# Patient Record
Sex: MALE | Race: WHITE | NOT HISPANIC OR LATINO | Employment: OTHER | URBAN - METROPOLITAN AREA
[De-identification: names, ages, dates, MRNs, and addresses within clinical notes are randomized per-mention and may not be internally consistent; named-entity substitution may affect disease eponyms.]

---

## 2017-02-13 ENCOUNTER — AMBULATORY - HEALTHEAST (OUTPATIENT)
Dept: ADMINISTRATIVE | Facility: REHABILITATION | Age: 52
End: 2017-02-13

## 2017-02-13 DIAGNOSIS — M25.511 RIGHT SHOULDER PAIN: ICD-10-CM

## 2017-02-13 DIAGNOSIS — Z98.890 STATUS POST ROTATOR CUFF SURGERY: ICD-10-CM

## 2017-04-07 ENCOUNTER — AMBULATORY - HEALTHEAST (OUTPATIENT)
Dept: ADMINISTRATIVE | Facility: REHABILITATION | Age: 52
End: 2017-04-07

## 2017-04-07 DIAGNOSIS — M25.511 PAIN IN RIGHT SHOULDER: ICD-10-CM

## 2018-02-15 ENCOUNTER — APPOINTMENT (OUTPATIENT)
Dept: MRI IMAGING | Facility: CLINIC | Age: 53
End: 2018-02-15
Attending: EMERGENCY MEDICINE
Payer: COMMERCIAL

## 2018-02-15 ENCOUNTER — APPOINTMENT (OUTPATIENT)
Dept: CT IMAGING | Facility: CLINIC | Age: 53
End: 2018-02-15
Attending: EMERGENCY MEDICINE
Payer: COMMERCIAL

## 2018-02-15 ENCOUNTER — HOSPITAL ENCOUNTER (EMERGENCY)
Facility: CLINIC | Age: 53
Discharge: HOME | End: 2018-02-15
Attending: EMERGENCY MEDICINE | Admitting: EMERGENCY MEDICINE
Payer: COMMERCIAL

## 2018-02-15 VITALS
WEIGHT: 240 LBS | RESPIRATION RATE: 13 BRPM | TEMPERATURE: 97.9 F | HEIGHT: 67 IN | DIASTOLIC BLOOD PRESSURE: 98 MMHG | OXYGEN SATURATION: 95 % | SYSTOLIC BLOOD PRESSURE: 161 MMHG | BODY MASS INDEX: 37.67 KG/M2

## 2018-02-15 DIAGNOSIS — R51.9 NONINTRACTABLE HEADACHE, UNSPECIFIED CHRONICITY PATTERN, UNSPECIFIED HEADACHE TYPE: ICD-10-CM

## 2018-02-15 DIAGNOSIS — K04.7 DENTAL INFECTION: ICD-10-CM

## 2018-02-15 LAB
ALBUMIN SERPL-MCNC: 3.4 G/DL (ref 3.4–5)
ALP SERPL-CCNC: 119 U/L (ref 40–150)
ALT SERPL W P-5'-P-CCNC: 24 U/L (ref 0–70)
ANION GAP SERPL CALCULATED.3IONS-SCNC: 9 MMOL/L (ref 3–14)
APTT PPP: 26 SEC (ref 22–37)
AST SERPL W P-5'-P-CCNC: 13 U/L (ref 0–45)
BASOPHILS # BLD AUTO: 0 10E9/L (ref 0–0.2)
BASOPHILS NFR BLD AUTO: 0.3 %
BILIRUB SERPL-MCNC: <0.1 MG/DL (ref 0.2–1.3)
BUN SERPL-MCNC: 13 MG/DL (ref 7–30)
CALCIUM SERPL-MCNC: 8.8 MG/DL (ref 8.5–10.1)
CHLORIDE SERPL-SCNC: 107 MMOL/L (ref 94–109)
CO2 SERPL-SCNC: 24 MMOL/L (ref 20–32)
CREAT SERPL-MCNC: 0.66 MG/DL (ref 0.66–1.25)
DIFFERENTIAL METHOD BLD: ABNORMAL
EOSINOPHIL # BLD AUTO: 0.3 10E9/L (ref 0–0.7)
EOSINOPHIL NFR BLD AUTO: 3.5 %
ERYTHROCYTE [DISTWIDTH] IN BLOOD BY AUTOMATED COUNT: 13.4 % (ref 10–15)
ERYTHROCYTE [SEDIMENTATION RATE] IN BLOOD BY WESTERGREN METHOD: 19 MM/H (ref 0–20)
GFR SERPL CREATININE-BSD FRML MDRD: >90 ML/MIN/1.7M2
GLUCOSE SERPL-MCNC: 163 MG/DL (ref 70–99)
HCT VFR BLD AUTO: 41.6 % (ref 40–53)
HGB BLD-MCNC: 12.9 G/DL (ref 13.3–17.7)
IMM GRANULOCYTES # BLD: 0.1 10E9/L (ref 0–0.4)
IMM GRANULOCYTES NFR BLD: 1.3 %
INR PPP: 0.91 (ref 0.86–1.14)
LYMPHOCYTES # BLD AUTO: 1.3 10E9/L (ref 0.8–5.3)
LYMPHOCYTES NFR BLD AUTO: 14.8 %
MCH RBC QN AUTO: 29.5 PG (ref 26.5–33)
MCHC RBC AUTO-ENTMCNC: 31 G/DL (ref 31.5–36.5)
MCV RBC AUTO: 95 FL (ref 78–100)
MONOCYTES # BLD AUTO: 0.7 10E9/L (ref 0–1.3)
MONOCYTES NFR BLD AUTO: 8.2 %
NEUTROPHILS # BLD AUTO: 6.3 10E9/L (ref 1.6–8.3)
NEUTROPHILS NFR BLD AUTO: 71.9 %
PLATELET # BLD AUTO: 321 10E9/L (ref 150–450)
POTASSIUM SERPL-SCNC: 4.2 MMOL/L (ref 3.4–5.3)
PROT SERPL-MCNC: 7.6 G/DL (ref 6.8–8.8)
RBC # BLD AUTO: 4.38 10E12/L (ref 4.4–5.9)
SODIUM SERPL-SCNC: 140 MMOL/L (ref 133–144)
WBC # BLD AUTO: 8.8 10E9/L (ref 4–11)

## 2018-02-15 PROCEDURE — 85652 RBC SED RATE AUTOMATED: CPT | Performed by: EMERGENCY MEDICINE

## 2018-02-15 PROCEDURE — 80053 COMPREHEN METABOLIC PANEL: CPT | Performed by: EMERGENCY MEDICINE

## 2018-02-15 PROCEDURE — 70496 CT ANGIOGRAPHY HEAD: CPT

## 2018-02-15 PROCEDURE — 99285 EMERGENCY DEPT VISIT HI MDM: CPT | Mod: 25

## 2018-02-15 PROCEDURE — 70450 CT HEAD/BRAIN W/O DYE: CPT | Mod: XS

## 2018-02-15 PROCEDURE — 85730 THROMBOPLASTIN TIME PARTIAL: CPT | Performed by: EMERGENCY MEDICINE

## 2018-02-15 PROCEDURE — 25000128 H RX IP 250 OP 636: Performed by: EMERGENCY MEDICINE

## 2018-02-15 PROCEDURE — 85610 PROTHROMBIN TIME: CPT | Performed by: EMERGENCY MEDICINE

## 2018-02-15 PROCEDURE — 99285 EMERGENCY DEPT VISIT HI MDM: CPT | Mod: Z6 | Performed by: EMERGENCY MEDICINE

## 2018-02-15 PROCEDURE — 25000132 ZZH RX MED GY IP 250 OP 250 PS 637: Performed by: EMERGENCY MEDICINE

## 2018-02-15 PROCEDURE — 96374 THER/PROPH/DIAG INJ IV PUSH: CPT

## 2018-02-15 PROCEDURE — 25000125 ZZHC RX 250: Performed by: EMERGENCY MEDICINE

## 2018-02-15 PROCEDURE — 96375 TX/PRO/DX INJ NEW DRUG ADDON: CPT

## 2018-02-15 PROCEDURE — 96361 HYDRATE IV INFUSION ADD-ON: CPT

## 2018-02-15 PROCEDURE — A9585 GADOBUTROL INJECTION: HCPCS | Performed by: EMERGENCY MEDICINE

## 2018-02-15 PROCEDURE — 85025 COMPLETE CBC W/AUTO DIFF WBC: CPT | Performed by: EMERGENCY MEDICINE

## 2018-02-15 PROCEDURE — 70553 MRI BRAIN STEM W/O & W/DYE: CPT

## 2018-02-15 RX ORDER — METOCLOPRAMIDE HYDROCHLORIDE 5 MG/ML
10 INJECTION INTRAMUSCULAR; INTRAVENOUS ONCE
Status: COMPLETED | OUTPATIENT
Start: 2018-02-15 | End: 2018-02-15

## 2018-02-15 RX ORDER — DIPHENHYDRAMINE HYDROCHLORIDE 50 MG/ML
25 INJECTION INTRAMUSCULAR; INTRAVENOUS ONCE
Status: COMPLETED | OUTPATIENT
Start: 2018-02-15 | End: 2018-02-15

## 2018-02-15 RX ORDER — GADOBUTROL 604.72 MG/ML
11 INJECTION INTRAVENOUS ONCE
Status: COMPLETED | OUTPATIENT
Start: 2018-02-15 | End: 2018-02-15

## 2018-02-15 RX ORDER — CLONIDINE HYDROCHLORIDE 0.1 MG/1
0.1 TABLET ORAL 2 TIMES DAILY
Qty: 60 TABLET | Refills: 0 | Status: SHIPPED | OUTPATIENT
Start: 2018-02-15 | End: 2020-12-30

## 2018-02-15 RX ORDER — LORAZEPAM 2 MG/ML
1 INJECTION INTRAMUSCULAR ONCE
Status: COMPLETED | OUTPATIENT
Start: 2018-02-15 | End: 2018-02-15

## 2018-02-15 RX ORDER — ONDANSETRON 4 MG/1
4 TABLET, ORALLY DISINTEGRATING ORAL EVERY 8 HOURS PRN
Qty: 12 TABLET | Refills: 1 | Status: SHIPPED | OUTPATIENT
Start: 2018-02-15 | End: 2018-02-27

## 2018-02-15 RX ORDER — OXYCODONE HYDROCHLORIDE 5 MG/1
15 TABLET ORAL ONCE
Status: COMPLETED | OUTPATIENT
Start: 2018-02-15 | End: 2018-02-15

## 2018-02-15 RX ORDER — CLONIDINE HYDROCHLORIDE 0.2 MG/1
0.2 TABLET ORAL ONCE
Status: DISCONTINUED | OUTPATIENT
Start: 2018-02-15 | End: 2018-02-15

## 2018-02-15 RX ORDER — KETOROLAC TROMETHAMINE 30 MG/ML
30 INJECTION, SOLUTION INTRAMUSCULAR; INTRAVENOUS ONCE
Status: COMPLETED | OUTPATIENT
Start: 2018-02-15 | End: 2018-02-15

## 2018-02-15 RX ORDER — SUMATRIPTAN 6 MG/.5ML
6 INJECTION, SOLUTION SUBCUTANEOUS ONCE
Status: COMPLETED | OUTPATIENT
Start: 2018-02-15 | End: 2018-02-15

## 2018-02-15 RX ORDER — IOPAMIDOL 755 MG/ML
70 INJECTION, SOLUTION INTRAVASCULAR ONCE
Status: COMPLETED | OUTPATIENT
Start: 2018-02-15 | End: 2018-02-15

## 2018-02-15 RX ORDER — SUMATRIPTAN 50 MG/1
50 TABLET, FILM COATED ORAL
Qty: 9 TABLET | Refills: 1 | Status: SHIPPED | OUTPATIENT
Start: 2018-02-15 | End: 2020-12-30

## 2018-02-15 RX ADMIN — SODIUM CHLORIDE 100 ML: 9 INJECTION, SOLUTION INTRAVENOUS at 14:22

## 2018-02-15 RX ADMIN — GADOBUTROL 11 ML: 604.72 INJECTION INTRAVENOUS at 17:47

## 2018-02-15 RX ADMIN — SODIUM CHLORIDE 1000 ML: 9 INJECTION, SOLUTION INTRAVENOUS at 12:36

## 2018-02-15 RX ADMIN — IOPAMIDOL 70 ML: 755 INJECTION, SOLUTION INTRAVENOUS at 14:22

## 2018-02-15 RX ADMIN — KETOROLAC TROMETHAMINE 30 MG: 30 INJECTION, SOLUTION INTRAMUSCULAR at 16:12

## 2018-02-15 RX ADMIN — OXYCODONE HYDROCHLORIDE 15 MG: 5 TABLET ORAL at 15:22

## 2018-02-15 RX ADMIN — LORAZEPAM 1 MG: 2 INJECTION INTRAMUSCULAR; INTRAVENOUS at 12:41

## 2018-02-15 RX ADMIN — DIPHENHYDRAMINE HYDROCHLORIDE 25 MG: 50 INJECTION, SOLUTION INTRAMUSCULAR; INTRAVENOUS at 12:37

## 2018-02-15 RX ADMIN — SUMATRIPTAN SUCCINATE 6 MG: 6 INJECTION SUBCUTANEOUS at 15:24

## 2018-02-15 RX ADMIN — METOCLOPRAMIDE 10 MG: 5 INJECTION, SOLUTION INTRAMUSCULAR; INTRAVENOUS at 12:40

## 2018-02-15 NOTE — PROGRESS NOTES
This writer clarified with Dr. Mendez that he wants the patient to have toradol with a toradol allergy. Pt wants to take the toradol.

## 2018-02-15 NOTE — ED AVS SNAPSHOT
[facility] Emergency Department    [address] [facility] [address]    Phone:  [phone]    Fax:  [phone]                                       Obed Nickerson   MRN: [id number]    Department:  [facility] Emergency Department   Date of Visit:  2/15/2018           After Visit Summary Signature Page     I have received my discharge instructions, and my questions have been answered. I have discussed any challenges I see with this plan with the nurse or doctor.    ..........................................................................................................................................  Patient/Patient Representative Signature      ..........................................................................................................................................  Patient Representative Print Name and Relationship to Patient    ..................................................               ................................................  Date                                            Time    ..........................................................................................................................................  Reviewed by Signature/Title    ...................................................              ..............................................  Date                                                            Time

## 2018-02-15 NOTE — ED AVS SNAPSHOT
[facility] Emergency Department    [address] [facility] [address]    Phone:  [phone]    Fax:  [phone]                                       Obed Nickerson   MRN: [id number]    Department:  [facility] Emergency Department   Date of Visit:  2/15/2018           Patient Information     Date Of Birth          1965        Your diagnoses for this visit were:     Nonintractable headache, unspecified chronicity pattern, unspecified headache type     Dental infection        You were seen by Elvin Mendez MD.      Follow-up Information     Schedule an appointment as soon as possible for a visit with [facility].    Why:  Follow-up in primary care clinic as soon as possible.    Contact information:    [address] [place] [address]          Schedule an appointment as soon as possible for a visit with [facility].    Specialty:  Neurology    Why:  For follow-up of headaches    Contact information:    [address] [facility] [address] [phone]    Additional information:    The medical center is located at   [address] [place] [address] ([address] in [place], [place] miles north   of [place]).        Schedule an appointment as soon as possible for a visit with Dentist.        Schedule an appointment as soon as possible for a visit with Neurology Clinic at [facility].    Contact information:    [phone]        Schedule an appointment as soon as possible for a visit with Oral Surgery Clinic at [facility].    Contact information:    [phone]        Schedule an appointment as soon as possible for a visit with [facility].    Why:  Also follow-up in your primary care clinic    Contact information:    [address] [place] [address]          Follow up with [facility] Emergency Department.    Specialty:  EMERGENCY MEDICINE    Why:   If symptoms worsen, or for new problems or concerns.    Contact information:    [address] [facility] [address] [phone]    Additional information:    The medical center is located at   [address] [facility] (between [address] in [place], four miles north   of [place]).      Discharge References/Attachments     HEADACHE, UNSPECIFIED (ENGLISH)    MIGRAINE AND TENSION HEADACHES, WHAT ARE? (ENGLISH)    HEADACHES, SELF-CARE FOR (ENGLISH)    HEADACHES, PREVENTING TENSION (ENGLISH)    DENTAL ABSCESS (ENGLISH)      24 Hour Appointment Hotline       To make an appointment at any [facility] clinic, call [phone] ([phone]). If you don't have a family doctor or clinic, we will help you find one. [facility] clinics are conveniently located to serve the needs of you and your family.             Review of your medicines      START taking        Dose / Directions Last dose taken    amoxicillin-clavulanate 875-125 MG per tablet   Commonly known as:  AUGMENTIN   Dose:  1 tablet   Quantity:  20 tablet        Take 1 tablet by mouth 2 times daily for 10 days   Refills:  0        cloNIDine 0.1 MG tablet   Commonly known as:  CATAPRES   Dose:  0.1 mg   Quantity:  60 tablet        Take 1 tablet (0.1 mg) by mouth 2 times daily   Refills:  0        ondansetron 4 MG ODT tab   Commonly known as:  ZOFRAN ODT   Dose:  4 mg   Quantity:  12 tablet        Take 1 tablet (4 mg) by mouth every 8 hours as needed for nausea   Refills:  1        SUMAtriptan 50 MG tablet   Commonly known as:  IMITREX   Dose:  50 mg   Quantity:  9 tablet        Take 1 tablet (50 mg) by mouth at onset of headache for migraine May repeat in 2 hours. Max 4 tablets/24 hours.   Refills:  1          Our records show that you are taking the medicines listed below. If these are incorrect, please call your family doctor or clinic.        Dose / Directions Last dose taken    ALPRAZOLAM PO   Dose:  0.5 mg        Take 0.5 mg by mouth 2  times daily as needed for anxiety   Refills:  0        BENADRYL PO   Dose:  25 mg        Take 25 mg by mouth every 8 hours as needed   Refills:  0        DULOXETINE HCL PO   Dose:  120 mg        Take 120 mg by mouth daily   Refills:  0        IBUPROFEN PO   Dose:  800 mg        Take 800 mg by mouth every 8 hours as needed for moderate pain   Refills:  0        insulin glargine 100 UNIT/ML injection   Commonly known as:  LANTUS   Dose:  20 Units        Inject 20 Units Subcutaneous At Bedtime   Refills:  0        lisinopril-hydrochlorothiazide 20-12.5 MG per tablet   Commonly known as:  PRINZIDE/ZESTORETIC   Dose:  2 tablet   Quantity:  180 tablet        Take 2 tablets by mouth daily (Needs follow-up appointment for this medication)   Refills:  0        METFORMIN HCL PO   Dose:  1000 mg        Take 1,000 mg by mouth 2 times daily (with meals)   Refills:  0        MORPHINE SULFATE ER PO   Dose:  30 mg        Take 30 mg by mouth 2 times daily   Refills:  0        OXYCODONE HCL PO   Dose:  15 mg        Take 15 mg by mouth every 8 hours   Refills:  0        TIZANIDINE HCL PO   Dose:  4 mg        Take 4 mg by mouth At Bedtime   Refills:  0                Prescriptions were sent or printed at these locations (4 Prescriptions)                   [id number] - [place], [place] - [address]   [address] [facility] [address]    Telephone:  [phone]   Fax:  [phone]   Hours:                  E-Prescribed (4 of 4)         amoxicillin-clavulanate (AUGMENTIN) 875-125 MG per tablet               SUMAtriptan (IMITREX) 50 MG tablet               ondansetron (ZOFRAN ODT) 4 MG ODT tab               cloNIDine (CATAPRES) 0.1 MG tablet                Procedures and tests performed during your visit     CBC with platelets, differential    CT Head Neck Angio w/o & w Contrast    CT Head w/o Contrast    Comprehensive metabolic panel    Erythrocyte sedimentation rate auto    INR    MR Brain w/o & w  Contrast    PTT      Orders Needing Specimen Collection     None      Pending Results     Date and Time Order Name Status Description    2/15/2018 1621 MR Brain w/o & w Contrast Preliminary             Pending Culture Results     No orders found from 2/13/2018 to 2/16/2018.            Pending Results Instructions     If you had any lab results that were not finalized at the time of your Discharge, you can call the ED Lab Result RN at [phone]. You will be contacted by this team for any positive Lab results or changes in treatment. The nurses are available 7 days a week from 10A to 6:30P.  You can leave a message 24 hours per day and they will return your call.        Test Results From Your Hospital Stay        2/15/2018  1:02 PM      Component Results     Component Value Ref Range & Units Status    WBC 8.8 4.0 - 11.0 10e9/L Final    RBC Count 4.38 (L) 4.4 - 5.9 10e12/L Final    Hemoglobin 12.9 (L) 13.3 - 17.7 g/dL Final    Hematocrit 41.6 40.0 - 53.0 % Final    MCV 95 78 - 100 fl Final    MCH 29.5 26.5 - 33.0 pg Final    MCHC 31.0 (L) 31.5 - 36.5 g/dL Final    RDW 13.4 10.0 - 15.0 % Final    Platelet Count 321 150 - 450 10e9/L Final    Diff Method Automated Method  Final    % Neutrophils 71.9 % Final    % Lymphocytes 14.8 % Final    % Monocytes 8.2 % Final    % Eosinophils 3.5 % Final    % Basophils 0.3 % Final    % Immature Granulocytes 1.3 % Final    Absolute Neutrophil 6.3 1.6 - 8.3 10e9/L Final    Absolute Lymphocytes 1.3 0.8 - 5.3 10e9/L Final    Absolute Monocytes 0.7 0.0 - 1.3 10e9/L Final    Absolute Eosinophils 0.3 0.0 - 0.7 10e9/L Final    Absolute Basophils 0.0 0.0 - 0.2 10e9/L Final    Abs Immature Granulocytes 0.1 0 - 0.4 10e9/L Final         2/15/2018  1:19 PM      Component Results     Component Value Ref Range & Units Status    Sodium 140 133 - 144 mmol/L Final    Potassium 4.2 3.4 - 5.3 mmol/L Final    Chloride 107 94 - 109 mmol/L Final    Carbon Dioxide 24 20 - 32 mmol/L Final    Anion Gap 9 3  - 14 mmol/L Final    Glucose 163 (H) 70 - 99 mg/dL Final    Urea Nitrogen 13 7 - 30 mg/dL Final    Creatinine 0.66 0.66 - 1.25 mg/dL Final    GFR Estimate >90 >60 mL/min/1.7m2 Final    Non  GFR Calc    GFR Estimate If Black >90 >60 mL/min/1.7m2 Final    African American GFR Calc    Calcium 8.8 8.5 - 10.1 mg/dL Final    Bilirubin Total <0.1 (L) 0.2 - 1.3 mg/dL Final    Albumin 3.4 3.4 - 5.0 g/dL Final    Protein Total 7.6 6.8 - 8.8 g/dL Final    Alkaline Phosphatase 119 40 - 150 U/L Final    ALT 24 0 - 70 U/L Final    AST 13 0 - 45 U/L Final         2/15/2018  1:29 PM      Component Results     Component Value Ref Range & Units Status    PTT 26 22 - 37 sec Final         2/15/2018  1:29 PM      Component Results     Component Value Ref Range & Units Status    INR 0.91 0.86 - 1.14 Final         2/15/2018  3:21 PM      Narrative     CT SCAN OF THE HEAD WITHOUT CONTRAST   2/15/2018 2:30 PM     HISTORY: Two weeks of intermittent poorly localized headache.     TECHNIQUE: Axial images of the head and coronal reformations without  IV contrast material. Radiation dose for this scan was reduced using  automated exposure control, adjustment of the mA and/or kV according  to patient size, or iterative reconstruction technique.    COMPARISON: None.    FINDINGS: There is no evidence of intracranial hemorrhage, mass, or  anomaly. The ventricles are normal in size, shape and configuration.  The brain parenchyma and subarachnoid spaces are normal.     Mild mucosal thickening within the visualized maxillary sinus. The  bony calvarium and bones of the skull base appear intact.         Impression     IMPRESSION: No evidence of acute intracranial hemorrhage, mass, or  herniation.      MARYURI ROSSI MD         2/15/2018  3:39 PM      Narrative     CT ANGIOGRAM OF THE HEAD AND NECK WITHOUT AND WITH CONTRAST  2/15/2018  2:41 PM     HISTORY: Evaluate for dissection/thromboembolism.     TECHNIQUE: CT angiography with an  injection of 70 mL Isovue 370 IV  with scans through the head and neck.  Images were transferred to a  separate 3-D workstation where multiplanar reformations and 3-D images  were created.  Estimates of carotid stenoses are made relative to the  distal internal carotid artery diameters except as noted. Radiation  dose for this scan was reduced using automated exposure control,  adjustment of the mA and/or kV according to patient size, or iterative  reconstruction technique.    COMPARISON: None.     CT HEAD FINDINGS: No contrast enhancing lesions. Cerebral blood flow  is grossly normal.     CT ANGIOGRAM HEAD FINDINGS: The major intracranial arteries including  the proximal branches of the anterior cerebral, middle cerebral, and  posterior cerebral arteries appear patent without vascular cutoff. No  aneurysm identified. There are multifocal intracranial stenosis of  both the anterior and posterior circulation, most pronounced including  focal stenosis of the mid P2 segments bilaterally. There is  atherosclerotic calcification of the cavernous internal carotid  arteries without significant stenosis. Venous circulation is  unremarkable.     CT ANGIOGRAM NECK FINDINGS:   Normal origin of the great vessels from the aortic arch.     Right carotid artery: The right common and internal carotid arteries  are patent. No significant stenosis.     Left carotid artery: The left common and internal carotid arteries are  patent. No significant stenosis.     Vertebral arteries: Vertebral arteries are patent without evidence of  dissection. No significant stenosis.     Other findings: Degenerative changes of the spine. Postoperative  changes of anterior fusion at C4-C5. Mild mucosal thickening along the  inferior left maxillary sinus. There is large periapical lucency  surrounding the left maxillary molar with erosion into the maxillary  sinus, this could represent odontogenic sinusitis.         Impression     IMPRESSION:   1.  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 This is nonspecific, but can be  seen in intracranial vasculitis. Intracranial atherosclerotic disease  would also be in the differential although it is considered less  likely as there is no significant atherosclerotic disease within the  neck.  2. Patent arteries in the neck without evidence of dissection. No  significant stenosis or atherosclerotic disease in the neck.  3. Mucosal thickening in the left maxillary sinus possibly  representing odontogenic sinusitis from the left maxillary molar.    Results discussed with Elvin Mendez at 3:30 PM on 2/15/2018.    MARYURI ROSSI MD         2/15/2018  4:31 PM      Component Results     Component Value Ref Range & Units Status    Sed Rate 19 0 - 20 mm/h Final         2/15/2018  6:05 PM      Narrative     MRI BRAIN WITHOUT AND WITH CONTRAST  2/15/2018  5:47 PM    HISTORY: Headache for 2 weeks. CT angiogram shows multiple  intracranial arterial stenoses in the anterior and posterior  circulation raising the possibility of vasculitis.     TECHNIQUE: Multiplanar, multisequence MRI of the brain without and  with 11mL Gadavist.    COMPARISON: None.    FINDINGS: The brain parenchyma, ventricles and subarachnoid spaces  appear normal. There is no evidence of hemorrhage, mass, acute  infarct, or anomaly. There are no gadolinium enhancing lesions.    Moderate mucosal thickening inferiorly in the left maxillary sinus.  The arteries at the base of the brain and the dural venous sinuses  appear patent.         Impression     IMPRESSION: Normal MRI of the brain. No evidence of infarct or white  matter disease.                Thank you for choosing [facility]       Thank you for choosing [facility] for your care. Our goal is always to provide you with excellent care. Hearing back from our patients is one way we can continue to improve our services. Please take a few minutes to complete  the written survey that you may receive in the mail after you visit with us. Thank you!        [facility] Information     [facility] gives you secure access to your electronic health record. If you see a primary care provider, you can also send messages to your care team and make appointments. If you have questions, please call your primary care clinic.  If you do not have a primary care provider, please call [phone] and they will assist you.        Care EveryWhere ID     This is your Care EveryWhere ID. This could be used by other organizations to access your [facility] medical records  [id number]        Equal Access to Services     [facility] : Logan Lao, albertina suarez, sola alberto. [facility] [phone].    ATENCIÓN: Si habla español, tiene a perdue disposición servicios gratuitos de asistencia lingüística. Llame al [phone].    We comply with applicable federal civil rights laws and [place] laws. We do not discriminate on the basis of race, color, national origin, age, disability, sex, sexual orientation, or gender identity.            After Visit Summary       This is your record. Keep this with you and show to your community pharmacist(s) and doctor(s) at your next visit.

## 2018-02-15 NOTE — ED PROVIDER NOTES
History     Chief Complaint   Patient presents with     Headache     HPI  Obed Nickerson is a 52 year old male with history of chronic pain syndrome and migraine 2 weeks of intermittent bilateral severe headaches. Sudden onset of headache approximately 2 weeks ago with benign straining with intermittent headaches since that time, precipitated by straining and without clear alleviating factors other than improvement from oxycodone 15 mg which he takes for chronic pain (in addition to morphine ER 30 mg). He also tried Imitrex from an old prescription (over one year old) and Zofran from his wife's prescription without headache relief.  Headaches are dull, aching, radiating from the frontal temporal areas posteriorly to the back of the head on each side. He has nausea and photophobia. No neck stiffness, rash, fever or chills.  No visual or acute neurologic deficit or abnormality. He has periods of asymptomatic, headache free, between the headaches. He has a distant history of migraine headaches and he does not recall what his migraines were like.  States he only gets these approximately once every 10 years.  No acute head injury.  Recent URI symptoms with gradual improvement since onset approximately 10 days ago. He is productive cough, resolved sore throat, nasal congestion and nasal drainage.  Also wonders if left lower molar tooth decay and recurring tooth infection could have anything to do with this headache.  No facial redness or swelling. He has not seen a dentist in the couple of months he has had the tooth symptoms intermittently.  No other acute complaints or concerns.    Problem List:    Patient Active Problem List    Diagnosis Date Noted     Encounter for long-term opiate analgesic use 03/04/2015     Priority: Medium     Colon cancer screening 01/16/2014     Priority: Medium     No known fam hx of colon cancer.         Morbid obesity (H) 01/16/2014     Priority: Medium     Pt reports being overweight in  his adult life.  He has sustained several injuries, especially a neck injury in .  Never tried any nutrition, medication, other weight management therapy.         Hyperlipidemia with target LDL less than 100 2014     Priority: Medium     Pt has not had prior Lipid testing, 2014 returned with .  Both parents with strokes and MI.  Father  from MI at 70.  Mother  from Pneuomnia, had heart failure as well.  Pt morbidly obese.    Diagnosis updated by automated process. Provider to review and confirm.       Screening for prostate cancer 2014     Priority: Medium     Pt's father had Prostate CA at 67.  Father smoked, drank.         Tinnitus of both ears 2014     Priority: Medium     Pt reports life-long ringing in both ears.  Was raised around farm equipment and airplanes.  Feels these are major contributors.  Has had audiology recently, per pt has mild hearing loss left worse than right and positive for tinnitus.  However they said there was no treatment available for tinnitus.         Hypertension goal BP (blood pressure) < 130/80 2014     Priority: Medium     Pt with elevated readings for past 5 years, generally in 140s and 90s, but today is 170s/110s (and at this level for past 3 years).  Has never been on BP med previously.  Currently obese, and has chronic pain, and also endorses sleep issues (secondary to pain) with frequent waking, but feels rested.  Feels that he very likely has ANAHI,  (and several family members have ANAHI- treated with CPAP), but is adamant that he could not tolerate a CPAP and is not interested in testing (more interested in this when discussing testing when we discussed Dental appliance as an alternative).      Interested in some blood pressure medications.         Chronic pain syndrome 2008     Priority: Medium     Previously followed by Dr. Moran at [facility], then left in , then started seeing Dr. Lo at [facility]  clinic in [place].  Plans to continue with this provider.  Here to establish care/PCP, does not want/need me to manage his chronic narcotics or pain.      Had neck injury in , and is s/p Cervical Fusion x 2 (2-6 presently).  Currently on Vicodin 5mg/325 QID.  Feels this helps the pain, though does not completely control pain.  He has been MS Contin, and OxyContin (briefly).  Had been on Neurontin (x 1 week- excessive SE), Amitriptyline, Tegretol (mental slow).  Has not tried Effexor or Cymbalta.   Is s/p SCS for lumbar radiculitis- which did not work and had it removed.  Has tried PT and numerous spine injection    Plans for some Carpal Tunnel surgery (pending EMG soon).  Has been told that he could have spine surgery, but is holding off surgery for as long as he can.         Cervicalgia 2008     Priority: Medium     2014  S/p cervical spine fusion x 2, (per pt C2-6).            Past Medical History:    Past Medical History:   Diagnosis Date     Chronic pain syndrome 2008     Tinnitus of both ears 2014       Past Surgical History:    Past Surgical History:   Procedure Laterality Date     ?? previous implanted morphine pump??  during     eventually explanted due to infection     APPENDECTOMY OPEN       FUSION CERVICAL ANTERIOR THREE+ LEVELS      C2-6     right lower leg limb reattachment       skin grafts      many       Family History:    Family History   Problem Relation Age of Onset     C.A.D. Mother 60     C.A.D. Father 72     CEREBROVASCULAR DISEASE Father 60      age 70; ETOH, smoker     Breast Cancer No family hx of      Cancer - colorectal No family hx of      Prostate Cancer Father      at 67       Social History:  Marital Status:   [2]  Social History   Substance Use Topics     Smoking status: Never Smoker     Smokeless tobacco: Not on file     Alcohol use No        Medications:      IBUPROFEN PO   DiphenhydrAMINE HCl (BENADRYL PO)   MORPHINE SULFATE ER PO   OXYCODONE  "HCL PO   TIZANIDINE HCL PO   insulin glargine (LANTUS) 100 UNIT/ML injection   DULOXETINE HCL PO   METFORMIN HCL PO   ALPRAZOLAM PO   amoxicillin-clavulanate (AUGMENTIN) 875-125 MG per tablet   SUMAtriptan (IMITREX) 50 MG tablet   ondansetron (ZOFRAN ODT) 4 MG ODT tab   cloNIDine (CATAPRES) 0.1 MG tablet   lisinopril-hydrochlorothiazide (PRINZIDE,ZESTORETIC) 20-12.5 MG per tablet   [DISCONTINUED] lisinopril (PRINIVIL,ZESTRIL) 10 MG tablet   [DISCONTINUED] simvastatin (ZOCOR) 40 MG tablet       Review of Systems  As mentioned above in the history present illness.  All other systems were reviewed and are negative.    Physical Exam   BP: (!) 199/114  Heart Rate: 105  Temp: 97.9  F (36.6  C)  Resp: 16  Height: 170.2 cm (5' 7\")  Weight: 108.9 kg (240 lb)  SpO2: 98 %      Physical Exam   Constitutional: He is oriented to person, place, and time. He appears well-developed and well-nourished. He appears distressed ( Anxious appearing but does not appear to be in any discomfort).   HENT:   Head: Normocephalic and atraumatic.   Right Ear: External ear normal.   Left Ear: External ear normal.   Mouth/Throat: Uvula is midline, oropharynx is clear and moist and mucous membranes are normal. No oral lesions. No trismus in the jaw. Dental caries present. No dental abscesses or uvula swelling. No oropharyngeal exudate, posterior oropharyngeal edema, posterior oropharyngeal erythema or tonsillar abscesses.   Eyes: Conjunctivae and EOM are normal.   Neck: Normal range of motion. Neck supple. Normal carotid pulses and no JVD present. Carotid bruit is not present. No tracheal deviation present. No thyromegaly present.   Cardiovascular: Normal rate, regular rhythm, normal heart sounds and intact distal pulses.  Exam reveals no gallop and no friction rub.    No murmur heard.  Pulmonary/Chest: Effort normal and breath sounds normal. No stridor. No respiratory distress. He has no wheezes. He has no rales.   Abdominal: Soft. He exhibits no " distension. There is no tenderness.   Musculoskeletal: Normal range of motion. He exhibits no edema or tenderness.   Lymphadenopathy:     He has cervical adenopathy ( Small tender left anterior cervical lymph node).   Neurological: He is alert and oriented to person, place, and time. He has normal strength. No cranial nerve deficit ( 2-12 intact) or sensory deficit. He exhibits normal muscle tone. Coordination and gait normal.   Skin: Skin is warm and dry. No rash noted. He is not diaphoretic. No erythema. No pallor.   Psychiatric: His behavior is normal.   Anxious appearing.   Nursing note and vitals reviewed.      ED Course     ED Course     Procedures          Results for orders placed or performed during the hospital encounter of 02/15/18   CT Head w/o Contrast    Narrative    CT SCAN OF THE HEAD WITHOUT CONTRAST   2/15/2018 2:30 PM     HISTORY: Two weeks of intermittent poorly localized headache.     TECHNIQUE: Axial images of the head and coronal reformations without  IV contrast material. Radiation dose for this scan was reduced using  automated exposure control, adjustment of the mA and/or kV according  to patient size, or iterative reconstruction technique.    COMPARISON: None.    FINDINGS: There is no evidence of intracranial hemorrhage, mass, or  anomaly. The ventricles are normal in size, shape and configuration.  The brain parenchyma and subarachnoid spaces are normal.     Mild mucosal thickening within the visualized maxillary sinus. The  bony calvarium and bones of the skull base appear intact.       Impression    IMPRESSION: No evidence of acute intracranial hemorrhage, mass, or  herniation.      MARYURI ROSSI MD   CT Head Neck Angio w/o & w Contrast    Narrative    CT ANGIOGRAM OF THE HEAD AND NECK WITHOUT AND WITH CONTRAST  2/15/2018  2:41 PM     HISTORY: Evaluate for dissection/thromboembolism.     TECHNIQUE: CT angiography with an injection of 70 mL Isovue 370 IV  with scans through the head and  neck.  Images were transferred to a  separate 3-D workstation where multiplanar reformations and 3-D images  were created.  Estimates of carotid stenoses are made relative to the  distal internal carotid artery diameters except as noted. Radiation  dose for this scan was reduced using automated exposure control,  adjustment of the mA and/or kV according to patient size, or iterative  reconstruction technique.    COMPARISON: None.     CT HEAD FINDINGS: No contrast enhancing lesions. Cerebral blood flow  is grossly normal.     CT ANGIOGRAM HEAD FINDINGS: The major intracranial arteries including  the proximal branches of the anterior cerebral, middle cerebral, and  posterior cerebral arteries appear patent without vascular cutoff. No  aneurysm identified. There are multifocal intracranial stenosis of  both the anterior and posterior circulation, most pronounced including  focal stenosis of the mid P2 segments bilaterally. There is  atherosclerotic calcification of the cavernous internal carotid  arteries without significant stenosis. Venous circulation is  unremarkable.     CT ANGIOGRAM NECK FINDINGS:   Normal origin of the great vessels from the aortic arch.     Right carotid artery: The right common and internal carotid arteries  are patent. No significant stenosis.     Left carotid artery: The left common and internal carotid arteries are  patent. No significant stenosis.     Vertebral arteries: Vertebral arteries are patent without evidence of  dissection. No significant stenosis.     Other findings: Degenerative changes of the spine. Postoperative  changes of anterior fusion at C4-C5. Mild mucosal thickening along the  inferior left maxillary sinus. There is large periapical lucency  surrounding the left maxillary molar with erosion into the maxillary  sinus, this could represent odontogenic sinusitis.       Impression    IMPRESSION:   1. Multifocal stenoses of the intracranial arteries involving both  the  anterior and posterior circulation. This is nonspecific, but can be  seen in intracranial vasculitis. Intracranial atherosclerotic disease  would also be in the differential although it is considered less  likely as there is no significant atherosclerotic disease within the  neck.  2. Patent arteries in the neck without evidence of dissection. No  significant stenosis or atherosclerotic disease in the neck.  3. Mucosal thickening in the left maxillary sinus possibly  representing odontogenic sinusitis from the left maxillary molar.    Results discussed with Elvin Mendez at 3:30 PM on 2/15/2018.    MARYURI ROSSI MD   MR Brain w/o & w Contrast    Narrative    MRI BRAIN WITHOUT AND WITH CONTRAST  2/15/2018  5:47 PM    HISTORY: Headache for 2 weeks. CT angiogram shows multiple  intracranial arterial stenoses in the anterior and posterior  circulation raising the possibility of vasculitis.     TECHNIQUE: Multiplanar, multisequence MRI of the brain without and  with 11mL Gadavist.    COMPARISON: None.    FINDINGS: The brain parenchyma, ventricles and subarachnoid spaces  appear normal. There is no evidence of hemorrhage, mass, acute  infarct, or anomaly. There are no gadolinium enhancing lesions.    Moderate mucosal thickening inferiorly in the left maxillary sinus.  The arteries at the base of the brain and the dural venous sinuses  appear patent.       Impression    IMPRESSION: Normal MRI of the brain. No evidence of infarct or white  matter disease.    CARLOS A MICHELE MD   CBC with platelets, differential   Result Value Ref Range    WBC 8.8 4.0 - 11.0 10e9/L    RBC Count 4.38 (L) 4.4 - 5.9 10e12/L    Hemoglobin 12.9 (L) 13.3 - 17.7 g/dL    Hematocrit 41.6 40.0 - 53.0 %    MCV 95 78 - 100 fl    MCH 29.5 26.5 - 33.0 pg    MCHC 31.0 (L) 31.5 - 36.5 g/dL    RDW 13.4 10.0 - 15.0 %    Platelet Count 321 150 - 450 10e9/L    Diff Method Automated Method     % Neutrophils 71.9 %    % Lymphocytes 14.8 %    % Monocytes 8.2 %     % Eosinophils 3.5 %    % Basophils 0.3 %    % Immature Granulocytes 1.3 %    Absolute Neutrophil 6.3 1.6 - 8.3 10e9/L    Absolute Lymphocytes 1.3 0.8 - 5.3 10e9/L    Absolute Monocytes 0.7 0.0 - 1.3 10e9/L    Absolute Eosinophils 0.3 0.0 - 0.7 10e9/L    Absolute Basophils 0.0 0.0 - 0.2 10e9/L    Abs Immature Granulocytes 0.1 0 - 0.4 10e9/L   Comprehensive metabolic panel   Result Value Ref Range    Sodium 140 133 - 144 mmol/L    Potassium 4.2 3.4 - 5.3 mmol/L    Chloride 107 94 - 109 mmol/L    Carbon Dioxide 24 20 - 32 mmol/L    Anion Gap 9 3 - 14 mmol/L    Glucose 163 (H) 70 - 99 mg/dL    Urea Nitrogen 13 7 - 30 mg/dL    Creatinine 0.66 0.66 - 1.25 mg/dL    GFR Estimate >90 >60 mL/min/1.7m2    GFR Estimate If Black >90 >60 mL/min/1.7m2    Calcium 8.8 8.5 - 10.1 mg/dL    Bilirubin Total <0.1 (L) 0.2 - 1.3 mg/dL    Albumin 3.4 3.4 - 5.0 g/dL    Protein Total 7.6 6.8 - 8.8 g/dL    Alkaline Phosphatase 119 40 - 150 U/L    ALT 24 0 - 70 U/L    AST 13 0 - 45 U/L   PTT   Result Value Ref Range    PTT 26 22 - 37 sec   INR   Result Value Ref Range    INR 0.91 0.86 - 1.14   Erythrocyte sedimentation rate auto   Result Value Ref Range    Sed Rate 19 0 - 20 mm/h         Medications   metoclopramide (REGLAN) injection 10 mg (10 mg Intravenous Given 2/15/18 1240)   diphenhydrAMINE (BENADRYL) injection 25 mg (25 mg Intravenous Given 2/15/18 1237)   LORazepam (ATIVAN) injection 1 mg (1 mg Intravenous Given 2/15/18 1241)   0.9% sodium chloride BOLUS (0 mLs Intravenous Stopped 2/15/18 1412)   iopamidol (ISOVUE-370) solution 70 mL (70 mLs Intravenous Given 2/15/18 1422)   sodium chloride 0.9 % bag 500mL for CT scan flush use (100 mLs Intravenous Given 2/15/18 1422)   ketorolac (TORADOL) injection 30 mg (30 mg Intravenous Given 2/15/18 1612)   oxyCODONE IR (ROXICODONE) tablet 15 mg (15 mg Oral Given 2/15/18 1522)   SUMAtriptan (IMITREX) injection 6 mg (6 mg Subcutaneous Given 2/15/18 1524)   gadobutrol (GADAVIST) injection 11 mL  (11 mLs Intravenous Given 2/15/18 1747)   sodium chloride (PF) 0.9% PF flush 10 mL (10 mLs Intracatheter Given 2/15/18 1747)       12:50 PM - Patient reports improvement in headache after migraine management.  He appears in no acute distress and is pleasant and cooperative.  Head CT without contrast was unremarkable.  Awaiting results of the the rest of his imaging evaluation .  He would like to try Imitrex and Toradol Toradol now. Toradol was felt to be acceptable to be given now that CT the head without contrast has been reported to be negative.     3:31 PM - Reviewed case with the Radiologist who interpreted his studies.     4:14 PM - Reviewed case and consulted Neurology, Dr. Cruz.  Recommended MRI study to differentiate atherosclerotic vascular disease versus vasculitis to explain his findings on CT study.  Doubt this is a cause of his headaches.  She recommended discharge on a baby aspirin daily if MRI negative.    MRI reports that MRI is booked until 9 PM and they are unable to perform his MRI expeditiously.    5:17 PM - Able to get patient into MRI now.     5:49 PM - Patient has returned from MRI.  He remains comfortable and significantly improved.  Essentially headache free.  He reports that he had great improvement with Toradol and he had no adverse reaction to Toradol.  He states that prior allergic reaction to Toradol is unclear for him and he would like this removed from his allergy list.  Allergy list states Toradol causes itching.    Assessments & Plan (with Medical Decision Making)   52-year-old male with chronic pain syndrome and history of migraine headaches, on high doses of OxyContin and Morphine extended release, with 2 weeks of intermittent bilateral headaches which appear to be precipitated by mild exertion, but not every exertional activity.  His last migraine headache was so long ago he does not recall if these are similar to prior migraines.  He is neurologically intact and without  meningeal signs.  He underwent CT/CTA head and neck studies which were remarkable for vascular stenoses in the anterior and posterior cerebral circulation concerning for atherosclerotic vascular disease versus vasculitis.  Neurologist was consulted and recommended MRI to differentiate and rule out vasculitis.  MRI study was normal.  Doubt vasculitis.  In addition sed rate was normal.  Afebrile with normal WBC. Hx, signs and sx and exam are most consistent with migraine HA. Doubt SAH/ICH, CVA, meningitis or encephalitis. Pt is improved significantly after migraine meds and pt is comfortable with d/c home. He was informed of the incidental finding of maxillary molar dental abscess eroding into the maxillary sinus with need for follow-up with oral surgery.  He reports left upper and left lower dental discomfort over the past several months, but has not had any facial redness or swelling or other concerning symptoms of this. He was given referral information for oral surgery clinic and expressed understanding of the need for follow-up of this finding as soon as possible.  He was prescribed Augmentin. He was provided a copy of his CT on disc to facilitate his outpatient follow-up of this and oral surgery clinic as soon as possible.  Neurologist recommended follow-up in the stroke neural clinic. Patient was provided instructions for supportive care and will return as needed for worsened condition or worsening symptoms, or new problems or concerns.  I prescribed Imitrex to try for headaches and Zofran to use if needed for nausea.  He also requested Clonidine which he was previously prescribed by his pain clinic physician.  This should also help his blood pressure which was elevated but significantly improved after headache pain management.    I have reviewed the nursing notes.    I have reviewed the findings, diagnosis, plan and need for follow up with the patient.      Discharge Medication List as of 2/15/2018  6:23 PM       START taking these medications    Details   amoxicillin-clavulanate (AUGMENTIN) 875-125 MG per tablet Take 1 tablet by mouth 2 times daily for 10 days, Disp-20 tablet, R-0, E-Prescribe      SUMAtriptan (IMITREX) 50 MG tablet Take 1 tablet (50 mg) by mouth at onset of headache for migraine May repeat in 2 hours. Max 4 tablets/24 hours., Disp-9 tablet, R-1, E-Prescribe      ondansetron (ZOFRAN ODT) 4 MG ODT tab Take 1 tablet (4 mg) by mouth every 8 hours as needed for nausea, Disp-12 tablet, R-1, E-Prescribe      cloNIDine (CATAPRES) 0.1 MG tablet Take 1 tablet (0.1 mg) by mouth 2 times daily, Disp-60 tablet, R-0, E-Prescribe             Final diagnoses:   Nonintractable headache, unspecified chronicity pattern, unspecified headache type   Dental infection       2/15/2018   [facility] EMERGENCY DEPARTMENT     Elvin Mendez MD  02/16/18 [id number]

## 2018-02-15 NOTE — ED NOTES
Headache on both sides of head off and on x 2 weeks - hx of infrequent migraine ha - 10 days of cough with wheeze at times-took oxycodone 15 mp po and morphine er 30 mg at 0700 for chronic neck pain

## 2018-02-16 ENCOUNTER — ANESTHESIA (OUTPATIENT)
Dept: EMERGENCY MEDICINE | Facility: CLINIC | Age: 53
End: 2018-02-16
Payer: COMMERCIAL

## 2018-02-16 ENCOUNTER — HOSPITAL ENCOUNTER (OUTPATIENT)
Facility: CLINIC | Age: 53
Setting detail: OBSERVATION
Discharge: HOME | End: 2018-02-17
Attending: EMERGENCY MEDICINE
Payer: COMMERCIAL

## 2018-02-16 ENCOUNTER — APPOINTMENT (OUTPATIENT)
Dept: GENERAL RADIOLOGY | Facility: CLINIC | Age: 53
End: 2018-02-16
Attending: EMERGENCY MEDICINE
Payer: COMMERCIAL

## 2018-02-16 ENCOUNTER — ANESTHESIA EVENT (OUTPATIENT)
Dept: EMERGENCY MEDICINE | Facility: CLINIC | Age: 53
End: 2018-02-16
Payer: COMMERCIAL

## 2018-02-16 ENCOUNTER — TELEPHONE (OUTPATIENT)
Dept: NEUROLOGY | Facility: CLINIC | Age: 53
End: 2018-02-16

## 2018-02-16 DIAGNOSIS — I10 ESSENTIAL HYPERTENSION: ICD-10-CM

## 2018-02-16 DIAGNOSIS — R51.9 ACUTE INTRACTABLE HEADACHE, UNSPECIFIED HEADACHE TYPE: ICD-10-CM

## 2018-02-16 LAB
ANION GAP SERPL CALCULATED.3IONS-SCNC: 8 MMOL/L (ref 3–14)
APPEARANCE CSF: CLEAR
BASOPHILS # BLD AUTO: 0 10E9/L (ref 0–0.2)
BASOPHILS NFR BLD AUTO: 0.6 %
BUN SERPL-MCNC: 13 MG/DL (ref 7–30)
CALCIUM SERPL-MCNC: 9.1 MG/DL (ref 8.5–10.1)
CHLORIDE SERPL-SCNC: 103 MMOL/L (ref 94–109)
CO2 SERPL-SCNC: 27 MMOL/L (ref 20–32)
COLOR CSF: COLORLESS
CREAT SERPL-MCNC: 0.68 MG/DL (ref 0.66–1.25)
CRP SERPL-MCNC: 9.1 MG/L (ref 0–8)
DIFFERENTIAL METHOD BLD: NORMAL
EOSINOPHIL # BLD AUTO: 0.3 10E9/L (ref 0–0.7)
EOSINOPHIL NFR BLD AUTO: 4.1 %
ERYTHROCYTE [DISTWIDTH] IN BLOOD BY AUTOMATED COUNT: 13.2 % (ref 10–15)
GFR SERPL CREATININE-BSD FRML MDRD: >90 ML/MIN/1.7M2
GLUCOSE CSF-MCNC: 85 MG/DL (ref 40–70)
GLUCOSE SERPL-MCNC: 126 MG/DL (ref 70–99)
HCT VFR BLD AUTO: 41.8 % (ref 40–53)
HGB BLD-MCNC: 13.9 G/DL (ref 13.3–17.7)
IMM GRANULOCYTES # BLD: 0.1 10E9/L (ref 0–0.4)
IMM GRANULOCYTES NFR BLD: 1.1 %
LYMPHOCYTES # BLD AUTO: 2 10E9/L (ref 0.8–5.3)
LYMPHOCYTES NFR BLD AUTO: 29.8 %
MCH RBC QN AUTO: 29.4 PG (ref 26.5–33)
MCHC RBC AUTO-ENTMCNC: 33.3 G/DL (ref 31.5–36.5)
MCV RBC AUTO: 88 FL (ref 78–100)
MONOCYTES # BLD AUTO: 0.7 10E9/L (ref 0–1.3)
MONOCYTES NFR BLD AUTO: 10.7 %
NEUTROPHILS # BLD AUTO: 3.5 10E9/L (ref 1.6–8.3)
NEUTROPHILS NFR BLD AUTO: 53.7 %
PLATELET # BLD AUTO: 353 10E9/L (ref 150–450)
POTASSIUM SERPL-SCNC: 3.8 MMOL/L (ref 3.4–5.3)
PROT CSF-MCNC: 54 MG/DL (ref 15–60)
RBC # BLD AUTO: 4.73 10E12/L (ref 4.4–5.9)
RBC # CSF MANUAL: 2 /UL (ref 0–2)
SODIUM SERPL-SCNC: 138 MMOL/L (ref 133–144)
SPECIMEN VOL CSF: 3 ML
TUBE # CSF: 4 #
WBC # BLD AUTO: 6.5 10E9/L (ref 4–11)
WBC # CSF MANUAL: 2 /UL (ref 0–5)

## 2018-02-16 PROCEDURE — 96375 TX/PRO/DX INJ NEW DRUG ADDON: CPT | Performed by: EMERGENCY MEDICINE

## 2018-02-16 PROCEDURE — G0378 HOSPITAL OBSERVATION PER HR: HCPCS

## 2018-02-16 PROCEDURE — 96374 THER/PROPH/DIAG INJ IV PUSH: CPT | Performed by: EMERGENCY MEDICINE

## 2018-02-16 PROCEDURE — 84157 ASSAY OF PROTEIN OTHER: CPT | Performed by: EMERGENCY MEDICINE

## 2018-02-16 PROCEDURE — 99285 EMERGENCY DEPT VISIT HI MDM: CPT | Mod: 25 | Performed by: EMERGENCY MEDICINE

## 2018-02-16 PROCEDURE — 87070 CULTURE OTHR SPECIMN AEROBIC: CPT | Performed by: EMERGENCY MEDICINE

## 2018-02-16 PROCEDURE — 62270 DX LMBR SPI PNXR: CPT | Performed by: EMERGENCY MEDICINE

## 2018-02-16 PROCEDURE — 85652 RBC SED RATE AUTOMATED: CPT | Performed by: EMERGENCY MEDICINE

## 2018-02-16 PROCEDURE — 80048 BASIC METABOLIC PNL TOTAL CA: CPT | Performed by: EMERGENCY MEDICINE

## 2018-02-16 PROCEDURE — 82945 GLUCOSE OTHER FLUID: CPT | Performed by: EMERGENCY MEDICINE

## 2018-02-16 PROCEDURE — 71046 X-RAY EXAM CHEST 2 VIEWS: CPT

## 2018-02-16 PROCEDURE — 96361 HYDRATE IV INFUSION ADD-ON: CPT | Performed by: EMERGENCY MEDICINE

## 2018-02-16 PROCEDURE — 25000132 ZZH RX MED GY IP 250 OP 250 PS 637: Performed by: EMERGENCY MEDICINE

## 2018-02-16 PROCEDURE — 25000128 H RX IP 250 OP 636: Performed by: EMERGENCY MEDICINE

## 2018-02-16 PROCEDURE — 96376 TX/PRO/DX INJ SAME DRUG ADON: CPT | Performed by: EMERGENCY MEDICINE

## 2018-02-16 PROCEDURE — 89050 BODY FLUID CELL COUNT: CPT | Performed by: EMERGENCY MEDICINE

## 2018-02-16 PROCEDURE — 37000011 ZZH ANESTHESIA WARD SERVICE: Performed by: NURSE ANESTHETIST, CERTIFIED REGISTERED

## 2018-02-16 PROCEDURE — 87205 SMEAR GRAM STAIN: CPT | Performed by: EMERGENCY MEDICINE

## 2018-02-16 PROCEDURE — 25000125 ZZHC RX 250: Performed by: NURSE ANESTHETIST, CERTIFIED REGISTERED

## 2018-02-16 PROCEDURE — 86140 C-REACTIVE PROTEIN: CPT | Performed by: EMERGENCY MEDICINE

## 2018-02-16 PROCEDURE — 83036 HEMOGLOBIN GLYCOSYLATED A1C: CPT | Performed by: FAMILY MEDICINE

## 2018-02-16 PROCEDURE — 40000671 ZZH STATISTIC ANESTHESIA CASE

## 2018-02-16 PROCEDURE — 85025 COMPLETE CBC W/AUTO DIFF WBC: CPT | Performed by: EMERGENCY MEDICINE

## 2018-02-16 RX ORDER — LORAZEPAM 2 MG/ML
1 INJECTION INTRAMUSCULAR ONCE
Status: COMPLETED | OUTPATIENT
Start: 2018-02-16 | End: 2018-02-16

## 2018-02-16 RX ORDER — METOCLOPRAMIDE HYDROCHLORIDE 5 MG/ML
10 INJECTION INTRAMUSCULAR; INTRAVENOUS ONCE
Status: COMPLETED | OUTPATIENT
Start: 2018-02-16 | End: 2018-02-16

## 2018-02-16 RX ORDER — HYDROMORPHONE HYDROCHLORIDE 1 MG/ML
0.5 INJECTION, SOLUTION INTRAMUSCULAR; INTRAVENOUS; SUBCUTANEOUS
Status: DISCONTINUED | OUTPATIENT
Start: 2018-02-16 | End: 2018-02-17

## 2018-02-16 RX ORDER — LIDOCAINE HYDROCHLORIDE 10 MG/ML
INJECTION, SOLUTION INFILTRATION; PERINEURAL PRN
Status: DISCONTINUED | OUTPATIENT
Start: 2018-02-16 | End: 2018-02-16

## 2018-02-16 RX ORDER — KETOROLAC TROMETHAMINE 30 MG/ML
30 INJECTION, SOLUTION INTRAMUSCULAR; INTRAVENOUS ONCE
Status: COMPLETED | OUTPATIENT
Start: 2018-02-16 | End: 2018-02-16

## 2018-02-16 RX ORDER — LIDOCAINE HYDROCHLORIDE 10 MG/ML
INJECTION, SOLUTION EPIDURAL; INFILTRATION; INTRACAUDAL; PERINEURAL
Status: DISCONTINUED
Start: 2018-02-16 | End: 2018-02-16 | Stop reason: HOSPADM

## 2018-02-16 RX ORDER — LISINOPRIL 10 MG/1
40 TABLET ORAL ONCE
Status: COMPLETED | OUTPATIENT
Start: 2018-02-16 | End: 2018-02-16

## 2018-02-16 RX ORDER — HYDROCHLOROTHIAZIDE 25 MG/1
25 TABLET ORAL ONCE
Status: COMPLETED | OUTPATIENT
Start: 2018-02-16 | End: 2018-02-16

## 2018-02-16 RX ORDER — DIPHENHYDRAMINE HYDROCHLORIDE 50 MG/ML
25 INJECTION INTRAMUSCULAR; INTRAVENOUS ONCE
Status: COMPLETED | OUTPATIENT
Start: 2018-02-16 | End: 2018-02-16

## 2018-02-16 RX ORDER — SODIUM CHLORIDE 9 MG/ML
1000 INJECTION, SOLUTION INTRAVENOUS CONTINUOUS
Status: DISCONTINUED | OUTPATIENT
Start: 2018-02-16 | End: 2018-02-17

## 2018-02-16 RX ORDER — LISINOPRIL AND HYDROCHLOROTHIAZIDE 12.5; 2 MG/1; MG/1
2 TABLET ORAL DAILY
Status: DISCONTINUED | OUTPATIENT
Start: 2018-02-16 | End: 2018-02-16 | Stop reason: CLARIF

## 2018-02-16 RX ADMIN — KETOROLAC TROMETHAMINE 30 MG: 30 INJECTION, SOLUTION INTRAMUSCULAR at 22:05

## 2018-02-16 RX ADMIN — LIDOCAINE HYDROCHLORIDE 120 MG: 10 INJECTION, SOLUTION INFILTRATION; PERINEURAL at 20:45

## 2018-02-16 RX ADMIN — DIPHENHYDRAMINE HYDROCHLORIDE 25 MG: 50 INJECTION, SOLUTION INTRAMUSCULAR; INTRAVENOUS at 18:05

## 2018-02-16 RX ADMIN — LORAZEPAM 1 MG: 2 INJECTION INTRAMUSCULAR; INTRAVENOUS at 18:05

## 2018-02-16 RX ADMIN — METOCLOPRAMIDE 10 MG: 5 INJECTION, SOLUTION INTRAMUSCULAR; INTRAVENOUS at 18:05

## 2018-02-16 RX ADMIN — HYDROCHLOROTHIAZIDE 25 MG: 25 TABLET ORAL at 22:54

## 2018-02-16 RX ADMIN — LISINOPRIL 40 MG: 10 TABLET ORAL at 22:55

## 2018-02-16 RX ADMIN — SODIUM CHLORIDE 1000 ML: 9 INJECTION, SOLUTION INTRAVENOUS at 19:45

## 2018-02-16 RX ADMIN — SODIUM CHLORIDE 1000 ML: 9 INJECTION, SOLUTION INTRAVENOUS at 18:00

## 2018-02-16 RX ADMIN — Medication 0.5 MG: at 19:45

## 2018-02-16 RX ADMIN — Medication 0.5 MG: at 21:03

## 2018-02-16 ASSESSMENT — ENCOUNTER SYMPTOMS
CONFUSION: 0
FEVER: 0
ABDOMINAL PAIN: 0
COLOR CHANGE: 0
SHORTNESS OF BREATH: 0
PHOTOPHOBIA: 1
RHINORRHEA: 1
DIARRHEA: 0
PALPITATIONS: 0
NECK PAIN: 1
NECK STIFFNESS: 0
HEADACHES: 1
CONSTIPATION: 0
NAUSEA: 1
WEAKNESS: 0
CHILLS: 0
VOMITING: 0
COUGH: 0

## 2018-02-16 NOTE — TELEPHONE ENCOUNTER
With the vessel imaging being concerning for vasculitis, he should definitely be seen in Stroke clinic ([facility]).    CY

## 2018-02-16 NOTE — IP AVS SNAPSHOT
MRN:[id number]                      After Visit Summary   2/16/2018    Obed Nickerson    MRN: [id number]           Thank you!     Thank you for choosing [facility] for your care. Our goal is always to provide you with excellent care. Hearing back from our patients is one way we can continue to improve our services. Please take a few minutes to complete the written survey that you may receive in the mail after you visit with us. Thank you!        Patient Information     Date Of Birth          1965        About your hospital stay     You were admitted on:  February 16, 2018 You last received care in the:  [facility]    You were discharged on:  February 17, 2018       Who to Call     For medical emergencies, please call 911.  For non-urgent questions about your medical care, please call your primary care provider or clinic, None          Attending Provider     Provider Specialty    Amanuel Chapman MD Emergency Medicine    Robles, Huseyin Faulkner MD Pulmonary    Lakhwinder Francis MD [facility]       Primary Care Provider Fax #    [facility] [phone]      Your next 10 appointments already scheduled     Mar 21, 2018  1:30 PM CDT   New Visit with Kady Glynn MD   [facility] ([facility]    [address] [address]   [phone]              Pending Results     Date and Time Order Name Status Description    2/16/2018 1746 CSF Culture Aerobic Bacterial Preliminary             Statement of Approval     Ordered          02/17/18 1506  I have reviewed and agree with all the recommendations and orders detailed in this document.  EFFECTIVE NOW     Approved and electronically signed by:  Lakhwinder Francis MD             Admission Information     Date & Time Provider Department Dept. Phone    2/16/2018 Lakhwinder Francis MD [facility] [phone]      Your Vitals Were     Blood  "Pressure Pulse Temperature Respirations Height Weight    163/92 110 97.7  F (36.5  C) (Oral) 18 1.702 m (5' 7\") 111.5 kg (245 lb 13 oz)    Pulse Oximetry BMI (Body Mass Index)                97% 38.5 kg/m2          Health: [facility] Information     [employer] gives you secure access to your electronic health record. If you see a primary care provider, you can also send messages to your care team and make appointments. If you have questions, please call your primary care clinic.  If you do not have a primary care provider, please call [phone] and they will assist you.        Care EveryWhere ID     This is your Care EveryWhere ID. This could be used by other organizations to access your [facility] medical records  [id number]        Equal Access to Services     MG HERNANDEZ : Logan Lao, albertina suarez, cyndi schmidt, sola zarate. So [facility] [phone].    ATENCIÓN: Si habla español, tiene a perdue disposición servicios gratuitos de asistencia lingüística. Llame al [phone].    We comply with applicable federal civil rights laws and [place] laws. We do not discriminate on the basis of race, color, national origin, age, disability, sex, sexual orientation, or gender identity.               Review of your medicines      START taking        Dose / Directions    ketorolac 10 MG tablet   Commonly known as:  TORADOL   Used for:  Acute intractable headache, unspecified headache type        Dose:  10 mg   Take 1 tablet (10 mg) by mouth every 6 hours as needed for moderate pain   Quantity:  14 tablet   Refills:  0       predniSONE 20 MG tablet   Commonly known as:  DELTASONE   Used for:  Acute intractable headache, unspecified headache type        Dose:  40 mg   Start taking on:  2/18/2018   Take 2 tablets (40 mg) by mouth daily for 4 days   Quantity:  8 tablet   Refills:  0         CONTINUE these medicines which may have CHANGED, or have new prescriptions. If we are " uncertain of the size of tablets/capsules you have at home, strength may be listed as something that might have changed.        Dose / Directions    lisinopril-hydrochlorothiazide 20-12.5 MG per tablet   Commonly known as:  PRINZIDE/ZESTORETIC   This may have changed:    - how much to take  - additional instructions   Used for:  Hypertension goal BP (blood pressure) < 130/80        Dose:  2 tablet   Take 2 tablets by mouth daily (Needs follow-up appointment for this medication)   Quantity:  180 tablet   Refills:  0       * OXYCODONE HCL PO   This may have changed:  Another medication with the same name was added. Make sure you understand how and when to take each.        Dose:  15 mg   Take 15 mg by mouth every 8 hours   Refills:  0       * oxyCODONE IR 10 MG tablet   Commonly known as:  ROXICODONE   This may have changed:  You were already taking a medication with the same name, and this prescription was added. Make sure you understand how and when to take each.   Used for:  Acute intractable headache, unspecified headache type        Dose:  10 mg   Take 1 tablet (10 mg) by mouth every 4 hours as needed for moderate to severe pain   Quantity:  30 tablet   Refills:  0       * Notice:  This list has 2 medication(s) that are the same as other medications prescribed for you. Read the directions carefully, and ask your doctor or other care provider to review them with you.      CONTINUE these medicines which have NOT CHANGED        Dose / Directions    ALPRAZOLAM PO        Dose:  0.5 mg   Take 0.5 mg by mouth 2 times daily as needed for anxiety   Refills:  0       amoxicillin-clavulanate 875-125 MG per tablet   Commonly known as:  AUGMENTIN        Dose:  1 tablet   Take 1 tablet by mouth 2 times daily for 10 days   Quantity:  20 tablet   Refills:  0       ASPIRIN PO        Dose:  81 mg   Take 81 mg by mouth daily   Refills:  0       BENADRYL PO        Dose:  25 mg   Take 25 mg by mouth every 8 hours as needed   Refills:   0       cloNIDine 0.1 MG tablet   Commonly known as:  CATAPRES        Dose:  0.1 mg   Take 1 tablet (0.1 mg) by mouth 2 times daily   Quantity:  60 tablet   Refills:  0       DULOXETINE HCL PO        Dose:  120 mg   Take 120 mg by mouth daily   Refills:  0       insulin glargine 100 UNIT/ML injection   Commonly known as:  LANTUS        Dose:  20 Units   Inject 20 Units Subcutaneous At Bedtime   Refills:  0       METFORMIN HCL PO        Dose:  1000 mg   Take 1,000 mg by mouth 2 times daily (with meals)   Refills:  0       MORPHINE SULFATE ER PO        Dose:  30 mg   Take 30 mg by mouth 2 times daily   Refills:  0       ondansetron 4 MG ODT tab   Commonly known as:  ZOFRAN ODT        Dose:  4 mg   Take 1 tablet (4 mg) by mouth every 8 hours as needed for nausea   Quantity:  12 tablet   Refills:  1       SUMAtriptan 50 MG tablet   Commonly known as:  IMITREX        Dose:  50 mg   Take 1 tablet (50 mg) by mouth at onset of headache for migraine May repeat in 2 hours. Max 4 tablets/24 hours.   Quantity:  9 tablet   Refills:  1       TIZANIDINE HCL PO        Dose:  4 mg   Take 4 mg by mouth At Bedtime   Refills:  0         STOP taking     IBUPROFEN PO                Where to get your medicines      These medications were sent to [facility] [address]  [address] [address]     Phone:  [phone]     ketorolac 10 MG tablet    predniSONE 20 MG tablet         Some of these will need a paper prescription and others can be bought over the counter. Ask your nurse if you have questions.     Bring a paper prescription for each of these medications     oxyCODONE IR 10 MG tablet                Protect others around you: Learn how to safely use, store and throw away your medicines at www.disposemymeds.org.        Information about OPIOIDS     PRESCRIPTION OPIOIDS: WHAT YOU NEED TO KNOW    Prescription opioids can be used to help relieve moderate to severe pain and are often  prescribed following a surgery or injury, or for certain health conditions. These medications can be an important part of treatment but also come with serious risks. It is important to work with your health care provider to make sure you are getting the safest, most effective care.    WHAT ARE THE RISKS AND SIDE EFFECTS OF OPIOID USE?  Prescription opioids carry serious risks of addiction and overdose, especially with prolonged use. An opioid overdose, often marked by slowed breathing can cause sudden death. The use of prescription opioids can have a number of side effects as well, even when taken as directed:      Tolerance - meaning you might need to take more of a medication for the same pain relief    Physical dependence - meaning you have symptoms of withdrawal when a medication is stopped    Increased sensitivity to pain    Constipation    Nausea, vomiting, and dry mouth    Sleepiness and dizziness    Confusion    Depression    Low levels of testosterone that can result in lower sex drive, energy, and strength    Itching and sweating    RISKS ARE GREATER WITH:    History of drug misuse, substance use disorder, or overdose    Mental health conditions (such as depression or anxiety)    Sleep apnea    Older age (65 years or older)    Pregnancy    Avoid alcohol while taking prescription opioids.   Also, unless specifically advised by your health care provider, medications to avoid include:    Benzodiazepines (such as Xanax or Valium)    Muscle relaxants (such as Soma or Flexeril)    Hypnotics (such as Ambien or Lunesta)    Other prescription opioids    KNOW YOUR OPTIONS:  Talk to your health care provider about ways to manage your pain that do not involve prescription opioids. Some of these options may actually work better and have fewer risks and side effects:    Pain relievers such as acetaminophen, ibuprofen, and naproxen    Some medications that are also used for depression or seizures    Physical therapy and  exercise    Cognitive behavioral therapy, a psychological, goal-directed approach, in which patients learn how to modify physical, behavioral, and emotional triggers of pain and stress    IF YOU ARE PRESCRIBED OPIOIDS FOR PAIN:    Never take opioids in greater amounts or more often than prescribed    Follow up with your primary health care provider and work together to create a plan on how to manage your pain.    Talk about ways to help manage your pain that do not involve prescription opioids    Talk about all concerns and side effects    Help prevent misuse and abuse    Never sell or share prescription opioids    Never use another person's prescription opioids    Store prescription opioids in a secure place and out of reach of others (this may include visitors, children, friends, and family)    Visit www.cdc.gov/drugoverdose to learn about risks of opioid abuse and overdose    If you believe you may be struggling with addiction, tell your health care provider and ask for guidance or call [facility]'s National Helpline at 0-657-915-HELP    LEARN MORE / www.cdc.gov/drugoverdose/prescribing/guideline.html    Safely dispose of unused prescription opioids: Find your local drug take-back programs and more information about the importance of safe disposal at www.doseofreality.mn.gov             Medication List: This is a list of all your medications and when to take them. Check marks below indicate your daily home schedule. Keep this list as a reference.      Medications           Morning Afternoon Evening Bedtime As Needed    ALPRAZOLAM PO   Take 0.5 mg by mouth 2 times daily as needed for anxiety                                amoxicillin-clavulanate 875-125 MG per tablet   Commonly known as:  AUGMENTIN   Take 1 tablet by mouth 2 times daily for 10 days   Last time this was given:  1 tablet on 2/17/2018  9:48 AM                                ASPIRIN PO   Take 81 mg by mouth daily   Last time this was given:  81 mg on  2/17/2018  9:49 AM                                BENADRYL PO   Take 25 mg by mouth every 8 hours as needed                                cloNIDine 0.1 MG tablet   Commonly known as:  CATAPRES   Take 1 tablet (0.1 mg) by mouth 2 times daily   Last time this was given:  0.1 mg on 2/17/2018  9:49 AM                                DULOXETINE HCL PO   Take 120 mg by mouth daily   Last time this was given:  120 mg on 2/17/2018  9:48 AM                                insulin glargine 100 UNIT/ML injection   Commonly known as:  LANTUS   Inject 20 Units Subcutaneous At Bedtime   Last time this was given:  20 Units on 2/17/2018  1:28 AM                                ketorolac 10 MG tablet   Commonly known as:  TORADOL   Take 1 tablet (10 mg) by mouth every 6 hours as needed for moderate pain   Last time this was given:  10 mg on 2/17/2018 11:03 AM                                lisinopril-hydrochlorothiazide 20-12.5 MG per tablet   Commonly known as:  PRINZIDE/ZESTORETIC   Take 2 tablets by mouth daily (Needs follow-up appointment for this medication)                                METFORMIN HCL PO   Take 1,000 mg by mouth 2 times daily (with meals)   Last time this was given:  1,000 mg on 2/17/2018  9:48 AM                                MORPHINE SULFATE ER PO   Take 30 mg by mouth 2 times daily   Last time this was given:  30 mg on 2/17/2018 11:03 AM                                ondansetron 4 MG ODT tab   Commonly known as:  ZOFRAN ODT   Take 1 tablet (4 mg) by mouth every 8 hours as needed for nausea                                * OXYCODONE HCL PO   Take 15 mg by mouth every 8 hours   Last time this was given:  5 mg on 2/17/2018 12:40 PM                                * oxyCODONE IR 10 MG tablet   Commonly known as:  ROXICODONE   Take 1 tablet (10 mg) by mouth every 4 hours as needed for moderate to severe pain   Last time this was given:  5 mg on 2/17/2018 12:40 PM                                predniSONE 20 MG  tablet   Commonly known as:  DELTASONE   Take 2 tablets (40 mg) by mouth daily for 4 days   Start taking on:  2/18/2018   Last time this was given:  40 mg on 2/17/2018  8:25 AM                                SUMAtriptan 50 MG tablet   Commonly known as:  IMITREX   Take 1 tablet (50 mg) by mouth at onset of headache for migraine May repeat in 2 hours. Max 4 tablets/24 hours.                                TIZANIDINE HCL PO   Take 4 mg by mouth At Bedtime                                * Notice:  This list has 2 medication(s) that are the same as other medications prescribed for you. Read the directions carefully, and ask your doctor or other care provider to review them with you.

## 2018-02-16 NOTE — TELEPHONE ENCOUNTER
Reason for Call:  Other appointment    Detailed comments: pt wife calling stating pt was in the ER for sever migraines x 2 weeks, had CT and MR done.  would like to get in to be seen sooner the March     Phone Number Patient can be reached at: Other phone number:  [phone] - wife Mirna*    Best Time: any     Can we leave a detailed message on this number? YES    Call taken on 2/16/2018 at 9:21 AM by Dee Fowler

## 2018-02-16 NOTE — TELEPHONE ENCOUNTER
"Spoke with patient.  Discussed that ER appears to have spoken to [facility] Neurologist, however they believe [facility] is booked out [facility] than Dr Glynn.    State they were verbally told by ER doc,  to get Neurology appt here [facility]  and also at the [facility] \"[facility]\".    Discussed that often times insurance may have difficulty reimburse for two Neurologists.  Often recommended to determine one or the other.  Suggested they take the Abx;  as well as Make the ASAP appt with PCP as stated in AVS.  RN ensured pt on the wait list and forward info to Dr Glynn to review the case info.        (The rad report did state:   IMPRESSION:   1. 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 This is nonspecific, but can be  seen in intracranial vasculitis. Intracranial atherosclerotic disease  would also be in the differential although it is considered less  likely as there is no significant atherosclerotic disease within the  neck.  2. Patent arteries in the neck without evidence of dissection. No  significant stenosis or atherosclerotic disease in the neck.  3. Mucosal thickening in the left maxillary sinus possibly  representing odontogenic sinusitis from the left maxillary molar.)    Sury Mathews RN  [facility]  "

## 2018-02-16 NOTE — IP AVS SNAPSHOT
[facility]    [address] [address]    Phone:  [phone]    Fax:  [phone]                                       After Visit Summary   2/16/2018    Obed Nickerson    MRN: [id number]           After Visit Summary Signature Page     I have received my discharge instructions, and my questions have been answered. I have discussed any challenges I see with this plan with the nurse or doctor.    ..........................................................................................................................................  Patient/Patient Representative Signature      ..........................................................................................................................................  Patient Representative Print Name and Relationship to Patient    ..................................................               ................................................  Date                                            Time    ..........................................................................................................................................  Reviewed by Signature/Title    ...................................................              ..............................................  Date                                                            Time

## 2018-02-16 NOTE — ED PROVIDER NOTES
History     Chief Complaint   Patient presents with     Headache     for 2 weeks, seen here yesterday for same     HPI  Obed Nickerson is a 52 year old male with history of essential hypertension, hyperlipidemia, chronic pain, migraines and morbid obesity who presents to the ED with wife by private car for evaluation of headache x 2 weeks. Pt was seen here in [facility] ED yesterday for his headache with labs, CT and MRI imaging with signs of atherosclerotic vascular disease, no signs of acute abnormalities. Pt discharged home yesterday and prescribed Clonidine, Zofran, Augmentin for dental pain and Imitrex. Pt reports he is currently taking these medications as prescribed but discontinued Lisinopril-HCTZ today as he thought there was an interaction with Zestoretic and Clonidine. Since seen yesterday pt reports increased bilateral temporal headache radiating down his neck. Associated nausea and photophobia. Pt additionally reports cough and congestion x 10 days. Dental pain a 1 month. Denies neck stiffness, changes in speech, weakness, fevers, chest pain, shortness of breath, changes in bowels or skin changes. Wife denies changes in mental status. Last migraine 10 years ago. History of spinal stimulator and osteomyelitis of the spine. History of concussions as a child. Excedrin, APAP, Ibuprofen and Magnesium additionally tried without alleviation of symptoms. Pt is on daily Oxycodone, OxyContin and morphine for chronic pain. No drug or ETOH use. Pt does not smoke. Current pain reported as 10/10.       Problem List:    Patient Active Problem List    Diagnosis Date Noted     Encounter for long-term opiate analgesic use 03/04/2015     Priority: Medium     Colon cancer screening 01/16/2014     Priority: Medium     No known fam hx of colon cancer.         Morbid obesity (H) 01/16/2014     Priority: Medium     Pt reports being overweight in his adult life.  He has sustained several injuries, especially a neck  injury in .  Never tried any nutrition, medication, other weight management therapy.         Hyperlipidemia with target LDL less than 100 2014     Priority: Medium     Pt has not had prior Lipid testing, 2014 returned with .  Both parents with strokes and MI.  Father  from MI at 70.  Mother  from Pneuomnia, had heart failure as well.  Pt morbidly obese.    Diagnosis updated by automated process. Provider to review and confirm.       Screening for prostate cancer 2014     Priority: Medium     Pt's father had Prostate CA at 67.  Father smoked, drank.         Tinnitus of both ears 2014     Priority: Medium     Pt reports life-long ringing in both ears.  Was raised around farm equipment and airplanes.  Feels these are major contributors.  Has had audiology recently, per pt has mild hearing loss left worse than right and positive for tinnitus.  However they said there was no treatment available for tinnitus.         Hypertension goal BP (blood pressure) < 130/80 2014     Priority: Medium     Pt with elevated readings for past 5 years, generally in 140s and 90s, but today is 170s/110s (and at this level for past 3 years).  Has never been on BP med previously.  Currently obese, and has chronic pain, and also endorses sleep issues (secondary to pain) with frequent waking, but feels rested.  Feels that he very likely has ANAHI,  (and several family members have ANAHI- treated with CPAP), but is adamant that he could not tolerate a CPAP and is not interested in testing (more interested in this when discussing testing when we discussed Dental appliance as an alternative).      Interested in some blood pressure medications.         Chronic pain syndrome 2008     Priority: Medium     Previously followed by Dr. Moran at [facility], then left in , then started seeing Dr. Lo at [facility] in [place].  Plans to continue with this provider.  Here to  establish care/PCP, does not want/need me to manage his chronic narcotics or pain.      Had neck injury in , and is s/p Cervical Fusion x 2 (2-6 presently).  Currently on Vicodin 5mg/325 QID.  Feels this helps the pain, though does not completely control pain.  He has been MS Contin, and OxyContin (briefly).  Had been on Neurontin (x 1 week- excessive SE), Amitriptyline, Tegretol (mental slow).  Has not tried Effexor or Cymbalta.   Is s/p SCS for lumbar radiculitis- which did not work and had it removed.  Has tried PT and numerous spine injection    Plans for some Carpal Tunnel surgery (pending EMG soon).  Has been told that he could have spine surgery, but is holding off surgery for as long as he can.         Cervicalgia 2008     Priority: Medium     2014  S/p cervical spine fusion x 2, (per pt C2-6).            Past Medical History:    Past Medical History:   Diagnosis Date     Chronic pain syndrome 2008     Tinnitus of both ears 2014       Past Surgical History:    Past Surgical History:   Procedure Laterality Date     ?? previous implanted morphine pump??  during     eventually explanted due to infection     APPENDECTOMY OPEN       FUSION CERVICAL ANTERIOR THREE+ LEVELS      C2-6     right lower leg limb reattachment       skin grafts      many       Family History:    Family History   Problem Relation Age of Onset     C.A.D. Mother 60     C.A.D. Father 72     CEREBROVASCULAR DISEASE Father 60      age 70; ETOH, smoker     Breast Cancer No family hx of      Cancer - colorectal No family hx of      Prostate Cancer Father      at 67       Social History:  Marital Status:   [2]  Social History   Substance Use Topics     Smoking status: Never Smoker     Smokeless tobacco: Not on file     Alcohol use No        Medications:      IBUPROFEN PO   DiphenhydrAMINE HCl (BENADRYL PO)   MORPHINE SULFATE ER PO   OXYCODONE HCL PO   TIZANIDINE HCL PO   insulin glargine (LANTUS) 100  UNIT/ML injection   DULOXETINE HCL PO   METFORMIN HCL PO   ALPRAZOLAM PO   amoxicillin-clavulanate (AUGMENTIN) 875-125 MG per tablet   SUMAtriptan (IMITREX) 50 MG tablet   ondansetron (ZOFRAN ODT) 4 MG ODT tab   cloNIDine (CATAPRES) 0.1 MG tablet   lisinopril-hydrochlorothiazide (PRINZIDE,ZESTORETIC) 20-12.5 MG per tablet   [DISCONTINUED] lisinopril (PRINIVIL,ZESTRIL) 10 MG tablet   [DISCONTINUED] simvastatin (ZOCOR) 40 MG tablet         Review of Systems   Constitutional: Negative for chills and fever.   HENT: Positive for congestion, postnasal drip and rhinorrhea.    Eyes: Positive for photophobia.   Respiratory: Negative for cough and shortness of breath.    Cardiovascular: Negative for chest pain, palpitations and leg swelling.   Gastrointestinal: Positive for nausea. Negative for abdominal pain, constipation, diarrhea and vomiting.   Genitourinary: Negative.    Musculoskeletal: Positive for neck pain. Negative for neck stiffness.   Skin: Negative for color change and rash.   Neurological: Positive for headaches. Negative for weakness.   Psychiatric/Behavioral: Negative for confusion.       Physical Exam   BP: (!) 208/138  Heart Rate: 81  Temp: 97.7  F (36.5  C)  Resp: 10  SpO2: 99 %      Physical Exam   Constitutional: He is oriented to person, place, and time. He appears well-developed and well-nourished. No distress.   HENT:   Head: Normocephalic and atraumatic.   Mouth/Throat: Oropharynx is clear and moist and mucous membranes are normal.   Eyes: Conjunctivae and EOM are normal. Pupils are equal, round, and reactive to light. Right eye exhibits no discharge. Left eye exhibits no discharge. No scleral icterus.   Neck: Normal range of motion. Neck supple.   Cardiovascular: Normal rate, regular rhythm, normal heart sounds and intact distal pulses.  Exam reveals no gallop and no friction rub.    No murmur heard.  Pulmonary/Chest: Effort normal and breath sounds normal. No respiratory distress. He has no wheezes.  He has no rales. He exhibits no tenderness.   Abdominal: Soft. Bowel sounds are normal. There is no tenderness.   Neurological: He is alert and oriented to person, place, and time. No cranial nerve deficit or sensory deficit.   Skin: Skin is warm. No rash noted. He is not diaphoretic.   Psychiatric: He has a normal mood and affect. His behavior is normal.     BP (!) 162/100  Temp 97.7  F (36.5  C) (Oral)  Resp 10  SpO2 99%      Nontoxic-appearing no respiratory distress alert and oriented x3.    Head atraumatic normocephalic    Cranial nerves; vision baseline fields intact, PERRL, EOMI, facial sensation intact to light touch, facial muscle tone intact and symmetrical, hearing grossly intact,swallowing without difficulty, voice baseline and normal, SCM  strength intact, tongue protrudes midline.  Palatal elevation symmetric    TM's unremarkable, EACs clear, oropharynx moist without lesions or erythema.  Dentition in poor repair, left upper bicuspid broken off at the base, no associated gingival swelling or fluctuance, no facial swelling or erythema    Neck supple full active painless range of motion no posterior midline tenderness.    No cervical adenopathy    Spine nontender to palpation     Lungs clear to auscultation no rales rhonchi or wheezes    Heart regular no murmur S3 or rub    Abdomen soft nontender bowel sounds positive no masses or HSM    Strength and sensation intact throughout the extremities, skin clear from rash or lesion.      ED Course     ED Course     Procedures              Critical Care time:  None    Results for orders placed or performed during the hospital encounter of 02/16/18   Chest XR,  PA & LAT    Narrative    CHEST TWO VIEWS   2/16/2018 8:29 PM     HISTORY: Cough.    COMPARISON: 5/30/2014.    FINDINGS: Heart and lungs negative. No interval change. Fusion  hardware lower cervical spine.      Impression    IMPRESSION: Negative.    JOHN MCNAMARA MD   Basic metabolic panel   Result  Value Ref Range    Sodium 138 133 - 144 mmol/L    Potassium 3.8 3.4 - 5.3 mmol/L    Chloride 103 94 - 109 mmol/L    Carbon Dioxide 27 20 - 32 mmol/L    Anion Gap 8 3 - 14 mmol/L    Glucose 126 (H) 70 - 99 mg/dL    Urea Nitrogen 13 7 - 30 mg/dL    Creatinine 0.68 0.66 - 1.25 mg/dL    GFR Estimate >90 >60 mL/min/1.7m2    GFR Estimate If Black >90 >60 mL/min/1.7m2    Calcium 9.1 8.5 - 10.1 mg/dL   CBC with platelets differential   Result Value Ref Range    WBC 6.5 4.0 - 11.0 10e9/L    RBC Count 4.73 4.4 - 5.9 10e12/L    Hemoglobin 13.9 13.3 - 17.7 g/dL    Hematocrit 41.8 40.0 - 53.0 %    MCV 88 78 - 100 fl    MCH 29.4 26.5 - 33.0 pg    MCHC 33.3 31.5 - 36.5 g/dL    RDW 13.2 10.0 - 15.0 %    Platelet Count 353 150 - 450 10e9/L    Diff Method Automated Method     % Neutrophils 53.7 %    % Lymphocytes 29.8 %    % Monocytes 10.7 %    % Eosinophils 4.1 %    % Basophils 0.6 %    % Immature Granulocytes 1.1 %    Absolute Neutrophil 3.5 1.6 - 8.3 10e9/L    Absolute Lymphocytes 2.0 0.8 - 5.3 10e9/L    Absolute Monocytes 0.7 0.0 - 1.3 10e9/L    Absolute Eosinophils 0.3 0.0 - 0.7 10e9/L    Absolute Basophils 0.0 0.0 - 0.2 10e9/L    Abs Immature Granulocytes 0.1 0 - 0.4 10e9/L   CRP inflammation   Result Value Ref Range    CRP Inflammation 9.1 (H) 0.0 - 8.0 mg/L   Glucose CSF: Tube 2   Result Value Ref Range    Glucose CSF 85 (H) 40 - 70 mg/dL   Protein total CSF: Tube 2   Result Value Ref Range    Protein Total CSF 54 15 - 60 mg/dL   Gram stain   Result Value Ref Range    Specimen Description Cerebrospinal fluid     Gram Stain No WBC's seen     Gram Stain No organisms seen     Gram Stain       Gram stain result is preliminary and awaits review of Microbiology Staff.  CALLED TO HEIDY RN ED       Medications   0.9% sodium chloride BOLUS (0 mLs Intravenous Stopped 2/16/18 1944)     Followed by   sodium chloride 0.9% infusion (1,000 mLs Intravenous New Bag 2/16/18 1945)   lidocaine (PF) (XYLOCAINE) 1 % injection (not administered)    HYDROmorphone (PF) (DILAUDID) injection 0.5 mg (0.5 mg Intravenous Given 2/16/18 2103)   diphenhydrAMINE (BENADRYL) injection 25 mg (25 mg Intravenous Given 2/16/18 1805)   metoclopramide (REGLAN) injection 10 mg (10 mg Intravenous Given 2/16/18 1805)   LORazepam (ATIVAN) injection 1 mg (1 mg Intravenous Given 2/16/18 1805)   ketorolac (TORADOL) injection 30 mg (30 mg Intravenous Given 2/16/18 2205)   lisinopril (PRINIVIL/ZESTRIL) tablet 40 mg (40 mg Oral Given 2/16/18 2255)     And   hydrochlorothiazide (HYDRODIURIL) tablet 25 mg (25 mg Oral Given 2/16/18 2254)     Procedure: Lumbar puncture  Indication headache rule out subarachnoid hemorrhage, infection  Risk benefits reviewed patient consented in written form  Patient placed in sitting position, L3-4 interspinous space identified using posterior superior iliac crest, 6 cm longitudinal scar present  Prepped and draped in usual fashion, attempted lumbar puncture at 3 levels and was unsuccessful, patient tolerated procedure without acute complication  CRNA ultimately obtained CSF.    Multiple different medications given as above for headache, ultimately responded best to Toradol with significant relief of headache.  Blood pressures slightly elevated 160/100 last.  I interviewed and examined the patient, supervised workup, discussed differential diagnoses, reviewed results of studies, agree with assessment, plan and contents of note which is completely reviewed, edited and contains my further additions, assessment and plan.  Dr. Amanuel Chapman    Assessments & Plan (with Medical Decision Making)  52-year-old male with a history of chronic pain, migraine, presents with severe headache, ×2 weeks, intractable, responded to above medications, hypertensive, did not take Zestoretic today.  No evidence for CNS bleed, infarct, mass, infection, vasculitis.  Patient will be admitted observation for ongoing blood pressure monitoring and pain control.  Reviewed with Dr. Robles  who accepts patient for admission.  Working diagnosis, plan and results of studies reviewed with patient and spouse expressed understanding and agreement.  Etiology of headache remains undetermined, may be secondary to labile hypertension, may be secondary to intractable migraine.     I have reviewed the nursing notes.    I have reviewed the findings, diagnosis, plan and need for follow up with the patient.       New Prescriptions    No medications on file       Final diagnoses:   Acute intractable headache, unspecified headache type   Essential hypertension       2/16/2018   [facility] EMERGENCY DEPARTMENT     Amanuel Chapman MD  02/16/18 [id number]       Amanuel Chapman MD  02/16/18 2223

## 2018-02-17 VITALS
RESPIRATION RATE: 18 BRPM | WEIGHT: 245.81 LBS | OXYGEN SATURATION: 97 % | HEART RATE: 110 BPM | HEIGHT: 67 IN | TEMPERATURE: 97.7 F | SYSTOLIC BLOOD PRESSURE: 163 MMHG | DIASTOLIC BLOOD PRESSURE: 92 MMHG | BODY MASS INDEX: 38.58 KG/M2

## 2018-02-17 PROBLEM — R51.9 HEADACHE: Status: ACTIVE | Noted: 2018-02-17

## 2018-02-17 LAB
ERYTHROCYTE [SEDIMENTATION RATE] IN BLOOD BY WESTERGREN METHOD: 18 MM/H (ref 0–20)
GLUCOSE BLDC GLUCOMTR-MCNC: 110 MG/DL (ref 70–99)
GLUCOSE BLDC GLUCOMTR-MCNC: 150 MG/DL (ref 70–99)
GLUCOSE BLDC GLUCOMTR-MCNC: 223 MG/DL (ref 70–99)
GRAM STN SPEC: NORMAL
HBA1C MFR BLD: 7.5 % (ref 4.3–6)
SPECIMEN SOURCE: NORMAL

## 2018-02-17 PROCEDURE — 25000132 ZZH RX MED GY IP 250 OP 250 PS 637: Performed by: FAMILY MEDICINE

## 2018-02-17 PROCEDURE — 25000128 H RX IP 250 OP 636

## 2018-02-17 PROCEDURE — 25000125 ZZHC RX 250: Performed by: FAMILY MEDICINE

## 2018-02-17 PROCEDURE — 96372 THER/PROPH/DIAG INJ SC/IM: CPT

## 2018-02-17 PROCEDURE — 25000132 ZZH RX MED GY IP 250 OP 250 PS 637

## 2018-02-17 PROCEDURE — 25000128 H RX IP 250 OP 636: Performed by: EMERGENCY MEDICINE

## 2018-02-17 PROCEDURE — 25000131 ZZH RX MED GY IP 250 OP 636 PS 637

## 2018-02-17 PROCEDURE — G0378 HOSPITAL OBSERVATION PER HR: HCPCS

## 2018-02-17 PROCEDURE — 00000146 ZZHCL STATISTIC GLUCOSE BY METER IP

## 2018-02-17 PROCEDURE — 99236 HOSP IP/OBS SAME DATE HI 85: CPT | Performed by: FAMILY MEDICINE

## 2018-02-17 PROCEDURE — 25000131 ZZH RX MED GY IP 250 OP 636 PS 637: Performed by: FAMILY MEDICINE

## 2018-02-17 PROCEDURE — 96376 TX/PRO/DX INJ SAME DRUG ADON: CPT

## 2018-02-17 RX ORDER — SUMATRIPTAN 50 MG/1
50 TABLET, FILM COATED ORAL
Status: DISCONTINUED | OUTPATIENT
Start: 2018-02-17 | End: 2018-02-17 | Stop reason: HOSPADM

## 2018-02-17 RX ORDER — OXYCODONE HYDROCHLORIDE 15 MG/1
15 TABLET ORAL EVERY 8 HOURS SCHEDULED
Status: DISCONTINUED | OUTPATIENT
Start: 2018-02-17 | End: 2018-02-17

## 2018-02-17 RX ORDER — MORPHINE SULFATE 30 MG/1
30 TABLET, FILM COATED, EXTENDED RELEASE ORAL EVERY 12 HOURS SCHEDULED
Status: DISCONTINUED | OUTPATIENT
Start: 2018-02-17 | End: 2018-02-17 | Stop reason: HOSPADM

## 2018-02-17 RX ORDER — PREDNISONE 20 MG/1
40 TABLET ORAL DAILY
Qty: 8 TABLET | Refills: 0 | Status: SHIPPED | OUTPATIENT
Start: 2018-02-18 | End: 2018-02-22

## 2018-02-17 RX ORDER — DEXTROSE MONOHYDRATE 25 G/50ML
25-50 INJECTION, SOLUTION INTRAVENOUS
Status: DISCONTINUED | OUTPATIENT
Start: 2018-02-17 | End: 2018-02-17 | Stop reason: HOSPADM

## 2018-02-17 RX ORDER — NICOTINE POLACRILEX 4 MG
15-30 LOZENGE BUCCAL
Status: DISCONTINUED | OUTPATIENT
Start: 2018-02-17 | End: 2018-02-17

## 2018-02-17 RX ORDER — NALOXONE HYDROCHLORIDE 0.4 MG/ML
.1-.4 INJECTION, SOLUTION INTRAMUSCULAR; INTRAVENOUS; SUBCUTANEOUS
Status: DISCONTINUED | OUTPATIENT
Start: 2018-02-17 | End: 2018-02-17 | Stop reason: HOSPADM

## 2018-02-17 RX ORDER — ONDANSETRON 4 MG/1
4 TABLET, ORALLY DISINTEGRATING ORAL EVERY 8 HOURS PRN
Status: DISCONTINUED | OUTPATIENT
Start: 2018-02-17 | End: 2018-02-17 | Stop reason: HOSPADM

## 2018-02-17 RX ORDER — KETOROLAC TROMETHAMINE 10 MG/1
10 TABLET, FILM COATED ORAL EVERY 6 HOURS PRN
Status: DISCONTINUED | OUTPATIENT
Start: 2018-02-17 | End: 2018-02-17 | Stop reason: HOSPADM

## 2018-02-17 RX ORDER — OXYCODONE HYDROCHLORIDE 15 MG/1
15 TABLET ORAL EVERY 8 HOURS SCHEDULED
Status: DISCONTINUED | OUTPATIENT
Start: 2018-02-17 | End: 2018-02-17 | Stop reason: HOSPADM

## 2018-02-17 RX ORDER — LISINOPRIL 40 MG/1
40 TABLET ORAL DAILY
Status: DISCONTINUED | OUTPATIENT
Start: 2018-02-17 | End: 2018-02-17 | Stop reason: HOSPADM

## 2018-02-17 RX ORDER — PREDNISONE 20 MG/1
40 TABLET ORAL DAILY
Status: DISCONTINUED | OUTPATIENT
Start: 2018-02-17 | End: 2018-02-17 | Stop reason: HOSPADM

## 2018-02-17 RX ORDER — HYDROCHLOROTHIAZIDE 25 MG/1
25 TABLET ORAL DAILY
Status: DISCONTINUED | OUTPATIENT
Start: 2018-02-17 | End: 2018-02-17 | Stop reason: HOSPADM

## 2018-02-17 RX ORDER — LISINOPRIL AND HYDROCHLOROTHIAZIDE 12.5; 2 MG/1; MG/1
2 TABLET ORAL DAILY
Status: DISCONTINUED | OUTPATIENT
Start: 2018-02-17 | End: 2018-02-17

## 2018-02-17 RX ORDER — MORPHINE SULFATE 30 MG/1
30 TABLET, FILM COATED, EXTENDED RELEASE ORAL EVERY 12 HOURS SCHEDULED
Status: DISCONTINUED | OUTPATIENT
Start: 2018-02-17 | End: 2018-02-17

## 2018-02-17 RX ORDER — CLONIDINE HYDROCHLORIDE 0.1 MG/1
0.1 TABLET ORAL 2 TIMES DAILY
Status: DISCONTINUED | OUTPATIENT
Start: 2018-02-17 | End: 2018-02-17 | Stop reason: HOSPADM

## 2018-02-17 RX ORDER — DEXTROSE MONOHYDRATE 25 G/50ML
25-50 INJECTION, SOLUTION INTRAVENOUS
Status: DISCONTINUED | OUTPATIENT
Start: 2018-02-17 | End: 2018-02-17

## 2018-02-17 RX ORDER — OXYCODONE HYDROCHLORIDE 5 MG/1
5 TABLET ORAL ONCE
Status: COMPLETED | OUTPATIENT
Start: 2018-02-17 | End: 2018-02-17

## 2018-02-17 RX ORDER — OXYCODONE HYDROCHLORIDE 5 MG/1
10-15 TABLET ORAL EVERY 4 HOURS PRN
Status: DISCONTINUED | OUTPATIENT
Start: 2018-02-17 | End: 2018-02-17 | Stop reason: HOSPADM

## 2018-02-17 RX ORDER — ASPIRIN 81 MG/1
81 TABLET, CHEWABLE ORAL DAILY
Status: DISCONTINUED | OUTPATIENT
Start: 2018-02-17 | End: 2018-02-17 | Stop reason: HOSPADM

## 2018-02-17 RX ORDER — IBUPROFEN 600 MG/1
600 TABLET, FILM COATED ORAL EVERY 6 HOURS PRN
Status: DISCONTINUED | OUTPATIENT
Start: 2018-02-17 | End: 2018-02-17 | Stop reason: ALTCHOICE

## 2018-02-17 RX ORDER — KETOROLAC TROMETHAMINE 30 MG/ML
30 INJECTION, SOLUTION INTRAMUSCULAR; INTRAVENOUS EVERY 6 HOURS PRN
Status: DISCONTINUED | OUTPATIENT
Start: 2018-02-17 | End: 2018-02-17

## 2018-02-17 RX ORDER — DULOXETIN HYDROCHLORIDE 30 MG/1
120 CAPSULE, DELAYED RELEASE ORAL DAILY
Status: DISCONTINUED | OUTPATIENT
Start: 2018-02-17 | End: 2018-02-17 | Stop reason: HOSPADM

## 2018-02-17 RX ORDER — KETOROLAC TROMETHAMINE 10 MG/1
10 TABLET, FILM COATED ORAL EVERY 4 HOURS PRN
Status: DISCONTINUED | OUTPATIENT
Start: 2018-02-17 | End: 2018-02-17

## 2018-02-17 RX ORDER — KETOROLAC TROMETHAMINE 10 MG/1
10 TABLET, FILM COATED ORAL EVERY 6 HOURS PRN
Qty: 14 TABLET | Refills: 0 | Status: SHIPPED | OUTPATIENT
Start: 2018-02-17 | End: 2020-12-30

## 2018-02-17 RX ORDER — NICOTINE POLACRILEX 4 MG
15-30 LOZENGE BUCCAL
Status: DISCONTINUED | OUTPATIENT
Start: 2018-02-17 | End: 2018-02-17 | Stop reason: HOSPADM

## 2018-02-17 RX ORDER — OXYCODONE HYDROCHLORIDE 10 MG/1
10 TABLET ORAL EVERY 4 HOURS PRN
Qty: 30 TABLET | Refills: 0 | Status: SHIPPED | OUTPATIENT
Start: 2018-02-17 | End: 2020-12-29

## 2018-02-17 RX ORDER — ALPRAZOLAM 0.5 MG
0.5 TABLET ORAL 2 TIMES DAILY PRN
Status: DISCONTINUED | OUTPATIENT
Start: 2018-02-17 | End: 2018-02-17 | Stop reason: HOSPADM

## 2018-02-17 RX ORDER — DIPHENHYDRAMINE HCL 25 MG
25 CAPSULE ORAL EVERY 8 HOURS PRN
Status: DISCONTINUED | OUTPATIENT
Start: 2018-02-17 | End: 2018-02-17 | Stop reason: HOSPADM

## 2018-02-17 RX ORDER — TIZANIDINE 2 MG/1
2-4 TABLET ORAL EVERY 6 HOURS PRN
Status: DISCONTINUED | OUTPATIENT
Start: 2018-02-17 | End: 2018-02-17 | Stop reason: HOSPADM

## 2018-02-17 RX ADMIN — CLONIDINE HYDROCHLORIDE 0.1 MG: 0.1 TABLET ORAL at 09:49

## 2018-02-17 RX ADMIN — MORPHINE SULFATE 30 MG: 30 TABLET, EXTENDED RELEASE ORAL at 11:03

## 2018-02-17 RX ADMIN — SODIUM CHLORIDE 1000 ML: 9 INJECTION, SOLUTION INTRAVENOUS at 02:51

## 2018-02-17 RX ADMIN — OXYCODONE HYDROCHLORIDE 5 MG: 5 TABLET ORAL at 12:40

## 2018-02-17 RX ADMIN — DULOXETINE HYDROCHLORIDE 120 MG: 30 CAPSULE, DELAYED RELEASE PELLETS ORAL at 09:48

## 2018-02-17 RX ADMIN — PREDNISONE 40 MG: 20 TABLET ORAL at 08:25

## 2018-02-17 RX ADMIN — KETOROLAC TROMETHAMINE 30 MG: 30 INJECTION, SOLUTION INTRAMUSCULAR at 03:56

## 2018-02-17 RX ADMIN — INSULIN GLARGINE 20 UNITS: 100 INJECTION, SOLUTION SUBCUTANEOUS at 01:28

## 2018-02-17 RX ADMIN — MORPHINE SULFATE 30 MG: 30 TABLET, EXTENDED RELEASE ORAL at 01:22

## 2018-02-17 RX ADMIN — OXYCODONE HYDROCHLORIDE 15 MG: 15 TABLET ORAL at 08:25

## 2018-02-17 RX ADMIN — OXYCODONE HYDROCHLORIDE 10 MG: 5 TABLET ORAL at 09:55

## 2018-02-17 RX ADMIN — INSULIN ASPART 2 UNITS: 100 INJECTION, SOLUTION INTRAVENOUS; SUBCUTANEOUS at 12:36

## 2018-02-17 RX ADMIN — HYDROCHLOROTHIAZIDE 25 MG: 25 TABLET ORAL at 09:49

## 2018-02-17 RX ADMIN — ASPIRIN 81 MG 81 MG: 81 TABLET ORAL at 09:49

## 2018-02-17 RX ADMIN — AMOXICILLIN AND CLAVULANATE POTASSIUM 1 TABLET: 875; 125 TABLET, FILM COATED ORAL at 09:48

## 2018-02-17 RX ADMIN — KETOROLAC TROMETHAMINE 10 MG: 10 TABLET, FILM COATED ORAL at 11:03

## 2018-02-17 RX ADMIN — OXYCODONE HYDROCHLORIDE 15 MG: 15 TABLET ORAL at 01:22

## 2018-02-17 RX ADMIN — LISINOPRIL 40 MG: 40 TABLET ORAL at 09:48

## 2018-02-17 RX ADMIN — METFORMIN HYDROCHLORIDE 1000 MG: 500 TABLET ORAL at 09:48

## 2018-02-17 ASSESSMENT — VISUAL ACUITY
OU: NORMAL ACUITY
OU: NORMAL ACUITY

## 2018-02-17 NOTE — H&P
[facility] History and Physical    Obed Nickerson MRN# [id number]   Age: 52 year old YOB: 1965     Date of Admission:  2/16/2018      Primary care provider: [facility]          Assessment and Plan:   Intractable headache  2/17/2018 -- unclear etiology - I suspect tension headache vs intractable migraine, but with description of incapacitating bursts over past week cluster headaches area at least a possibility and we'll try 15 minutes of high flow oxygen to see if this helps.  See discussion of vasculitis as below - awaiting call-back from neurology regarding this.  Otherwise, if looks like migraine/tension as above, toradol has been most helpful and we'll try switching to oral toradol 10 mg 4 times daily for 3 more days, then return to ibuprofen along with a prednisone burst 40 mg x 5 days and can use some extra doses of his oxycodone 15 mg over the next 5 days as well, plan for resumption of home dosing only of oxycodone after 5 days.   Can try some of his zanaflex during the day as well.  Instructed not to drive with these increases in oxycodone and Zanaflex if needed.  Continue home imitrex as needed.    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  This was discussed with general neurology by ER on 2/15 who thought it was likely atherosclerotic if her MRI was unremarkable - today I discussed it with stroke neurology from [facility] who reviewed his imaging and agreed that this appears to be intracranial athrosclerotic disease and not vasculitis especially with no new or progressive neurologic symptoms, no evidence of stroke or other changes on MRI, normal ESR and just faintly elevated CRP and normal LP.  They recommended treatment as above, with follow-up MRA shortly  prior to follow-up with stroke neuro in 2-3 months - patient and primary care provider can coordinate this as we cannot schedule with neurology on the weekend.      Maxillary molar dental abscess eroding into maxillary sinus  2/17/2018 -- subacute problem, may be compounding headaches as above.  On augmentin.  Needs outpatient follow-up with oral surgery/dentistry, patient aware and planning to schedule on Monday.       Chronic pain  2/17/2018 -- has chronic neck pain, previously seen by [place] [facility] 2 years ago, now managed by [facility].  On oxycodone 15 mg 3 times daily and  MS contin 15 mg twice daily.  Also takes tizanadine daily at bedtime.  Chronic pain is at baseline and adequately controlled with this.  Continue home medications with additions as above.      Type 2 diabetes   2/17/2018 -- A1c unknown.  Continue home Lantus 20u daily at bedtime.  Continue home metformin.  Medium dose sliding scale insulin while in hospital.       Essential hypertension   2/17/2018 -- blood pressure running high - of note patient stopped taking his lisinopril/hctz when he started the clonidine after ER visit 2 days ago, and had only been taking 1 tab daily prior to that - resume baseline dose 40/25 lisinopril/hctz, continue clonidine   - follow.      Morbid obesity    Prophylaxis  Low risk ambulate    Lines  PIV    Disposition    If headache is controlled with this oral regimen can discharge home today - if not having failed multiple ER visits and needing ongoing inpatient management of his medications would consider him to have failed observation and would transition to inpatient status.               Chief Complaint:   headache     History is obtained from the patient          History of Present Illness:   This patient is a 52 year old  male with a significant past medical history of chronic pain, hypertension, diabetes, and obesity who presents with headache.  Patient has had about 2 weeks of intractable  "headache. Thinks it initially started when trying to have a bowel movement, then resolved.  Then returned the next day and has not gone away since.  However, he has had about 5 episodes where it's become markedly worse over the past 2 weeks where he feels \"completely incapacitated\" by the headache for up to an hour or so, then it returns to it's baseline level.  Pain currently an 8/10, has been a 10 when it flares.  Says this is baseline and tolerable currently after the IV toradol early this AM.  Has tried multiple medications, but the toradol is what works the best for him.  Pain starts in frontal area bilaterally and radiates around the top of his head down to his neck. Not more on 1 side than the other.  Says it's about the same pain throughout this area.  Constant.  No vision changes, floaters or bluriness, but does have some mild photophobia and says some smells do seem to bother him more than usual.  No new numbness, weakness, speech changes, swallowing trouble or other neurologic symptoms.    Denies recent medication changes other than starting the clonidine and stopping the lisinopril/hctz after his ED visit 2 days ago.  Says the oxycodone and MS contin help some, but more for the chronic neck pain than the headache.    No fever or chills.  No cough, throat pain, urinary symptoms or other signs of infection.  Taking orals normally.    Reports never smoking, very rare alcohol - still on same bottle that he got for his wedding 8 years ago, none recently.  Denies any illicit drug use.              Past Medical History:   I have reviewed this patient's past medical history.  Patient Active Problem List    Diagnosis Date Noted     Headache 02/17/2018     Priority: Medium     Encounter for long-term opiate analgesic use 03/04/2015     Priority: Medium     Colon cancer screening 01/16/2014     Priority: Medium     No known fam hx of colon cancer.         Morbid obesity (H) 01/16/2014     Priority: Medium     Pt " reports being overweight in his adult life.  He has sustained several injuries, especially a neck injury in .  Never tried any nutrition, medication, other weight management therapy.         Hyperlipidemia with target LDL less than 100 2014     Priority: Medium     Pt has not had prior Lipid testing, 2014 returned with .  Both parents with strokes and MI.  Father  from MI at 70.  Mother  from Pneuomnia, had heart failure as well.  Pt morbidly obese.    Diagnosis updated by automated process. Provider to review and confirm.       Screening for prostate cancer 2014     Priority: Medium     Pt's father had Prostate CA at 67.  Father smoked, drank.         Tinnitus of both ears 2014     Priority: Medium     Pt reports life-long ringing in both ears.  Was raised around farm equipment and airplanes.  Feels these are major contributors.  Has had audiology recently, per pt has mild hearing loss left worse than right and positive for tinnitus.  However they said there was no treatment available for tinnitus.         Hypertension goal BP (blood pressure) < 130/80 2014     Priority: Medium     Pt with elevated readings for past 5 years, generally in 140s and 90s, but today is 170s/110s (and at this level for past 3 years).  Has never been on BP med previously.  Currently obese, and has chronic pain, and also endorses sleep issues (secondary to pain) with frequent waking, but feels rested.  Feels that he very likely has ANAHI,  (and several family members have ANAHI- treated with CPAP), but is adamant that he could not tolerate a CPAP and is not interested in testing (more interested in this when discussing testing when we discussed Dental appliance as an alternative).      Interested in some blood pressure medications.         Chronic pain syndrome 2008     Priority: Medium     Previously followed by Dr. Moran at [facility], then left in , then started seeing   Wally at [facility] in [place].  Plans to continue with this provider.  Here to establish care/PCP, does not want/need me to manage his chronic narcotics or pain.      Had neck injury in , and is s/p Cervical Fusion x 2 (2-6 presently).  Currently on Vicodin 5mg/325 QID.  Feels this helps the pain, though does not completely control pain.  He has been MS Contin, and OxyContin (briefly).  Had been on Neurontin (x 1 week- excessive SE), Amitriptyline, Tegretol (mental slow).  Has not tried Effexor or Cymbalta.   Is s/p SCS for lumbar radiculitis- which did not work and had it removed.  Has tried PT and numerous spine injection    Plans for some Carpal Tunnel surgery (pending EMG soon).  Has been told that he could have spine surgery, but is holding off surgery for as long as he can.         Cervicalgia 2008     Priority: Medium     2014  S/p cervical spine fusion x 2, (per pt C2-6).                  Past Surgical History:   I have reviewed this patient's past surgical history   Past Surgical History:   Procedure Laterality Date     ?? previous implanted morphine pump??  during     eventually explanted due to infection     APPENDECTOMY OPEN       FUSION CERVICAL ANTERIOR THREE+ LEVELS      C2-6     right lower leg limb reattachment       skin grafts      many             Social History:   I have reviewed this patient's social history   Social History   Substance Use Topics     Smoking status: Never Smoker     Smokeless tobacco: Not on file     Alcohol use No             Family History:   I have reviewed this patient's family history  Family History   Problem Relation Age of Onset     C.A.D. Mother 60     C.A.D. Father 72     CEREBROVASCULAR DISEASE Father 60      age 70; ETOH, smoker     Breast Cancer No family hx of      Cancer - colorectal No family hx of      Prostate Cancer Father      at 67             Immunizations:   Immunizations are current          Allergies:     Allergies    Allergen Reactions     Compazine [Prochlorperazine] Nausea     And jittery     Demerol Nausea     Gabapentin      Ultram [Tramadol] Itching             Medications:     Prescriptions Prior to Admission   Medication Sig Dispense Refill Last Dose     IBUPROFEN PO Take 800 mg by mouth every 8 hours as needed for moderate pain   2/16/2018 at Unknown time     DiphenhydrAMINE HCl (BENADRYL PO) Take 25 mg by mouth every 8 hours as needed   2/16/2018 at Unknown time     MORPHINE SULFATE ER PO Take 30 mg by mouth 2 times daily   2/16/2018 at Unknown time     OXYCODONE HCL PO Take 15 mg by mouth every 8 hours   2/16/2018 at 1100     TIZANIDINE HCL PO Take 4 mg by mouth At Bedtime   2/15/2018 at Unknown time     insulin glargine (LANTUS) 100 UNIT/ML injection Inject 20 Units Subcutaneous At Bedtime   2/15/2018 at Unknown time     DULOXETINE HCL PO Take 120 mg by mouth daily   2/16/2018 at Unknown time     METFORMIN HCL PO Take 1,000 mg by mouth 2 times daily (with meals)   2/16/2018 at Unknown time     ALPRAZOLAM PO Take 0.5 mg by mouth 2 times daily as needed for anxiety   2/16/2018 at Unknown time     amoxicillin-clavulanate (AUGMENTIN) 875-125 MG per tablet Take 1 tablet by mouth 2 times daily for 10 days 20 tablet 0 2/16/2018 at Unknown time     SUMAtriptan (IMITREX) 50 MG tablet Take 1 tablet (50 mg) by mouth at onset of headache for migraine May repeat in 2 hours. Max 4 tablets/24 hours. 9 tablet 1 2/16/2018 at Unknown time     ondansetron (ZOFRAN ODT) 4 MG ODT tab Take 1 tablet (4 mg) by mouth every 8 hours as needed for nausea 12 tablet 1 2/16/2018 at Unknown time     cloNIDine (CATAPRES) 0.1 MG tablet Take 1 tablet (0.1 mg) by mouth 2 times daily 60 tablet 0 2/16/2018 at Unknown time     lisinopril-hydrochlorothiazide (PRINZIDE,ZESTORETIC) 20-12.5 MG per tablet Take 2 tablets by mouth daily (Needs follow-up appointment for this medication) (Patient taking differently: Take 1 tablet by mouth daily (Needs follow-up  "appointment for this medication)) 180 tablet 0 2018 at Unknown time             Review of Systems:    ROS: 10 point ROS neg other than the symptoms noted above in the HPI.             Physical Exam:   Blood pressure (!) 162/98, pulse 97, temperature 98  F (36.7  C), temperature source Oral, resp. rate 18, height 1.702 m (5' 7\"), weight 111.5 kg (245 lb 13 oz), SpO2 96 %.  Temperatures:  Current - Temp: 98  F (36.7  C); Max - Temp  Av.8  F (36.6  C)  Min: 97.7  F (36.5  C)  Max: 98  F (36.7  C)  Respiration range: Resp  Avg: 15.3  Min: 10  Max: 18  Pulse range: Pulse  Av  Min: 97  Max: 97  Blood pressure range: Systolic (24hrs), Av , Min:154 , Max:208   ; Diastolic (24hrs), Av, Min:92, Max:138    Pulse oximetry range: SpO2  Av.4 %  Min: 95 %  Max: 99 %  No intake or output data in the 24 hours ending 18 0751  EXAM:   GENERAL APPEARANCE ADULT: Alert, no acute distress, oriented x 3  EYES: PERRL, EOM normal, conjunctiva and lids normal, EOM normal  HENT: oral mucous membranes moist, head atraumatic and normocephalic  NECK: No adenopathy,masses or thyromegaly, supple  CV: regular rate and rhythm no murmur  Pulm: clear to auscultation bilaterally  Abdomen: soft, non-tender, no masses, no masses, normal bowel sounds.  MS: extremities normal, no peripheral edema  SKIN: no suspicious lesions or rashes  NEURO: Alert, oriented, speech and mentation normal, Cranial nerves 2-12 are normal., Strength normal and symmetrical in upper and lower extremities.  Sensation to light touch intact throughout upper and lower extremities bilaterally.   Reflexes 2+ and symmetrical at biceps, triceps, knees and ankles. Finger to nose and heel to shin testing is normal.  PSYCH: mentation appears normal, affect and mood normal           Data:     Results for orders placed or performed during the hospital encounter of 18 (from the past 24 hour(s))   Basic metabolic panel   Result Value Ref Range    Sodium " 138 133 - 144 mmol/L    Potassium 3.8 3.4 - 5.3 mmol/L    Chloride 103 94 - 109 mmol/L    Carbon Dioxide 27 20 - 32 mmol/L    Anion Gap 8 3 - 14 mmol/L    Glucose 126 (H) 70 - 99 mg/dL    Urea Nitrogen 13 7 - 30 mg/dL    Creatinine 0.68 0.66 - 1.25 mg/dL    GFR Estimate >90 >60 mL/min/1.7m2    GFR Estimate If Black >90 >60 mL/min/1.7m2    Calcium 9.1 8.5 - 10.1 mg/dL   CBC with platelets differential   Result Value Ref Range    WBC 6.5 4.0 - 11.0 10e9/L    RBC Count 4.73 4.4 - 5.9 10e12/L    Hemoglobin 13.9 13.3 - 17.7 g/dL    Hematocrit 41.8 40.0 - 53.0 %    MCV 88 78 - 100 fl    MCH 29.4 26.5 - 33.0 pg    MCHC 33.3 31.5 - 36.5 g/dL    RDW 13.2 10.0 - 15.0 %    Platelet Count 353 150 - 450 10e9/L    Diff Method Automated Method     % Neutrophils 53.7 %    % Lymphocytes 29.8 %    % Monocytes 10.7 %    % Eosinophils 4.1 %    % Basophils 0.6 %    % Immature Granulocytes 1.1 %    Absolute Neutrophil 3.5 1.6 - 8.3 10e9/L    Absolute Lymphocytes 2.0 0.8 - 5.3 10e9/L    Absolute Monocytes 0.7 0.0 - 1.3 10e9/L    Absolute Eosinophils 0.3 0.0 - 0.7 10e9/L    Absolute Basophils 0.0 0.0 - 0.2 10e9/L    Abs Immature Granulocytes 0.1 0 - 0.4 10e9/L   CRP inflammation   Result Value Ref Range    CRP Inflammation 9.1 (H) 0.0 - 8.0 mg/L   Erythrocyte sedimentation rate auto   Result Value Ref Range    Sed Rate 18 0 - 20 mm/h   Chest XR,  PA & LAT    Narrative    CHEST TWO VIEWS   2/16/2018 8:29 PM     HISTORY: Cough.    COMPARISON: 5/30/2014.    FINDINGS: Heart and lungs negative. No interval change. Fusion  hardware lower cervical spine.      Impression    IMPRESSION: Negative.    JOHN MCNAMARA MD   Gram stain   Result Value Ref Range    Specimen Description Cerebrospinal fluid     Gram Stain No organisms seen     Gram Stain No WBC's seen     Gram Stain CALLED TO [facility]     Gram Stain       Gram stain review consistent with reported results.  Gram stain slide reviewed at the [facility]     Glucose CSF: Tube 2   Result Value Ref Range    Glucose CSF 85 (H) 40 - 70 mg/dL   Protein total CSF: Tube 2   Result Value Ref Range    Protein Total CSF 54 15 - 60 mg/dL   Cell count with differential CSF: Tube 4   Result Value Ref Range    WBC CSF 2 0 - 5 /uL    RBC CSF 2 0 - 2 /uL    Tube Number 4 #    Volume 3 mL    Color CSF Colorless CLRL^Colorless    Appearance CSF Clear CLER^Clear   Glucose by meter   Result Value Ref Range    Glucose 150 (H) 70 - 99 mg/dL   Glucose by meter   Result Value Ref Range    Glucose 110 (H) 70 - 99 mg/dL       All imaging studies reviewed by me.    Attestation:  I have reviewed today's vital signs, notes, medications, labs and imaging.  Amount of time performed on this history and physical: 70 minutes.        Lakhwinder Francis MD

## 2018-02-17 NOTE — PLAN OF CARE
Problem: Pain, Acute (Adult)  Goal: Identify Related Risk Factors and Signs and Symptoms  Related risk factors and signs and symptoms are identified upon initiation of Human Response Clinical Practice Guideline (CPG).   Outcome: No Change  Patient up with stand by assist.   Headache pain slightly improved with IV Toradol and home pain medications. Sensitive to light, washcloth covering eyes.   Patient left arm and right leg weaker then opposite side which is baseline for him.   Small scab on bottom of right foot.

## 2018-02-17 NOTE — ANESTHESIA PROCEDURE NOTES
Peripheral nerve/Neuraxial procedure note : lumbar puncture  Pre-Procedure  Performed by  RAMYA SHEEHAN   Location: ED      Pre-Anesthestic Checklist: patient identified, risks and benefits discussed, informed consent, monitors and equipment checked, pre-op evaluation and at physician/surgeon's request    Timeout  Correct Patient: Yes   Correct Procedure: Yes   Correct Site: Yes   Correct Laterality: N/A   Correct Position: Yes   Site Marked: N/A   .   Procedure Documentation  ASA 3  .    Procedure:    Lumbar puncture.  Insertion Site:L2-3  (midline approach)      Patient Prep;povidone-iodine 7.5% surgical scrub.  .  Needle: Megha tip Spinal Needle (gauge): 22  Spinal/LP Needle Length (inches): 4 # of attempts: 1 and  # of redirects:  1 No introducer used .       Assessment/Narrative  Paresthesias: No.  .  .  20 mL of clear CSF fluid removed while lateral   .

## 2018-02-17 NOTE — DISCHARGE SUMMARY
[facility] Discharge Summary    Obed Nickerson MRN# [id number]   Age: 52 year old YOB: 1965     Date of Admission:  2/16/2018  Date of Discharge::  2/17/2018  Admitting Physician:  Huseyin Robles MD  Discharge Physician:  Lakhwinder Francis MD, MD             Admission Diagnoses:   Essential hypertension [I10]  Acute intractable headache, unspecified headache type [R51]          Principle Discharge Diagnosis:     Intractable headache    See hospital course for further active diagnoses addressed during this admission.            Procedures:   No procedures performed during this admission          Medications Prior to Admission:     Prescriptions Prior to Admission   Medication Sig Dispense Refill Last Dose     ASPIRIN PO Take 81 mg by mouth daily        DiphenhydrAMINE HCl (BENADRYL PO) Take 25 mg by mouth every 8 hours as needed   2/16/2018 at Unknown time     MORPHINE SULFATE ER PO Take 30 mg by mouth 2 times daily   2/16/2018 at Unknown time     OXYCODONE HCL PO Take 15 mg by mouth every 8 hours   2/16/2018 at 1100     TIZANIDINE HCL PO Take 4 mg by mouth At Bedtime   2/15/2018 at Unknown time     insulin glargine (LANTUS) 100 UNIT/ML injection Inject 20 Units Subcutaneous At Bedtime   2/15/2018 at Unknown time     DULOXETINE HCL PO Take 120 mg by mouth daily   2/16/2018 at Unknown time     METFORMIN HCL PO Take 1,000 mg by mouth 2 times daily (with meals)   2/16/2018 at Unknown time     ALPRAZOLAM PO Take 0.5 mg by mouth 2 times daily as needed for anxiety   2/16/2018 at Unknown time     amoxicillin-clavulanate (AUGMENTIN) 875-125 MG per tablet Take 1 tablet by mouth 2 times daily for 10 days 20 tablet 0 2/16/2018 at Unknown time     SUMAtriptan (IMITREX) 50 MG tablet Take 1 tablet (50 mg) by mouth at onset of headache for migraine May repeat in 2 hours. Max 4 tablets/24 hours. 9 tablet 1 2/16/2018 at Unknown time     ondansetron (ZOFRAN ODT) 4 MG ODT tab Take 1 tablet (4 mg) by mouth  every 8 hours as needed for nausea 12 tablet 1 2/16/2018 at Unknown time     cloNIDine (CATAPRES) 0.1 MG tablet Take 1 tablet (0.1 mg) by mouth 2 times daily 60 tablet 0 2/16/2018 at Unknown time     lisinopril-hydrochlorothiazide (PRINZIDE,ZESTORETIC) 20-12.5 MG per tablet Take 2 tablets by mouth daily (Needs follow-up appointment for this medication) (Patient taking differently: Take 1 tablet by mouth daily (Needs follow-up appointment for this medication)) 180 tablet 0 2/16/2018 at Unknown time     [DISCONTINUED] IBUPROFEN PO Take 800 mg by mouth every 8 hours as needed for moderate pain   2/16/2018 at Unknown time             Discharge Medications:     Current Discharge Medication List      START taking these medications    Details   !! oxyCODONE IR (ROXICODONE) 10 MG tablet Take 1 tablet (10 mg) by mouth every 4 hours as needed for moderate to severe pain  Qty: 30 tablet, Refills: 0    Associated Diagnoses: Acute intractable headache, unspecified headache type      ketorolac (TORADOL) 10 MG tablet Take 1 tablet (10 mg) by mouth every 6 hours as needed for moderate pain  Qty: 14 tablet, Refills: 0    Associated Diagnoses: Acute intractable headache, unspecified headache type      predniSONE (DELTASONE) 20 MG tablet Take 2 tablets (40 mg) by mouth daily for 4 days  Qty: 8 tablet, Refills: 0    Associated Diagnoses: Acute intractable headache, unspecified headache type       !! - Potential duplicate medications found. Please discuss with provider.      CONTINUE these medications which have NOT CHANGED    Details   ASPIRIN PO Take 81 mg by mouth daily      DiphenhydrAMINE HCl (BENADRYL PO) Take 25 mg by mouth every 8 hours as needed      MORPHINE SULFATE ER PO Take 30 mg by mouth 2 times daily      !! OXYCODONE HCL PO Take 15 mg by mouth every 8 hours      TIZANIDINE HCL PO Take 4 mg by mouth At Bedtime      insulin glargine (LANTUS) 100 UNIT/ML injection Inject 20 Units Subcutaneous At Bedtime      DULOXETINE HCL  "PO Take 120 mg by mouth daily      METFORMIN HCL PO Take 1,000 mg by mouth 2 times daily (with meals)      ALPRAZOLAM PO Take 0.5 mg by mouth 2 times daily as needed for anxiety      amoxicillin-clavulanate (AUGMENTIN) 875-125 MG per tablet Take 1 tablet by mouth 2 times daily for 10 days  Qty: 20 tablet, Refills: 0      SUMAtriptan (IMITREX) 50 MG tablet Take 1 tablet (50 mg) by mouth at onset of headache for migraine May repeat in 2 hours. Max 4 tablets/24 hours.  Qty: 9 tablet, Refills: 1      ondansetron (ZOFRAN ODT) 4 MG ODT tab Take 1 tablet (4 mg) by mouth every 8 hours as needed for nausea  Qty: 12 tablet, Refills: 1      cloNIDine (CATAPRES) 0.1 MG tablet Take 1 tablet (0.1 mg) by mouth 2 times daily  Qty: 60 tablet, Refills: 0      lisinopril-hydrochlorothiazide (PRINZIDE,ZESTORETIC) 20-12.5 MG per tablet Take 2 tablets by mouth daily (Needs follow-up appointment for this medication)  Qty: 180 tablet, Refills: 0    Associated Diagnoses: Hypertension goal BP (blood pressure) < 130/80       !! - Potential duplicate medications found. Please discuss with provider.      STOP taking these medications       IBUPROFEN PO Comments:   Reason for Stopping:                     Consultations:   Phone consultation with stroke neurology           Brief History of Illness:     From Admission H+P:   This patient is a 52 year old  male with a significant past medical history of chronic pain, hypertension, diabetes, and obesity who presents with headache.  Patient has had about 2 weeks of intractable headache. Thinks it initially started when trying to have a bowel movement, then resolved.  Then returned the next day and has not gone away since.  However, he has had about 5 episodes where it's become markedly worse over the past 2 weeks where he feels \"completely incapacitated\" by the headache for up to an hour or so, then it returns to it's baseline level.  Pain currently an 8/10, has been a 10 when it flares.  " Says this is baseline and tolerable currently after the IV toradol early this AM.  Has tried multiple medications, but the toradol is what works the best for him.  Pain starts in frontal area bilaterally and radiates around the top of his head down to his neck. Not more on 1 side than the other.  Says it's about the same pain throughout this area.  Constant.  No vision changes, floaters or bluriness, but does have some mild photophobia and says some smells do seem to bother him more than usual.  No new numbness, weakness, speech changes, swallowing trouble or other neurologic symptoms.    Denies recent medication changes other than starting the clonidine and stopping the lisinopril/hctz after his ED visit 2 days ago.  Says the oxycodone and MS contin help some, but more for the chronic neck pain than the headache.    No fever or chills.  No cough, throat pain, urinary symptoms or other signs of infection.  Taking orals normally.     Reports never smoking, very rare alcohol - still on same bottle that he got for his wedding 8 years ago, none recently.  Denies any illicit drug use.                 TODAY:     See progress note from today - headache improved but not resolved, current oral regimen at least controlling symptoms, patient thinks he'd be ok at home with current regimen for now.  Planned follow-up with primary care provider this week.               Hospital Course:     Intractable headache  2/17/2018 -- unclear etiology - I suspect tension headache vs intractable migraine, but with description of incapacitating bursts over past week cluster headaches area at least a possibility and we'll try 15 minutes of high flow oxygen to see if this helps.  See discussion of vasculitis as below - awaiting call-back from neurology regarding this.  Otherwise, if looks like migraine/tension as above, toradol has been most helpful and we'll try switching to oral toradol 10 mg 4 times daily for 3 more days, then return to  ibuprofen along with a prednisone burst 40 mg x 5 days and can use some extra doses of his oxycodone 15 mg over the next 5 days as well, plan for resumption of home dosing only of oxycodone after 5 days.   Can try some of his zanaflex during the day as well.  Instructed not to drive with these increases in oxycodone and Zanaflex if needed.  Continue home imitrex as needed.  UPDATE: doing well with oral toradol, prednisone and some extra oxycodone planned to be used for 1 week or less.  No other concerns.       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  This was discussed with general neurology by ER on 2/15 who thought it was likely atherosclerotic if her MRI was unremarkable - today I discussed it with stroke neurology from [facility] who reviewed his imaging and agreed that this appears to be intracranial athrosclerotic disease and not vasculitis especially with no new or progressive neurologic symptoms, no evidence of stroke or other changes on MRI, normal ESR and just faintly elevated CRP and normal LP.  They recommended treatment as above, with follow-up MRA shortly prior to follow-up with stroke neuro in 2-3 months - patient and primary care provider can coordinate this as we cannot schedule with neurology on the weekend.       Maxillary molar dental abscess eroding into maxillary sinus  2/17/2018 -- subacute problem, may be compounding headaches as above.  On augmentin.  Needs outpatient follow-up with oral surgery/dentistry, patient aware and planning to schedule on Monday.        Chronic pain  2/17/2018 -- has chronic neck pain, previously seen by [facility] 2 years ago, now managed by [facility].  On oxycodone 15 mg 3 times daily and  MS contin 15 mg twice daily.  Also takes tizanadine daily at bedtime.   Chronic pain is at baseline and adequately controlled with this.  Continue home medications with additions as above.       Type 2 diabetes   2/17/2018 -- A1c unknown.  Continue home Lantus 20u daily at bedtime.  Continue home metformin.  Medium dose sliding scale insulin while in hospital.        Essential hypertension   2/17/2018 -- blood pressure running high - of note patient stopped taking his lisinopril/hctz when he started the clonidine after ER visit 2 days ago, and had only been taking 1 tab daily prior to that - resume baseline dose 40/25 lisinopril/hctz, continue clonidine   - follow.       Morbid obesity     Prophylaxis  Low risk ambulate             Discharge Instructions and Follow-Up:   Discharge diet: Regular   Discharge activity: Activity as tolerated   Discharge follow-up: Follow up with primary care provider within 7 days           Discharge Disposition:   Discharged to home      Attestation:  I have reviewed today's vital signs, notes, medications, labs and imaging.  Amount of time performed on this discharge summary: 30 minutes.    Lakhwinder Francis MD, MD

## 2018-02-17 NOTE — PROGRESS NOTES
Secondary skin audit complete and agree with primary nurses assessment of skin audit and milad scale.

## 2018-02-17 NOTE — ANESTHESIA POSTPROCEDURE EVALUATION
Patient: Obed Nickerson    * No procedures listed *    Diagnosis:* No pre-op diagnosis entered *  Diagnosis Additional Information: No value filed.    Anesthesia Type:  Spinal    Note:  Anesthesia Post Evaluation    Patient location during evaluation: Bedside  Patient participation: Able to fully participate in evaluation  Level of consciousness: awake and alert  Pain management: adequate  Airway patency: patent  Cardiovascular status: acceptable  Respiratory status: acceptable  Hydration status: acceptable  PONV: none     Anesthetic complications: None          Last vitals:  Vitals:    02/16/18 1945 02/16/18 2000 02/16/18 2015   BP: (!) 160/99 (!) 167/125 (!) 165/98   Resp:      Temp:      SpO2: 98% 97% 97%         Electronically Signed By: CATHIE Reina CRNA  February 16, 2018  9:06 PM

## 2018-02-17 NOTE — PROGRESS NOTES
Patient has continued to complain of intermittent headache pain despite interventions. Patient does feel he can manage at home. Dr. Francis in to see him and planning to discharge.

## 2018-02-17 NOTE — PROGRESS NOTES
[facility] [facility] DISCHARGE NOTE    Patient discharged to home at 3:48 PM via wheel chair. Accompanied by staff. Discharge instructions reviewed with patient, opportunity offered to ask questions. Prescriptions sent to patients preferred pharmacy. All belongings sent with patient.    Beulah Cavazos

## 2018-02-17 NOTE — ED NOTES
"Patient has  [place] to Observation  order. Patient has been given the Observation brochure -  What does Observation mean to me.\"  Patient has been given the opportunity to ask questions about observation status and their plan of care.      Ramona Souza  "

## 2018-02-17 NOTE — ANESTHESIA PREPROCEDURE EVALUATION
Anesthesia Evaluation     .             ROS/MED HX    ENT/Pulmonary:       Neurologic:       Cardiovascular:     (+) Dyslipidemia, hypertension----. : . . . :. .       METS/Exercise Tolerance:     Hematologic:         Musculoskeletal:         GI/Hepatic:         Renal/Genitourinary:         Endo:     (+) Obesity, .      Psychiatric:         Infectious Disease:         Malignancy:         Other:    (+) H/O Chronic Pain,                   Physical Exam  Normal systems: cardiovascular, pulmonary and dental    Airway   Mallampati: I  TM distance: >3 FB  Neck ROM: full    Dental     Cardiovascular   Rhythm and rate: regular and normal      Pulmonary    breath sounds clear to auscultation                    Anesthesia Plan  Procedure only, no anesthetic delivered    History & Physical Review  History and physical reviewed and following examination; no interval change.    ASA Status:  3 emergent.    NPO Status:  Unknown    Plan for Spinal     LP.      Postoperative Care      Consents  Anesthetic plan, risks, benefits and alternatives discussed with:  Patient..                          .

## 2018-02-17 NOTE — PROGRESS NOTES
"[place] [facility] ADMISSION NOTE    Patient admitted to room 2309 at approximately 0000 via cart from emergency room. Patient was accompanied by transport tech and spouse .     Verbal SBAR report received from Annie prior to patient arrival.     Patient ambulated to bed with stand-by assist. Patient alert and oriented X 3. Pain is not well controlled.  Medication(s) being used: narcotic analgesics including dilaudid. 0-10 Pain Scale: 6. Admission vital signs: Blood pressure (!) 178/99, temperature 97.8  F (36.6  C), temperature source Oral, resp. rate 18, height 1.702 m (5' 7\"), weight 111.5 kg (245 lb 13 oz), SpO2 98 %. Patient was oriented to plan of care, call light, bed controls, tv, telephone, bathroom and visiting hours.     The following safety risks were identified during admission: none. Yellow risk band applied: NO.     Patient left upper extremity and right lower extremity are weaker then the other side, this is baseline per patient.    Patient has small scab on bottom of right foot.     Hope Cloindres"

## 2018-02-19 NOTE — TELEPHONE ENCOUNTER
Left message on answering machine for patient to call back.  Was seen in ED again and they also recommended Stroke clinic follow up and a shot term follow with PCP in 7 days.    Will reassure that if they determine transfer to general neurology care (if and when appropriate) any remaining non complex migraines,  we will be glad to see him.    Sury Mathews RN  [facility]

## 2018-02-22 LAB
BACTERIA SPEC CULT: NO GROWTH
SPECIMEN SOURCE: NORMAL

## 2018-03-21 ENCOUNTER — OFFICE VISIT (OUTPATIENT)
Dept: NEUROLOGY | Facility: CLINIC | Age: 53
End: 2018-03-21
Payer: COMMERCIAL

## 2018-03-21 VITALS
SYSTOLIC BLOOD PRESSURE: 137 MMHG | DIASTOLIC BLOOD PRESSURE: 89 MMHG | WEIGHT: 237.2 LBS | BODY MASS INDEX: 37.15 KG/M2 | HEART RATE: 107 BPM | RESPIRATION RATE: 12 BRPM | TEMPERATURE: 98 F

## 2018-03-21 DIAGNOSIS — R93.0 ABNORMAL HEAD CT: ICD-10-CM

## 2018-03-21 DIAGNOSIS — G44.84 EXERTIONAL HEADACHE: Primary | ICD-10-CM

## 2018-03-21 PROCEDURE — 99204 OFFICE O/P NEW MOD 45 MIN: CPT | Performed by: PSYCHIATRY & NEUROLOGY

## 2018-03-21 PROCEDURE — 99358 PROLONG SERVICE W/O CONTACT: CPT | Performed by: PSYCHIATRY & NEUROLOGY

## 2018-03-21 RX ORDER — INDOMETHACIN 25 MG/1
25 CAPSULE ORAL 3 TIMES DAILY PRN
Qty: 30 CAPSULE | Refills: 0 | Status: SHIPPED | OUTPATIENT
Start: 2018-03-21 | End: 2018-05-14

## 2018-03-21 NOTE — MR AVS SNAPSHOT
After Visit Summary   3/21/2018    Obed Nickerson    MRN: [id number]           Patient Information     Date Of Birth          1965        Visit Information        Provider Department      3/21/2018 1:30 PM Kady Glynn MD [facility]        Today's Diagnoses     Exertional headache    -  1    Abnormal head CT          Care Instructions    Plan:  Reimaging of the head vessels. We will notify you of the results.   In reviewing your chart: It looks like they wanted to see you in Stroke Neurology at the [place]. I do recommend you follow-up with them too (after the imaging is done).   Try indomethacin for the headaches, if they return. Take at time of onset. I caution you against using this at the same time as the duloxetine.   Return to clinic as needed.               Follow-ups after your visit        Future tests that were ordered for you today     Open Future Orders        Priority Expected Expires Ordered    MRA Angiogram Head w/o Contrast Routine  3/22/2019 3/21/2018    MRA Brain Venogram w&wo Contrast Routine  3/21/2019 3/21/2018            Who to contact     If you have questions or need follow up information about today's clinic visit or your schedule please contact [facility] directly at [phone].  Normal or non-critical lab and imaging results will be communicated to you by MyChart, letter or phone within 4 business days after the clinic has received the results. If you do not hear from us within 7 days, please contact the clinic through MyChart or phone. If you have a critical or abnormal lab result, we will notify you by phone as soon as possible.  Submit refill requests through [employer] or call your pharmacy and they will forward the refill request to us. Please allow 3 business days for your refill to be completed.          Additional Information About Your Visit        [employer] Information     [employer] gives you secure access to your electronic  health record. If you see a primary care provider, you can also send messages to your care team and make appointments. If you have questions, please call your primary care clinic.  If you do not have a primary care provider, please call [phone] and they will assist you.        Care EveryWhere ID     This is your Care EveryWhere ID. This could be used by other organizations to access your [facility] medical records  [id number]        Your Vitals Were     Pulse Temperature Respirations BMI (Body Mass Index)          107 98  F (36.7  C) (Oral) 12 37.15 kg/m2         Blood Pressure from Last 3 Encounters:   03/21/18 137/89   02/17/18 (!) 163/92   02/15/18 (!) 161/98    Weight from Last 3 Encounters:   03/21/18 107.6 kg (237 lb 3.2 oz)   02/17/18 111.5 kg (245 lb 13 oz)   02/15/18 108.9 kg (240 lb)                 Today's Medication Changes          These changes are accurate as of 3/21/18  2:28 PM.  If you have any questions, ask your nurse or doctor.               Start taking these medicines.        Dose/Directions    indomethacin 25 MG capsule   Commonly known as:  INDOCIN   Used for:  Exertional headache   Started by:  Kady Glynn MD        Dose:  25 mg   Take 1 capsule (25 mg) by mouth 3 times daily as needed for moderate pain   Quantity:  30 capsule   Refills:  0         These medicines have changed or have updated prescriptions.        Dose/Directions    oxyCODONE IR 10 MG tablet   Commonly known as:  ROXICODONE   This may have changed:  Another medication with the same name was removed. Continue taking this medication, and follow the directions you see here.   Used for:  Acute intractable headache, unspecified headache type   Changed by:  Kady Glynn MD        Dose:  10 mg   Take 1 tablet (10 mg) by mouth every 4 hours as needed for moderate to severe pain   Quantity:  30 tablet   Refills:  0            Where to get your medicines      These medications were sent to [id number] - [id number]  [place], [place] - [address]  [address] [address]     Phone:  [phone]     indomethacin 25 MG capsule                Primary Care Provider Fax #    [facility] [phone]       [address] [place] [id number]        Equal Access to Services     MG HERNANDEZ : Logan kitchen hadasho Soomaali, waaxda luqadaha, qaybta kaalmada adeegyada, waxcrystal zarate. So [facility] [phone].    ATENCIÓN: Si habla español, tiene a perdue disposición servicios gratuitos de asistencia lingüística. Regi al [phone].    We comply with applicable federal civil rights laws and [place] laws. We do not discriminate on the basis of race, color, national origin, age, disability, sex, sexual orientation, or gender identity.            Thank you!     Thank you for choosing [facility]  for your care. Our goal is always to provide you with excellent care. Hearing back from our patients is one way we can continue to improve our services. Please take a few minutes to complete the written survey that you may receive in the mail after your visit with us. Thank you!             Your Updated Medication List - Protect others around you: Learn how to safely use, store and throw away your medicines at www.disposemymeds.org.          This list is accurate as of 3/21/18  2:28 PM.  Always use your most recent med list.                   Brand Name Dispense Instructions for use Diagnosis    ALPRAZOLAM PO      Take 0.5 mg by mouth 2 times daily as needed for anxiety        ASPIRIN PO      Take 81 mg by mouth daily        BENADRYL PO      Take 25 mg by mouth every 8 hours as needed        cloNIDine 0.1 MG tablet    CATAPRES    60 tablet    Take 1 tablet (0.1 mg) by mouth 2 times daily        DULOXETINE HCL PO      Take 120 mg by mouth daily        indomethacin 25 MG capsule    INDOCIN    30 capsule    Take 1 capsule (25 mg) by mouth 3 times daily as  needed for moderate pain    Exertional headache       insulin glargine 100 UNIT/ML injection    LANTUS     Inject 20 Units Subcutaneous At Bedtime        ketorolac 10 MG tablet    TORADOL    14 tablet    Take 1 tablet (10 mg) by mouth every 6 hours as needed for moderate pain    Acute intractable headache, unspecified headache type       lisinopril-hydrochlorothiazide 20-12.5 MG per tablet    PRINZIDE/ZESTORETIC    180 tablet    Take 2 tablets by mouth daily (Needs follow-up appointment for this medication)    Hypertension goal BP (blood pressure) < 130/80       MAXALT PO      Take by mouth as needed Unsure dose        METFORMIN HCL PO      Take 1,000 mg by mouth 2 times daily (with meals)        MORPHINE SULFATE ER PO      Take 30 mg by mouth 2 times daily        oxyCODONE IR 10 MG tablet    ROXICODONE    30 tablet    Take 1 tablet (10 mg) by mouth every 4 hours as needed for moderate to severe pain    Acute intractable headache, unspecified headache type       SUMAtriptan 50 MG tablet    IMITREX    9 tablet    Take 1 tablet (50 mg) by mouth at onset of headache for migraine May repeat in 2 hours. Max 4 tablets/24 hours.        TIZANIDINE HCL PO      Take 4 mg by mouth At Bedtime
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
## 2018-03-21 NOTE — NURSING NOTE
"Chief Complaint   Patient presents with     New Patient     Headaches.  Referral from ED.         Initial /89 (BP Location: Left arm, Patient Position: Sitting, Cuff Size: Adult Large)  Pulse 107  Temp 98  F (36.7  C) (Oral)  Resp 12  Wt 107.6 kg (237 lb 3.2 oz)  BMI 37.15 kg/m2 Estimated body mass index is 37.15 kg/(m^2) as calculated from the following:    Height as of 2/17/18: 1.702 m (5' 7\").    Weight as of this encounter: 107.6 kg (237 lb 3.2 oz).  Medication Reconciliation: complete    Patient prefers to be contacted: Brent Miller to leave detailed message on voicemail: n/a      Sumaya ACOSTA-CMA    "

## 2018-03-21 NOTE — PATIENT INSTRUCTIONS
Plan:  Reimaging of the head vessels. We will notify you of the results.   In reviewing your chart: It looks like they wanted to see you in Stroke Neurology at the [facility]. I do recommend you follow-up with them too (after the imaging is done).   Try indomethacin for the headaches, if they return. Take at time of onset. I caution you against using this at the same time as the duloxetine.   Return to clinic as needed.
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
## 2018-05-14 ENCOUNTER — MYC REFILL (OUTPATIENT)
Dept: NEUROLOGY | Facility: CLINIC | Age: 53
End: 2018-05-14

## 2018-05-14 ENCOUNTER — MYC REFILL (OUTPATIENT)
Dept: FAMILY MEDICINE | Facility: CLINIC | Age: 53
End: 2018-05-14

## 2018-05-14 DIAGNOSIS — R51.9 ACUTE INTRACTABLE HEADACHE, UNSPECIFIED HEADACHE TYPE: ICD-10-CM

## 2018-05-14 DIAGNOSIS — G44.84 EXERTIONAL HEADACHE: ICD-10-CM

## 2018-05-14 RX ORDER — KETOROLAC TROMETHAMINE 10 MG/1
10 TABLET, FILM COATED ORAL EVERY 6 HOURS PRN
Qty: 14 TABLET | Refills: 0 | Status: CANCELLED | OUTPATIENT
Start: 2018-05-14

## 2018-05-14 RX ORDER — OXYCODONE HYDROCHLORIDE 10 MG/1
10 TABLET ORAL EVERY 4 HOURS PRN
Qty: 30 TABLET | Refills: 0 | Status: CANCELLED | OUTPATIENT
Start: 2018-05-14

## 2018-05-15 RX ORDER — INDOMETHACIN 25 MG/1
25 CAPSULE ORAL 3 TIMES DAILY PRN
Qty: 30 CAPSULE | Refills: 3 | Status: SHIPPED | OUTPATIENT
Start: 2018-05-15 | End: 2020-12-30

## 2018-05-15 NOTE — TELEPHONE ENCOUNTER
Message from [facility]:  Sury Sánchez RN Tue May 15, 2018 11:46 AM        ----- Message -----   From: Obed Nickerson   Sent: 5/14/2018 7:44 PM   To: Fl Lalitha/Lpn Pool  Subject: Medication Renewal Request     Original authorizing provider: MD Obed Savage would like a refill of the following medications:  indomethacin (INDOCIN) 25 MG capsule [Kady Glynn MD]    Preferred pharmacy: [id number] [address]    Comment:      Medication renewals requested in this message routed to other providers:  oxyCODONE IR (ROXICODONE) 10 MG tablet [Lakhwinder Francis MD, MD]  ketorolac (TORADOL) 10 MG tablet [Lakwhinder Francis MD, MD]

## 2018-05-15 NOTE — TELEPHONE ENCOUNTER
Message from [employer]:  Original authorizing provider: Lakhwinder Francis MD, MD    Obed Nickerson would like a refill of the following medications:  oxyCODONE IR (ROXICODONE) 10 MG tablet [Lakhwinder Francis MD, MD]  ketorolac (TORADOL) 10 MG tablet [Lakhwinder Francis MD, MD]    Preferred pharmacy: [id number] [address]    Comment:      Medication renewals requested in this message routed to other providers:  indomethacin (INDOCIN) 25 MG capsule [Kady Glynn MD]

## 2018-07-11 ENCOUNTER — HOSPITAL ENCOUNTER (OUTPATIENT)
Dept: MRI IMAGING | Facility: CLINIC | Age: 53
Discharge: HOME | End: 2018-07-11
Attending: PSYCHIATRY & NEUROLOGY | Admitting: PSYCHIATRY & NEUROLOGY
Payer: COMMERCIAL

## 2018-07-11 ENCOUNTER — HOSPITAL ENCOUNTER (OUTPATIENT)
Dept: MRI IMAGING | Facility: CLINIC | Age: 53
End: 2018-07-11
Attending: PSYCHIATRY & NEUROLOGY
Payer: COMMERCIAL

## 2018-07-11 ENCOUNTER — HOSPITAL ENCOUNTER (OUTPATIENT)
Dept: MRI IMAGING | Facility: CLINIC | Age: 53
End: 2018-07-11
Attending: ANESTHESIOLOGY
Payer: COMMERCIAL

## 2018-07-11 DIAGNOSIS — R93.0 ABNORMAL HEAD CT: ICD-10-CM

## 2018-07-11 DIAGNOSIS — M25.511 RIGHT SHOULDER PAIN, UNSPECIFIED CHRONICITY: ICD-10-CM

## 2018-07-11 DIAGNOSIS — G44.84 EXERTIONAL HEADACHE: ICD-10-CM

## 2018-07-11 LAB
CREAT BLD-MCNC: 0.9 MG/DL (ref 0.66–1.25)
GFR SERPL CREATININE-BSD FRML MDRD: 88 ML/MIN/1.7M2

## 2018-07-11 PROCEDURE — 27210995 ZZH RX 272: Performed by: PSYCHIATRY & NEUROLOGY

## 2018-07-11 PROCEDURE — 25000128 H RX IP 250 OP 636: Performed by: PSYCHIATRY & NEUROLOGY

## 2018-07-11 PROCEDURE — 82565 ASSAY OF CREATININE: CPT

## 2018-07-11 PROCEDURE — 73221 MRI JOINT UPR EXTREM W/O DYE: CPT | Mod: RT

## 2018-07-11 PROCEDURE — 70546 MR ANGIOGRAPH HEAD W/O&W/DYE: CPT

## 2018-07-11 PROCEDURE — A9585 GADOBUTROL INJECTION: HCPCS | Performed by: PSYCHIATRY & NEUROLOGY

## 2018-07-11 PROCEDURE — 70544 MR ANGIOGRAPHY HEAD W/O DYE: CPT

## 2018-07-11 RX ORDER — GADOBUTROL 604.72 MG/ML
10 INJECTION INTRAVENOUS ONCE
Status: COMPLETED | OUTPATIENT
Start: 2018-07-11 | End: 2018-07-11

## 2018-07-11 RX ORDER — ACYCLOVIR 200 MG/1
60 CAPSULE ORAL ONCE
Status: COMPLETED | OUTPATIENT
Start: 2018-07-11 | End: 2018-07-11

## 2018-07-11 RX ADMIN — GADOBUTROL 10 ML: 604.72 INJECTION INTRAVENOUS at 12:37

## 2018-07-11 RX ADMIN — SODIUM CHLORIDE 60 ML: 9 INJECTION, SOLUTION INTRAMUSCULAR; INTRAVENOUS; SUBCUTANEOUS at 12:37

## 2018-07-12 NOTE — PROGRESS NOTES
Please advise Obed Nickerson,  1965, that his vessel imaging was normal.  [phone] (home)   Kady Glynn

## 2018-09-24 ENCOUNTER — OFFICE VISIT - HEALTHEAST (OUTPATIENT)
Dept: FAMILY MEDICINE | Facility: CLINIC | Age: 53
End: 2018-09-24

## 2018-09-24 DIAGNOSIS — G89.29 CHRONIC PAIN: ICD-10-CM

## 2018-09-24 DIAGNOSIS — I10 BENIGN ESSENTIAL HYPERTENSION: ICD-10-CM

## 2018-09-24 DIAGNOSIS — E11.9 LONG-TERM INSULIN USE IN TYPE 2 DIABETES (H): ICD-10-CM

## 2018-09-24 DIAGNOSIS — Z00.00 HEALTHCARE MAINTENANCE: ICD-10-CM

## 2018-09-24 DIAGNOSIS — F32.0 MILD MAJOR DEPRESSION (H): ICD-10-CM

## 2018-09-24 DIAGNOSIS — Z79.4 LONG-TERM INSULIN USE IN TYPE 2 DIABETES (H): ICD-10-CM

## 2018-09-24 ASSESSMENT — MIFFLIN-ST. JEOR: SCORE: 1906.74

## 2018-09-27 ENCOUNTER — COMMUNICATION - HEALTHEAST (OUTPATIENT)
Dept: FAMILY MEDICINE | Facility: CLINIC | Age: 53
End: 2018-09-27

## 2018-10-15 ENCOUNTER — COMMUNICATION - HEALTHEAST (OUTPATIENT)
Dept: FAMILY MEDICINE | Facility: CLINIC | Age: 53
End: 2018-10-15

## 2018-10-15 DIAGNOSIS — M54.9 BACK PAIN: ICD-10-CM

## 2018-10-20 ENCOUNTER — COMMUNICATION - HEALTHEAST (OUTPATIENT)
Dept: FAMILY MEDICINE | Facility: CLINIC | Age: 53
End: 2018-10-20

## 2018-12-11 ENCOUNTER — OFFICE VISIT - HEALTHEAST (OUTPATIENT)
Dept: FAMILY MEDICINE | Facility: CLINIC | Age: 53
End: 2018-12-11

## 2018-12-11 DIAGNOSIS — B37.2 YEAST INFECTION OF THE SKIN: ICD-10-CM

## 2018-12-11 DIAGNOSIS — Z79.4 TYPE 2 DIABETES MELLITUS WITH HYPERGLYCEMIA, WITH LONG-TERM CURRENT USE OF INSULIN (H): ICD-10-CM

## 2018-12-11 DIAGNOSIS — E11.65 TYPE 2 DIABETES MELLITUS WITH HYPERGLYCEMIA, WITH LONG-TERM CURRENT USE OF INSULIN (H): ICD-10-CM

## 2018-12-11 LAB
ALBUMIN SERPL-MCNC: 4 G/DL (ref 3.5–5)
ALP SERPL-CCNC: 118 U/L (ref 45–120)
ALT SERPL W P-5'-P-CCNC: 27 U/L (ref 0–45)
ANION GAP SERPL CALCULATED.3IONS-SCNC: 11 MMOL/L (ref 5–18)
AST SERPL W P-5'-P-CCNC: 16 U/L (ref 0–40)
BILIRUB SERPL-MCNC: 0.2 MG/DL (ref 0–1)
BUN SERPL-MCNC: 15 MG/DL (ref 8–22)
CALCIUM SERPL-MCNC: 9.9 MG/DL (ref 8.5–10.5)
CHLORIDE BLD-SCNC: 103 MMOL/L (ref 98–107)
CO2 SERPL-SCNC: 25 MMOL/L (ref 22–31)
CREAT SERPL-MCNC: 0.94 MG/DL (ref 0.7–1.3)
GFR SERPL CREATININE-BSD FRML MDRD: >60 ML/MIN/1.73M2
GLUCOSE BLD-MCNC: 276 MG/DL (ref 70–125)
HBA1C MFR BLD: 8 % (ref 3.5–6)
POTASSIUM BLD-SCNC: 4.3 MMOL/L (ref 3.5–5)
PROT SERPL-MCNC: 7.3 G/DL (ref 6–8)
SODIUM SERPL-SCNC: 139 MMOL/L (ref 136–145)

## 2018-12-19 ENCOUNTER — COMMUNICATION - HEALTHEAST (OUTPATIENT)
Dept: FAMILY MEDICINE | Facility: CLINIC | Age: 53
End: 2018-12-19

## 2018-12-19 DIAGNOSIS — B37.2 YEAST INFECTION OF THE SKIN: ICD-10-CM

## 2018-12-30 ENCOUNTER — HOSPITAL ENCOUNTER (EMERGENCY)
Facility: CLINIC | Age: 53
Discharge: HOME | End: 2018-12-30
Attending: EMERGENCY MEDICINE | Admitting: EMERGENCY MEDICINE
Payer: COMMERCIAL

## 2018-12-30 VITALS
TEMPERATURE: 98.4 F | WEIGHT: 250 LBS | HEART RATE: 99 BPM | SYSTOLIC BLOOD PRESSURE: 173 MMHG | DIASTOLIC BLOOD PRESSURE: 123 MMHG | OXYGEN SATURATION: 98 % | RESPIRATION RATE: 20 BRPM | BODY MASS INDEX: 39.16 KG/M2

## 2018-12-30 DIAGNOSIS — R73.9 HYPERGLYCEMIA: ICD-10-CM

## 2018-12-30 LAB
ALBUMIN SERPL-MCNC: 4.1 G/DL (ref 3.4–5)
ALBUMIN UR-MCNC: NEGATIVE MG/DL
ALP SERPL-CCNC: 111 U/L (ref 40–150)
ALT SERPL W P-5'-P-CCNC: 43 U/L (ref 0–70)
ANION GAP SERPL CALCULATED.3IONS-SCNC: 11 MMOL/L (ref 3–14)
APPEARANCE UR: CLEAR
AST SERPL W P-5'-P-CCNC: 25 U/L (ref 0–45)
BASOPHILS # BLD AUTO: 0.1 10E9/L (ref 0–0.2)
BASOPHILS NFR BLD AUTO: 0.7 %
BILIRUB SERPL-MCNC: 0.3 MG/DL (ref 0.2–1.3)
BILIRUB UR QL STRIP: NEGATIVE
BUN SERPL-MCNC: 12 MG/DL (ref 7–30)
CALCIUM SERPL-MCNC: 8.7 MG/DL (ref 8.5–10.1)
CHLORIDE SERPL-SCNC: 104 MMOL/L (ref 94–109)
CO2 SERPL-SCNC: 24 MMOL/L (ref 20–32)
COLOR UR AUTO: YELLOW
CREAT SERPL-MCNC: 0.72 MG/DL (ref 0.66–1.25)
DIFFERENTIAL METHOD BLD: NORMAL
EOSINOPHIL # BLD AUTO: 0.2 10E9/L (ref 0–0.7)
EOSINOPHIL NFR BLD AUTO: 1.9 %
ERYTHROCYTE [DISTWIDTH] IN BLOOD BY AUTOMATED COUNT: 13.2 % (ref 10–15)
GFR SERPL CREATININE-BSD FRML MDRD: >90 ML/MIN/{1.73_M2}
GLUCOSE BLDC GLUCOMTR-MCNC: 192 MG/DL (ref 70–99)
GLUCOSE SERPL-MCNC: 172 MG/DL (ref 70–99)
GLUCOSE UR STRIP-MCNC: >499 MG/DL
HCT VFR BLD AUTO: 44.6 % (ref 40–53)
HGB BLD-MCNC: 14.9 G/DL (ref 13.3–17.7)
HGB UR QL STRIP: NEGATIVE
IMM GRANULOCYTES # BLD: 0.1 10E9/L (ref 0–0.4)
IMM GRANULOCYTES NFR BLD: 0.9 %
KETONES UR STRIP-MCNC: NEGATIVE MG/DL
LEUKOCYTE ESTERASE UR QL STRIP: NEGATIVE
LYMPHOCYTES # BLD AUTO: 1.7 10E9/L (ref 0.8–5.3)
LYMPHOCYTES NFR BLD AUTO: 21.2 %
MCH RBC QN AUTO: 30.7 PG (ref 26.5–33)
MCHC RBC AUTO-ENTMCNC: 33.4 G/DL (ref 31.5–36.5)
MCV RBC AUTO: 92 FL (ref 78–100)
MONOCYTES # BLD AUTO: 0.6 10E9/L (ref 0–1.3)
MONOCYTES NFR BLD AUTO: 7.7 %
MUCOUS THREADS #/AREA URNS LPF: PRESENT /LPF
NEUTROPHILS # BLD AUTO: 5.5 10E9/L (ref 1.6–8.3)
NEUTROPHILS NFR BLD AUTO: 67.6 %
NITRATE UR QL: NEGATIVE
NRBC # BLD AUTO: 0 10*3/UL
NRBC BLD AUTO-RTO: 0 /100
PH UR STRIP: 5 PH (ref 5–7)
PLATELET # BLD AUTO: 315 10E9/L (ref 150–450)
POTASSIUM SERPL-SCNC: 4.2 MMOL/L (ref 3.4–5.3)
PROT SERPL-MCNC: 8.3 G/DL (ref 6.8–8.8)
RBC # BLD AUTO: 4.86 10E12/L (ref 4.4–5.9)
RBC #/AREA URNS AUTO: 0 /HPF (ref 0–2)
SODIUM SERPL-SCNC: 139 MMOL/L (ref 133–144)
SOURCE: ABNORMAL
SP GR UR STRIP: 1.03 (ref 1–1.03)
SQUAMOUS #/AREA URNS AUTO: <1 /HPF (ref 0–1)
UROBILINOGEN UR STRIP-MCNC: 0 MG/DL (ref 0–2)
WBC # BLD AUTO: 8.1 10E9/L (ref 4–11)
WBC #/AREA URNS AUTO: <1 /HPF (ref 0–5)

## 2018-12-30 PROCEDURE — 99284 EMERGENCY DEPT VISIT MOD MDM: CPT | Mod: Z6 | Performed by: EMERGENCY MEDICINE

## 2018-12-30 PROCEDURE — 81001 URINALYSIS AUTO W/SCOPE: CPT | Performed by: EMERGENCY MEDICINE

## 2018-12-30 PROCEDURE — 80053 COMPREHEN METABOLIC PANEL: CPT | Performed by: EMERGENCY MEDICINE

## 2018-12-30 PROCEDURE — 99283 EMERGENCY DEPT VISIT LOW MDM: CPT | Mod: 25 | Performed by: EMERGENCY MEDICINE

## 2018-12-30 PROCEDURE — 85025 COMPLETE CBC W/AUTO DIFF WBC: CPT | Performed by: EMERGENCY MEDICINE

## 2018-12-30 PROCEDURE — 96360 HYDRATION IV INFUSION INIT: CPT | Performed by: EMERGENCY MEDICINE

## 2018-12-30 PROCEDURE — 96361 HYDRATE IV INFUSION ADD-ON: CPT | Performed by: EMERGENCY MEDICINE

## 2018-12-30 PROCEDURE — 00000146 ZZHCL STATISTIC GLUCOSE BY METER IP

## 2018-12-30 PROCEDURE — 25000128 H RX IP 250 OP 636: Performed by: EMERGENCY MEDICINE

## 2018-12-30 RX ADMIN — SODIUM CHLORIDE 1000 ML: 9 INJECTION, SOLUTION INTRAVENOUS at 16:38

## 2018-12-30 ASSESSMENT — ENCOUNTER SYMPTOMS
VOMITING: 0
ABDOMINAL PAIN: 0
COUGH: 0
DIARRHEA: 0
FREQUENCY: 1

## 2018-12-30 NOTE — ED PROVIDER NOTES
"  History     Chief Complaint   Patient presents with     Hyperglycemia     noted not feeling well this afternoon, BS high. Lightheaded, dizzy.      HPI  Obed Nickerson is a 53 year old male who presents to the ED for evaluation of hyperglycemia. The patient states that earlier this afternoon he began to feel \"fuzzy and tingly everywhere\". He then tested his blood glucose, which was elevated at around 370. The patient states that his blood glucose is typically in the 220s. He has a history of type II diabetes mellitus and regularly takes 2000 mg of metformin and 24 units of Lantus daily for control. The patient reports that he was switched to Lantus about one month ago and has not been feeling \"up to par\" recently. He acknowledges some urinary frequency but denies any emesis, cough, cold symptoms, abdominal pain, or diarrhea. No past history of kidney stones or diabetic ketoacidosis. No recent use of steroid medications. The patient had a colonoscopy in , but diverticulosis was not identified at that time.       Problem List:    Patient Active Problem List    Diagnosis Date Noted     Headache 2018     Priority: Medium     Encounter for long-term opiate analgesic use 2015     Priority: Medium     Colon cancer screening 2014     Priority: Medium     No known fam hx of colon cancer.         Morbid obesity (H) 2014     Priority: Medium     Pt reports being overweight in his adult life.  He has sustained several injuries, especially a neck injury in .  Never tried any nutrition, medication, other weight management therapy.         Hyperlipidemia with target LDL less than 100 2014     Priority: Medium     Pt has not had prior Lipid testing, 2014 returned with .  Both parents with strokes and MI.  Father  from MI at 70.  Mother  from Pneuomnia, had heart failure as well.  Pt morbidly obese.    Diagnosis updated by automated process. Provider to review and confirm.   "     Screening for prostate cancer 01/16/2014     Priority: Medium     Pt's father had Prostate CA at 67.  Father smoked, drank.         Tinnitus of both ears 01/16/2014     Priority: Medium     Pt reports life-long ringing in both ears.  Was raised around farm equipment and airplanes.  Feels these are major contributors.  Has had audiology recently, per pt has mild hearing loss left worse than right and positive for tinnitus.  However they said there was no treatment available for tinnitus.         Hypertension goal BP (blood pressure) < 130/80 01/16/2014     Priority: Medium     Pt with elevated readings for past 5 years, generally in 140s and 90s, but today is 170s/110s (and at this level for past 3 years).  Has never been on BP med previously.  Currently obese, and has chronic pain, and also endorses sleep issues (secondary to pain) with frequent waking, but feels rested.  Feels that he very likely has ANAHI,  (and several family members have ANAHI- treated with CPAP), but is adamant that he could not tolerate a CPAP and is not interested in testing (more interested in this when discussing testing when we discussed Dental appliance as an alternative).      Interested in some blood pressure medications.         Chronic pain syndrome 01/04/2008     Priority: Medium     Previously followed by Dr. Moran at [facility], then left in 2008, then started seeing Dr. Lo at [facility] in [place].  Plans to continue with this provider.  Here to establish care/PCP, does not want/need me to manage his chronic narcotics or pain.      Had neck injury in 2000, and is s/p Cervical Fusion x 2 (2-6 presently).  Currently on Vicodin 5mg/325 QID.  Feels this helps the pain, though does not completely control pain.  He has been MS Contin, and OxyContin (briefly).  Had been on Neurontin (x 1 week- excessive SE), Amitriptyline, Tegretol (mental slow).  Has not tried Effexor or Cymbalta.   Is s/p SCS for lumbar  radiculitis- which did not work and had it removed.  Has tried PT and numerous spine injection    Plans for some Carpal Tunnel surgery (pending EMG soon).  Has been told that he could have spine surgery, but is holding off surgery for as long as he can.         Cervicalgia 2008     Priority: Medium     2014  S/p cervical spine fusion x 2, (per pt C2-6).            Past Medical History:    Past Medical History:   Diagnosis Date     Chronic pain syndrome 2008     Tinnitus of both ears 2014       Past Surgical History:    Past Surgical History:   Procedure Laterality Date     ?? previous implanted morphine pump??  during     eventually explanted due to infection     APPENDECTOMY OPEN       FUSION CERVICAL ANTERIOR THREE+ LEVELS      C2-6     right lower leg limb reattachment       skin grafts      many       Family History:    Family History   Problem Relation Age of Onset     C.A.D. Mother 60     C.A.D. Father 72     Cerebrovascular Disease Father 60         age 70; ETOH, smoker     Breast Cancer No family hx of      Cancer - colorectal No family hx of      Prostate Cancer Father         at 67       Social History:  Marital Status:   [2]  Social History     Tobacco Use     Smoking status: Never Smoker     Smokeless tobacco: Never Used   Substance Use Topics     Alcohol use: No     Drug use: No        Medications:      ALPRAZOLAM PO   ASPIRIN PO   cloNIDine (CATAPRES) 0.1 MG tablet   DiphenhydrAMINE HCl (BENADRYL PO)   DULOXETINE HCL PO   indomethacin (INDOCIN) 25 MG capsule   insulin glargine (LANTUS) 100 UNIT/ML injection   ketorolac (TORADOL) 10 MG tablet   lisinopril-hydrochlorothiazide (PRINZIDE,ZESTORETIC) 20-12.5 MG per tablet   METFORMIN HCL PO   MORPHINE SULFATE ER PO   oxyCODONE IR (ROXICODONE) 10 MG tablet   Rizatriptan Benzoate (MAXALT PO)   SUMAtriptan (IMITREX) 50 MG tablet   TIZANIDINE HCL PO         Review of Systems   Respiratory: Negative for cough.     Gastrointestinal: Negative for abdominal pain, diarrhea and vomiting.   Genitourinary: Positive for frequency.   All other systems reviewed and are negative.      Physical Exam   BP: (!) 167/121  Pulse: 112  Temp: 98.4  F (36.9  C)  Resp: 20  Weight: 113.4 kg (250 lb)  SpO2: 97 %      Physical Exam general alert cooperative male in mild distress.  HEENT shows no scleral icterus or injection.  No facial asymmetry or droop.  No nasal swelling.  No oral lesions.  Able to speak in complete sentences.  Neck is supple.  Lungs are clear without adventitious sounds.  Cardiac auscultation is normal.  He has mild CVA tenderness on the right.  Abdomen is obese and benign to palpation.  No skin rash or lesions are noted.  No leg pain or swelling.  He has a large scar over the right ankle from previous injury.    ED Course        Procedures               Critical Care time:  none               Results for orders placed or performed during the hospital encounter of 12/30/18 (from the past 24 hour(s))   Glucose by meter   Result Value Ref Range    Glucose 192 (H) 70 - 99 mg/dL   Comprehensive metabolic panel   Result Value Ref Range    Sodium 139 133 - 144 mmol/L    Potassium 4.2 3.4 - 5.3 mmol/L    Chloride 104 94 - 109 mmol/L    Carbon Dioxide 24 20 - 32 mmol/L    Anion Gap 11 3 - 14 mmol/L    Glucose 172 (H) 70 - 99 mg/dL    Urea Nitrogen 12 7 - 30 mg/dL    Creatinine 0.72 0.66 - 1.25 mg/dL    GFR Estimate >90 >60 mL/min/[1.73_m2]    GFR Estimate If Black >90 >60 mL/min/[1.73_m2]    Calcium 8.7 8.5 - 10.1 mg/dL    Bilirubin Total 0.3 0.2 - 1.3 mg/dL    Albumin 4.1 3.4 - 5.0 g/dL    Protein Total 8.3 6.8 - 8.8 g/dL    Alkaline Phosphatase 111 40 - 150 U/L    ALT 43 0 - 70 U/L    AST 25 0 - 45 U/L   CBC with platelets differential   Result Value Ref Range    WBC 8.1 4.0 - 11.0 10e9/L    RBC Count 4.86 4.4 - 5.9 10e12/L    Hemoglobin 14.9 13.3 - 17.7 g/dL    Hematocrit 44.6 40.0 - 53.0 %    MCV 92 78 - 100 fl    MCH 30.7 26.5 -  "33.0 pg    MCHC 33.4 31.5 - 36.5 g/dL    RDW 13.2 10.0 - 15.0 %    Platelet Count 315 150 - 450 10e9/L    Diff Method Automated Method     % Neutrophils 67.6 %    % Lymphocytes 21.2 %    % Monocytes 7.7 %    % Eosinophils 1.9 %    % Basophils 0.7 %    % Immature Granulocytes 0.9 %    Nucleated RBCs 0 0 /100    Absolute Neutrophil 5.5 1.6 - 8.3 10e9/L    Absolute Lymphocytes 1.7 0.8 - 5.3 10e9/L    Absolute Monocytes 0.6 0.0 - 1.3 10e9/L    Absolute Eosinophils 0.2 0.0 - 0.7 10e9/L    Absolute Basophils 0.1 0.0 - 0.2 10e9/L    Abs Immature Granulocytes 0.1 0 - 0.4 10e9/L    Absolute Nucleated RBC 0.0    UA with Microscopic   Result Value Ref Range    Color Urine Yellow     Appearance Urine Clear     Glucose Urine >499 (A) NEG^Negative mg/dL    Bilirubin Urine Negative NEG^Negative    Ketones Urine Negative NEG^Negative mg/dL    Specific Gravity Urine 1.026 1.003 - 1.035    Blood Urine Negative NEG^Negative    pH Urine 5.0 5.0 - 7.0 pH    Protein Albumin Urine Negative NEG^Negative mg/dL    Urobilinogen mg/dL 0.0 0.0 - 2.0 mg/dL    Nitrite Urine Negative NEG^Negative    Leukocyte Esterase Urine Negative NEG^Negative    Source Midstream Urine     WBC Urine <1 0 - 5 /HPF    RBC Urine 0 0 - 2 /HPF    Squamous Epithelial /HPF Urine <1 0 - 1 /HPF    Mucous Urine Present (A) NEG^Negative /LPF       Medications   0.9% sodium chloride BOLUS (0 mLs Intravenous Stopped 12/30/18 1909)       4:48 Patient assessed.    He states his glucose was over 300.  However when we checked his blood sugar was 192 and on blood draw was 172.  Assessments & Plan (with Medical Decision Making)   Obed Nickerson is a 53 year old male who presents to the ED for evaluation of hyperglycemia. The patient states that earlier this afternoon he began to feel \"fuzzy and tingly everywhere\". He then tested his blood glucose, which was elevated at around 370. The patient states that his blood glucose is typically in the 220s. He has a history of type II " "diabetes mellitus and regularly takes 2000 mg of metformin and 24 units of Lantus daily for control. The patient reports that he was switched to Lantus about one month ago and has not been feeling \"up to par\" recently. He acknowledges some urinary frequency but denies any emesis, cough, cold symptoms, abdominal pain, or diarrhea. No past history of kidney stones or diabetic ketoacidosis. No recent use of steroid medications. The patient had a colonoscopy in 2014, but diverticulosis was not identified at that time.  On presentation patient was mildly hypertensive.  He is afebrile not hypoxic.  His exam was unremarkable except mild CVA tenderness.  Urine however was normal.  Blood work was unremarkable and his hemoglobin on check was 192 and on recheck 172.  Question if his glucose monitor is accurate.  Of asked him to bring it to his next doctor's appointment to have it compared.  Unfortunately could not do so today because he had no strips for it.  Patient's urine was unremarkable that glucose being present.  Information on hypoglycemia is provided.  Continue previous meds.  Follow your blood sugars and follow-up if you have new concerning symptoms.  I have reviewed the nursing notes.    I have reviewed the findings, diagnosis, plan and need for follow up with the patient.          Medication List      There are no discharge medications for this visit.         Final diagnoses:   None     This document serves as a record of the services and decisions personally performed and made by Erasmo Amaya MD. It was created on HIS/HER behalf by Precious Beck, a trained medical scribe. The creation of this document is based the provider's statements to the medical scribe.  Precious Beck 5:00 PM 12/30/2018    Provider:   The information in this document, created by the medical scribe for me, accurately reflects the services I personally performed and the decisions made by me. I have reviewed and approved this document for " accuracy prior to leaving the patient care area.  Erasmo Amaya MD 5:00 PM 12/30/2018 12/30/2018   [facility] EMERGENCY DEPARTMENT     Erasmo Amaya MD  12/30/18 4482

## 2018-12-30 NOTE — ED TRIAGE NOTES
"Diabetic with \"chronic pain problems\" started feeling lightheaded and dizzy this afternoon. Ambulates without problem, drinking water. -120 in triage, pt states his all over pain is \"8/10, and I haven't taken my pain meds for a while\"  "

## 2018-12-30 NOTE — ED AVS SNAPSHOT
[facility] Emergency Department  [address] [address]  Phone:  [phone]  Fax:  [phone]                                    Obed Nickerson   MRN: [id number]    Department:  [facility] Emergency Department   Date of Visit:  12/30/2018           After Visit Summary Signature Page    I have received my discharge instructions, and my questions have been answered. I have discussed any challenges I see with this plan with the nurse or doctor.    ..........................................................................................................................................  Patient/Patient Representative Signature      ..........................................................................................................................................  Patient Representative Print Name and Relationship to Patient    ..................................................               ................................................  Date                                   Time    ..........................................................................................................................................  Reviewed by Signature/Title    ...................................................              ..............................................  Date                                               Time          22EPIC Rev 08/18

## 2018-12-30 NOTE — ED NOTES
"Pt presents today with concerns with blood glucose levels.  States in 300's earlier today, now in 190's.  He says that is BG levels have been in 200's the last two weeks.  Past 2 weeks patient has felt \"like I did before I started taking insulin,\"  but today he started to feel worse.  Pt diaphoretic upon RN entering room.  Last 2 weeks has felt an increase in thirst and increase in urination.  Denies CP and SOB.  NO recent injury or illness.  Spouse at bedside.  Call light within reach. Pt Hypertensive upon arrival.    "

## 2018-12-31 ENCOUNTER — COMMUNICATION - HEALTHEAST (OUTPATIENT)
Dept: FAMILY MEDICINE | Facility: CLINIC | Age: 53
End: 2018-12-31

## 2019-01-09 ENCOUNTER — OFFICE VISIT - HEALTHEAST (OUTPATIENT)
Dept: FAMILY MEDICINE | Facility: CLINIC | Age: 54
End: 2019-01-09

## 2019-01-09 DIAGNOSIS — F32.0 MILD MAJOR DEPRESSION (H): ICD-10-CM

## 2019-01-09 DIAGNOSIS — K08.89 PAIN, DENTAL: ICD-10-CM

## 2019-01-09 DIAGNOSIS — Z79.4 TYPE 2 DIABETES MELLITUS WITH HYPERGLYCEMIA, WITH LONG-TERM CURRENT USE OF INSULIN (H): ICD-10-CM

## 2019-01-09 DIAGNOSIS — Z00.00 ROUTINE GENERAL MEDICAL EXAMINATION AT A HEALTH CARE FACILITY: ICD-10-CM

## 2019-01-09 DIAGNOSIS — E11.65 TYPE 2 DIABETES MELLITUS WITH HYPERGLYCEMIA, WITH LONG-TERM CURRENT USE OF INSULIN (H): ICD-10-CM

## 2019-01-09 DIAGNOSIS — E66.01 MORBID OBESITY (H): ICD-10-CM

## 2019-01-09 DIAGNOSIS — I10 BENIGN ESSENTIAL HYPERTENSION: ICD-10-CM

## 2019-01-09 ASSESSMENT — MIFFLIN-ST. JEOR: SCORE: 1949.27

## 2019-01-21 ENCOUNTER — COMMUNICATION - HEALTHEAST (OUTPATIENT)
Dept: FAMILY MEDICINE | Facility: CLINIC | Age: 54
End: 2019-01-21

## 2019-01-21 DIAGNOSIS — Z79.4 LONG-TERM INSULIN USE IN TYPE 2 DIABETES (H): ICD-10-CM

## 2019-01-21 DIAGNOSIS — E11.9 LONG-TERM INSULIN USE IN TYPE 2 DIABETES (H): ICD-10-CM

## 2019-01-22 ENCOUNTER — COMMUNICATION - HEALTHEAST (OUTPATIENT)
Dept: FAMILY MEDICINE | Facility: CLINIC | Age: 54
End: 2019-01-22

## 2019-01-22 DIAGNOSIS — Z79.4 LONG-TERM INSULIN USE IN TYPE 2 DIABETES (H): ICD-10-CM

## 2019-01-22 DIAGNOSIS — E11.9 LONG-TERM INSULIN USE IN TYPE 2 DIABETES (H): ICD-10-CM

## 2019-01-23 ENCOUNTER — COMMUNICATION - HEALTHEAST (OUTPATIENT)
Dept: FAMILY MEDICINE | Facility: CLINIC | Age: 54
End: 2019-01-23

## 2019-01-23 DIAGNOSIS — Z79.4 LONG-TERM INSULIN USE IN TYPE 2 DIABETES (H): ICD-10-CM

## 2019-01-23 DIAGNOSIS — E11.9 LONG-TERM INSULIN USE IN TYPE 2 DIABETES (H): ICD-10-CM

## 2019-02-03 ENCOUNTER — AMBULATORY - HEALTHEAST (OUTPATIENT)
Dept: EDUCATION SERVICES | Facility: CLINIC | Age: 54
End: 2019-02-03

## 2019-02-03 DIAGNOSIS — Z79.4 TYPE 2 DIABETES MELLITUS WITH HYPERGLYCEMIA, WITH LONG-TERM CURRENT USE OF INSULIN (H): ICD-10-CM

## 2019-02-03 DIAGNOSIS — E11.65 TYPE 2 DIABETES MELLITUS WITH HYPERGLYCEMIA, WITH LONG-TERM CURRENT USE OF INSULIN (H): ICD-10-CM

## 2019-02-26 ENCOUNTER — COMMUNICATION - HEALTHEAST (OUTPATIENT)
Dept: FAMILY MEDICINE | Facility: CLINIC | Age: 54
End: 2019-02-26

## 2019-02-26 DIAGNOSIS — F32.0 MILD MAJOR DEPRESSION (H): ICD-10-CM

## 2019-04-03 ENCOUNTER — COMMUNICATION - HEALTHEAST (OUTPATIENT)
Dept: FAMILY MEDICINE | Facility: CLINIC | Age: 54
End: 2019-04-03

## 2019-04-10 ENCOUNTER — COMMUNICATION - HEALTHEAST (OUTPATIENT)
Dept: FAMILY MEDICINE | Facility: CLINIC | Age: 54
End: 2019-04-10

## 2019-04-17 ENCOUNTER — AMBULATORY - HEALTHEAST (OUTPATIENT)
Dept: FAMILY MEDICINE | Facility: CLINIC | Age: 54
End: 2019-04-17

## 2019-04-17 DIAGNOSIS — Z79.4 TYPE 2 DIABETES MELLITUS WITH HYPERGLYCEMIA, WITH LONG-TERM CURRENT USE OF INSULIN (H): ICD-10-CM

## 2019-04-17 DIAGNOSIS — E11.65 TYPE 2 DIABETES MELLITUS WITH HYPERGLYCEMIA, WITH LONG-TERM CURRENT USE OF INSULIN (H): ICD-10-CM

## 2019-04-30 ENCOUNTER — OFFICE VISIT - HEALTHEAST (OUTPATIENT)
Dept: FAMILY MEDICINE | Facility: CLINIC | Age: 54
End: 2019-04-30

## 2019-04-30 DIAGNOSIS — Z79.4 TYPE 2 DIABETES MELLITUS WITH HYPERGLYCEMIA, WITH LONG-TERM CURRENT USE OF INSULIN (H): ICD-10-CM

## 2019-04-30 DIAGNOSIS — Z79.4 LONG-TERM INSULIN USE IN TYPE 2 DIABETES (H): ICD-10-CM

## 2019-04-30 DIAGNOSIS — E11.65 TYPE 2 DIABETES MELLITUS WITH HYPERGLYCEMIA, WITH LONG-TERM CURRENT USE OF INSULIN (H): ICD-10-CM

## 2019-04-30 DIAGNOSIS — E11.9 LONG-TERM INSULIN USE IN TYPE 2 DIABETES (H): ICD-10-CM

## 2019-04-30 LAB
ANION GAP SERPL CALCULATED.3IONS-SCNC: 13 MMOL/L (ref 5–18)
BUN SERPL-MCNC: 18 MG/DL (ref 8–22)
CALCIUM SERPL-MCNC: 10.7 MG/DL (ref 8.5–10.5)
CHLORIDE BLD-SCNC: 102 MMOL/L (ref 98–107)
CO2 SERPL-SCNC: 23 MMOL/L (ref 22–31)
CREAT SERPL-MCNC: 0.86 MG/DL (ref 0.7–1.3)
GFR SERPL CREATININE-BSD FRML MDRD: >60 ML/MIN/1.73M2
GLUCOSE BLD-MCNC: 256 MG/DL (ref 70–125)
HBA1C MFR BLD: 9.1 % (ref 3.5–6)
POTASSIUM BLD-SCNC: 4.5 MMOL/L (ref 3.5–5)
SODIUM SERPL-SCNC: 138 MMOL/L (ref 136–145)

## 2019-04-30 ASSESSMENT — MIFFLIN-ST. JEOR: SCORE: 1911.56

## 2019-05-01 ENCOUNTER — COMMUNICATION - HEALTHEAST (OUTPATIENT)
Dept: FAMILY MEDICINE | Facility: CLINIC | Age: 54
End: 2019-05-01

## 2019-05-02 ENCOUNTER — COMMUNICATION - HEALTHEAST (OUTPATIENT)
Dept: FAMILY MEDICINE | Facility: CLINIC | Age: 54
End: 2019-05-02

## 2019-05-02 DIAGNOSIS — K08.89 PAIN, DENTAL: ICD-10-CM

## 2019-05-02 DIAGNOSIS — F32.0 MILD MAJOR DEPRESSION (H): ICD-10-CM

## 2019-05-15 ENCOUNTER — COMMUNICATION - HEALTHEAST (OUTPATIENT)
Dept: FAMILY MEDICINE | Facility: CLINIC | Age: 54
End: 2019-05-15

## 2019-05-17 ENCOUNTER — COMMUNICATION - HEALTHEAST (OUTPATIENT)
Dept: FAMILY MEDICINE | Facility: CLINIC | Age: 54
End: 2019-05-17

## 2019-05-17 DIAGNOSIS — Z79.4 TYPE 2 DIABETES MELLITUS WITH HYPERGLYCEMIA, WITH LONG-TERM CURRENT USE OF INSULIN (H): ICD-10-CM

## 2019-05-17 DIAGNOSIS — E11.65 TYPE 2 DIABETES MELLITUS WITH HYPERGLYCEMIA, WITH LONG-TERM CURRENT USE OF INSULIN (H): ICD-10-CM

## 2019-05-30 ENCOUNTER — COMMUNICATION - HEALTHEAST (OUTPATIENT)
Dept: FAMILY MEDICINE | Facility: CLINIC | Age: 54
End: 2019-05-30

## 2019-05-30 DIAGNOSIS — E11.65 TYPE 2 DIABETES MELLITUS WITH HYPERGLYCEMIA, WITH LONG-TERM CURRENT USE OF INSULIN (H): ICD-10-CM

## 2019-05-30 DIAGNOSIS — Z79.4 TYPE 2 DIABETES MELLITUS WITH HYPERGLYCEMIA, WITH LONG-TERM CURRENT USE OF INSULIN (H): ICD-10-CM

## 2019-06-25 ENCOUNTER — COMMUNICATION - HEALTHEAST (OUTPATIENT)
Dept: FAMILY MEDICINE | Facility: CLINIC | Age: 54
End: 2019-06-25

## 2019-06-25 DIAGNOSIS — Z79.4 TYPE 2 DIABETES MELLITUS WITH HYPERGLYCEMIA, WITH LONG-TERM CURRENT USE OF INSULIN (H): ICD-10-CM

## 2019-06-25 DIAGNOSIS — E11.65 TYPE 2 DIABETES MELLITUS WITH HYPERGLYCEMIA, WITH LONG-TERM CURRENT USE OF INSULIN (H): ICD-10-CM

## 2019-06-29 ENCOUNTER — COMMUNICATION - HEALTHEAST (OUTPATIENT)
Dept: FAMILY MEDICINE | Facility: CLINIC | Age: 54
End: 2019-06-29

## 2019-06-29 DIAGNOSIS — Z79.4 LONG-TERM INSULIN USE IN TYPE 2 DIABETES (H): ICD-10-CM

## 2019-06-29 DIAGNOSIS — E11.9 LONG-TERM INSULIN USE IN TYPE 2 DIABETES (H): ICD-10-CM

## 2019-07-01 ENCOUNTER — COMMUNICATION - HEALTHEAST (OUTPATIENT)
Dept: FAMILY MEDICINE | Facility: CLINIC | Age: 54
End: 2019-07-01

## 2019-07-15 ENCOUNTER — AMBULATORY - HEALTHEAST (OUTPATIENT)
Dept: NURSING | Facility: CLINIC | Age: 54
End: 2019-07-15

## 2019-07-25 ENCOUNTER — COMMUNICATION - HEALTHEAST (OUTPATIENT)
Dept: FAMILY MEDICINE | Facility: CLINIC | Age: 54
End: 2019-07-25

## 2019-07-25 DIAGNOSIS — Z79.4 LONG-TERM INSULIN USE IN TYPE 2 DIABETES (H): ICD-10-CM

## 2019-07-25 DIAGNOSIS — E11.9 LONG-TERM INSULIN USE IN TYPE 2 DIABETES (H): ICD-10-CM

## 2019-07-29 ENCOUNTER — COMMUNICATION - HEALTHEAST (OUTPATIENT)
Dept: FAMILY MEDICINE | Facility: CLINIC | Age: 54
End: 2019-07-29

## 2019-08-09 ENCOUNTER — OFFICE VISIT - HEALTHEAST (OUTPATIENT)
Dept: FAMILY MEDICINE | Facility: CLINIC | Age: 54
End: 2019-08-09

## 2019-08-09 DIAGNOSIS — F32.0 MILD MAJOR DEPRESSION (H): ICD-10-CM

## 2019-08-09 DIAGNOSIS — I10 BENIGN ESSENTIAL HYPERTENSION: ICD-10-CM

## 2019-08-09 DIAGNOSIS — E11.65 TYPE 2 DIABETES MELLITUS WITH HYPERGLYCEMIA, WITH LONG-TERM CURRENT USE OF INSULIN (H): ICD-10-CM

## 2019-08-09 DIAGNOSIS — Z79.4 TYPE 2 DIABETES MELLITUS WITH HYPERGLYCEMIA, WITH LONG-TERM CURRENT USE OF INSULIN (H): ICD-10-CM

## 2019-08-09 DIAGNOSIS — Z79.4 LONG-TERM INSULIN USE IN TYPE 2 DIABETES (H): ICD-10-CM

## 2019-08-09 DIAGNOSIS — E11.9 LONG-TERM INSULIN USE IN TYPE 2 DIABETES (H): ICD-10-CM

## 2019-08-09 LAB
ANION GAP SERPL CALCULATED.3IONS-SCNC: 14 MMOL/L (ref 5–18)
BUN SERPL-MCNC: 22 MG/DL (ref 8–22)
CALCIUM SERPL-MCNC: 10.7 MG/DL (ref 8.5–10.5)
CHLORIDE BLD-SCNC: 97 MMOL/L (ref 98–107)
CHOLEST SERPL-MCNC: 316 MG/DL
CO2 SERPL-SCNC: 25 MMOL/L (ref 22–31)
CREAT SERPL-MCNC: 1.1 MG/DL (ref 0.7–1.3)
FASTING STATUS PATIENT QL REPORTED: NO
GFR SERPL CREATININE-BSD FRML MDRD: >60 ML/MIN/1.73M2
GLUCOSE BLD-MCNC: 168 MG/DL (ref 70–125)
HBA1C MFR BLD: 7.5 % (ref 3.5–6)
HDLC SERPL-MCNC: 50 MG/DL
LDLC SERPL CALC-MCNC: 187 MG/DL
POTASSIUM BLD-SCNC: 4.9 MMOL/L (ref 3.5–5)
SODIUM SERPL-SCNC: 136 MMOL/L (ref 136–145)
TRIGL SERPL-MCNC: 397 MG/DL

## 2019-08-09 ASSESSMENT — MIFFLIN-ST. JEOR: SCORE: 1876.12

## 2019-08-24 ENCOUNTER — COMMUNICATION - HEALTHEAST (OUTPATIENT)
Dept: FAMILY MEDICINE | Facility: CLINIC | Age: 54
End: 2019-08-24

## 2019-08-24 DIAGNOSIS — E11.9 LONG-TERM INSULIN USE IN TYPE 2 DIABETES (H): ICD-10-CM

## 2019-08-24 DIAGNOSIS — Z79.4 LONG-TERM INSULIN USE IN TYPE 2 DIABETES (H): ICD-10-CM

## 2019-09-15 ENCOUNTER — COMMUNICATION - HEALTHEAST (OUTPATIENT)
Dept: FAMILY MEDICINE | Facility: CLINIC | Age: 54
End: 2019-09-15

## 2019-09-15 DIAGNOSIS — Z79.4 LONG-TERM INSULIN USE IN TYPE 2 DIABETES (H): ICD-10-CM

## 2019-09-15 DIAGNOSIS — E11.9 LONG-TERM INSULIN USE IN TYPE 2 DIABETES (H): ICD-10-CM

## 2019-09-29 ENCOUNTER — HEALTH MAINTENANCE LETTER (OUTPATIENT)
Age: 54
End: 2019-09-29

## 2019-10-04 ENCOUNTER — COMMUNICATION - HEALTHEAST (OUTPATIENT)
Dept: FAMILY MEDICINE | Facility: CLINIC | Age: 54
End: 2019-10-04

## 2019-10-14 ENCOUNTER — COMMUNICATION - HEALTHEAST (OUTPATIENT)
Dept: FAMILY MEDICINE | Facility: CLINIC | Age: 54
End: 2019-10-14

## 2019-10-14 DIAGNOSIS — F32.0 MILD MAJOR DEPRESSION (H): ICD-10-CM

## 2019-10-15 ENCOUNTER — OFFICE VISIT - HEALTHEAST (OUTPATIENT)
Dept: RHEUMATOLOGY | Facility: CLINIC | Age: 54
End: 2019-10-15

## 2019-10-15 DIAGNOSIS — M70.61 TROCHANTERIC BURSITIS OF BOTH HIPS: ICD-10-CM

## 2019-10-15 DIAGNOSIS — M47.812 CERVICAL SPONDYLOSIS: ICD-10-CM

## 2019-10-15 DIAGNOSIS — M15.0 PRIMARY OSTEOARTHRITIS INVOLVING MULTIPLE JOINTS: ICD-10-CM

## 2019-10-15 DIAGNOSIS — M70.62 TROCHANTERIC BURSITIS OF BOTH HIPS: ICD-10-CM

## 2019-10-15 DIAGNOSIS — M25.50 POLYARTHRALGIA: ICD-10-CM

## 2019-10-15 LAB — CCP AB SER IA-ACNC: <0.5 U/ML

## 2019-10-15 ASSESSMENT — MIFFLIN-ST. JEOR: SCORE: 1888.6

## 2019-10-16 LAB — HCV AB SERPL QL IA: NEGATIVE

## 2019-11-07 ENCOUNTER — COMMUNICATION - HEALTHEAST (OUTPATIENT)
Dept: FAMILY MEDICINE | Facility: CLINIC | Age: 54
End: 2019-11-07

## 2019-11-26 ENCOUNTER — OFFICE VISIT - HEALTHEAST (OUTPATIENT)
Dept: FAMILY MEDICINE | Facility: CLINIC | Age: 54
End: 2019-11-26

## 2019-11-26 DIAGNOSIS — Z12.11 SCREEN FOR COLON CANCER: ICD-10-CM

## 2019-11-26 DIAGNOSIS — I10 BENIGN ESSENTIAL HYPERTENSION: ICD-10-CM

## 2019-11-26 DIAGNOSIS — Z79.4 TYPE 2 DIABETES MELLITUS WITH HYPERGLYCEMIA, WITH LONG-TERM CURRENT USE OF INSULIN (H): ICD-10-CM

## 2019-11-26 DIAGNOSIS — Z23 FLU VACCINE NEED: ICD-10-CM

## 2019-11-26 DIAGNOSIS — F32.0 MILD MAJOR DEPRESSION (H): ICD-10-CM

## 2019-11-26 DIAGNOSIS — E11.65 TYPE 2 DIABETES MELLITUS WITH HYPERGLYCEMIA, WITH LONG-TERM CURRENT USE OF INSULIN (H): ICD-10-CM

## 2019-11-26 LAB
ALBUMIN SERPL-MCNC: 4.3 G/DL (ref 3.5–5)
ALP SERPL-CCNC: 112 U/L (ref 45–120)
ALT SERPL W P-5'-P-CCNC: 58 U/L (ref 0–45)
ANION GAP SERPL CALCULATED.3IONS-SCNC: 15 MMOL/L (ref 5–18)
AST SERPL W P-5'-P-CCNC: 36 U/L (ref 0–40)
BILIRUB SERPL-MCNC: 0.4 MG/DL (ref 0–1)
BUN SERPL-MCNC: 16 MG/DL (ref 8–22)
CALCIUM SERPL-MCNC: 10 MG/DL (ref 8.5–10.5)
CHLORIDE BLD-SCNC: 104 MMOL/L (ref 98–107)
CHOLEST SERPL-MCNC: 327 MG/DL
CO2 SERPL-SCNC: 19 MMOL/L (ref 22–31)
CREAT SERPL-MCNC: 0.95 MG/DL (ref 0.7–1.3)
FASTING STATUS PATIENT QL REPORTED: YES
GFR SERPL CREATININE-BSD FRML MDRD: >60 ML/MIN/1.73M2
GLUCOSE BLD-MCNC: 224 MG/DL (ref 70–125)
HBA1C MFR BLD: 8.3 % (ref 3.5–6)
HDLC SERPL-MCNC: 46 MG/DL
LDLC SERPL CALC-MCNC: 214 MG/DL
LDLC SERPL CALC-MCNC: ABNORMAL MG/DL
POTASSIUM BLD-SCNC: 4.2 MMOL/L (ref 3.5–5)
PROT SERPL-MCNC: 7.8 G/DL (ref 6–8)
SODIUM SERPL-SCNC: 138 MMOL/L (ref 136–145)
TRIGL SERPL-MCNC: 485 MG/DL

## 2019-11-26 ASSESSMENT — MIFFLIN-ST. JEOR: SCORE: 1894.55

## 2019-12-29 ENCOUNTER — COMMUNICATION - HEALTHEAST (OUTPATIENT)
Dept: FAMILY MEDICINE | Facility: CLINIC | Age: 54
End: 2019-12-29

## 2019-12-29 DIAGNOSIS — F32.0 MILD MAJOR DEPRESSION (H): ICD-10-CM

## 2020-01-28 ENCOUNTER — COMMUNICATION - HEALTHEAST (OUTPATIENT)
Dept: FAMILY MEDICINE | Facility: CLINIC | Age: 55
End: 2020-01-28

## 2020-01-28 DIAGNOSIS — F32.0 MILD MAJOR DEPRESSION (H): ICD-10-CM

## 2020-02-07 ENCOUNTER — COMMUNICATION - HEALTHEAST (OUTPATIENT)
Dept: FAMILY MEDICINE | Facility: CLINIC | Age: 55
End: 2020-02-07

## 2020-02-07 DIAGNOSIS — Z79.4 LONG-TERM INSULIN USE IN TYPE 2 DIABETES (H): ICD-10-CM

## 2020-02-07 DIAGNOSIS — E11.9 LONG-TERM INSULIN USE IN TYPE 2 DIABETES (H): ICD-10-CM

## 2020-03-02 ENCOUNTER — COMMUNICATION - HEALTHEAST (OUTPATIENT)
Dept: FAMILY MEDICINE | Facility: CLINIC | Age: 55
End: 2020-03-02

## 2020-03-17 ENCOUNTER — COMMUNICATION - HEALTHEAST (OUTPATIENT)
Dept: FAMILY MEDICINE | Facility: CLINIC | Age: 55
End: 2020-03-17

## 2020-03-17 DIAGNOSIS — E11.9 LONG-TERM INSULIN USE IN TYPE 2 DIABETES (H): ICD-10-CM

## 2020-03-17 DIAGNOSIS — Z79.4 LONG-TERM INSULIN USE IN TYPE 2 DIABETES (H): ICD-10-CM

## 2020-03-20 ENCOUNTER — COMMUNICATION - HEALTHEAST (OUTPATIENT)
Dept: FAMILY MEDICINE | Facility: CLINIC | Age: 55
End: 2020-03-20

## 2020-03-20 DIAGNOSIS — F32.0 MILD MAJOR DEPRESSION (H): ICD-10-CM

## 2020-03-24 ENCOUNTER — TELEPHONE (OUTPATIENT)
Dept: FAMILY MEDICINE | Facility: CLINIC | Age: 55
End: 2020-03-24

## 2020-03-24 NOTE — TELEPHONE ENCOUNTER
Prescription for alprazolam was sent to our mail order pharmacy on 3/22/20. They did not fill it. Patient would like to fill at [facility]. Since it has never been filled, we are not allowed to transfer a controlled substance. Can you please send a new rx to [facility] or you can call with a verbal.  Thank you  [facility]   [address]  [place] [address]  [phone]  Fax [phone]

## 2020-03-26 ENCOUNTER — COMMUNICATION - HEALTHEAST (OUTPATIENT)
Dept: FAMILY MEDICINE | Facility: CLINIC | Age: 55
End: 2020-03-26

## 2020-03-26 DIAGNOSIS — F32.0 MILD MAJOR DEPRESSION (H): ICD-10-CM

## 2020-03-27 NOTE — TELEPHONE ENCOUNTER
Pt requesting rx for Bupropion XL 150mg, didn't see on med list    Thank you!  Mirna Brady Willa  [facility]

## 2020-03-30 ENCOUNTER — OFFICE VISIT - HEALTHEAST (OUTPATIENT)
Dept: FAMILY MEDICINE | Facility: CLINIC | Age: 55
End: 2020-03-30

## 2020-03-30 DIAGNOSIS — Z79.4 LONG-TERM INSULIN USE IN TYPE 2 DIABETES (H): ICD-10-CM

## 2020-03-30 DIAGNOSIS — Z79.4 TYPE 2 DIABETES MELLITUS WITH HYPERGLYCEMIA, WITH LONG-TERM CURRENT USE OF INSULIN (H): ICD-10-CM

## 2020-03-30 DIAGNOSIS — M25.50 POLYARTHRALGIA: ICD-10-CM

## 2020-03-30 DIAGNOSIS — I10 HYPERTENSION, UNSPECIFIED TYPE: ICD-10-CM

## 2020-03-30 DIAGNOSIS — F32.0 MILD MAJOR DEPRESSION (H): ICD-10-CM

## 2020-03-30 DIAGNOSIS — E11.65 TYPE 2 DIABETES MELLITUS WITH HYPERGLYCEMIA, WITH LONG-TERM CURRENT USE OF INSULIN (H): ICD-10-CM

## 2020-03-30 DIAGNOSIS — E11.9 LONG-TERM INSULIN USE IN TYPE 2 DIABETES (H): ICD-10-CM

## 2020-04-30 ENCOUNTER — COMMUNICATION - HEALTHEAST (OUTPATIENT)
Dept: FAMILY MEDICINE | Facility: CLINIC | Age: 55
End: 2020-04-30

## 2020-04-30 DIAGNOSIS — F32.0 MILD MAJOR DEPRESSION (H): ICD-10-CM

## 2020-05-02 ENCOUNTER — COMMUNICATION - HEALTHEAST (OUTPATIENT)
Dept: FAMILY MEDICINE | Facility: CLINIC | Age: 55
End: 2020-05-02

## 2020-05-13 ENCOUNTER — COMMUNICATION - HEALTHEAST (OUTPATIENT)
Dept: FAMILY MEDICINE | Facility: CLINIC | Age: 55
End: 2020-05-13

## 2020-06-02 NOTE — TELEPHONE ENCOUNTER
Pt requesting Rx for 70% alcohol prep pads for injecting insulin  Thank you!  Mirna Brady [facility]

## 2020-06-03 ENCOUNTER — COMMUNICATION - HEALTHEAST (OUTPATIENT)
Dept: FAMILY MEDICINE | Facility: CLINIC | Age: 55
End: 2020-06-03

## 2020-06-03 DIAGNOSIS — E11.65 TYPE 2 DIABETES MELLITUS WITH HYPERGLYCEMIA, WITH LONG-TERM CURRENT USE OF INSULIN (H): ICD-10-CM

## 2020-06-03 DIAGNOSIS — Z79.4 TYPE 2 DIABETES MELLITUS WITH HYPERGLYCEMIA, WITH LONG-TERM CURRENT USE OF INSULIN (H): ICD-10-CM

## 2020-06-17 ENCOUNTER — COMMUNICATION - HEALTHEAST (OUTPATIENT)
Dept: FAMILY MEDICINE | Facility: CLINIC | Age: 55
End: 2020-06-17

## 2020-06-20 ENCOUNTER — COMMUNICATION - HEALTHEAST (OUTPATIENT)
Dept: FAMILY MEDICINE | Facility: CLINIC | Age: 55
End: 2020-06-20

## 2020-06-20 DIAGNOSIS — Z79.4 TYPE 2 DIABETES MELLITUS WITH HYPERGLYCEMIA, WITH LONG-TERM CURRENT USE OF INSULIN (H): ICD-10-CM

## 2020-06-20 DIAGNOSIS — E11.65 TYPE 2 DIABETES MELLITUS WITH HYPERGLYCEMIA, WITH LONG-TERM CURRENT USE OF INSULIN (H): ICD-10-CM

## 2020-06-23 ENCOUNTER — OFFICE VISIT - HEALTHEAST (OUTPATIENT)
Dept: FAMILY MEDICINE | Facility: CLINIC | Age: 55
End: 2020-06-23

## 2020-06-23 DIAGNOSIS — E11.65 TYPE 2 DIABETES MELLITUS WITH HYPERGLYCEMIA, WITH LONG-TERM CURRENT USE OF INSULIN (H): ICD-10-CM

## 2020-06-23 DIAGNOSIS — Z79.4 TYPE 2 DIABETES MELLITUS WITH HYPERGLYCEMIA, WITH LONG-TERM CURRENT USE OF INSULIN (H): ICD-10-CM

## 2020-06-23 DIAGNOSIS — Z79.4 TYPE 2 DIABETES MELLITUS WITH HYPERGLYCEMIA, WITH LONG-TERM CURRENT USE OF INSULIN (H): Primary | ICD-10-CM

## 2020-06-23 DIAGNOSIS — E11.65 TYPE 2 DIABETES MELLITUS WITH HYPERGLYCEMIA, WITH LONG-TERM CURRENT USE OF INSULIN (H): Primary | ICD-10-CM

## 2020-06-24 ENCOUNTER — COMMUNICATION - HEALTHEAST (OUTPATIENT)
Dept: FAMILY MEDICINE | Facility: CLINIC | Age: 55
End: 2020-06-24

## 2020-06-24 DIAGNOSIS — E11.65 TYPE 2 DIABETES MELLITUS WITH HYPERGLYCEMIA, WITH LONG-TERM CURRENT USE OF INSULIN (H): ICD-10-CM

## 2020-06-24 DIAGNOSIS — Z79.4 TYPE 2 DIABETES MELLITUS WITH HYPERGLYCEMIA, WITH LONG-TERM CURRENT USE OF INSULIN (H): ICD-10-CM

## 2020-06-29 DIAGNOSIS — Z79.4 TYPE 2 DIABETES MELLITUS WITH HYPERGLYCEMIA, WITH LONG-TERM CURRENT USE OF INSULIN (H): ICD-10-CM

## 2020-06-29 DIAGNOSIS — E11.65 TYPE 2 DIABETES MELLITUS WITH HYPERGLYCEMIA, WITH LONG-TERM CURRENT USE OF INSULIN (H): ICD-10-CM

## 2020-06-29 LAB
ALBUMIN SERPL-MCNC: 3.9 G/DL (ref 3.4–5)
ALP SERPL-CCNC: 113 U/L (ref 40–150)
ALT SERPL W P-5'-P-CCNC: 55 U/L (ref 0–70)
ANION GAP SERPL CALCULATED.3IONS-SCNC: 8 MMOL/L (ref 3–14)
AST SERPL W P-5'-P-CCNC: 19 U/L (ref 0–45)
BILIRUB SERPL-MCNC: 0.3 MG/DL (ref 0.2–1.3)
BUN SERPL-MCNC: 26 MG/DL (ref 7–30)
CALCIUM SERPL-MCNC: 9 MG/DL (ref 8.5–10.1)
CHLORIDE SERPL-SCNC: 102 MMOL/L (ref 94–109)
CO2 SERPL-SCNC: 24 MMOL/L (ref 20–32)
CREAT SERPL-MCNC: 0.93 MG/DL (ref 0.66–1.25)
ERYTHROCYTE [DISTWIDTH] IN BLOOD BY AUTOMATED COUNT: 13.3 % (ref 10–15)
GFR SERPL CREATININE-BSD FRML MDRD: >90 ML/MIN/{1.73_M2}
GLUCOSE SERPL-MCNC: 277 MG/DL (ref 70–99)
HBA1C MFR BLD: 10 % (ref 0–5.6)
HCT VFR BLD AUTO: 44.4 % (ref 40–53)
HGB BLD-MCNC: 14.9 G/DL (ref 13.3–17.7)
MCH RBC QN AUTO: 30.7 PG (ref 26.5–33)
MCHC RBC AUTO-ENTMCNC: 33.6 G/DL (ref 31.5–36.5)
MCV RBC AUTO: 91 FL (ref 78–100)
PLATELET # BLD AUTO: 290 10E9/L (ref 150–450)
POTASSIUM SERPL-SCNC: 4.1 MMOL/L (ref 3.4–5.3)
PROT SERPL-MCNC: 7.9 G/DL (ref 6.8–8.8)
RBC # BLD AUTO: 4.86 10E12/L (ref 4.4–5.9)
SODIUM SERPL-SCNC: 134 MMOL/L (ref 133–144)
WBC # BLD AUTO: 8.1 10E9/L (ref 4–11)

## 2020-06-29 PROCEDURE — 36415 COLL VENOUS BLD VENIPUNCTURE: CPT | Performed by: PEDIATRICS

## 2020-06-29 PROCEDURE — 85027 COMPLETE CBC AUTOMATED: CPT | Performed by: PEDIATRICS

## 2020-06-29 PROCEDURE — 80053 COMPREHEN METABOLIC PANEL: CPT | Performed by: PEDIATRICS

## 2020-06-29 PROCEDURE — 83036 HEMOGLOBIN GLYCOSYLATED A1C: CPT | Performed by: PEDIATRICS

## 2020-07-01 ENCOUNTER — COMMUNICATION - HEALTHEAST (OUTPATIENT)
Dept: FAMILY MEDICINE | Facility: CLINIC | Age: 55
End: 2020-07-01

## 2020-07-04 ENCOUNTER — COMMUNICATION - HEALTHEAST (OUTPATIENT)
Dept: FAMILY MEDICINE | Facility: CLINIC | Age: 55
End: 2020-07-04

## 2020-07-07 ENCOUNTER — COMMUNICATION - HEALTHEAST (OUTPATIENT)
Dept: FAMILY MEDICINE | Facility: CLINIC | Age: 55
End: 2020-07-07

## 2020-07-07 ENCOUNTER — OFFICE VISIT - HEALTHEAST (OUTPATIENT)
Dept: FAMILY MEDICINE | Facility: CLINIC | Age: 55
End: 2020-07-07

## 2020-07-07 DIAGNOSIS — Z79.4 TYPE 2 DIABETES MELLITUS WITH HYPERGLYCEMIA, WITH LONG-TERM CURRENT USE OF INSULIN (H): ICD-10-CM

## 2020-07-07 DIAGNOSIS — E11.65 TYPE 2 DIABETES MELLITUS WITH HYPERGLYCEMIA, WITH LONG-TERM CURRENT USE OF INSULIN (H): ICD-10-CM

## 2020-07-07 DIAGNOSIS — I10 BENIGN ESSENTIAL HYPERTENSION: ICD-10-CM

## 2020-07-07 DIAGNOSIS — R26.81 UNSTEADY GAIT: ICD-10-CM

## 2020-07-07 DIAGNOSIS — F32.0 MILD MAJOR DEPRESSION (H): ICD-10-CM

## 2020-07-08 ENCOUNTER — COMMUNICATION - HEALTHEAST (OUTPATIENT)
Dept: FAMILY MEDICINE | Facility: CLINIC | Age: 55
End: 2020-07-08

## 2020-07-08 DIAGNOSIS — Z79.4 LONG-TERM INSULIN USE IN TYPE 2 DIABETES (H): ICD-10-CM

## 2020-07-08 DIAGNOSIS — E11.9 LONG-TERM INSULIN USE IN TYPE 2 DIABETES (H): ICD-10-CM

## 2020-08-16 ENCOUNTER — COMMUNICATION - HEALTHEAST (OUTPATIENT)
Dept: FAMILY MEDICINE | Facility: CLINIC | Age: 55
End: 2020-08-16

## 2020-08-16 DIAGNOSIS — F32.0 MILD MAJOR DEPRESSION (H): ICD-10-CM

## 2020-09-28 ENCOUNTER — COMMUNICATION - HEALTHEAST (OUTPATIENT)
Dept: FAMILY MEDICINE | Facility: CLINIC | Age: 55
End: 2020-09-28

## 2020-09-28 DIAGNOSIS — Z79.4 TYPE 2 DIABETES MELLITUS WITH HYPERGLYCEMIA, WITH LONG-TERM CURRENT USE OF INSULIN (H): ICD-10-CM

## 2020-09-28 DIAGNOSIS — E11.65 TYPE 2 DIABETES MELLITUS WITH HYPERGLYCEMIA, WITH LONG-TERM CURRENT USE OF INSULIN (H): ICD-10-CM

## 2020-10-02 ENCOUNTER — COMMUNICATION - HEALTHEAST (OUTPATIENT)
Dept: FAMILY MEDICINE | Facility: CLINIC | Age: 55
End: 2020-10-02

## 2020-10-02 DIAGNOSIS — F32.0 MILD MAJOR DEPRESSION (H): ICD-10-CM

## 2020-10-26 ENCOUNTER — COMMUNICATION - HEALTHEAST (OUTPATIENT)
Dept: FAMILY MEDICINE | Facility: CLINIC | Age: 55
End: 2020-10-26

## 2020-11-12 ENCOUNTER — COMMUNICATION - HEALTHEAST (OUTPATIENT)
Dept: FAMILY MEDICINE | Facility: CLINIC | Age: 55
End: 2020-11-12

## 2020-11-12 DIAGNOSIS — F32.0 MILD MAJOR DEPRESSION (H): ICD-10-CM

## 2020-11-26 ENCOUNTER — COMMUNICATION - HEALTHEAST (OUTPATIENT)
Dept: FAMILY MEDICINE | Facility: CLINIC | Age: 55
End: 2020-11-26

## 2020-11-26 DIAGNOSIS — Z79.4 LONG-TERM INSULIN USE IN TYPE 2 DIABETES (H): ICD-10-CM

## 2020-11-26 DIAGNOSIS — E11.65 TYPE 2 DIABETES MELLITUS WITH HYPERGLYCEMIA, WITH LONG-TERM CURRENT USE OF INSULIN (H): ICD-10-CM

## 2020-11-26 DIAGNOSIS — Z79.4 TYPE 2 DIABETES MELLITUS WITH HYPERGLYCEMIA, WITH LONG-TERM CURRENT USE OF INSULIN (H): ICD-10-CM

## 2020-11-26 DIAGNOSIS — E11.9 LONG-TERM INSULIN USE IN TYPE 2 DIABETES (H): ICD-10-CM

## 2020-12-01 ENCOUNTER — COMMUNICATION - HEALTHEAST (OUTPATIENT)
Dept: FAMILY MEDICINE | Facility: CLINIC | Age: 55
End: 2020-12-01

## 2020-12-01 DIAGNOSIS — Z79.4 TYPE 2 DIABETES MELLITUS WITH HYPERGLYCEMIA, WITH LONG-TERM CURRENT USE OF INSULIN (H): ICD-10-CM

## 2020-12-01 DIAGNOSIS — E11.65 TYPE 2 DIABETES MELLITUS WITH HYPERGLYCEMIA, WITH LONG-TERM CURRENT USE OF INSULIN (H): ICD-10-CM

## 2020-12-07 ENCOUNTER — COMMUNICATION - HEALTHEAST (OUTPATIENT)
Dept: FAMILY MEDICINE | Facility: CLINIC | Age: 55
End: 2020-12-07

## 2020-12-07 DIAGNOSIS — Z79.4 LONG-TERM INSULIN USE IN TYPE 2 DIABETES (H): ICD-10-CM

## 2020-12-07 DIAGNOSIS — E11.9 LONG-TERM INSULIN USE IN TYPE 2 DIABETES (H): ICD-10-CM

## 2020-12-29 ENCOUNTER — MYC MEDICAL ADVICE (OUTPATIENT)
Dept: FAMILY MEDICINE | Facility: CLINIC | Age: 55
End: 2020-12-29

## 2020-12-29 ENCOUNTER — COMMUNICATION - HEALTHEAST (OUTPATIENT)
Dept: FAMILY MEDICINE | Facility: CLINIC | Age: 55
End: 2020-12-29

## 2020-12-29 RX ORDER — FLASH GLUCOSE SENSOR
KIT MISCELLANEOUS
COMMUNITY
Start: 2020-12-14 | End: 2021-03-18

## 2020-12-29 RX ORDER — ALPRAZOLAM 0.5 MG
TABLET ORAL
COMMUNITY
Start: 2020-11-12 | End: 2020-12-30

## 2020-12-29 RX ORDER — INSULIN PUMP SYRINGE, 3 ML
EACH MISCELLANEOUS
COMMUNITY
Start: 2017-09-05 | End: 2021-10-26

## 2020-12-29 RX ORDER — BUPROPION HYDROCHLORIDE 150 MG/1
TABLET ORAL
COMMUNITY
Start: 2020-04-26 | End: 2020-12-30

## 2020-12-29 RX ORDER — UBIQUINOL 100 MG
CAPSULE ORAL
COMMUNITY
Start: 2020-12-25 | End: 2022-01-28

## 2020-12-29 RX ORDER — DULOXETIN HYDROCHLORIDE 60 MG/1
CAPSULE, DELAYED RELEASE ORAL
COMMUNITY
Start: 2020-12-14 | End: 2021-06-01

## 2020-12-29 RX ORDER — ALPRAZOLAM 1 MG
1 TABLET ORAL
COMMUNITY
End: 2020-12-30

## 2020-12-29 RX ORDER — FLASH GLUCOSE SENSOR
KIT MISCELLANEOUS
COMMUNITY
Start: 2020-10-01 | End: 2021-10-27

## 2020-12-29 RX ORDER — METOPROLOL SUCCINATE 25 MG/1
25 TABLET, EXTENDED RELEASE ORAL
COMMUNITY
Start: 2019-11-26 | End: 2020-12-30

## 2020-12-29 RX ORDER — OXYCODONE HYDROCHLORIDE 20 MG/1
TABLET ORAL
COMMUNITY
Start: 2020-12-28 | End: 2021-10-26

## 2020-12-29 RX ORDER — DULAGLUTIDE 1.5 MG/.5ML
INJECTION, SOLUTION SUBCUTANEOUS
COMMUNITY
Start: 2020-12-03 | End: 2020-12-30

## 2020-12-30 ENCOUNTER — OFFICE VISIT (OUTPATIENT)
Dept: FAMILY MEDICINE | Facility: CLINIC | Age: 55
End: 2020-12-30
Payer: MEDICAID

## 2020-12-30 ENCOUNTER — TELEPHONE (OUTPATIENT)
Dept: FAMILY MEDICINE | Facility: CLINIC | Age: 55
End: 2020-12-30

## 2020-12-30 VITALS
RESPIRATION RATE: 16 BRPM | HEIGHT: 66 IN | SYSTOLIC BLOOD PRESSURE: 158 MMHG | BODY MASS INDEX: 40.35 KG/M2 | HEART RATE: 72 BPM | DIASTOLIC BLOOD PRESSURE: 98 MMHG | TEMPERATURE: 97.6 F

## 2020-12-30 DIAGNOSIS — G43.809 OTHER MIGRAINE WITHOUT STATUS MIGRAINOSUS, NOT INTRACTABLE: ICD-10-CM

## 2020-12-30 DIAGNOSIS — R10.2 PELVIC PAIN IN MALE: Primary | ICD-10-CM

## 2020-12-30 DIAGNOSIS — E11.65 TYPE 2 DIABETES MELLITUS WITH HYPERGLYCEMIA, WITH LONG-TERM CURRENT USE OF INSULIN (H): ICD-10-CM

## 2020-12-30 DIAGNOSIS — Z79.4 TYPE 2 DIABETES MELLITUS WITH HYPERGLYCEMIA, WITH LONG-TERM CURRENT USE OF INSULIN (H): ICD-10-CM

## 2020-12-30 DIAGNOSIS — E11.65 TYPE 2 DIABETES MELLITUS WITH HYPERGLYCEMIA, WITH LONG-TERM CURRENT USE OF INSULIN (H): Primary | ICD-10-CM

## 2020-12-30 DIAGNOSIS — Z79.4 TYPE 2 DIABETES MELLITUS WITH HYPERGLYCEMIA, WITH LONG-TERM CURRENT USE OF INSULIN (H): Primary | ICD-10-CM

## 2020-12-30 DIAGNOSIS — F41.9 ANXIETY: ICD-10-CM

## 2020-12-30 DIAGNOSIS — M79.10 MUSCLE PAIN: ICD-10-CM

## 2020-12-30 DIAGNOSIS — I10 HYPERTENSION GOAL BP (BLOOD PRESSURE) < 130/80: ICD-10-CM

## 2020-12-30 LAB
ALBUMIN SERPL-MCNC: 3.8 G/DL (ref 3.4–5)
ALP SERPL-CCNC: 106 U/L (ref 40–150)
ALT SERPL W P-5'-P-CCNC: 28 U/L (ref 0–70)
ANION GAP SERPL CALCULATED.3IONS-SCNC: 4 MMOL/L (ref 3–14)
AST SERPL W P-5'-P-CCNC: 18 U/L (ref 0–45)
BILIRUB SERPL-MCNC: 0.1 MG/DL (ref 0.2–1.3)
BUN SERPL-MCNC: 15 MG/DL (ref 7–30)
CALCIUM SERPL-MCNC: 9.4 MG/DL (ref 8.5–10.1)
CHLORIDE SERPL-SCNC: 104 MMOL/L (ref 94–109)
CO2 SERPL-SCNC: 28 MMOL/L (ref 20–32)
CREAT SERPL-MCNC: 0.82 MG/DL (ref 0.66–1.25)
GFR SERPL CREATININE-BSD FRML MDRD: >90 ML/MIN/{1.73_M2}
GLUCOSE SERPL-MCNC: 155 MG/DL (ref 70–99)
HBA1C MFR BLD: 8.8 % (ref 0–5.6)
POTASSIUM SERPL-SCNC: 3.9 MMOL/L (ref 3.4–5.3)
PROT SERPL-MCNC: 7.5 G/DL (ref 6.8–8.8)
SODIUM SERPL-SCNC: 136 MMOL/L (ref 133–144)

## 2020-12-30 PROCEDURE — 83036 HEMOGLOBIN GLYCOSYLATED A1C: CPT | Performed by: FAMILY MEDICINE

## 2020-12-30 PROCEDURE — 36415 COLL VENOUS BLD VENIPUNCTURE: CPT | Performed by: FAMILY MEDICINE

## 2020-12-30 PROCEDURE — 80053 COMPREHEN METABOLIC PANEL: CPT | Performed by: FAMILY MEDICINE

## 2020-12-30 PROCEDURE — 99204 OFFICE O/P NEW MOD 45 MIN: CPT | Performed by: FAMILY MEDICINE

## 2020-12-30 RX ORDER — ALPRAZOLAM 0.5 MG
TABLET ORAL
Status: CANCELLED | OUTPATIENT
Start: 2020-12-30

## 2020-12-30 RX ORDER — SUMATRIPTAN 50 MG/1
50 TABLET, FILM COATED ORAL
Qty: 9 TABLET | Refills: 1 | Status: SHIPPED | OUTPATIENT
Start: 2020-12-30 | End: 2022-07-14

## 2020-12-30 RX ORDER — ALPRAZOLAM 0.5 MG
TABLET ORAL
Qty: 15 TABLET | Refills: 3 | Status: SHIPPED | OUTPATIENT
Start: 2020-12-30 | End: 2021-03-29

## 2020-12-30 RX ORDER — DULAGLUTIDE 1.5 MG/.5ML
1.5 INJECTION, SOLUTION SUBCUTANEOUS
Qty: 2 ML | Refills: 1 | Status: SHIPPED | OUTPATIENT
Start: 2020-12-30 | End: 2021-01-04

## 2020-12-30 RX ORDER — METOPROLOL SUCCINATE 25 MG/1
25 TABLET, EXTENDED RELEASE ORAL DAILY
Qty: 90 TABLET | Refills: 0 | Status: SHIPPED | OUTPATIENT
Start: 2020-12-30 | End: 2021-02-02

## 2020-12-30 RX ORDER — LISINOPRIL AND HYDROCHLOROTHIAZIDE 12.5; 2 MG/1; MG/1
2 TABLET ORAL DAILY
Qty: 180 TABLET | Refills: 0 | Status: SHIPPED | OUTPATIENT
Start: 2020-12-30 | End: 2021-02-02

## 2020-12-30 ASSESSMENT — PATIENT HEALTH QUESTIONNAIRE - PHQ9: SUM OF ALL RESPONSES TO PHQ QUESTIONS 1-9: 9

## 2020-12-30 NOTE — TELEPHONE ENCOUNTER
Prior Authorization Retail Medication Request    Medication/Dose:   ICD code (if different than what is on RX):  Type 2 diabetes mellitus with hyperglycemia, without long-term current use of insulin (H) [E11.65]  Previously Tried and Failed:    Rationale:      Insurance Name:  [facility]  Insurance ID:  [id number]  Covermymeds:  Key- [id number]      Pharmacy Information (if different than what is on RX)  [facility]  [facility]  Phone:  [phone]

## 2020-12-31 NOTE — TELEPHONE ENCOUNTER
Central Prior Authorization Team   Phone: [phone]    PA Initiation    Medication: Trulicity  Insurance Company: [place] Medicaid ([facility] - Phone [phone] Fax [phone]  Pharmacy Filling the Rx: [id number] [place] [id number]  Filling Pharmacy Phone: [phone]  Filling Pharmacy Fax: [phone]  Start Date: 12/31/2020

## 2020-12-31 NOTE — TELEPHONE ENCOUNTER
Can you please call him and let him know his insurance has denied his weekly injectable diabetic med - would he want to try a daily tablet or a daily injection (non insulin)?    Please let mek now!    EB

## 2020-12-31 NOTE — TELEPHONE ENCOUNTER
PRIOR AUTHORIZATION DENIED    Medication: Trulicity-DENIED    Denial Date: 12/31/2020    Denial Rational: PATIENT MUST TRY/FAIL 2 FORMULARY ALTERNATIVES - NATALEE, TRADJENTA, TASHA, HAY, MARGUERITE, KOMBIGLYZE, FARXIGA, INVOKANA, JARDIANCE, AMANDA, GHASSAN, NOY.        Appeal Information:  IF PATIENT IS UNABLE TO TRY/FAIL ALTERNATIVE(S) PLEASE SUPPLY PA TEAM WITH A LETTER OF MEDICAL NECESSITY WITH CLINICAL REASON.

## 2021-01-04 NOTE — PROGRESS NOTES
Assessment/Plan:    Obed Nickerson is a 55 year old male presenting for:    1. Pelvic pain in male  Given the history of this pain this seems most likely to be a hernia.  Discussed this with the patient.  Offered imaging to further investigate or a referral to the general surgeon.  He would like to do imaging at this point.  Given that this pain goes into the scrotum I think it is less likely to be just a muscular strain but this is a possibility as well.  - CT Pelvis Soft Tissue w Contrast; Future  - Comprehensive metabolic panel (BMP + Alb, Alk Phos, ALT, AST, Total. Bili, TP)    2. Muscle pain  Refill tizanidine sent to the pharmacy.  - tiZANidine (ZANAFLEX) 4 MG tablet; Take 1 tablet (4 mg) by mouth At Bedtime  Dispense: 30 tablet; Refill: 1    3. Type 2 diabetes mellitus with hyperglycemia, with long-term current use of insulin (H)  Refill Trulicity.  Encouraged him to continue using his medications as prescribed.  We will follow-up in 1 month for his physical and further evaluation and discussion of his diabetes.  - TRULICITY 1.5 MG/0.5ML pen; Inject 1.5 mg Subcutaneous every 7 days  Dispense: 2 mL; Refill: 1  - Hemoglobin A1c    4. Hypertension goal BP (blood pressure) < 130/80  Blood pressure not at goal today.  Patient is not been taking his medication.  - lisinopril-hydrochlorothiazide (ZESTORETIC) 20-12.5 MG tablet; Take 2 tablets by mouth daily (Needs follow-up appointment for this medication)  Dispense: 180 tablet; Refill: 0  - metoprolol succinate ER (TOPROL-XL) 25 MG 24 hr tablet; Take 1 tablet (25 mg) by mouth daily  Dispense: 90 tablet; Refill: 0    5. Anxiety  - ALPRAZolam (XANAX) 0.5 MG tablet; TK 1 T PO QD PRF SLP  Dispense: 15 tablet; Refill: 3    6. Other migraine without status migrainosus, not intractable  - SUMAtriptan (IMITREX) 50 MG tablet; Take 1 tablet (50 mg) by mouth at onset of headache for migraine May repeat in 2 hours. Max 4 tablets/24 hours.  Dispense: 9 tablet; Refill:  1        Medications Discontinued During This Encounter   Medication Reason     ALPRAZolam (XANAX) 1 MG tablet      buPROPion (WELLBUTRIN XL) 150 MG 24 hr tablet      cloNIDine (CATAPRES) 0.1 MG tablet      DULOXETINE HCL PO      indomethacin (INDOCIN) 25 MG capsule      ketorolac (TORADOL) 10 MG tablet      METFORMIN HCL PO      MORPHINE SULFATE ER PO      Rizatriptan Benzoate (MAXALT PO)      lisinopril-hydrochlorothiazide (PRINZIDE,ZESTORETIC) 20-12.5 MG per tablet Reorder     TIZANIDINE HCL PO Reorder     SUMAtriptan (IMITREX) 50 MG tablet Reorder     TRULICITY 1.5 MG/0.5ML pen Reorder     metoprolol succinate ER (TOPROL-XL) 25 MG 24 hr tablet Reorder     ALPRAZolam (XANAX) 0.5 MG tablet Reorder           Chief Complaint:  Abdominal Pain        Subjective:   Obed Nickerson is a very pleasant 55-year-old gentleman presenting to the clinic today with concerns over a possible hernia.    Patient states that he was lifting something fairly heavy a few days ago and had some pain and pressure in his left groin area.  He states that he was able to complete the task he was doing but since that time he has been having that pain and pressure which is fairly constant.  He said that this goes down to his left groin and into his testicular region.  He has not noticed any bulges.    He has a past medical history significant for diabetes.  He has not been using his medications as prescribed as he tends to forget.  He did stop drinking soda and believes that his A1c will be improved from what it was previously (high nines).    Hypertension: Patient has a history of hypertension.    12 point review of systems completed and negative except for what has been described above    History   Smoking Status     Never Smoker   Smokeless Tobacco     Never Used         Current Outpatient Medications:      Alcohol Swabs (ALCOHOL PREP) 70 % PADS, , Disp: , Rfl:      ALPRAZolam (XANAX) 0.5 MG tablet, TK 1 T PO QD PRF SLP, Disp: 15 tablet,  "Rfl: 3     Blood Glucose Monitoring Suppl (FIFTY50 GLUCOSE METER 2.0) w/Device KIT, Dispense meter, test strips, lancets covered by pt ins. E11.65 NIDDM type II, uncontrolled - Test 3 times/day, Reason: High A1C, Disp: , Rfl:      Continuous Blood Gluc Sensor (FREESTYLE YOKO 14 DAY SENSOR) MISC, USE AS DIRECTED, Disp: , Rfl:      Continuous Blood Gluc Sensor (FREESTYLE YOKO 14 DAY SENSOR) MISC, , Disp: , Rfl:      DULoxetine (CYMBALTA) 60 MG capsule, , Disp: , Rfl:      insulin glargine (LANTUS) 100 UNIT/ML injection, Inject 20 Units Subcutaneous At Bedtime, Disp: , Rfl:      lisinopril-hydrochlorothiazide (ZESTORETIC) 20-12.5 MG tablet, Take 2 tablets by mouth daily (Needs follow-up appointment for this medication), Disp: 180 tablet, Rfl: 0     metFORMIN (GLUCOPHAGE) 1000 MG tablet, , Disp: , Rfl:      metoprolol succinate ER (TOPROL-XL) 25 MG 24 hr tablet, Take 1 tablet (25 mg) by mouth daily, Disp: 90 tablet, Rfl: 0     oxyCODONE HCl (ROXICODONE) 20 MG TABS immediate release tablet, TAKE 1 TABLET BY MOUTH FOUR TIMES DAILY FOR CHRONIC PAIN, Disp: , Rfl:      SUMAtriptan (IMITREX) 50 MG tablet, Take 1 tablet (50 mg) by mouth at onset of headache for migraine May repeat in 2 hours. Max 4 tablets/24 hours., Disp: 9 tablet, Rfl: 1     tiZANidine (ZANAFLEX) 4 MG tablet, Take 1 tablet (4 mg) by mouth At Bedtime, Disp: 30 tablet, Rfl: 1     TRULICITY 1.5 MG/0.5ML pen, Inject 1.5 mg Subcutaneous every 7 days, Disp: 2 mL, Rfl: 1     ASPIRIN PO, Take 81 mg by mouth daily, Disp: , Rfl:      DiphenhydrAMINE HCl (BENADRYL PO), Take 25 mg by mouth every 8 hours as needed, Disp: , Rfl:       Objective:  Vitals:    12/30/20 1444 12/30/20 1509   BP: (!) 152/98 (!) 158/98   Pulse: 72    Resp: 16    Temp: 97.6  F (36.4  C)    TempSrc: Tympanic    Height: 1.676 m (5' 6\")        Body mass index is 40.35 kg/m .    Vital signs reviewed and stable  General: No acute distress  Psych: Appropriate affect  HEENT: moist mucous membranes, " pupils equal, round, reactive to light and accomodation, tympanic membranes are pearly grey bilaterally  Lymph: no cervical or supraclavicular lymphadenopathy  Cardiovascular: regular rate and rhythm with no murmur  Pulmonary: clear to auscultation bilaterally with no wheeze  Abdomen: soft, non tender, non distended with normo-active bowel sounds, tender in the left groin region with no bulging noted.  Exam is slightly difficult due to body habitus.  Also tender in the left testicular area.  Testicle has been previously surgically removed.  Again, no particular bulging noted.  Extremities: warm and well perfused with no edema  Skin: warm and dry with no rash         This note has been dictated and transcribed using voice recognition software.   Any errors in transcription are unintentional and inherent to the software.

## 2021-01-04 NOTE — TELEPHONE ENCOUNTER
Call placed to patient.  Relayed  Message per Dr Brandt.  Patient states he would like to try pill form as alternative.   Verified preferred pharmacy is [facility].  Nora Suarez RN

## 2021-01-05 ENCOUNTER — RECORDS - HEALTHEAST (OUTPATIENT)
Dept: ADMINISTRATIVE | Facility: OTHER | Age: 56
End: 2021-01-05

## 2021-01-05 ENCOUNTER — MYC MEDICAL ADVICE (OUTPATIENT)
Dept: FAMILY MEDICINE | Facility: CLINIC | Age: 56
End: 2021-01-05

## 2021-01-07 ENCOUNTER — RECORDS - HEALTHEAST (OUTPATIENT)
Dept: ADMINISTRATIVE | Facility: OTHER | Age: 56
End: 2021-01-07

## 2021-01-07 ENCOUNTER — NURSE TRIAGE (OUTPATIENT)
Dept: NURSING | Facility: CLINIC | Age: 56
End: 2021-01-07

## 2021-01-07 NOTE — TELEPHONE ENCOUNTER
89/51 about 20 minutes ago with a wrist cuff    90/60 was a second BP. His pulse is 85    He is taking lisinopril    He also takes metoprolol    170/100 is his bp normally    He is really tired, he is feeling weak, and really dizzy.    COVID 19 Nurse Triage Plan/Patient Instructions    Please be aware that novel coronavirus (COVID-19) may be circulating in the community. If you develop symptoms such as fever, cough, or SOB or if you have concerns about the presence of another infection including coronavirus (COVID-19), please contact your health care provider or visit [email or web address].     Disposition/Instructions    Call to EMS/911 recommended. Follow protocol based instructions.     Bring Your Own Device:  Please also bring your smart device(s) (smart phones, tablets, laptops) and their charging cables for your personal use and to communicate with your care team during your visit.    Thank you for taking steps to prevent the spread of this virus.  o Limit your contact with others.  o Wear a simple mask to cover your cough.  o Wash your hands well and often.    Resources    [facility]: About COVID-19: [email or web address]    CDC: What to Do If You're Sick: www.cdc.gov/coronavirus/2019-ncov/about/steps-when-sick.html    CDC: Ending Home Isolation: www.cdc.gov/coronavirus/2019-ncov/hcp/disposition-in-home-patients.html     CDC: Caring for Someone: www.cdc.gov/coronavirus/2019-ncov/if-you-are-sick/care-for-someone.html     [facility]: Interim Guidance for Hospital Discharge to Home: www.health.[facility].[place].us/diseases/coronavirus/hcp/hospdischarge.pdf    [facility] clinical trials (COVID-19 research studies): [email or web address]/um-clinical-trials     Below are the COVID-19 hotlines at the [place] Department of Health ([facility]). Interpreters are available.   o For health questions: Call 313-672-9596 or 1-193.430.7196 (7 a.m. to 7 p.m.)  o For questions about schools and childcare: Call  [phone] or [phone] (7 a.m. to 7 p.m.)         Precious Brennan RN  [place] Nurse Advisor  10:50 AM  1/7/2021              Reason for Disposition    Systolic BP < 90 and feeling dizzy, lightheaded, or weak    Protocols used: LOW BLOOD PRESSURE-A-OH

## 2021-01-08 ENCOUNTER — RECORDS - HEALTHEAST (OUTPATIENT)
Dept: ADMINISTRATIVE | Facility: OTHER | Age: 56
End: 2021-01-08

## 2021-01-14 ENCOUNTER — HEALTH MAINTENANCE LETTER (OUTPATIENT)
Age: 56
End: 2021-01-14

## 2021-01-20 ENCOUNTER — COMMUNICATION - HEALTHEAST (OUTPATIENT)
Dept: FAMILY MEDICINE | Facility: CLINIC | Age: 56
End: 2021-01-20

## 2021-01-20 DIAGNOSIS — E11.9 LONG-TERM INSULIN USE IN TYPE 2 DIABETES (H): ICD-10-CM

## 2021-01-20 DIAGNOSIS — Z79.4 LONG-TERM INSULIN USE IN TYPE 2 DIABETES (H): ICD-10-CM

## 2021-01-22 ENCOUNTER — COMMUNICATION - HEALTHEAST (OUTPATIENT)
Dept: FAMILY MEDICINE | Facility: CLINIC | Age: 56
End: 2021-01-22

## 2021-01-22 DIAGNOSIS — E11.9 LONG-TERM INSULIN USE IN TYPE 2 DIABETES (H): ICD-10-CM

## 2021-01-22 DIAGNOSIS — Z79.4 LONG-TERM INSULIN USE IN TYPE 2 DIABETES (H): ICD-10-CM

## 2021-02-01 PROBLEM — M70.62 TROCHANTERIC BURSITIS OF BOTH HIPS: Status: ACTIVE | Noted: 2019-10-15

## 2021-02-01 PROBLEM — M25.50 POLYARTHRALGIA: Status: ACTIVE | Noted: 2019-10-15

## 2021-02-01 PROBLEM — M25.512 CHRONIC PAIN OF BOTH SHOULDERS: Status: ACTIVE | Noted: 2017-09-21

## 2021-02-01 PROBLEM — M25.511 CHRONIC PAIN OF BOTH SHOULDERS: Status: ACTIVE | Noted: 2017-09-21

## 2021-02-01 PROBLEM — H91.91 DECREASED HEARING OF RIGHT EAR: Status: ACTIVE | Noted: 2017-09-14

## 2021-02-01 PROBLEM — M47.812 CERVICAL SPONDYLOSIS: Status: ACTIVE | Noted: 2019-10-15

## 2021-02-01 PROBLEM — F32.0 MILD MAJOR DEPRESSION (H): Status: ACTIVE | Noted: 2018-09-24

## 2021-02-01 PROBLEM — G89.29 CHRONIC PAIN OF BOTH SHOULDERS: Status: ACTIVE | Noted: 2017-09-21

## 2021-02-01 PROBLEM — N17.9 AKI (ACUTE KIDNEY INJURY) (H): Status: ACTIVE | Noted: 2021-01-07

## 2021-02-01 PROBLEM — M70.61 TROCHANTERIC BURSITIS OF BOTH HIPS: Status: ACTIVE | Noted: 2019-10-15

## 2021-02-01 PROBLEM — M15.0 PRIMARY OSTEOARTHRITIS INVOLVING MULTIPLE JOINTS: Status: ACTIVE | Noted: 2019-10-15

## 2021-02-02 ENCOUNTER — OFFICE VISIT (OUTPATIENT)
Dept: FAMILY MEDICINE | Facility: CLINIC | Age: 56
End: 2021-02-02
Payer: COMMERCIAL

## 2021-02-02 VITALS
BODY MASS INDEX: 39.86 KG/M2 | TEMPERATURE: 96.8 F | DIASTOLIC BLOOD PRESSURE: 82 MMHG | SYSTOLIC BLOOD PRESSURE: 138 MMHG | HEART RATE: 68 BPM | WEIGHT: 248 LBS | HEIGHT: 66 IN | RESPIRATION RATE: 16 BRPM

## 2021-02-02 DIAGNOSIS — R07.9 CHEST PAIN, UNSPECIFIED TYPE: ICD-10-CM

## 2021-02-02 DIAGNOSIS — E66.01 MORBID OBESITY (H): ICD-10-CM

## 2021-02-02 DIAGNOSIS — Z12.5 SCREENING FOR PROSTATE CANCER: ICD-10-CM

## 2021-02-02 DIAGNOSIS — Z09 HOSPITAL DISCHARGE FOLLOW-UP: ICD-10-CM

## 2021-02-02 DIAGNOSIS — E11.65 TYPE 2 DIABETES MELLITUS WITH HYPERGLYCEMIA, WITH LONG-TERM CURRENT USE OF INSULIN (H): Primary | ICD-10-CM

## 2021-02-02 DIAGNOSIS — E78.5 HYPERLIPIDEMIA LDL GOAL <130: ICD-10-CM

## 2021-02-02 DIAGNOSIS — Z79.4 TYPE 2 DIABETES MELLITUS WITH HYPERGLYCEMIA, WITH LONG-TERM CURRENT USE OF INSULIN (H): Primary | ICD-10-CM

## 2021-02-02 DIAGNOSIS — Z12.11 SPECIAL SCREENING FOR MALIGNANT NEOPLASMS, COLON: ICD-10-CM

## 2021-02-02 DIAGNOSIS — I10 HYPERTENSION GOAL BP (BLOOD PRESSURE) < 130/80: ICD-10-CM

## 2021-02-02 LAB
ALBUMIN SERPL-MCNC: 3.6 G/DL (ref 3.4–5)
ALP SERPL-CCNC: 96 U/L (ref 40–150)
ALT SERPL W P-5'-P-CCNC: 24 U/L (ref 0–70)
ANION GAP SERPL CALCULATED.3IONS-SCNC: 2 MMOL/L (ref 3–14)
AST SERPL W P-5'-P-CCNC: 12 U/L (ref 0–45)
BILIRUB SERPL-MCNC: 0.2 MG/DL (ref 0.2–1.3)
BUN SERPL-MCNC: 12 MG/DL (ref 7–30)
CALCIUM SERPL-MCNC: 8.8 MG/DL (ref 8.5–10.1)
CHLORIDE SERPL-SCNC: 106 MMOL/L (ref 94–109)
CHOLEST SERPL-MCNC: 218 MG/DL
CO2 SERPL-SCNC: 29 MMOL/L (ref 20–32)
CREAT SERPL-MCNC: 0.77 MG/DL (ref 0.66–1.25)
GFR SERPL CREATININE-BSD FRML MDRD: >90 ML/MIN/{1.73_M2}
GLUCOSE SERPL-MCNC: 162 MG/DL (ref 70–99)
HDLC SERPL-MCNC: 46 MG/DL
LDLC SERPL CALC-MCNC: 111 MG/DL
NONHDLC SERPL-MCNC: 172 MG/DL
POTASSIUM SERPL-SCNC: 3.9 MMOL/L (ref 3.4–5.3)
PROT SERPL-MCNC: 7.2 G/DL (ref 6.8–8.8)
PSA SERPL-ACNC: 0.74 UG/L (ref 0–4)
SODIUM SERPL-SCNC: 137 MMOL/L (ref 133–144)
TRIGL SERPL-MCNC: 305 MG/DL

## 2021-02-02 PROCEDURE — 80061 LIPID PANEL: CPT | Performed by: FAMILY MEDICINE

## 2021-02-02 PROCEDURE — 99207 PR FOOT EXAM NO CHARGE: CPT | Mod: 25 | Performed by: FAMILY MEDICINE

## 2021-02-02 PROCEDURE — 80053 COMPREHEN METABOLIC PANEL: CPT | Performed by: FAMILY MEDICINE

## 2021-02-02 PROCEDURE — 99214 OFFICE O/P EST MOD 30 MIN: CPT | Mod: 25 | Performed by: FAMILY MEDICINE

## 2021-02-02 PROCEDURE — 99396 PREV VISIT EST AGE 40-64: CPT | Performed by: FAMILY MEDICINE

## 2021-02-02 PROCEDURE — G0103 PSA SCREENING: HCPCS | Performed by: FAMILY MEDICINE

## 2021-02-02 PROCEDURE — 36415 COLL VENOUS BLD VENIPUNCTURE: CPT | Performed by: FAMILY MEDICINE

## 2021-02-02 RX ORDER — LISINOPRIL 10 MG/1
10 TABLET ORAL DAILY
Qty: 90 TABLET | Refills: 3 | Status: ON HOLD | OUTPATIENT
Start: 2021-02-02 | End: 2022-01-30

## 2021-02-02 ASSESSMENT — MIFFLIN-ST. JEOR: SCORE: 1897.67

## 2021-02-02 NOTE — PROGRESS NOTES
"Assessment/Plan:     Healthcare Maintenance Exam    Fasting labs pending  Immunizations updated  Healthy lifestyle modifications discussed  Discussed self testicular exams  Colonoscopy due and ordered  PSA discussed and done    BMI:   Estimated body mass index is 40.03 kg/m  as calculated from the following:    Height as of this encounter: 1.676 m (5' 6\").    Weight as of this encounter: 112.5 kg (248 lb).   Weight management plan: Discussed healthy diet and exercise guidelines    Healthcare maint - colonoscopy and labs    Hospital follow-up: Please see below.  Labs were reviewed    Type 2 diabetes mellitus with hyperglycemia, with long-term current use of insulin (H)  Most recent A1c was 8.8.  We will repeat his A1c in March.  - Comprehensive metabolic panel (BMP + Alb, Alk Phos, ALT, AST, Total. Bili, TP)    Chest pain, unspecified type  EKG was done in the emergency department on the seventh) to be overall unremarkable per emergency department report.  I do not have the actual EKG to review.    Given the patient is having some exertional chest pain I have ordered a nuclear medicine stress test.  Number was given for scheduling.  - [place] MPI Treadmill    Special screening for malignant neoplasms, colon  - GASTROENTEROLOGY ADULT REF PROCEDURE ONLY    Hyperlipidemia LDL goal <130  - Lipid panel reflex to direct LDL Fasting    Screening for prostate cancer  - PSA, screen    Hypertension goal BP (blood pressure) < 130/80  Blood pressures were low on lisinopril/hydrochlorothiazide and metoprolol.  Most of the same just to 1 lisinopril daily for the renal protective effects especially with his diabetes.  - lisinopril (ZESTRIL) 10 MG tablet  Dispense: 90 tablet; Refill: 3          Subjective:     Obed Nickerson is a 55 year old male who presents for an annual exam. Concerns are as follows:    Follow-up hospital admission.  Patient was in the emergency department on the seventh of last month for hypotension.  He had not " been taking blood pressure medications and then started taking his lisinopril/hydrochlorothiazide to daily as well as his metoprolol.  He stated he took his blood pressure and it was 90/60.  Because of that he went to the emergency department.  His blood pressure in the emergency department was in the 1 teens but he was found to have elevated creatinine and so he was admitted overnight.  He was given fluids and laboratory testing improved.  He was told that if blood pressure elevates over 130 systolic he should take one of his lisinopril/hydrochlorothiazide.  His metoprolol was discontinued.    He has been checking his blood pressures.  Sometimes he remembers to take blood pressure medication.  Original blood pressure here today is 150/90 with a repeat of 138/82.    The patient notes that his brother recently had a stress test and was diagnosed with coronary artery disease and needed to have a triple bypass surgery.  The patient himself initially states that he is asymptomatic but upon further questioning it sounds as though he does have some chest pain/tightness when walking upstairs particularly when carrying something.  He has noticed this for the last few years and it has been fairly stable.    He has a history of type 2 diabetes.  Most recent A1c about a month ago was 8.8.  He has been trying to eat healthier since that time.  He has cut out soda and is drinking more water.  Blood sugars are generally in the 120s to 160s.      Healthy Habits:   Regular Exercise: no  Sunscreen Use: yes  Healthy Diet: no  Dental Visits Regularly: yes  Seat Belt: yes  Sexually active: yes  Self Testicular Exam Monthly:yes  Colonoscopy: due  Lipid Profile: yes  Glucose Screen: ys    Immunization status: deecline influenza and shingels      Current Outpatient Medications   Medication Sig Dispense Refill     Alcohol Swabs (ALCOHOL PREP) 70 % PADS        ALPRAZolam (XANAX) 0.5 MG tablet TK 1 T PO QD PRF SLP 15 tablet 3     Blood  Glucose Monitoring Suppl (FIFTY50 GLUCOSE METER 2.0) w/Device KIT Dispense meter, test strips, lancets covered by pt ins. E11.65 NIDDM type II, uncontrolled - Test 3 times/day, Reason: High A1C       Continuous Blood Gluc Sensor (FREESTYLE YOKO 14 DAY SENSOR) MISC USE AS DIRECTED       Continuous Blood Gluc Sensor (FREESTYLE YOKO 14 DAY SENSOR) MISC        DiphenhydrAMINE HCl (BENADRYL PO) Take 25 mg by mouth every 8 hours as needed       DULoxetine (CYMBALTA) 60 MG capsule        insulin glargine (LANTUS SOLOSTAR) 100 UNIT/ML pen INJECT 36 UNITS UNDER THE SKIN EVERY NIGHT AT BEDTIME       lisinopril (ZESTRIL) 10 MG tablet Take 1 tablet (10 mg) by mouth daily 90 tablet 3     metFORMIN (GLUCOPHAGE) 1000 MG tablet        oxyCODONE HCl (ROXICODONE) 20 MG TABS immediate release tablet TAKE 1 TABLET BY MOUTH FOUR TIMES DAILY FOR CHRONIC PAIN       SUMAtriptan (IMITREX) 50 MG tablet Take 1 tablet (50 mg) by mouth at onset of headache for migraine May repeat in 2 hours. Max 4 tablets/24 hours. 9 tablet 1     tiZANidine (ZANAFLEX) 4 MG tablet Take 1 tablet (4 mg) by mouth At Bedtime 30 tablet 1     ASPIRIN PO Take 81 mg by mouth daily       sitagliptin (JANUVIA) 50 MG tablet Take 1 tablet (50 mg) by mouth daily (Patient not taking: Reported on 2/2/2021) 90 tablet 0     Past Medical History:   Diagnosis Date     Chronic pain syndrome 1/4/2008    Previously followed by Dr. Moran at [facility], then left in 2008, then started seeing Dr. Lo at [facility] in [place].  Plans to continue with this provider.  Here to establish care/PCP, does not want/need me to manage his chronic narcotics or pain.    Had neck injury in 2000, and is s/p Cervical Fusion x 2 (2-6 presently).  Currently on Vicodin 5mg/325 QID.  Feels this helps the pain, though does not completely control pain.  He has been MS Contin, and OxyContin (briefly).  Had been on Neurontin (x 1 week- excessive SE), Amitriptyline, Tegretol (mental  slow).  Has not tried Effexor or Cymbalta.   Is s/p SCS for lumbar radiculitis- which did not work and had it removed.  Has tried PT and numerous spine injection  Plans for some Carpal Tunnel surgery (pending EMG soon).  Has been told that he could have spine surgery, but is holding off surgery for as long as he can.        Tinnitus of both ears 2014    Pt reports life-long ringing in both ears.  Was raised around farm equipment and airplanes.  Feels these are major contributors.  Has had audiology recently, per pt has mild hearing loss left worse than right and positive for tinnitus.  However they said there was no treatment available for tinnitus.        Past Surgical History:   Procedure Laterality Date     ?? previous implanted morphine pump??  during     eventually explanted due to infection     APPENDECTOMY OPEN       FUSION CERVICAL ANTERIOR THREE+ LEVELS      C2-6     right lower leg limb reattachment       skin grafts      many     Ketorolac, Phenothiazines, Compazine [prochlorperazine], Demerol, Gabapentin, Hmg-coa-r inhibitors, Naproxen, Tolmetin, and Ultram [tramadol]  Family History   Problem Relation Age of Onset     C.A.D. Mother 60     C.A.D. Father 72     Cerebrovascular Disease Father 60         age 70; ETOH, smoker     Breast Cancer No family hx of      Cancer - colorectal No family hx of      Prostate Cancer Father         at 67     Social History     Socioeconomic History     Marital status:      Spouse name: Not on file     Number of children: Not on file     Years of education: Not on file     Highest education level: Not on file   Occupational History     Not on file   Social Needs     Financial resource strain: Not on file     Food insecurity     Worry: Not on file     Inability: Not on file     Transportation needs     Medical: Not on file     Non-medical: Not on file   Tobacco Use     Smoking status: Never Smoker     Smokeless tobacco: Never Used   Substance and  "Sexual Activity     Alcohol use: No     Drug use: No     Sexual activity: Not on file   Lifestyle     Physical activity     Days per week: Not on file     Minutes per session: Not on file     Stress: Not on file   Relationships     Social connections     Talks on phone: Not on file     Gets together: Not on file     Attends Scientologist service: Not on file     Active member of club or organization: Not on file     Attends meetings of clubs or organizations: Not on file     Relationship status: Not on file     Intimate partner violence     Fear of current or ex partner: Not on file     Emotionally abused: Not on file     Physically abused: Not on file     Forced sexual activity: Not on file   Other Topics Concern     Not on file   Social History Narrative     Not on file       Review of Systems  General:  Denies problems  Eyes:  Denies problems  Ears/Nose/Throat:  Denies problems  Cardiovascular:  Denies problems  Respiratory:  Denies problems  Gastrointestinal:  Denies problems  Genitourinary:  Denies problems  Musculoskeletal:  Denies problems  Skin:  Denies problems  Neurologic:  Denies problems  Psychiatric:  Denies problems  Endocrine:  Denies problems  Heme/Lymphatic:  Denies problems  Allergic/Immunologic:  Denies problems      Objective:      Vitals:    02/02/21 1016 02/02/21 1057   BP: (!) 150/90 138/82   Pulse: 68    Resp: 16    Temp: 96.8  F (36  C)    TempSrc: Tympanic    Weight: 112.5 kg (248 lb)    Height: 1.676 m (5' 6\")          Physical Exam:  General Appearance: Alert, cooperative, no distress, appears stated age  Head: Normocephalic, without obvious abnormality, atraumatic  Eyes: PERRL, conjunctiva/corneas clear, EOM's intact  Ears: Normal TM's and external ear canals, both ears   Neck: Supple, symmetrical, trachea midline, no adenopathy;  thyroid: not enlarged, symmetric, no tenderness/mass/nodules  Back: Symmetric, no curvature, ROM normal, no CVA tenderness   Lungs: Clear to auscultation " bilaterally, respirations unlabored  Chest: no visible abnormalities.  Heart: Regular rate and rhythm, S1 and S2 normal, no murmur, rub, or gallop,   Abdomen: Soft, non-tender, bowel sounds active all four quadrants,  no masses, no organomegaly  Extremities: Extremities normal, atraumatic, no cyanosis or edema  Skin: Skin color, texture, turgor normal, no rashes or lesions  Lymph nodes: Cervical, supraclavicular, and axillary nodes normal  Neurologic: Normal

## 2021-02-17 ENCOUNTER — MYC MEDICAL ADVICE (OUTPATIENT)
Dept: FAMILY MEDICINE | Facility: CLINIC | Age: 56
End: 2021-02-17

## 2021-02-18 ENCOUNTER — RECORDS - HEALTHEAST (OUTPATIENT)
Dept: ADMINISTRATIVE | Facility: OTHER | Age: 56
End: 2021-02-18

## 2021-02-19 ENCOUNTER — VIRTUAL VISIT (OUTPATIENT)
Dept: FAMILY MEDICINE | Facility: CLINIC | Age: 56
End: 2021-02-19
Payer: COMMERCIAL

## 2021-02-19 DIAGNOSIS — G89.4 CHRONIC PAIN SYNDROME: ICD-10-CM

## 2021-02-19 DIAGNOSIS — R10.9 ACUTE ABDOMINAL PAIN: Primary | ICD-10-CM

## 2021-02-19 PROCEDURE — 99214 OFFICE O/P EST MOD 30 MIN: CPT | Mod: 95 | Performed by: FAMILY MEDICINE

## 2021-02-19 RX ORDER — POLYETHYLENE GLYCOL 3350 17 G/17G
POWDER, FOR SOLUTION ORAL
COMMUNITY
Start: 2021-02-18 | End: 2021-10-26

## 2021-02-19 RX ORDER — OXYCODONE HYDROCHLORIDE 10 MG/1
5-10 TABLET ORAL EVERY 4 HOURS PRN
Qty: 20 TABLET | Refills: 0 | Status: SHIPPED | OUTPATIENT
Start: 2021-02-19 | End: 2021-03-01

## 2021-02-19 NOTE — PROGRESS NOTES
Obed is a 55 year old who is being evaluated via a billable video visit.      How would you like to obtain your AVS? MyChart  If the video visit is dropped, the invitation should be resent by: Text to cell phone: [phone]  Will anyone else be joining your video visit? No      Video Start Time:3:20pm   -------------------------    Assessment/Plan:    Obed Nickerson is a 55 year old male presenting for:    Acute abdominal pain  We will give him a limited supply of oxycodone.  Discussed that he needs to discuss this with his pain clinic as well.  Discussed that this is not something that can be refilled.  He does not have any Narcan at home and I will send that to the pharmacy as well.    Etiology of his groin pain seems somewhat unclear.  He will be seeing the gastroenterologist.  He does have the number if he would like to try to get in sooner.  If GI does not feel as though this is a gastroissue would recommend a MRI of his back to check for nerve compression.  - oxyCODONE IR (ROXICODONE) 10 MG tablet  Dispense: 20 tablet; Refill: 0  - naloxone (NARCAN) 4 MG/0.1ML nasal spray  Dispense: 0.2 mL; Refill: 3    Chronic pain syndrome  - naloxone (NARCAN) 4 MG/0.1ML nasal spray  Dispense: 0.2 mL; Refill: 3        There are no discontinued medications.    I personally spent over 35 minutes performing care related to this patient on the day of the patient visit.  This includes chart review, precharting, talking with/ examining the patient as well as completing after visit documentation.      Chief Complaint:  Hospital F/U and Recheck Medication          Subjective:   Obed Nickerson is a pleasant 55 year old male being evaluated via video visit today for the following concern/s:     Groin pain: Patient has been having intermittent left-sided groin pain.  This has been very bothersome for him.  It seems to wax and wane.  He has had 2 CT scans of his abdomen looking for hernias and kidney stones both of which  were unremarkable.  He does not think that this is related to bowel movements but is thinking it might be a GI issue.  He was given a referral to the gastroenterologist at his most recent urgent care appointment.    He is also given some pain medication which she can take acutely on top of his chronic pain pills for this pain.  He states that he is hopeful to get a refill of that.  He did discuss with the pain clinic and that they are okay with him getting a limited supply due to his acute pain.  He is meeting with them next week as well.      12 point review of systems completed and negative except for what has been described above    History   Smoking Status     Never Smoker   Smokeless Tobacco     Never Used         Current Outpatient Medications:      Alcohol Swabs (ALCOHOL PREP) 70 % PADS, , Disp: , Rfl:      ALPRAZolam (XANAX) 0.5 MG tablet, TK 1 T PO QD PRF SLP, Disp: 15 tablet, Rfl: 3     ASPIRIN PO, Take 81 mg by mouth daily, Disp: , Rfl:      Blood Glucose Monitoring Suppl (FIFTY50 GLUCOSE METER 2.0) w/Device KIT, Dispense meter, test strips, lancets covered by pt ins. E11.65 NIDDM type II, uncontrolled - Test 3 times/day, Reason: High A1C, Disp: , Rfl:      Continuous Blood Gluc Sensor (FREESTYLE YOKO 14 DAY SENSOR) MISC, USE AS DIRECTED, Disp: , Rfl:      Continuous Blood Gluc Sensor (FREESTYLE YOKO 14 DAY SENSOR) MISC, , Disp: , Rfl:      DiphenhydrAMINE HCl (BENADRYL PO), Take 25 mg by mouth every 8 hours as needed, Disp: , Rfl:      DULoxetine (CYMBALTA) 60 MG capsule, , Disp: , Rfl:      insulin glargine (LANTUS SOLOSTAR) 100 UNIT/ML pen, INJECT 36 UNITS UNDER THE SKIN EVERY NIGHT AT BEDTIME, Disp: , Rfl:      lisinopril (ZESTRIL) 10 MG tablet, Take 1 tablet (10 mg) by mouth daily, Disp: 90 tablet, Rfl: 3     metFORMIN (GLUCOPHAGE) 1000 MG tablet, , Disp: , Rfl:      naloxone (NARCAN) 4 MG/0.1ML nasal spray, Spray 1 spray (4 mg) into one nostril alternating nostrils as needed for opioid reversal  every 2-3 minutes until assistance arrives, Disp: 0.2 mL, Rfl: 3     oxyCODONE HCl (ROXICODONE) 20 MG TABS immediate release tablet, TAKE 1 TABLET BY MOUTH FOUR TIMES DAILY FOR CHRONIC PAIN, Disp: , Rfl:      oxyCODONE IR (ROXICODONE) 10 MG tablet, Take 0.5-1 tablets (5-10 mg) by mouth every 4 hours as needed for severe pain, Disp: 20 tablet, Rfl: 0     sitagliptin (JANUVIA) 50 MG tablet, Take 1 tablet (50 mg) by mouth daily, Disp: 90 tablet, Rfl: 0     SUMAtriptan (IMITREX) 50 MG tablet, Take 1 tablet (50 mg) by mouth at onset of headache for migraine May repeat in 2 hours. Max 4 tablets/24 hours., Disp: 9 tablet, Rfl: 1     tiZANidine (ZANAFLEX) 4 MG tablet, Take 1 tablet (4 mg) by mouth At Bedtime, Disp: 30 tablet, Rfl: 1     polyethylene glycol (MIRALAX) 17 GM/Dose powder, , Disp: , Rfl:         Objective:  No vitals were done due to the nature of this visit  No flowsheet data found.            General: No acute distress  Psych: Appropriate affect  HEENT: moist mucous membranes  Pulmonary: Breathing comfortably, speaking in complete sentences  Extremities: warm and well perfused with no edema  Skin: warm and dry with no rash         This note has been dictated and transcribed using voice recognition software.   Any errors in transcription are unintentional and inherent to the software.    Video-Visit Details    Type of service:  Video Visit    Video End Time:345    Originating Location (pt. Location): Home    Distant Location (provider location):  [facility]     Platform used for Video Visit: Seth

## 2021-02-25 ENCOUNTER — HOSPITAL ENCOUNTER (OUTPATIENT)
Dept: CARDIOLOGY | Facility: CLINIC | Age: 56
End: 2021-02-25
Attending: FAMILY MEDICINE
Payer: COMMERCIAL

## 2021-02-25 ENCOUNTER — HOSPITAL ENCOUNTER (OUTPATIENT)
Dept: NUCLEAR MEDICINE | Facility: CLINIC | Age: 56
Setting detail: NUCLEAR MEDICINE
End: 2021-02-25
Attending: FAMILY MEDICINE
Payer: COMMERCIAL

## 2021-02-25 DIAGNOSIS — R07.9 CHEST PAIN, UNSPECIFIED TYPE: ICD-10-CM

## 2021-02-25 LAB
CV STRESS MAX HR HE: 160
RATE PRESSURE PRODUCT: NORMAL
STRESS ANGINA INDEX: 0
STRESS ECHO BASELINE DIASTOLIC HE: 78
STRESS ECHO BASELINE HR: 117
STRESS ECHO BASELINE SYSTOLIC BP: 134
STRESS ECHO CALCULATED PERCENT HR: 98 %
STRESS ECHO LAST STRESS DIASTOLIC BP: 84
STRESS ECHO LAST STRESS SYSTOLIC BP: 160
STRESS ECHO POST ESTIMATED WORKLOAD: 3.2 METS
STRESS ECHO POST EXERCISE DUR MIN: 6 MIN
STRESS ECHO POST EXERCISE DUR SEC: 0 SEC
STRESS ECHO TARGET HR: 164
STRESS/REST PERFUSION RATIO: 1.04

## 2021-02-25 PROCEDURE — 343N000001 HC RX 343: Performed by: FAMILY MEDICINE

## 2021-02-25 PROCEDURE — A9502 TC99M TETROFOSMIN: HCPCS | Performed by: FAMILY MEDICINE

## 2021-02-25 PROCEDURE — 93016 CV STRESS TEST SUPVJ ONLY: CPT | Performed by: INTERNAL MEDICINE

## 2021-02-25 PROCEDURE — 78452 HT MUSCLE IMAGE SPECT MULT: CPT | Mod: 26 | Performed by: INTERNAL MEDICINE

## 2021-02-25 PROCEDURE — 78452 HT MUSCLE IMAGE SPECT MULT: CPT

## 2021-02-25 PROCEDURE — 93018 CV STRESS TEST I&R ONLY: CPT | Performed by: INTERNAL MEDICINE

## 2021-02-25 PROCEDURE — 93017 CV STRESS TEST TRACING ONLY: CPT

## 2021-02-25 RX ADMIN — TETROFOSMIN 10.6 MCI.: 1.38 INJECTION, POWDER, LYOPHILIZED, FOR SOLUTION INTRAVENOUS at 10:18

## 2021-02-25 RX ADMIN — TETROFOSMIN 32.3 MCI.: 1.38 INJECTION, POWDER, LYOPHILIZED, FOR SOLUTION INTRAVENOUS at 12:04

## 2021-03-01 DIAGNOSIS — M79.10 MUSCLE PAIN: ICD-10-CM

## 2021-03-01 NOTE — TELEPHONE ENCOUNTER
Routing refill request to provider for review/approval because:  Drug not on the [facility] refill protocol     Juice Bond RN

## 2021-03-01 NOTE — TELEPHONE ENCOUNTER
tiZANidine (ZANAFLEX) 4 MG tablet  Last Written Prescription Date:  12/30/2020  Last Fill Quantity: 30,  # refills: 1   Last office visit: 2/2/2021 with prescribing provider:  jose   Future Office Visit:

## 2021-03-08 ENCOUNTER — RECORDS - HEALTHEAST (OUTPATIENT)
Dept: ADMINISTRATIVE | Facility: OTHER | Age: 56
End: 2021-03-08

## 2021-03-11 ENCOUNTER — RECORDS - HEALTHEAST (OUTPATIENT)
Dept: ADMINISTRATIVE | Facility: OTHER | Age: 56
End: 2021-03-11

## 2021-03-11 ENCOUNTER — AMBULATORY - HEALTHEAST (OUTPATIENT)
Dept: MULTI SPECIALTY CLINIC | Facility: CLINIC | Age: 56
End: 2021-03-11

## 2021-03-11 LAB — HBA1C MFR BLD: 8.9 % (ref 0–5.6)

## 2021-03-15 ENCOUNTER — TELEPHONE (OUTPATIENT)
Dept: FAMILY MEDICINE | Facility: CLINIC | Age: 56
End: 2021-03-15

## 2021-03-15 DIAGNOSIS — Z79.4 TYPE 2 DIABETES MELLITUS WITH HYPERGLYCEMIA, WITH LONG-TERM CURRENT USE OF INSULIN (H): Primary | ICD-10-CM

## 2021-03-15 DIAGNOSIS — E11.65 TYPE 2 DIABETES MELLITUS WITH HYPERGLYCEMIA, WITH LONG-TERM CURRENT USE OF INSULIN (H): Primary | ICD-10-CM

## 2021-03-15 NOTE — TELEPHONE ENCOUNTER
Lantus solostar 100unit. PT reports an increased dose of 40 units daily. I DID NOT SEE THESE DIRECATIONS ON THE MED LIST

## 2021-03-16 ENCOUNTER — COMMUNICATION - HEALTHEAST (OUTPATIENT)
Dept: FAMILY MEDICINE | Facility: CLINIC | Age: 56
End: 2021-03-16

## 2021-03-16 DIAGNOSIS — F32.0 MILD MAJOR DEPRESSION (H): ICD-10-CM

## 2021-03-18 DIAGNOSIS — Z79.4 TYPE 2 DIABETES MELLITUS WITH HYPERGLYCEMIA, WITH LONG-TERM CURRENT USE OF INSULIN (H): Primary | ICD-10-CM

## 2021-03-18 DIAGNOSIS — E11.65 TYPE 2 DIABETES MELLITUS WITH HYPERGLYCEMIA, WITH LONG-TERM CURRENT USE OF INSULIN (H): Primary | ICD-10-CM

## 2021-03-22 NOTE — TELEPHONE ENCOUNTER
Routing refill request to provider for review/approval because:  Contiuous blood glucose monitor order says it will not be sent electronically.  Thank you.  Dimitry Neff RN

## 2021-03-23 RX ORDER — FLASH GLUCOSE SENSOR
KIT MISCELLANEOUS SEE ADMIN INSTRUCTIONS
Qty: 2 EACH | Status: CANCELLED | OUTPATIENT
Start: 2021-03-23

## 2021-03-23 RX ORDER — FLASH GLUCOSE SENSOR
KIT MISCELLANEOUS
Qty: 6 EACH | Refills: 2 | Status: SHIPPED | OUTPATIENT
Start: 2021-03-23 | End: 2022-09-18

## 2021-03-23 NOTE — TELEPHONE ENCOUNTER
Continuous Blood Gluc Sensor (FREESTYLE YOKO 14 DAY SENSOR) MISC 6 each 2 3/23/2021  No   Sig: Switch device every 2 weeks   Sent to pharmacy as: FreeStyle Yoko 14 Day Sensor   Class: E-Prescribe   Order: [id number]   E-Prescribing Status: Receipt confirmed by pharmacy (3/23/2021  7:45 AM CDT)   Prior authorization: Closed - Prior Authorization not required for patient/medication   Printout Tracking    External Result Report   Medication Administration Instructions    Switch device every 2 weeks   Pharmacy    [facility] - [place], [place] - [address]

## 2021-03-24 DIAGNOSIS — Z79.4 TYPE 2 DIABETES MELLITUS WITH HYPERGLYCEMIA, WITH LONG-TERM CURRENT USE OF INSULIN (H): Primary | ICD-10-CM

## 2021-03-24 DIAGNOSIS — E11.65 TYPE 2 DIABETES MELLITUS WITH HYPERGLYCEMIA, WITH LONG-TERM CURRENT USE OF INSULIN (H): Primary | ICD-10-CM

## 2021-03-31 DIAGNOSIS — Z79.4 TYPE 2 DIABETES MELLITUS WITH HYPERGLYCEMIA, WITH LONG-TERM CURRENT USE OF INSULIN (H): ICD-10-CM

## 2021-03-31 DIAGNOSIS — E11.65 TYPE 2 DIABETES MELLITUS WITH HYPERGLYCEMIA, WITH LONG-TERM CURRENT USE OF INSULIN (H): ICD-10-CM

## 2021-03-31 LAB — HBA1C MFR BLD: 9 % (ref 0–5.6)

## 2021-03-31 PROCEDURE — 83036 HEMOGLOBIN GLYCOSYLATED A1C: CPT | Performed by: FAMILY MEDICINE

## 2021-03-31 PROCEDURE — 36415 COLL VENOUS BLD VENIPUNCTURE: CPT | Performed by: FAMILY MEDICINE

## 2021-04-05 ENCOUNTER — COMMUNICATION - HEALTHEAST (OUTPATIENT)
Dept: FAMILY MEDICINE | Facility: CLINIC | Age: 56
End: 2021-04-05

## 2021-04-05 DIAGNOSIS — R10.32 ABDOMINAL PAIN, LEFT LOWER QUADRANT: ICD-10-CM

## 2021-04-07 ENCOUNTER — TELEPHONE (OUTPATIENT)
Dept: FAMILY MEDICINE | Facility: CLINIC | Age: 56
End: 2021-04-07

## 2021-04-07 DIAGNOSIS — R10.9 ACUTE ABDOMINAL PAIN: Primary | ICD-10-CM

## 2021-04-07 NOTE — TELEPHONE ENCOUNTER
[facility] is calling stating that they got a refill request from PCP for Hyoscyamine Sublingual I do not see it. I see a refill request with [facility] but they said it says PCP name on it. They need the directions clarified.   [facility]    [address] [place] [address]    [phone]       Elvia Hennessy CSS on 4/7/2021 at 5:11 PM

## 2021-04-09 ENCOUNTER — RECORDS - HEALTHEAST (OUTPATIENT)
Dept: ADMINISTRATIVE | Facility: OTHER | Age: 56
End: 2021-04-09

## 2021-04-09 LAB
ALT SERPL W/O P-5'-P-CCNC: 60 U/L (ref 0–78)
AST SERPL-CCNC: 39 U/L (ref 0–37)

## 2021-04-13 ENCOUNTER — RECORDS - HEALTHEAST (OUTPATIENT)
Dept: ADMINISTRATIVE | Facility: OTHER | Age: 56
End: 2021-04-13

## 2021-04-15 ENCOUNTER — RECORDS - HEALTHEAST (OUTPATIENT)
Dept: HEALTH INFORMATION MANAGEMENT | Facility: CLINIC | Age: 56
End: 2021-04-15

## 2021-04-26 DIAGNOSIS — R10.9 ACUTE ABDOMINAL PAIN: ICD-10-CM

## 2021-04-26 NOTE — TELEPHONE ENCOUNTER
Routing refill request to provider for review/approval because:  Drug not on the [facility] refill protocol   Dimitry Neff RN

## 2021-05-10 DIAGNOSIS — M79.10 MUSCLE PAIN: ICD-10-CM

## 2021-05-19 DIAGNOSIS — Z79.4 TYPE 2 DIABETES MELLITUS WITH HYPERGLYCEMIA, WITH LONG-TERM CURRENT USE OF INSULIN (H): Primary | ICD-10-CM

## 2021-05-19 DIAGNOSIS — E11.65 TYPE 2 DIABETES MELLITUS WITH HYPERGLYCEMIA, WITH LONG-TERM CURRENT USE OF INSULIN (H): Primary | ICD-10-CM

## 2021-05-19 NOTE — TELEPHONE ENCOUNTER
Routing refill request to provider for review/approval because:  Labs out of range:  A1C: 9.0  Patient needs to be seen because:  PCP requested patient to follow-up either VV or in person to discuss A1C that resulted on 3/31/2021 - No appointment scheduled     Juice Bond RN

## 2021-05-20 NOTE — TELEPHONE ENCOUNTER
Can you call the patient, clarify his current dose and let him know he needs an appointment within the next month for his diabetes    Thanks    EB

## 2021-05-20 NOTE — TELEPHONE ENCOUNTER
Call placed to patient  Relayed Dr. Brandt message    Patient states he is currently taking Metformin 1,000mg twice a day.     Patient states he will be making an appointment with Dr. Brandt regarding his Diabetes soon; declined offer from author to assist in scheduling appointment.     No further questions/concerns    Juice Bond RN

## 2021-05-21 ENCOUNTER — RECORDS - HEALTHEAST (OUTPATIENT)
Dept: ADMINISTRATIVE | Facility: OTHER | Age: 56
End: 2021-05-21

## 2021-05-24 ENCOUNTER — RECORDS - HEALTHEAST (OUTPATIENT)
Dept: ADMINISTRATIVE | Facility: CLINIC | Age: 56
End: 2021-05-24

## 2021-05-25 ENCOUNTER — RECORDS - HEALTHEAST (OUTPATIENT)
Dept: ADMINISTRATIVE | Facility: CLINIC | Age: 56
End: 2021-05-25

## 2021-05-26 ENCOUNTER — RECORDS - HEALTHEAST (OUTPATIENT)
Dept: ADMINISTRATIVE | Facility: CLINIC | Age: 56
End: 2021-05-26

## 2021-05-27 ENCOUNTER — RECORDS - HEALTHEAST (OUTPATIENT)
Dept: HEALTH INFORMATION MANAGEMENT | Facility: CLINIC | Age: 56
End: 2021-05-27

## 2021-05-27 NOTE — TELEPHONE ENCOUNTER
Spoke to pt and have him scheduled with Dr. Brandt for a DM check in clinic on 4/15/19 at 2:00. Confirmed appt day and time with pt before hanging up. Completing task.

## 2021-05-28 NOTE — TELEPHONE ENCOUNTER
Can you please let that patient know that I am happy to refill a very limited supply of xanax (he can use 2-3/month) when I get back tomorrow but will not be refilling the pain medication - he should speak with his chronic pain doctor if he needs pain medication adjustments.    Thanks    BB

## 2021-05-28 NOTE — TELEPHONE ENCOUNTER
Controlled Substance Refill Request  Medication:   Requested Prescriptions     Pending Prescriptions Disp Refills     ALPRAZolam (XANAX) 0.5 MG tablet 15 tablet 0     Sig: Take 1 tablet (0.5 mg total) by mouth daily as needed for sleep.     HYDROcodone-acetaminophen (NORCO )  mg per tablet 10 tablet 0     Sig: Take 1 tablet by mouth every 6 (six) hours as needed for pain.     Date Last Fill: 1/9/19  [facility]   Submit electronically to pharmacy  Controlled Substance Agreement on File:   Encounter-Level CSA Scan Date:    There are no encounter-level csa scan date.       Last office visit: Last office visit pertaining to requested medication was 4/30/19.

## 2021-05-28 NOTE — TELEPHONE ENCOUNTER
RN cannot approve Refill Request    RN can NOT refill this medication historical medication requested. Last office visit: 4/30/2019 Rocío Brandt MD Last Physical: 1/9/2019 Last MTM visit: Visit date not found Last visit same specialty: 4/30/2019 Rocío Brandt MD.  Next visit within 3 mo: Visit date not found  Next physical within 3 mo: Visit date not found      Precious Brennan, [facility] Triage/Med Refill 5/19/2019    Requested Prescriptions   Pending Prescriptions Disp Refills     FREESTYLE YOKO 14 DAY SENSOR Kit [Pharmacy Med Name: FREESTYLE YOKO 14 DAY SENSO  MISC]  0     Sig: USE AS DIRECTED       Diabetic Supplies Refill Protocol Passed - 5/17/2019  8:50 PM        Passed - Visit with PCP or prescribing provider visit in last 6 months     Last office visit with prescriber/PCP: 4/30/2019 Rocío Brandt MD OR same dept: 4/30/2019 Rocío Brandt MD OR same specialty: 4/30/2019 Rocío Brandt MD  Last physical: 1/9/2019 Last MTM visit: Visit date not found   Next visit within 3 mo: Visit date not found  Next physical within 3 mo: Visit date not found  Prescriber OR PCP: Rocío Brandt MD  Last diagnosis associated with med order: There are no diagnoses linked to this encounter.  If protocol passes may refill for 12 months if within 3 months of last provider visit (or a total of 15 months).             Passed - A1C in last 6 months     Hemoglobin A1c   Date Value Ref Range Status   04/30/2019 9.1 (H) 3.5 - 6.0 % Final

## 2021-05-28 NOTE — TELEPHONE ENCOUNTER
Controlled Substance Refill Request  Medication:   Requested Prescriptions     Pending Prescriptions Disp Refills     ALPRAZolam (XANAX) 0.5 MG tablet 15 tablet 0     Sig: Take 1 tablet (0.5 mg total) by mouth daily as needed for sleep.     Date Last Fill: 10/22/18  [facility]: [facility]   Submit electronically to pharmacy  Controlled Substance Agreement on File:   Encounter-Level CSA Scan Date:    There are no encounter-level csa scan date.       Last office visit: Last office visit pertaining to requested medication was 4/30/19.

## 2021-05-28 NOTE — PROGRESS NOTES
"Assessment/Plan:    Obed Nickerson is a 53 y.o. male presenting for:    1. Long-term insulin use in type 2 diabetes  I have started him on Ozempic today.  He reached out to me know if his insurance did cover it so he will start that.  He will increase his insulin by 2 units weekly up until 30 units.  He will follow-up with the diabetic educator.  He will let me know if there is anything else that I can do.  Otherwise, I have requested that he let me know his blood sugars over the next month.      We discussed the severe ramifications of not getting his blood sugars under better control including but not limited to eyesight malfunction, nerve damage, kidney damage, DKA and even death.    I am a bit concerned as the patient does not appear to be taking his diagnosis that seriously.    We discussed the utmost importance of getting physical activity at least 5 times weekly.    - semaglutide (OZEMPIC) 0.25 mg or 0.5 mg(2 mg/1.5 mL) PnIj; Inject 0.25 mg under the skin every 7 days.  Dispense: 1.5 mL; Refill: 0  - metFORMIN (GLUCOPHAGE) 1000 MG tablet; Take 1 tablet (1,000 mg total) by mouth 2 (two) times a day with meals.  Dispense: 180 tablet; Refill: 3  - insulin glargine (LANTUS SOLOSTAR U-100 INSULIN) 100 unit/mL (3 mL) pen; Inject 20 Units under the skin daily. Do not mix Lantus with any other insulin  Dispense: 5 adj dose pen; Refill: 3    2.  Hypertension  Floridly uncontrolled today.  He states that the amlodipine made him \"drowsy\" which would be an odd side effect.  He tolerates the lisinopril hydrochlorothiazide well and I will have him double this.  He will return to the clinic in 2 weeks for a nurse only blood pressure check.  - lisinopril-hydrochlorothiazide (ZESTORETIC) 20-25 mg per tablet; Take 2 tablets by mouth daily.  Dispense: 180 tablet; Refill: 0  - Ambulatory referral to Diabetes Education Program (CDE)  - Glycosylated Hemoglobin A1c  - Basic Metabolic Panel        Medications Discontinued During " "This Encounter   Medication Reason     metFORMIN (GLUCOPHAGE) 1000 MG tablet      metFORMIN (GLUCOPHAGE) 1000 MG tablet      metFORMIN (GLUCOPHAGE) 1000 MG tablet      insulin glargine (LANTUS SOLOSTAR U-100 INSULIN) 100 unit/mL (3 mL) pen Reorder     amLODIPine (NORVASC) 5 MG tablet      lisinopril-hydrochlorothiazide (ZESTORETIC) 20-25 mg per tablet Reorder           Chief Complaint:  Chief Complaint   Patient presents with     Diabetes     DM Check       Subjective:   Obed Nickerson is a 53-year-old gentleman with a past medical history significant for insulin-dependent diabetes which is not well controlled, hypertension and chronic pain presenting to the clinic today for medication check.  The patient follows with the pain clinic.    He states that he is taking 22 units of Lantus at night as well as his metformin.  He does not check his blood sugars.  He notes that when he does they will average in the upper 200s.  He does not eat a diabetic diet.  He states that occasionally he will eat \"several bowls of ice cream\" at one setting.  He does not check his blood sugars after doing this.  His last A1c was 8.0% but he estimates today that it will be higher.  I have asked him to see the diabetic educator at her last visit but he never made that appointment.      The patient has a history of hypertension as well.  Blood pressure today was significantly elevated at 158/100 which she states is actually \"a bit low for him\" based on his checks at home.  He states that he is not concerned about this because \"it is stable.\"  He does not have any chest pain or shortness of breath.  At her last visit I had started him on amlodipine to take in conjunction with his lisinopril/hydrochlorothiazide.  He states he took this for a few days but states that it made him drowsy so he stopped.        12 point review of systems completed and negative except for what has been described above    Social History     Tobacco Use   Smoking " Status Never Smoker   Smokeless Tobacco Never Used       Current Outpatient Medications   Medication Sig Note     ALPRAZolam (XANAX) 0.5 MG tablet Take 1 tablet (0.5 mg total) by mouth daily as needed for sleep.      aspirin 81 MG EC tablet Take 81 mg by mouth. 9/24/2018: Received from: [facility] Received Sig: Take 1 tablet by mouth once daily with a meal.     blood glucose test strips Dispense item covered by pt ins. E11.65 NIDDM type II, uncontrolled - Test 3 times/day, Reason: High A1C 9/24/2018: Received from: [facility]     DULoxetine (CYMBALTA) 60 MG capsule TAKE TWO CAPSULES BY MOUTH EVERY DAY      fluconazole (DIFLUCAN) 150 MG tablet Take 1 tablet (150 mg total) by mouth every other day.      HYDROcodone-acetaminophen (NORCO )  mg per tablet Take 1 tablet by mouth every 6 (six) hours as needed for pain.      insulin glargine (LANTUS SOLOSTAR U-100 INSULIN) 100 unit/mL (3 mL) pen Inject 20 Units under the skin daily. Do not mix Lantus with any other insulin      lisinopril-hydrochlorothiazide (ZESTORETIC) 20-25 mg per tablet Take 2 tablets by mouth daily.      morphine (MS CONTIN) 15 MG 12 hr tablet  9/24/2018: Received from: [facility]     naloxone (NARCAN) 4 mg/actuation nasal spray SPRAY INTO NOSTRIL AND CALL 911 FOR OVERDOSE. MAY REPEAT ONCE 9/24/2018: Received from: [facility]     nystatin (MYCOSTATIN) powder Apply to affected area 3 times daily      oxyCODONE (ROXICODONE) 15 MG immediate release tablet 10 mg.        9/24/2018: Received from: [facility]     rizatriptan (MAXALT) 10 MG tablet Take by mouth. 9/24/2018: Received from: [facility] Received Sig: TAKE 1 TABLET BY MOUTH AS NEEDED FOR  HEADACHE; MAY REPEAT EVERY 2 HOURS AS NEEDED  MAX DOSE: 30MG PER 24HRS.     tiZANidine (ZANAFLEX) 4  "MG tablet  11/12/2016: Received from: External Pharmacy     metFORMIN (GLUCOPHAGE) 1000 MG tablet Take 1 tablet (1,000 mg total) by mouth 2 (two) times a day with meals.      semaglutide (OZEMPIC) 0.25 mg or 0.5 mg(2 mg/1.5 mL) PnIj Inject 0.25 mg under the skin every 7 days.          Objective:  Vitals:    04/30/19 1049 05/01/19 1257   BP: (!) 158/102 (!) 154/98   Pulse: 76    Resp: 16    Temp: 97.9  F (36.6  C)    TempSrc: Oral    Weight: (!) 251 lb 1 oz (113.9 kg)    Height: 5' 6\" (1.676 m)        Body mass index is 40.52 kg/m .    Vital signs reviewed and stable  General: No acute distress  Psych: Appropriate affect  HEENT: moist mucous membranes, pupils equal, round, reactive to light and accomodation, posterior oropharynx clear of erythema or exudate, tympanic membranes are pearly grey bilaterally  Lymph: no cervical or supraclavicular lymphadenopathy  Cardiovascular: regular rate and rhythm with no murmur  Pulmonary: clear to auscultation bilaterally with no wheeze  Abdomen: soft, non tender, non distended with normo-active bowel sounds  Extremities: warm and well perfused with no edema  Skin: warm and dry with no rash         This note has been dictated and transcribed using voice recognition software.   Any errors in transcription are unintentional and inherent to the software.  "

## 2021-05-29 NOTE — TELEPHONE ENCOUNTER
Electrodesiccation And Curettage Text: The wound bed was treated with electrodesiccation and curettage after the biopsy was performed. Refill Approved    Rx renewed per Medication Renewal Policy. Medication was last renewed on 5/19/19.    Lisa Erwin, [facility] Triage/Med Refill 5/31/2019     Requested Prescriptions   Pending Prescriptions Disp Refills     FREESTYLE YOKO 14 DAY SENSOR Kit [Pharmacy Med Name: FREESTYLE YOKO 14 DAY SENSO  MISC]  0     Sig: USE AS DIRECTED       Diabetic Supplies Refill Protocol Passed - 5/30/2019  5:01 PM        Passed - Visit with PCP or prescribing provider visit in last 6 months     Last office visit with prescriber/PCP: 4/30/2019 Rocío Brandt MD OR same dept: 4/30/2019 Rocío Brandt MD OR same specialty: 4/30/2019 Rocío Brandt MD  Last physical: 1/9/2019 Last MTM visit: Visit date not found   Next visit within 3 mo: Visit date not found  Next physical within 3 mo: Visit date not found  Prescriber OR PCP: Rocío Brandt MD  Last diagnosis associated with med order: 1. Type 2 diabetes mellitus with hyperglycemia, with long-term current use of insulin (H)  - FREESTYLE YOKO 14 DAY SENSOR Kit [Pharmacy Med Name: FREESTYLE YOKO 14 DAY SENSO  MISC]; USE AS DIRECTED; Refill: 0    If protocol passes may refill for 12 months if within 3 months of last provider visit (or a total of 15 months).             Passed - A1C in last 6 months     Hemoglobin A1c   Date Value Ref Range Status   04/30/2019 9.1 (H) 3.5 - 6.0 % Final                            Electrodesiccation Text: The wound bed was treated with electrodesiccation after the biopsy was performed. Billing Type: Third-Party Bill Biopsy Method: Dermablade Bill For Surgical Tray: no Silver Nitrate Text: The wound bed was treated with silver nitrate after the biopsy was performed. Detail Level: Detailed Dressing: bandage Cryotherapy Text: The wound bed was treated with cryotherapy after the biopsy was performed. Hemostasis: Drysol Size Of Lesion In Cm: 0 Consent: Written consent was obtained and risks were reviewed including but not limited to scarring, infection, bleeding, scabbing, incomplete removal, nerve damage and allergy to anesthesia. Wound Care: Petrolatum Curettage Text: The wound bed was treated with curettage after the biopsy was performed. Type Of Destruction Used: Curettage Notification Instructions: Patient will be notified of biopsy results. However, patient instructed to call the office if not contacted within 2 weeks. Anesthesia Type: 1% lidocaine with epinephrine Post-Care Instructions: I reviewed with the patient in detail post-care instructions. Patient is to keep the biopsy site dry overnight, and then apply bacitracin twice daily until healed. Patient may apply hydrogen peroxide soaks to remove any crusting. Biopsy Type: H and E Anesthesia Volume In Cc: 0.5

## 2021-05-30 ENCOUNTER — RECORDS - HEALTHEAST (OUTPATIENT)
Dept: ADMINISTRATIVE | Facility: CLINIC | Age: 56
End: 2021-05-30

## 2021-05-30 NOTE — TELEPHONE ENCOUNTER
RN cannot approve Refill Request    RN can NOT refill this medication med is not covered by policy/route to provider. Last office visit: 4/30/2019 Rocío Brandt MD Last Physical: 1/9/2019 Last MTM visit: Visit date not found Last visit same specialty: 4/30/2019 Rocío Brandt MD.  Next visit within 3 mo: Visit date not found  Next physical within 3 mo: Visit date not found      Bekah Schroeder, [facility] Triage/Med Refill 6/29/2019    Requested Prescriptions   Pending Prescriptions Disp Refills     OZEMPIC 0.25 mg or 0.5 mg(2 mg/1.5 mL) PnIj [Pharmacy Med Name: OZEMPIC 0.25 OR 0.5MG/DOSE (1 PEN/BOX)] 1.5 mL 0     Sig: INJECT 0.25 MG UNDER THE SKIN EVERY 7 DAYS       There is no refill protocol information for this order

## 2021-05-30 NOTE — PROGRESS NOTES
I met with Obed Nickerson at the request of Dr. Brandt to recheck his blood pressure.  Blood pressure medications on the MAR were reviewed with patient.    Patient has taken all medications as per usual regimen: No  Patient reports tolerating them without any issues or concerns: No    Vitals:    07/15/19 1029 07/15/19 1036   BP: 152/90 138/90       After 5 minutes, the patient's blood pressure remained greater than or equal to 140/90.    Is the patient currently having any chest pain?  No  Does the patient currently have a headache?   No  Does the patient currently have any vision changes? No  Does the patient currently have any nausea? No  Does the patient currently have any abdominal pain? No    The previous encounter was reviewed.  The patient was discharged and the note will be sent to the provider for final review.

## 2021-05-30 NOTE — TELEPHONE ENCOUNTER
Kate Gonzalez - this patient appears to have an upcoming visit next month - not sure why the dot phrase did not pick that up???  Has there been any issues with this lately?    Thanks!  I appreciate all that you do!    Mirna

## 2021-05-30 NOTE — TELEPHONE ENCOUNTER
RN cannot approve Refill Request    RN can NOT refill this medication med is not covered by policy/route to provider       . Last office visit: 4/30/2019 Rocío Brandt MD Last Physical: 1/9/2019 Last MTM visit: Visit date not found Last visit same specialty: 4/30/2019 Rocío Brandt MD.  Next visit within 3 mo: Visit date not found  Next physical within 3 mo: Visit date not found      Lisa Erwin, [facility] Triage/Med Refill 7/25/2019    Requested Prescriptions   Pending Prescriptions Disp Refills     OZEMPIC 0.25 mg or 0.5 mg(2 mg/1.5 mL) PnIj [Pharmacy Med Name: OZEMPIC 0.25 OR 0.5MG/DOSE (1 PEN/BOX)] 1.5 mL 0     Sig: INJECT 0.25 MG UNDER THE SKIN EVERY 7 DAYS       There is no refill protocol information for this order

## 2021-05-30 NOTE — TELEPHONE ENCOUNTER
Name of form/paperwork: Other:  clinic notes and any blood sugar reading forblood glucose reader and sensors. Writer did fax three office visits dated 4/30/19 , 1/9/19 and 9/24/18. Please send any other information.  The pharmacy states they faxed clinic three times for this request.    Have you been seen for this request: N/A  Do we have the form: in chart   When is form needed by: NA  How would you like the form returned: Fax  Fax Number:[phone]    Patient Notified form requests are processed in 3-5 business days: No  (If patient needs form sooner, please note that in this message.)  Okay to leave a detailed message? No Elma [phone]

## 2021-05-31 NOTE — TELEPHONE ENCOUNTER
RN cannot approve Refill Request    RN can NOT refill this medication PCP messaged that patient is overdue for Labs. Last office visit: 8/9/2019 Rocío Brandt MD Last Physical: 1/9/2019 Last MTM visit: Visit date not found Last visit same specialty: 8/9/2019 Rocío Brandt MD.  Next visit within 3 mo: Visit date not found  Next physical within 3 mo: Visit date not found      Isa Gunn, [facility] Triage/Med Refill 8/24/2019    Requested Prescriptions   Pending Prescriptions Disp Refills     LANTUS SOLOSTAR U-100 INSULIN 100 unit/mL (3 mL) pen [Pharmacy Med Name: LANTUS SOLOSTAR 100UNIT/ML SOPN] 6 mL 0     Sig: INJECT 20 UNITS UNDER THE SKIN ONCE DAILY       Insulin/GLP-1 Refill Protocol Failed - 8/24/2019 11:27 AM        Failed - Microalbumin in last year     No results found for: MICROALBUR               Passed - Visit with PCP or prescribing provider visit in last 6 months     Last office visit with prescriber/PCP: 8/9/2019 OR same dept: 8/9/2019 Rocío Brandt MD OR same specialty: 8/9/2019 Rocío Brandt MD Last physical: Visit date not found Last MTM visit: Visit date not found     Next appt within 3 mo: Visit date not found  Next physical within 3 mo: Visit date not found  Prescriber OR PCP: Rocío Brandt MD  Last diagnosis associated with med order: 1. Long-term insulin use in type 2 diabetes  - LANTUS SOLOSTAR U-100 INSULIN 100 unit/mL (3 mL) pen [Pharmacy Med Name: LANTUS SOLOSTAR 100UNIT/ML SOPN]; INJECT 20 UNITS UNDER THE SKIN ONCE DAILY  Dispense: 6 mL; Refill: 0    If protocol passes may refill for 6 months if within 3 months of last provider visit (or a total of 9 months).              Passed - A1C in last 6 months     Hemoglobin A1c   Date Value Ref Range Status   08/09/2019 7.5 (H) 3.5 - 6.0 % Final               Passed - Blood pressure in last year     BP Readings from Last 1 Encounters:   08/09/19 124/80             Passed - Creatinine done in  last year     Creatinine   Date Value Ref Range Status   08/09/2019 1.10 0.70 - 1.30 mg/dL Final

## 2021-06-01 ENCOUNTER — VIRTUAL VISIT (OUTPATIENT)
Dept: FAMILY MEDICINE | Facility: CLINIC | Age: 56
End: 2021-06-01
Payer: COMMERCIAL

## 2021-06-01 DIAGNOSIS — Z79.4 TYPE 2 DIABETES MELLITUS WITH HYPERGLYCEMIA, WITH LONG-TERM CURRENT USE OF INSULIN (H): ICD-10-CM

## 2021-06-01 DIAGNOSIS — E11.65 TYPE 2 DIABETES MELLITUS WITH HYPERGLYCEMIA, WITH LONG-TERM CURRENT USE OF INSULIN (H): ICD-10-CM

## 2021-06-01 DIAGNOSIS — M79.10 MUSCLE PAIN: ICD-10-CM

## 2021-06-01 DIAGNOSIS — F41.9 ANXIETY: Primary | ICD-10-CM

## 2021-06-01 PROCEDURE — 99214 OFFICE O/P EST MOD 30 MIN: CPT | Mod: 95 | Performed by: FAMILY MEDICINE

## 2021-06-01 RX ORDER — LUBIPROSTONE 8 UG/1
CAPSULE, GELATIN COATED ORAL
COMMUNITY
Start: 2021-04-29 | End: 2022-01-28

## 2021-06-01 RX ORDER — DULOXETIN HYDROCHLORIDE 60 MG/1
60 CAPSULE, DELAYED RELEASE ORAL 2 TIMES DAILY
Qty: 180 CAPSULE | Refills: 1 | Status: SHIPPED | OUTPATIENT
Start: 2021-06-01 | End: 2022-02-28

## 2021-06-01 RX ORDER — DICYCLOMINE HCL 20 MG
TABLET ORAL
COMMUNITY
Start: 2021-04-13 | End: 2022-01-28

## 2021-06-01 RX ORDER — BUPRENORPHINE HYDROCHLORIDE 300 UG/1
FILM, SOLUBLE BUCCAL
COMMUNITY
Start: 2021-05-29 | End: 2021-10-26

## 2021-06-01 NOTE — PROGRESS NOTES
Mati is a 55 year old who is being evaluated via a billable video visit.      How would you like to obtain your AVS? Brent  If the video visit is dropped, the invitation should be resent by: Brent  Will anyone else be joining your video visit? No      Video Start Time: 150pm    -------------------------    Assessment/Plan:    Obed Nickerson is a 55 year old male presenting for:    Muscle pain  Refill Zanaflex sent to the pharmacy  - tiZANidine (ZANAFLEX) 4 MG tablet  Dispense: 30 tablet; Refill: 1    Type 2 diabetes mellitus with hyperglycemia, with long-term current use of insulin (H)  Given his reported his A1c numbers I believe his A1c is still out of range.  He is willing to see a diabetic educator which I think a great step.  We will have him increase his Lantus from 40 units at night to 50 units at night.  He will increase by 2 units every few days although I doubt that this will cause his blood sugars to be too low.    He does not tolerate Metformin.  He will continue his Januvia.  We had tried to put him on a GLP-1 in the past although insurance would not approve this.  Could potentially try to send that through again as well.    He will follow up with me in a few months for an A1c.  - insulin glargine (LANTUS SOLOSTAR) 100 UNIT/ML pen  Dispense: 10 mL; Refill: 3  - AMBULATORY ADULT DIABETES EDUCATOR REFERRAL    Anxiety  Refill Cymbalta sent to the pharmacy  - DULoxetine (CYMBALTA) 60 MG capsule  Dispense: 180 capsule; Refill: 1        Medications Discontinued During This Encounter   Medication Reason     tiZANidine (ZANAFLEX) 4 MG tablet Reorder     insulin glargine (LANTUS SOLOSTAR) 100 UNIT/ML pen      DULoxetine (CYMBALTA) 60 MG capsule Reorder           Chief Complaint:  Diabetes          Subjective:   Obed Nickerson is a pleasant 55 year old male being evaluated via video visit today for the following concern/s:     Diabetes: Patient has a past medical history significant for diabetes.  He is  currently on Januvia as well as Lantus 40 units at night.  Most recent A1c was 9.1.  Patient states that he does have a continuous monitor and notes that his blood sugars on a good day are in the mid to high 100s and on a bad day are in the mid 200s.  He is not getting many 300s anymore.  He does find it is helpful he can determine what might be causing his numbers to be elevated and has been trying to work with his diet.  He wonders about seeing a diabetic educator.  He has been good about remembering to take his medications which has been a problem in the past.      12 point review of systems completed and negative except for what has been described above    History   Smoking Status     Never Smoker   Smokeless Tobacco     Never Used         Current Outpatient Medications:      Alcohol Swabs (ALCOHOL PREP) 70 % PADS, , Disp: , Rfl:      ALPRAZolam (XANAX) 0.5 MG tablet, TK 1 T PO QD PRF SLP, Disp: 15 tablet, Rfl: 3     AMITIZA 8 MCG capsule, TAKE 1 CAPSULE BY MOUTH TWICE DAILY WITH FOOD AND WATER, Disp: , Rfl:      ASPIRIN PO, Take 81 mg by mouth daily, Disp: , Rfl:      BELBUCA 300 MCG FILM buccal film, , Disp: , Rfl:      Blood Glucose Monitoring Suppl (FIFTY50 GLUCOSE METER 2.0) w/Device KIT, Dispense meter, test strips, lancets covered by pt ins. E11.65 NIDDM type II, uncontrolled - Test 3 times/day, Reason: High A1C, Disp: , Rfl:      Continuous Blood Gluc Sensor (FREESTYLE YOKO 14 DAY SENSOR) [facility], Switch device every 2 weeks, Disp: 6 each, Rfl: 2     Continuous Blood Gluc Sensor (FREESTYLE YOKO 14 DAY SENSOR) MISC, USE AS DIRECTED, Disp: , Rfl:      dicyclomine (BENTYL) 20 MG tablet, TAKE 1 TABLET BY MOUTH TWICE DAILY AS NEEDED, Disp: , Rfl:      DiphenhydrAMINE HCl (BENADRYL PO), Take 25 mg by mouth every 8 hours as needed, Disp: , Rfl:      DULoxetine (CYMBALTA) 60 MG capsule, Take 1 capsule (60 mg) by mouth 2 times daily, Disp: 180 capsule, Rfl: 1     hyoscyamine SL (LEVSIN/SL) 0.125 MG sublingual  tablet, Take 1 tablet (0.125 mg) by mouth every 4 hours as needed (for GI upset - max 1.5mg/day), Disp: 30 tablet, Rfl: 1     insulin glargine (LANTUS SOLOSTAR) 100 UNIT/ML pen, INJECT 50 UNITS UNDER THE SKIN EVERY NIGHT AT BEDTIME, Disp: 10 mL, Rfl: 3     lisinopril (ZESTRIL) 10 MG tablet, Take 1 tablet (10 mg) by mouth daily, Disp: 90 tablet, Rfl: 3     metFORMIN (GLUCOPHAGE) 1000 MG tablet, Take 1 tablet (1,000 mg) by mouth 2 times daily (with meals), Disp: 60 tablet, Rfl: 0     naloxone (NARCAN) 4 MG/0.1ML nasal spray, Spray 1 spray (4 mg) into one nostril alternating nostrils as needed for opioid reversal every 2-3 minutes until assistance arrives, Disp: 0.2 mL, Rfl: 3     oxyCODONE HCl (ROXICODONE) 20 MG TABS immediate release tablet, TAKE 1 TABLET BY MOUTH FOUR TIMES DAILY FOR CHRONIC PAIN, Disp: , Rfl:      oxyCODONE IR (ROXICODONE) 10 MG tablet, Take 0.5-1 tablets (5-10 mg) by mouth every 4 hours as needed for severe pain, Disp: 10 tablet, Rfl: 0     polyethylene glycol (MIRALAX) 17 GM/Dose powder, , Disp: , Rfl:      sitagliptin (JANUVIA) 50 MG tablet, Take 1 tablet (50 mg) by mouth daily, Disp: 90 tablet, Rfl: 0     SUMAtriptan (IMITREX) 50 MG tablet, Take 1 tablet (50 mg) by mouth at onset of headache for migraine May repeat in 2 hours. Max 4 tablets/24 hours., Disp: 9 tablet, Rfl: 1     tiZANidine (ZANAFLEX) 4 MG tablet, Take 1 tablet (4 mg) by mouth At Bedtime, Disp: 30 tablet, Rfl: 1        Objective:  No vitals were done due to the nature of this visit  No flowsheet data found.            General: No acute distress  Psych: Appropriate affect  HEENT: moist mucous membranes  Pulmonary: Breathing comfortably, speaking in complete sentences  Extremities: warm and well perfused with no edema  Skin: warm and dry with no rash       This note has been dictated and transcribed using voice recognition software.   Any errors in transcription are unintentional and inherent to the software.      Video-Visit  Details    Type of service:  Video Visit    Video End Time:220pm    Originating Location (pt. Location): Home    Distant Location (provider location):  [facility]     Platform used for Video Visit: [employer]Well

## 2021-06-01 NOTE — TELEPHONE ENCOUNTER
RN cannot approve Refill Request    RN can NOT refill this medication PCP messaged that patient is overdue for Labs. Last office visit: 8/9/2019 Rocío Brandt MD Last Physical: 1/9/2019 Last MTM visit: Visit date not found Last visit same specialty: 8/9/2019 Rocío Brandt MD.  Next visit within 3 mo: Visit date not found  Next physical within 3 mo: Visit date not found      [facility] Connection Triage/Med Refill 9/15/2019    Requested Prescriptions   Pending Prescriptions Disp Refills     insulin glargine (LANTUS SOLOSTAR U-100 INSULIN) 100 unit/mL (3 mL) pen 6 mL 0       Insulin/GLP-1 Refill Protocol Failed - 9/15/2019 11:42 AM        Failed - Microalbumin in last year     No results found for: MICROALBUR               Passed - Visit with PCP or prescribing provider visit in last 6 months     Last office visit with prescriber/PCP: 8/9/2019 OR same dept: 8/9/2019 Rocío Brandt MD OR same specialty: 8/9/2019 Rocío Brandt MD Last physical: Visit date not found Last MTM visit: Visit date not found     Next appt within 3 mo: Visit date not found  Next physical within 3 mo: Visit date not found  Prescriber OR PCP: Rocío Brandt MD  Last diagnosis associated with med order: 1. Long-term insulin use in type 2 diabetes  - insulin glargine (LANTUS SOLOSTAR U-100 INSULIN) 100 unit/mL (3 mL) pen  Dispense: 6 mL; Refill: 0    If protocol passes may refill for 6 months if within 3 months of last provider visit (or a total of 9 months).              Passed - A1C in last 6 months     Hemoglobin A1c   Date Value Ref Range Status   08/09/2019 7.5 (H) 3.5 - 6.0 % Final               Passed - Blood pressure in last year     BP Readings from Last 1 Encounters:   08/09/19 124/80             Passed - Creatinine done in last year     Creatinine   Date Value Ref Range Status   08/09/2019 1.10 0.70 - 1.30 mg/dL Final

## 2021-06-02 ENCOUNTER — TELEPHONE (OUTPATIENT)
Dept: FAMILY MEDICINE | Facility: CLINIC | Age: 56
End: 2021-06-02

## 2021-06-02 VITALS — BODY MASS INDEX: 40.18 KG/M2 | HEIGHT: 66 IN | WEIGHT: 250 LBS

## 2021-06-02 VITALS — BODY MASS INDEX: 41.71 KG/M2 | WEIGHT: 258.4 LBS

## 2021-06-02 VITALS — WEIGHT: 259.38 LBS | BODY MASS INDEX: 41.68 KG/M2 | HEIGHT: 66 IN

## 2021-06-02 NOTE — TELEPHONE ENCOUNTER
Diabetes Education Scheduling Outreach #1:    Call to patient to schedule. Left message with phone number to call to schedule.    Plan for 2nd outreach attempt within 1 week.    Jen Ortiz  [facility]  Diabetes and Nutrition Scheduling
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
## 2021-06-02 NOTE — TELEPHONE ENCOUNTER
Controlled Substance Refill Request  Medication:   Requested Prescriptions     Pending Prescriptions Disp Refills     ALPRAZolam (XANAX) 0.5 MG tablet [Pharmacy Med Name: ALPRAZOLAM 0.5MG TABS] 15 tablet 0     Sig: TAKE ONE TABLET BY MOUTH ONCE DAILY AS NEEDED FOR SLEEP     Date Last Fill: 8/9/19  [facility]   Submit electronically to pharmacy  Controlled Substance Agreement on File:   Encounter-Level CSA Scan Date:    There are no encounter-level csa scan date.       Last office visit: Last office visit pertaining to requested medication was 8/9/19.

## 2021-06-03 VITALS — BODY MASS INDEX: 39.09 KG/M2 | HEIGHT: 66 IN | WEIGHT: 243.25 LBS

## 2021-06-03 VITALS
DIASTOLIC BLOOD PRESSURE: 100 MMHG | HEART RATE: 108 BPM | HEIGHT: 66 IN | WEIGHT: 246 LBS | BODY MASS INDEX: 39.53 KG/M2 | SYSTOLIC BLOOD PRESSURE: 160 MMHG

## 2021-06-03 VITALS — BODY MASS INDEX: 40.35 KG/M2 | HEIGHT: 66 IN | WEIGHT: 251.06 LBS

## 2021-06-03 NOTE — PROGRESS NOTES
Assessment/Plan:    Obed Nickerson is a 54 y.o. male presenting for:    1. Type 2 diabetes mellitus with hyperglycemia, with long-term current use of insulin (H)  A1c is above goal today.  I will have him increase his insulin by 2 units every other night up until 40 units were blood sugars are less than 140 fasting.  If he is able to  the Trulicity he will use that for 2 weeks prior to increasing his insulin.  He will follow-up with me in 1 month to let me know how he is doing and to recheck his blood pressure.  - Glycosylated Hemoglobin A1c  - Lipid Cascade FASTING  - Comprehensive Metabolic Panel  - dulaglutide (TRULICITY) 1.5 mg/0.5 mL PnIj; Inject 1.5 mg under the skin every 7 days.  Dispense: 4 Syringe; Refill: 3  - Custom LDL Cholesterol, Direct    2. Screen for colon cancer  - Ambulatory referral for Colonoscopy    3. Flu vaccine need  - Influenza, Recombinant, Inj, Quadrivalent, PF, 18+YRS    4. Mild major depression (H)  He will continue his Cymbalta.  Wellbutrin sent to the pharmacy and discussed the risks and benefits of the medication.  Referral to psychiatry was placed as he would like to discuss with someone about starting Abilify.  Refill Xanax sent to the pharmacy which she will use very sparingly.  - buPROPion (WELLBUTRIN XL) 150 MG 24 hr tablet; Take 1 tablet (150 mg total) by mouth daily.  Dispense: 90 tablet; Refill: 1  - Ambulatory referral to Psychiatry  - ALPRAZolam (XANAX) 0.5 MG tablet; TAKE ONE TABLET BY MOUTH ONCE DAILY AS NEEDED FOR SLEEP  Dispense: 15 tablet; Refill: 0  - Ambulatory referral to Psychiatry    5. Benign essential hypertension  Blood pressures are significantly elevated today.  We discussed that his blood pressures are elevated to a point that it is dangerous and we need to get these numbers down.  Metoprolol sent to the pharmacy and discussed the risks and most common side effects of the medication.  - metoprolol succinate (TOPROL XL) 25 MG; Take 1 tablet (25 mg  "total) by mouth daily.  Dispense: 90 tablet; Refill: 3        Medications Discontinued During This Encounter   Medication Reason     OZEMPIC 0.25 mg or 0.5 mg(2 mg/1.5 mL) PnIj      ALPRAZolam (XANAX) 0.5 MG tablet Reorder           Chief Complaint:  Chief Complaint   Patient presents with     Diabetes     DM Check       Subjective:   Obed Nickerson is a 54-year-old gentleman with a past medical history significant for chronic pain, hypertension and diabetes which is not well controlled on insulin presenting to the clinic today for diabetic check.    The patient states that his blood sugars have been \"elevated.\"  He is not really able to give me a range of what his blood sugars have been other than they have been higher than he would like.  His last A1c was actually 7.5 but he estimates that his blood sugars will be higher than that at this point.  At his last visit he was on Ozempic however he has stopped taking that medication because his insurance stopped paying for it.  He unfortunately did not alert us of this.  He is also on metformin and Lantus which she is using.  He is currently using 24 units of Lantus at night but would be willing to increase further if necessary.  He states that he is motivated to not be on mealtime insulin.    Hypertension: Patient is currently on lisinopril hydrochlorothiazide.  Interestingly at his last visit about 3 months ago his blood pressure was well controlled.  Today this is significantly out of control.  He states that he is taking his medication as prescribed.  He denies any chest pain or shortness of breath.    .Depression: Patient states that he has been fairly depressed recently.  He states that today.  He does not have any suicidal or homicidal ideations but is feeling quite down.  He is frustrated that his blood sugars have been elevated but again has not been eating healthfully or doing much for physical activity.  He is currently on Cymbalta 60 mg daily.  He is " wondering about Abilify as his wife has taken this in the past and it helped her.    12 point review of systems completed and negative except for what has been described above    Social History     Tobacco Use   Smoking Status Never Smoker   Smokeless Tobacco Never Used       Current Outpatient Medications   Medication Sig Note     ALPRAZolam (XANAX) 0.5 MG tablet TAKE ONE TABLET BY MOUTH ONCE DAILY AS NEEDED FOR SLEEP      blood glucose test strips Dispense item covered by pt ins. E11.65 NIDDM type II, uncontrolled - Test 3 times/day, Reason: High A1C 9/24/2018: [facility]     blood-glucose sensor Jelena Please dispense Dexicom G6 system - use as directed      DULoxetine (CYMBALTA) 60 MG capsule TAKE TWO CAPSULES BY MOUTH EVERY DAY      FREESTYLE YOKO 14 DAY SENSOR Kit USE AS DIRECTED      insulin glargine (LANTUS SOLOSTAR U-100 INSULIN) 100 unit/mL (3 mL) pen Inject 26 Units under the skin at bedtime.      lisinopril-hydrochlorothiazide (ZESTORETIC) 20-25 mg per tablet Take 2 tablets by mouth daily.      metFORMIN (GLUCOPHAGE) 1000 MG tablet Take 1 tablet (1,000 mg total) by mouth 2 (two) times a day with meals.      morphine (MS CONTIN) 15 MG 12 hr tablet  9/24/2018: [facility]ceived [facility]: [facility]     naloxone (NARCAN) 4 mg/actuation nasal spray SPRAY INTO NOSTRIL AND CALL 911 FOR OVERDOSE. MAY REPEAT ONCE 9/24/2018: [facility]     oxyCODONE (ROXICODONE) 15 MG immediate release tablet 10 mg.        9/24/2018: [facility]     rizatriptan (MAXALT) 10 MG tablet Take by mouth. 9/24/2018: [facility] Received Sig: TAKE 1 TABLET BY MOUTH AS NEEDED FOR  HEADACHE; MAY REPEAT EVERY 2 HOURS AS NEEDED  MAX DOSE: 30MG PER 24HRS.     tiZANidine (ZANAFLEX) 4 MG tablet  11/12/2016: Received from: External  "Pharmacy     buPROPion (WELLBUTRIN XL) 150 MG 24 hr tablet Take 1 tablet (150 mg total) by mouth daily.      dulaglutide (TRULICITY) 1.5 mg/0.5 mL PnIj Inject 1.5 mg under the skin every 7 days.      metoprolol succinate (TOPROL XL) 25 MG Take 1 tablet (25 mg total) by mouth daily.          Objective:  Vitals:    11/26/19 1310 11/26/19 1315   BP: (!) 202/100 (!) 188/110   Pulse: 72    Resp: 16    Temp: 99.5  F (37.5  C)    TempSrc: Oral    Weight: (!) 247 lb 5 oz (112.2 kg)    Height: 5' 6\" (1.676 m)        Body mass index is 39.92 kg/m .    Vital signs reviewed and stable  General: No acute distress  Psych: Appropriate affect  HEENT: moist mucous membranes, pupils equal, round, reactive to light and accomodation, posterior oropharynx clear of erythema or exudate, tympanic membranes are pearly grey bilaterally  Lymph: no cervical or supraclavicular lymphadenopathy  Cardiovascular: regular rate and rhythm with no murmur  Pulmonary: clear to auscultation bilaterally with no wheeze  Abdomen: soft, non tender, non distended with normo-active bowel sounds  Extremities: warm and well perfused with no edema  Skin: warm and dry with no rash         This note has been dictated and transcribed using voice recognition software.   Any errors in transcription are unintentional and inherent to the software.  "

## 2021-06-04 VITALS
DIASTOLIC BLOOD PRESSURE: 110 MMHG | TEMPERATURE: 99.5 F | BODY MASS INDEX: 39.75 KG/M2 | HEART RATE: 72 BPM | WEIGHT: 247.31 LBS | RESPIRATION RATE: 16 BRPM | HEIGHT: 66 IN | SYSTOLIC BLOOD PRESSURE: 188 MMHG

## 2021-06-04 NOTE — TELEPHONE ENCOUNTER
Refill Approved    Rx renewed per Medication Renewal Policy. Medication was last renewed on 2/26/19.    Isa ANITRA Luis A, [facility] Connection Triage/Med Refill 12/30/2019     Requested Prescriptions   Pending Prescriptions Disp Refills     DULoxetine (CYMBALTA) 60 MG capsule [Pharmacy Med Name: DULOXETINE HCL 60MG CPEP] 180 capsule 1     Sig: TAKE TWO CAPSULES BY MOUTH EVERY DAY       Tricyclics/Misc Antidepressant/Antianxiety Meds Refill Protocol Passed - 12/29/2019 11:25 PM        Passed - PCP or prescribing provider visit in last year     Last office visit with prescriber/PCP: 11/26/2019 Rocío Brandt MD OR same dept: 11/26/2019 Rocío Brandt MD OR same specialty: 11/26/2019 Rocío Brandt MD  Last physical: 1/9/2019 Last MTM visit: Visit date not found   Next visit within 3 mo: Visit date not found  Next physical within 3 mo: Visit date not found  Prescriber OR PCP: Rocío Brandt MD  Last diagnosis associated with med order: 1. Mild major depression (H)  - DULoxetine (CYMBALTA) 60 MG capsule [Pharmacy Med Name: DULOXETINE HCL 60MG CPEP]; TAKE TWO CAPSULES BY MOUTH EVERY DAY  Dispense: 180 capsule; Refill: 1    If protocol passes may refill for 12 months if within 3 months of last provider visit (or a total of 15 months).

## 2021-06-05 NOTE — TELEPHONE ENCOUNTER
Controlled Substance Refill Request  Medication Name:   Requested Prescriptions     Pending Prescriptions Disp Refills     ALPRAZolam (XANAX) 0.5 MG tablet [Pharmacy Med Name: ALPRAZOLAM 0.5MG TABS] 15 tablet 0     Sig: TAKE ONE TABLET BY MOUTH ONCE DAILY AS NEEDED FOR SLEEP     Date Last Fill: 11/26/19  [facility]ed [facility]  Submit electronically to pharmacy  Controlled Substance Agreement on file:   Encounter-Level CSA Scan Date:    There are no encounter-level csa scan date.        Last office visit:  11/26/19

## 2021-06-05 NOTE — TELEPHONE ENCOUNTER
RN cannot approve Refill Request    RN can NOT refill this medication Protocol failed and NO refill given.      Lsia Erwin, [facility] Triage/Med Refill 2/7/2020    Requested Prescriptions   Pending Prescriptions Disp Refills     LANTUS SOLOSTAR U-100 INSULIN 100 unit/mL (3 mL) pen [Pharmacy Med Name: LANTUS SOLOSTAR 100UNIT/ML SOPN] 15 mL 2     Sig: INJECT 26 UNITS UNDER THE SKIN EVERY NIGHT AT BEDTIME       Insulin/GLP-1 Refill Protocol Failed - 2/7/2020  2:47 AM        Failed - Microalbumin in last year     No results found for: MICROALBUR               Passed - Visit with PCP or prescribing provider visit in last 6 months     Last office visit with prescriber/PCP: 11/26/2019 OR same dept: 11/26/2019 Rocío Brandt MD OR same specialty: 11/26/2019 Rocío Brandt MD Last physical: Visit date not found Last MTM visit: Visit date not found     Next appt within 3 mo: Visit date not found  Next physical within 3 mo: Visit date not found  Prescriber OR PCP: Rocío Brandt MD  Last diagnosis associated with med order: 1. Long-term insulin use in type 2 diabetes  - LANTUS SOLOSTAR U-100 INSULIN 100 unit/mL (3 mL) pen [Pharmacy Med Name: LANTUS SOLOSTAR 100UNIT/ML SOPN]; INJECT 26 UNITS UNDER THE SKIN EVERY NIGHT AT BEDTIME  Dispense: 15 mL; Refill: 2    If protocol passes may refill for 6 months if within 3 months of last provider visit (or a total of 9 months).              Passed - A1C in last 6 months     Hemoglobin A1c   Date Value Ref Range Status   11/26/2019 8.3 (H) 3.5 - 6.0 % Final               Passed - Blood pressure in last year     BP Readings from Last 1 Encounters:   11/26/19 (!) 188/110             Passed - Creatinine done in last year     Creatinine   Date Value Ref Range Status   11/26/2019 0.95 0.70 - 1.30 mg/dL Final

## 2021-06-06 NOTE — TELEPHONE ENCOUNTER
RN cannot approve Refill Request    RN can NOT refill this medication Protocol failed and NO refill given.      Lisa Erwin, [facility] Connection Triage/Med Refill 3/18/2020    Requested Prescriptions   Pending Prescriptions Disp Refills     LANTUS SOLOSTAR U-100 INSULIN 100 unit/mL (3 mL) pen [Pharmacy Med Name: LANTUS SOLOSTAR 100UNIT/ML SOPN] 15 mL 0     Sig: INJECT 26 UNITS UNDER THE SKIN EVERY NIGHT AT BEDTIME       Insulin/GLP-1 Refill Protocol Failed - 3/17/2020  9:42 AM        Failed - Microalbumin in last year     No results found for: MICROALBUR               Passed - Visit with PCP or prescribing provider visit in last 6 months     Last office visit with prescriber/PCP: 11/26/2019 OR same dept: 11/26/2019 Rocío Brandt MD OR same specialty: 11/26/2019 Rocío Brandt MD Last physical: Visit date not found Last MTM visit: Visit date not found     Next appt within 3 mo: Visit date not found  Next physical within 3 mo: Visit date not found  Prescriber OR PCP: Rocío Brandt MD  Last diagnosis associated with med order: 1. Long-term insulin use in type 2 diabetes  - LANTUS SOLOSTAR U-100 INSULIN 100 unit/mL (3 mL) pen [Pharmacy Med Name: LANTUS SOLOSTAR 100UNIT/ML SOPN]; INJECT 26 UNITS UNDER THE SKIN EVERY NIGHT AT BEDTIME  Dispense: 15 mL; Refill: 0    If protocol passes may refill for 6 months if within 3 months of last provider visit (or a total of 9 months).              Passed - A1C in last 6 months     Hemoglobin A1c   Date Value Ref Range Status   11/26/2019 8.3 (H) 3.5 - 6.0 % Final               Passed - Blood pressure in last year     BP Readings from Last 1 Encounters:   11/26/19 (!) 188/110             Passed - Creatinine done in last year     Creatinine   Date Value Ref Range Status   11/26/2019 0.95 0.70 - 1.30 mg/dL Final

## 2021-06-07 DIAGNOSIS — R10.9 ACUTE ABDOMINAL PAIN: ICD-10-CM

## 2021-06-07 NOTE — TELEPHONE ENCOUNTER
Who is calling:  [facility]  Reason for Call:  Lorazepam sent to wrong pharmacy, please re send to [facility] pharmacy.  Date of last appointment with primary care: N/A  Okay to leave a detailed message: No

## 2021-06-07 NOTE — TELEPHONE ENCOUNTER
Refill Approved    Rx renewed per Medication Renewal Policy. Medication was last renewed on 11/26/19.    Lisa Erwin, [facility] Connection Triage/Med Refill 3/27/2020     Requested Prescriptions   Pending Prescriptions Disp Refills     buPROPion (WELLBUTRIN XL) 150 MG 24 hr tablet 90 tablet 1     Sig: Take 1 tablet (150 mg total) by mouth daily.       Tricyclics/Misc Antidepressant/Antianxiety Meds Refill Protocol Passed - 3/26/2020  1:22 PM        Passed - PCP or prescribing provider visit in last year     Last office visit with prescriber/PCP: 11/26/2019 Rocío Brandt MD OR same dept: Visit date not found OR same specialty: 11/26/2019 Rocío Brandt MD  Last physical: 1/9/2019 Last MTM visit: Visit date not found   Next visit within 3 mo: Visit date not found  Next physical within 3 mo: Visit date not found  Prescriber OR PCP: Rocío Brandt MD  Last diagnosis associated with med order: 1. Mild major depression (H)  - buPROPion (WELLBUTRIN XL) 150 MG 24 hr tablet; Take 1 tablet (150 mg total) by mouth daily.  Dispense: 90 tablet; Refill: 1    If protocol passes may refill for 12 months if within 3 months of last provider visit (or a total of 15 months).

## 2021-06-07 NOTE — TELEPHONE ENCOUNTER
Controlled Substance Refill Request  Medication Name:   Requested Prescriptions     Pending Prescriptions Disp Refills     ALPRAZolam (XANAX) 0.5 MG tablet 15 tablet 0     Date Last Fill: 1/29/20  Requested Pharmacy: [facility]  Submit electronically to pharmacy  Controlled Substance Agreement on file:   Encounter-Level CSA Scan Date:    There are no encounter-level csa scan date.        Last office visit:  11/26/19

## 2021-06-07 NOTE — PROGRESS NOTES
"Obed Nickerson is a 54 y.o. male who is being evaluated via a billable telephone visit.      The patient has been notified of following:     \"This telephone visit will be conducted via a call between you and your physician/provider. We have found that certain health care needs can be provided without the need for a physical exam.  This service lets us provide the care you need with a short phone conversation.  If a prescription is necessary we can send it directly to your pharmacy.  If lab work is needed we can place an order for that and you can then stop by our lab to have the test done at a later time.    If during the course of the call the physician/provider feels a telephone visit is not appropriate, you will not be charged for this service.\"     Physician has received verbal consent for a Telephone Visit from the patient? Yes    Obed Nickerson complains of    Chief Complaint   Patient presents with     Diabetes     DM Check     Questions     Disability/handicap parking?       I have reviewed and updated the patient's Past Medical History, Social History, Family History and Medication List.    ALLERGIES  Compazine [prochlorperazine edisylate]; Demerol [meperidine]; Gabapentin; Statins-hmg-coa reductase inhibitors; Toradol [ketorolac]; and Ultram [tramadol]    Additional provider notes: Mil is a 54-year-old gentleman with a past medical history significant for arthritis, obesity, uncontrolled type 2 diabetes as well as uncontrolled hypertension.    Diabetes: Patient is prescribed metformin 2 g twice daily, Lantus 30 units at night and Trulicity.  He states that he does take his Lantus but has been not taking his metformin and was unable to get the Trulicity.  He states that his blood sugars throughout the day are generally in the upper 200s to lower 300s.    Lab Results   Component Value Date    HGBA1C 8.3 (H) 11/26/2019     His last A1c was reasonable at 8.3 back in November but he states that he " believes his numbers would be higher at this point.  He states that he tolerates the medications well he just forgets to take them.    Hypertension: Blood pressures have been very poorly controlled.  He is on lisinopril hydrochlorothiazide maximum dosage.  At his last visit his blood pressures were 188/110 however he was fairly evasive whether he was taking his medications.  He was started on metoprolol at that point due to some tachycardia as well.  He states that the tachycardia resolved with the metoprolol and he stopped taking it.  He is currently not taking any blood pressure medication.  He does not check his blood pressures at home.  He denies any chest pain or headaches.    Depression: Needs a refill of his Wellbutrin    Chronic pain/arthritis: Follows with pain clinic.  Would like handicap parking permit.    Assessment/Plan:  1. Type 2 diabetes mellitus with hyperglycemia, with long-term current use of insulin (H)  Encouraged him to be more vigilant about taking his medications.  We have discussed previously the risks of uncontrolled diabetes and discussed this again today.  Discussed dietary modifications.  Offered diabetic education over the phone however he declined today.  We will increase his Lantus to 36 units at night.  I would like to talk with him in 1 month for a repeat phone visit regarding his numbers.  - dulaglutide (TRULICITY) 1.5 mg/0.5 mL PnIj; Inject 1.5 mg under the skin every 7 days.  Dispense: 4 Syringe; Refill: 3    2. Long-term insulin use in type 2 diabetes  - insulin glargine (LANTUS SOLOSTAR U-100 INSULIN) 100 unit/mL (3 mL) pen; INJECT 36 UNITS UNDER THE SKIN EVERY NIGHT AT BEDTIME  Dispense: 15 mL; Refill: 5    3. Mild major depression (H)  Refill Wellbutrin sent to the pharmacy.  - buPROPion (WELLBUTRIN XL) 150 MG 24 hr tablet; Take 1 tablet (150 mg total) by mouth daily.  Dispense: 90 tablet; Refill: 2    4. Polyarthralgia  Handicap parking permit filled out.  He will discuss  with his pain clinic regarding long-term disability.    5. HTN -discussed importance of taking prescribed medication.    Phone call duration:  25 minutes    Rocío Brandt MD

## 2021-06-07 NOTE — TELEPHONE ENCOUNTER
"Refill sent - please help him make a \"phone visit\" in the next week or so to discuss meds - thanks    BB  "

## 2021-06-07 NOTE — TELEPHONE ENCOUNTER
Patient lost handicapped parking permit that I filled out last week - do we have a copy that we could resend?    BB

## 2021-06-08 NOTE — TELEPHONE ENCOUNTER
Diabetes Education Scheduling Outreach #2:    Sending MyChart message for second attempt.    Jen Ortiz  [facility]  Diabetes and Nutrition Scheduling

## 2021-06-09 NOTE — TELEPHONE ENCOUNTER
Order for quad cane pended for you to sign.  I will then fax order to [facility] per pt request.

## 2021-06-09 NOTE — TELEPHONE ENCOUNTER
RN cannot approve Refill Request    RN can NOT refill this medication Protocol failed and NO refill given. Last office visit: 11/26/2019 Rocío Brandt MD Last Physical: 1/9/2019 Last MTM visit: Visit date not found Last visit same specialty: 11/26/2019 Rocío Brandt MD.  Next visit within 3 mo: Visit date not found  Next physical within 3 mo: Visit date not found      Lisa Erwin, [facility] Connection Triage/Med Refill 7/10/2020    Requested Prescriptions   Pending Prescriptions Disp Refills     metFORMIN (GLUCOPHAGE) 1000 MG tablet [Pharmacy Med Name: METFORMIN HCL 1000MG TABS] 180 tablet 1     Sig: TAKE ONE TABLET BY MOUTH TWICE A DAY WITH MEALS       Metformin Refill Protocol Failed - 7/8/2020  6:59 PM        Failed - Visit with PCP or prescribing provider visit in last 6 months or next 3 months     Last office visit with prescriber/PCP: Visit date not found OR same dept: Visit date not found OR same specialty: 11/26/2019 Rocío Brandt MD Last physical: Visit date not found Last MTM visit: Visit date not found         Next appt within 3 mo: Visit date not found  Next physical within 3 mo: Visit date not found  Prescriber OR PCP: Rocío Brandt MD  Last diagnosis associated with med order: 1. Long-term insulin use in type 2 diabetes  - metFORMIN (GLUCOPHAGE) 1000 MG tablet [Pharmacy Med Name: METFORMIN HCL 1000MG TABS]; TAKE ONE TABLET BY MOUTH TWICE A DAY WITH MEALS  Dispense: 180 tablet; Refill: 1     If protocol passes may refill for 12 months if within 3 months of last provider visit (or a total of 15 months).           Failed - A1C in last 6 months     Hemoglobin A1c   Date Value Ref Range Status   11/26/2019 8.3 (H) 3.5 - 6.0 % Final               Failed - Microalbumin in last year      No results found for: MICROALBUR               Passed - Blood pressure in last 12 months     BP Readings from Last 1 Encounters:   11/26/19 (!) 188/110             Passed - LFT or AST or ALT  in last 12 months     Albumin   Date Value Ref Range Status   11/26/2019 4.3 3.5 - 5.0 g/dL Final     Bilirubin, Total   Date Value Ref Range Status   11/26/2019 0.4 0.0 - 1.0 mg/dL Final     Alkaline Phosphatase   Date Value Ref Range Status   11/26/2019 112 45 - 120 U/L Final     AST   Date Value Ref Range Status   11/26/2019 36 0 - 40 U/L Final     ALT   Date Value Ref Range Status   11/26/2019 58 (H) 0 - 45 U/L Final     Protein, Total   Date Value Ref Range Status   11/26/2019 7.8 6.0 - 8.0 g/dL Final                Passed - GFR or Serum Creatinine in last 6 months     GFR MDRD Non Af Amer   Date Value Ref Range Status   11/26/2019 >60 >60 mL/min/1.73m2 Final     GFR MDRD Af Amer   Date Value Ref Range Status   11/26/2019 >60 >60 mL/min/1.73m2 Final

## 2021-06-09 NOTE — TELEPHONE ENCOUNTER
Called pt and LM that BB filled out the forms again, but we are needing info on where to send these so he can receive them, as pt needs to sign them before they can be sent over. On pt's return call or return [employer] message, please gather info and document call was received. Thank you.

## 2021-06-09 NOTE — TELEPHONE ENCOUNTER
Mycharted pt and informed that forms will be at the  for p/u here at the [facility]. Closing encounter.

## 2021-06-09 NOTE — TELEPHONE ENCOUNTER
RN cannot approve Refill Request    RN can NOT refill this medication PCP messaged that patient is overdue for Labs and Office Visit. Last office visit: 11/26/2019 Rocío Brandt MD Last Physical: 1/9/2019 Last MTM visit: Visit date not found Last visit same specialty: 11/26/2019 Rocío Brandt MD.  Next visit within 3 mo: Visit date not found  Next physical within 3 mo: Visit date not found      [facility] Connection Triage/Med Refill 6/20/2020    Requested Prescriptions   Pending Prescriptions Disp Refills     FREESTYLE YOKO 14 DAY SENSOR Kit [Pharmacy Med Name: FREESTYLE YOKO 14 DAY SENSO  MISC]  2     Sig: USE AS DIRECTED       Diabetic Supplies Refill Protocol Failed - 6/20/2020  9:56 AM        Failed - Visit with PCP or prescribing provider visit in last 6 months     Last office visit with prescriber/PCP: 11/26/2019 Rocío Brandt MD OR same dept: Visit date not found OR same specialty: 11/26/2019 Rocío Brandt MD  Last physical: 1/9/2019 Last MTM visit: Visit date not found   Next visit within 3 mo: Visit date not found  Next physical within 3 mo: Visit date not found  Prescriber OR PCP: Rocío Brandt MD  Last diagnosis associated with med order: 1. Type 2 diabetes mellitus with hyperglycemia, with long-term current use of insulin (H)  - FREESTYLE YOKO 14 DAY SENSOR Kit [Pharmacy Med Name: FREESTYLE YOKO 14 DAY SENSO  MISC]; USE AS DIRECTED; Refill: 2    If protocol passes may refill for 12 months if within 3 months of last provider visit (or a total of 15 months).             Failed - A1C in last 6 months     Hemoglobin A1c   Date Value Ref Range Status   11/26/2019 8.3 (H) 3.5 - 6.0 % Final

## 2021-06-09 NOTE — TELEPHONE ENCOUNTER
Medication Question or Clarification  Who is calling: Pharmacy  What medication are you calling about (include dose and sig)?:    Disp  Refills  Start  End     FREESTYLE YOKO 14 DAY SENSOR Kit  1 kit  2  6/20/2020      Sig: USE AS DIRECTED     Sent to pharmacy as: FreeStyle Yoko 14 Day Sensor kit (flash glucose sensor)     E-Prescribing Status: Receipt confirmed by pharmacy (6/20/2020  5:57 PM CDT)       Who prescribed the medication?: Rocío Brandt MD   What is your question/concern?: Caller stated need to clarify the QTY. Caller stated is has to say QTY 2 and change every 14 days. Caller stated to send a new prescription.  [facility]ed Pharmacy: [facility]  Okay to leave a detailed message?: Yes  [phone]

## 2021-06-09 NOTE — PROGRESS NOTES
"Obed Nickerson is a 55 y.o. male who is being evaluated via a billable video visit.      The patient has been notified of following:     \"This video visit will be conducted via a call between you and your physician/provider. We have found that certain health care needs can be provided without the need for an in-person physical exam.  This service lets us provide the care you need with a video conversation.  If a prescription is necessary we can send it directly to your pharmacy.  If lab work is needed we can place an order for that and you can then stop by our lab to have the test done at a later time.    Video visits are billed at different rates depending on your insurance coverage. Please reach out to your insurance provider with any questions.    If during the course of the call the physician/provider feels a video visit is not appropriate, you will not be charged for this service.\"    Patient has given verbal consent to a Video visit? Yes  How would you like to obtain your AVS? AVS Preference: [employer].  Patient would like the video invitation sent by: Text to cell phone: [phone]  Will anyone else be joining your video visit? No        Video Start Time: 2:24 PM    -------------------------    Assessment/Plan:    Obed Nickerson is a 55 y.o. male presenting for:    1. Benign essential hypertension  Patient has not been taking his blood pressure at home.  Encouraged him to begin doing this given that blood pressures in the clinic have been fairly elevated.    2. Type 2 diabetes mellitus with hyperglycemia, with long-term current use of insulin (H)  Continue to use Lantus 40 units at night as well as Trulicity and metformin.  Continue to monitor blood sugars and send me an email with an update in 2 weeks.  It does seem as though they are coming down nicely although he is having some symptoms when blood sugars are less than 130.  I suspect that he needs to lower than a bit slower.  Encouraged to shoot for " 140 for the next week or 2 and then decrease from there.    3. Mild major depression (H)  Refill Xanax sent to the pharmacy.  He uses this at night about twice weekly.  - ALPRAZolam (XANAX) 0.5 MG tablet; TAKE ONE TABLET BY MOUTH ONCE DAILY AS NEEDED FOR SLEEP  Dispense: 15 tablet; Refill: 0    4.  Unsteady gait  Gait is unsteady due to arthritis in right leg and weakness.  I did offer physical therapy but he has done this in the past and does not think that it would be beneficial.  He is hopeful to get a cane to help stabilize himself which is reasonable.  We will contact his insurance and let me know where he would like me to send this.  If he changes his mind and would like to see [facility] I am happy to place a referral for there as well.    This patient's life would be improved in a meaningful way with a cane to help with balance.  He is a good candidate for this as he would be able to use it as intended and it would decrease risk for falls.    Medications Discontinued During This Encounter   Medication Reason     ALPRAZolam (XANAX) 0.5 MG tablet Reorder           Chief Complaint:  Chief Complaint   Patient presents with     Gait Problem     Noticed it for a while and states it is getting worse     Medication Refill     Xanax       Subjective:   Obed Nickerson is a pleasant 55 y.o. male being evaluated via video visit today for the following concern/s:     1.  Hypertension: Patient is a past medical history significant for hypertension.  He is currently on lisinopril hydrochlorothiazide and metoprolol.  Blood pressures have historically been elevated but he often does not take his medication prior to coming to the clinic so difficult to titrate medication.  Encouraged him to begin taking medications at home.  No chest pain or shortness of breath.    2.  Diabetes: Patient has a past medical history significant for diabetes.  Most recently blood sugars have been running extremely high disease please  "see last progress note).  We increase his insulin to 40 units at night and he is using his metformin and Trulicity as well.  He states that the other day his blood sugar was 128 he felt \"like fire aunts were all over me\".  He states he feels better when his blood sugars are in the 160s to 180s.    3.  Depression: Patient has a history of depression and some anxiety.  He is on Cymbalta which he thinks is helpful.  He is also on Wellbutrin.  He takes Xanax at night on occasion.  He estimates he uses this once or twice weekly.  He needs a refill of his Xanax.    4.  Unsteady gait: Patient notes that his right leg has a history of an injury as well as arthritis.  He states that it is a week and often will somewhat give out on him.  He is wondering about getting a cane as he has had a few falls recently.  He states that he is able to walk about but he does feel unsteady.  Particularly when he is out and about he would like a cane to help reduce the risk of falling.      12 point review of systems completed and negative except for what has been described above    Social History     Tobacco Use   Smoking Status Never Smoker   Smokeless Tobacco Never Used       Current Outpatient Medications   Medication Sig Note     alcohol swabs (ALCOHOL PREP PADS) PadM Use prior to blood sugar testing and insulin.      ALPRAZolam (XANAX) 0.5 MG tablet TAKE ONE TABLET BY MOUTH ONCE DAILY AS NEEDED FOR SLEEP      blood glucose test strips Dispense item covered by pt ins. E11.65 NIDDM type II, uncontrolled - Test 3 times/day, Reason: High A1C 9/24/2018: [facility]     blood-glucose sensor Jelena Please dispense Dexicom G6 system - use as directed      buPROPion (WELLBUTRIN XL) 150 MG 24 hr tablet Take 1 tablet (150 mg total) by mouth daily.      dulaglutide (TRULICITY) 1.5 mg/0.5 mL PnIj Inject 1.5 mg under the skin every 7 days.      DULoxetine (CYMBALTA) 60 MG capsule TAKE TWO CAPSULES BY " MOUTH EVERY DAY      flash glucose sensor (FREESTYLE YOKO 14 DAY SENSOR) Kit Use 1 Units As Directed every 14 (fourteen) days. Change every 14 days      insulin glargine (LANTUS SOLOSTAR U-100 INSULIN) 100 unit/mL (3 mL) pen INJECT 36 UNITS UNDER THE SKIN EVERY NIGHT AT BEDTIME      lisinopril-hydrochlorothiazide (ZESTORETIC) 20-25 mg per tablet Take 2 tablets by mouth daily.      metFORMIN (GLUCOPHAGE) 1000 MG tablet Take 1 tablet (1,000 mg total) by mouth 2 (two) times a day with meals.      metoprolol succinate (TOPROL XL) 25 MG Take 1 tablet (25 mg total) by mouth daily.      naloxone (NARCAN) 4 mg/actuation nasal spray SPRAY INTO NOSTRIL AND CALL 911 FOR OVERDOSE. MAY REPEAT ONCE 9/24/2018: [facility]     oxyCODONE (ROXICODONE) 15 MG immediate release tablet 10 mg. Taking 5 times a day. 9/24/2018: Received from: [facility]     rizatriptan (MAXALT) 10 MG tablet Take by mouth. 9/24/2018: Received from: [employer] Received Sig: TAKE 1 TABLET BY MOUTH AS NEEDED FOR  HEADACHE; MAY REPEAT EVERY 2 HOURS AS NEEDED  MAX DOSE: 30MG PER 24HRS.     tiZANidine (ZANAFLEX) 4 MG tablet  11/12/2016: Received from: External Pharmacy         Objective:  No flowsheet data found.        There is no height or weight on file to calculate BMI.    General: No acute distress  Psych: Appropriate affect  HEENT: moist mucous membranes  Pulmonary: Breathing comfortably, speaking in complete sentences  Extremities: warm and well perfused with no edema  Skin: warm and dry with no rash         This note has been dictated and transcribed using voice recognition software.   Any errors in transcription are unintentional and inherent to the software.        Video-Visit Details    Type of service:  Video Visit    Video End Time (time video stopped): 2:36 PM  Originating Location (pt. Location): Home    Distant Location (provider  location):  [facility] MEDICINE/OB     Platform used for Video Visit: Seth Brandt MD

## 2021-06-09 NOTE — TELEPHONE ENCOUNTER
Dr. Brandt-  See pt's mychart message and advise.   Pt did a VV on 6/23/20.  Do you want him to set up a VV?

## 2021-06-09 NOTE — TELEPHONE ENCOUNTER
Please send new rx with change listed below   REVIEW OF SYSTEMS: GENERAL:  Patient denies fevers, chills, night sweats, excessive fatigue, anorexia, weight loss  ALLERGIC/IMMUNOLOGIC: no reactions  EYES:  Patient denies significant visual difficulties, diplopia  ENT/MOUTH:  Patient denies problems with hearing, sore throat, mucositis or mouth sores, sinus drainage  ENDOCRINE:  Patient denies diabetes, thyroid disease, hormone replacement, hot flashes or night sweats  HEMATOLOGIC/LYMPHATIC: Patient denies easy bruising or bleeding, tender or palpable lymph nodes  BREASTS: Patient denies abnormal masses of breast, nipple discharge or pain, nipple/areolar skin changes  RESPIRATORY:  Patient denies shortness of breath, dyspnea on exertion, chest pain, cough, hemoptysis  CARDIOVASCULAR:  Patient denies anginal chest pain, palpitations, orthopnea, peripheral edema   GASTROINTESTINAL: Patient denies nausea, vomiting, diarrhea, GI bleeding, constipation, change in bowel habits, heartburn, early satiety   (FEMALE):  Patient denies hematuria, hesitancy, incontinence, vaginal bleeding, discharge, other problems with urination  MUSCULOSKELETAL:  Patient denies joint pain, swelling or redness, decreased range of motion, but complains of: Pain in thighs and legs. Hurts worst while sitting.   SKIN:  Patient denies chronic rashes, inflammation, ulcerations or skin changes  NEUROLOGIC:  Patient denies headache, blurred vision, areas of focal weakness or numbness, abnormal gait, sensory problems  PSYCHIATRIC: Patient denies insomnia, depression, anxiety  Primary Care Physician: Lien Knutson NP  Advanced directives discussed: Patient doesn't have an Advance Directives document, and is not interested in additional information

## 2021-06-09 NOTE — PROGRESS NOTES
"Obed Nickerson is a 55 y.o. male who is being evaluated via a billable video visit.      The patient has been notified of following:     \"This video visit will be conducted via a call between you and your physician/provider. We have found that certain health care needs can be provided without the need for an in-person physical exam.  This service lets us provide the care you need with a video conversation.  If a prescription is necessary we can send it directly to your pharmacy.  If lab work is needed we can place an order for that and you can then stop by our lab to have the test done at a later time.    Video visits are billed at different rates depending on your insurance coverage. Please reach out to your insurance provider with any questions.    If during the course of the call the physician/provider feels a video visit is not appropriate, you will not be charged for this service.\"    Patient has given verbal consent to a Video visit? Yes    Will anyone else be joining your video visit? No        Video Start Time: 1100    -------------------------    Assessment/Plan:    Obed Nickerson is a 55 y.o. male presenting for:    1. Type 2 diabetes mellitus with hyperglycemia, with long-term current use of insulin (H)  I am certainly fairly worried about this patient given that his blood sugars have been so high.  I do wonder if he has been having mild bouts of ketoacidosis given the vomiting.  Referral to diabetic educator was placed.  He will increase his Lantus to 40 units at night and contact me in 3 days to let me know his blood sugars.  If they are still elevated will increase to 44 units.  If they continue to be elevated thereafter we should consider mealtime insulin.    Discussed that if he has episodes where he has extreme vomiting and high blood sugars he should present to the emergency department for further evaluation.  He needs to discuss with the diabetic educator and dietary modifications as he does " "not eat a diabetic diet.  Encouraged him to cut out any sugar sweetened beverages including juices.  - Glycosylated Hemoglobin A1c; Future  - Comprehensive Metabolic Panel; Future  - HM2(CBC w/o Differential); Future  - Ambulatory referral to Diabetes Education Program (CDE)    This patient needs to be seen in the clinic in 1 month for a follow-up and for blood pressure check.    There are no discontinued medications.        Chief Complaint:  Chief Complaint   Patient presents with     glucose       Subjective:   Obed Nickerson is a pleasant 55 y.o. male being evaluated via video visit today for the following concern/s:     Hyperglycemia: Patient has a past medical history significant for diabetes.  He is currently on Trulicity, maximum dose metformin as well as 36 units of Lantus at night.  He states that he is generally fairly good at taking his medications.  He does not eat a diabetic diet and in fact states that he likes to drink a lot of juice.  He does not watch what he eats.  Recently he has been having episodes of vomiting.  The last time he had this episode his blood sugar was in the 600s.  He does not check his blood sugar regularly.    Lab Results   Component Value Date    HGBA1C 8.3 (H) 11/26/2019     His last A1c was 8.3 which is elevated but better than it had been previously.  He states that with the quarantine he has been eating a lot more foods that he knows he \"should not be eating.\"  His significant other (who has some medical knowledge) worries that he is going into ketoacidosis when he is having these vomiting episodes.    He has a past medical history significant for uncontrolled hypertension as well.  Unfortunately when I see him he is often not taking his medications and it is difficult to titrate the medications due to that.      12 point review of systems completed and negative except for what has been described above    Social History     Tobacco Use   Smoking Status Never Smoker "   Smokeless Tobacco Never Used       Current Outpatient Medications   Medication Sig Note     alcohol swabs (ALCOHOL PREP PADS) PadM Use prior to blood sugar testing and insulin.      ALPRAZolam (XANAX) 0.5 MG tablet TAKE ONE TABLET BY MOUTH ONCE DAILY AS NEEDED FOR SLEEP      blood glucose test strips Dispense item covered by pt ins. E11.65 NIDDM type II, uncontrolled - Test 3 times/day, Reason: High A1C 9/24/2018: [facility]     blood-glucose sensor Jelena Please dispense Dexicom G6 system - use as directed      buPROPion (WELLBUTRIN XL) 150 MG 24 hr tablet Take 1 tablet (150 mg total) by mouth daily.      dulaglutide (TRULICITY) 1.5 mg/0.5 mL PnIj Inject 1.5 mg under the skin every 7 days.      DULoxetine (CYMBALTA) 60 MG capsule TAKE TWO CAPSULES BY MOUTH EVERY DAY      insulin glargine (LANTUS SOLOSTAR U-100 INSULIN) 100 unit/mL (3 mL) pen INJECT 36 UNITS UNDER THE SKIN EVERY NIGHT AT BEDTIME      lisinopril-hydrochlorothiazide (ZESTORETIC) 20-25 mg per tablet Take 2 tablets by mouth daily.      metFORMIN (GLUCOPHAGE) 1000 MG tablet Take 1 tablet (1,000 mg total) by mouth 2 (two) times a day with meals.      metoprolol succinate (TOPROL XL) 25 MG Take 1 tablet (25 mg total) by mouth daily.      naloxone (NARCAN) 4 mg/actuation nasal spray SPRAY INTO NOSTRIL AND CALL 911 FOR OVERDOSE. MAY REPEAT ONCE 9/24/2018: [facility]     oxyCODONE (ROXICODONE) 15 MG immediate release tablet 10 mg. Taking 5 times a day. 9/24/2018: [facility]     rizatriptan (MAXALT) 10 MG tablet Take by mouth. 9/24/2018: [facility] Received Sig: TAKE 1 TABLET BY MOUTH AS NEEDED FOR  HEADACHE; MAY REPEAT EVERY 2 HOURS AS NEEDED  MAX DOSE: 30MG PER 24HRS.     tiZANidine (ZANAFLEX) 4 MG tablet  11/12/2016: Received from: External Pharmacy     flash  glucose sensor (FREESTYLE YOKO 14 DAY SENSOR) Kit Use 1 Units As Directed every 14 (fourteen) days. Change every 14 days          Objective:  There were no vitals filed for this visit.    There is no height or weight on file to calculate BMI.    General: No acute distress  Psych: Appropriate affect  HEENT: moist mucous membranes  Pulmonary: Breathing comfortably, speaking in complete sentences  Extremities: warm and well perfused with no edema  Skin: warm and dry with no rash         This note has been dictated and transcribed using voice recognition software.   Any errors in transcription are unintentional and inherent to the software.        Video-Visit Details    Type of service:  Video Visit    Video End Time (time video stopped): 1127  Originating Location (pt. Location): Home    Distant Location (provider location):  [facility]/OB     Platform used for Video Visit: [employer]      Rocío Brandt MD

## 2021-06-09 NOTE — TELEPHONE ENCOUNTER
Mycharted pt and informed that forms will be at the  for p/u here at the [facility]. Closing encounter.

## 2021-06-10 DIAGNOSIS — F41.9 ANXIETY: ICD-10-CM

## 2021-06-10 RX ORDER — ALPRAZOLAM 0.5 MG
TABLET ORAL
Qty: 15 TABLET | Refills: 3 | Status: SHIPPED | OUTPATIENT
Start: 2021-06-10 | End: 2021-08-15

## 2021-06-10 NOTE — TELEPHONE ENCOUNTER
Controlled Substance Refill Request  Medication Name:   Requested Prescriptions     Pending Prescriptions Disp Refills     ALPRAZolam (XANAX) 0.5 MG tablet [Pharmacy Med Name: ALPRAZOLAM 0.5MG TABS] 15 tablet 0     Sig: TAKE ONE TABLET BY MOUTH ONCE DAILY AS NEEDED FOR SLEEP     Date Last Fill: 7/7/20  [facility]  Submit electronically to pharmacy  Controlled Substance Agreement on file:   Encounter-Level CSA Scan Date:    There are no encounter-level csa scan date.        Last office visit:  7/7/20

## 2021-06-11 NOTE — TELEPHONE ENCOUNTER
RN cannot approve Refill Request    RN can NOT refill this medication Protocol failed and NO refill given. Last office visit: 11/26/2019 Rocío Brandt MD Last Physical: 1/9/2019 Last MTM visit: Visit date not found Last visit same specialty: 11/26/2019 Rocío Brandt MD.  Next visit within 3 mo: Visit date not found  Next physical within 3 mo: Visit date not found      Lisa Erwin, [facility] Triage/Med Refill 10/1/2020    Requested Prescriptions   Pending Prescriptions Disp Refills     FREESTYLE YOKO 14 DAY SENSOR Kit [Pharmacy Med Name: FREESTYLE YOKO 14 DAY SENSO  MISC]  2     Sig: USE AS DIRECTED       Diabetic Supplies Refill Protocol Failed - 9/28/2020 10:58 AM        Failed - A1C in last 6 months     Hemoglobin A1c   Date Value Ref Range Status   11/26/2019 8.3 (H) 3.5 - 6.0 % Final               Passed - Visit with PCP or prescribing provider visit in last 6 months     Last office visit with prescriber/PCP: 11/26/2019 Rocío Brandt MD OR same dept: Visit date not found OR same specialty: 11/26/2019 Rocío Brandt MD  Last physical: 1/9/2019 Last MTM visit: Visit date not found   Next visit within 3 mo: Visit date not found  Next physical within 3 mo: Visit date not found  Prescriber OR PCP: Rocío Brandt MD  Last diagnosis associated with med order: 1. Type 2 diabetes mellitus with hyperglycemia, with long-term current use of insulin (H)  - FREESTYLE YOKO 14 DAY SENSOR Kit [Pharmacy Med Name: FREESTYLE YOKO 14 DAY SENSO  MISC]; USE AS DIRECTED; Refill: 2    If protocol passes may refill for 12 months if within 3 months of last provider visit (or a total of 15 months).

## 2021-06-12 NOTE — TELEPHONE ENCOUNTER
Left message to call back for: Mati  Information to relay to patient:  Please notify patient of Dr. Brandt message and help schedule. Thank you.

## 2021-06-12 NOTE — TELEPHONE ENCOUNTER
This is something that I could certainly talk to him about either with a visit in person or a virtual visit sometime this week.  If his blood sugars will not go down he needs to proceed to the emergency department for further evaluation.    ARGELIA

## 2021-06-12 NOTE — TELEPHONE ENCOUNTER
Controlled Substance Refill Request  Medication Name:   Requested Prescriptions     Pending Prescriptions Disp Refills     ALPRAZolam (XANAX) 0.5 MG tablet 15 tablet 0     Date Last Fill: 8/18/20  Requested Pharmacy: [facility]  Submit electronically to pharmacy  Controlled Substance Agreement on file:   Encounter-Level CSA Scan Date:    There are no encounter-level csa scan date.        Last office visit:  VV 7/7/20

## 2021-06-13 NOTE — TELEPHONE ENCOUNTER
I sent a 1 month refill of this patient's medication.  They will need to be seen prior to further refills being prescribed.     They should call [phone] to get scheduled at the [facility] - we should see him F2F - due for labs.       Thanks!    BB

## 2021-06-13 NOTE — TELEPHONE ENCOUNTER
Although patient has an appointment this summer he needs another virtual visit due to uncontrolled diabetes.    If not able to be seen this week and /he can call [phone] to get scheduled at the [facility].       If patient is going to make an appointment with me at the [facility] I can send a 30-day supply of medication if they would like.  If they are interested in this please route this message back to me.    Thanks!    BB

## 2021-06-13 NOTE — TELEPHONE ENCOUNTER
RN cannot approve Refill Request    RN can NOT refill this medication PCP messaged that patient is overdue for Labs, and BP check. Last office visit: 11/26/2019 Rocío Brandt MD Last Physical: 1/9/2019 Last MTM visit: Visit date not found Last visit same specialty: 11/26/2019 Rocío Brandt MD.  Next visit within 3 mo: Visit date not found  Next physical within 3 mo: Visit date not found      Janes Lr, [facility] Triage/Med Refill 11/26/2020    Requested Prescriptions   Pending Prescriptions Disp Refills     LANTUS SOLOSTAR U-100 INSULIN 100 unit/mL (3 mL) pen [Pharmacy Med Name: LANTUS SOLOSTAR 100UNIT/ML SOPN] 15 mL 5     Sig: INJECT 36 UNITS UNDER THE SKIN EVERY NIGHT AT BEDTIME       Insulin/GLP-1 Refill Protocol Failed - 11/26/2020 11:16 AM        Failed - A1C in last 6 months     Hemoglobin A1c   Date Value Ref Range Status   11/26/2019 8.3 (H) 3.5 - 6.0 % Final               Failed - Microalbumin in last year     No results found for: MICROALBUR               Failed - Blood pressure in last year     BP Readings from Last 1 Encounters:   11/26/19 (!) 188/110             Failed - Creatinine done in last year     Creatinine   Date Value Ref Range Status   11/26/2019 0.95 0.70 - 1.30 mg/dL Final             Passed - Visit with PCP or prescribing provider visit in last 6 months     Last office visit with prescriber/PCP: Visit date not found OR same dept: Visit date not found OR same specialty: 11/26/2019 Rocío Brandt MD Last physical: Visit date not found Last MTM visit: Visit date not found     Next appt within 3 mo: Visit date not found  Next physical within 3 mo: Visit date not found  Prescriber OR PCP: Rocío Brandt MD  Last diagnosis associated with med order: 1. Long-term insulin use in type 2 diabetes  - LANTUS SOLOSTAR U-100 INSULIN 100 unit/mL (3 mL) pen [Pharmacy Med Name: LANTUS SOLOSTAR 100UNIT/ML SOPN]; INJECT 36 UNITS UNDER THE SKIN EVERY NIGHT AT  BEDTIME  Dispense: 15 mL; Refill: 5    If protocol passes may refill for 6 months if within 3 months of last provider visit (or a total of 9 months).

## 2021-06-13 NOTE — TELEPHONE ENCOUNTER
[facility] to schedule a med check appointment, Obed can also call [facility] [place] to schedule with her at [phone]

## 2021-06-13 NOTE — TELEPHONE ENCOUNTER
Please help this patient make a F2F physical and route back to me for limited refill if needed    Thanks!    BB

## 2021-06-13 NOTE — TELEPHONE ENCOUNTER
RN cannot approve Refill Request    RN can NOT refill this medication Protocol failed and NO refill given. Last office visit: 11/26/2019 Rocío Brandt MD Last Physical: 1/9/2019 Last MTM visit: Visit date not found Last visit same specialty: 11/26/2019 Rocío Brandt MD.  Next visit within 3 mo: Visit date not found  Next physical within 3 mo: Visit date not found      Lisa Erwin, [facility] Connection Triage/Med Refill 12/3/2020    Requested Prescriptions   Pending Prescriptions Disp Refills     TRULICITY 1.5 mg/0.5 mL PnIj [Pharmacy Med Name: TRULICITY 1.5MG/0.5ML SOPN]  3     Sig: INJECT THE CONTENTS OF ONE PEN UNDER THE SKIN EVERY 7 DAYS       Insulin/GLP-1 Refill Protocol Failed - 12/1/2020  8:59 PM        Failed - A1C in last 6 months     Hemoglobin A1c   Date Value Ref Range Status   11/26/2019 8.3 (H) 3.5 - 6.0 % Final               Failed - Microalbumin in last year     No results found for: MICROALBUR               Failed - Blood pressure in last year     BP Readings from Last 1 Encounters:   11/26/19 (!) 188/110             Failed - Creatinine done in last year     Creatinine   Date Value Ref Range Status   11/26/2019 0.95 0.70 - 1.30 mg/dL Final             Passed - Visit with PCP or prescribing provider visit in last 6 months     Last office visit with prescriber/PCP: Visit date not found OR same dept: Visit date not found OR same specialty: 11/26/2019 Rocío Brandt MD Last physical: Visit date not found Last MTM visit: Visit date not found     Next appt within 3 mo: Visit date not found  Next physical within 3 mo: Visit date not found  Prescriber OR PCP: Rocío rBandt MD  Last diagnosis associated with med order: 1. Type 2 diabetes mellitus with hyperglycemia, with long-term current use of insulin (H)  - TRULICITY 1.5 mg/0.5 mL PnIj [Pharmacy Med Name: TRULICITY 1.5MG/0.5ML SOPN]; INJECT THE CONTENTS OF ONE PEN UNDER THE SKIN EVERY 7 DAYS; Refill: 3    If protocol  passes may refill for 6 months if within 3 months of last provider visit (or a total of 9 months).

## 2021-06-14 NOTE — TELEPHONE ENCOUNTER
RN cannot approve Refill Request    RN can NOT refill this medication Protocol failed and NO refill given. Last office visit: 11/26/2019 Rocío Brandt MD Last Physical: 1/9/2019 Last MTM visit: Visit date not found Last visit same specialty: 11/26/2019 Rocío Brandt MD.  Next visit within 3 mo: Visit date not found  Next physical within 3 mo: Visit date not found      iLsa Erwin, [facility] Connection Triage/Med Refill 1/21/2021    Requested Prescriptions   Pending Prescriptions Disp Refills     LANTUS SOLOSTAR U-100 INSULIN 100 unit/mL (3 mL) pen [Pharmacy Med Name: LANTUS SOLOSTAR 100UNIT/ML SOPN] 15 mL 0     Sig: INJECT 36 UNITS UNDER THE SKIN EVERY NIGHT AT BEDTIME       Insulin/GLP-1 Refill Protocol Failed - 1/20/2021  8:47 PM        Failed - Visit with PCP or prescribing provider visit in last 6 months     Last office visit with prescriber/PCP: Visit date not found OR same dept: Visit date not found OR same specialty: 11/26/2019 Rocío Brandt MD Last physical: Visit date not found Last MTM visit: Visit date not found     Next appt within 3 mo: Visit date not found  Next physical within 3 mo: Visit date not found  Prescriber OR PCP: Rocío Brandt MD  Last diagnosis associated with med order: 1. Long-term insulin use in type 2 diabetes  - LANTUS SOLOSTAR U-100 INSULIN 100 unit/mL (3 mL) pen [Pharmacy Med Name: LANTUS SOLOSTAR 100UNIT/ML SOPN]; INJECT 36 UNITS UNDER THE SKIN EVERY NIGHT AT BEDTIME  Dispense: 15 mL; Refill: 0    If protocol passes may refill for 6 months if within 3 months of last provider visit (or a total of 9 months).              Failed - A1C in last 6 months     Hemoglobin A1c   Date Value Ref Range Status   11/26/2019 8.3 (H) 3.5 - 6.0 % Final               Failed - Microalbumin in last year     No results found for: MICROALBUR               Failed - Blood pressure in last year     BP Readings from Last 1 Encounters:   11/26/19 (!) 188/110              Failed - Creatinine done in last year     Creatinine   Date Value Ref Range Status   11/26/2019 0.95 0.70 - 1.30 mg/dL Final

## 2021-06-14 NOTE — TELEPHONE ENCOUNTER
RN cannot approve Refill Request    RN can NOT refill this medication Protocol failed and NO refill given. Last office visit: 11/26/2019 Rocío Brandt MD Last Physical: 1/9/2019 Last MTM visit: Visit date not found Last visit same specialty: 11/26/2019 Rocío Brandt MD.  Next visit within 3 mo: Visit date not found  Next physical within 3 mo: Visit date not found      Lisa Erwin, [facility] Connection Triage/Med Refill 1/22/2021    Requested Prescriptions   Pending Prescriptions Disp Refills     LANTUS SOLOSTAR U-100 INSULIN 100 unit/mL (3 mL) pen [Pharmacy Med Name: LANTUS SOLOSTAR 100UNIT/ML SOPN] 15 mL 0     Sig: INJECT 36 UNITS UNDER THE SKIN EVERY NIGHT AT BEDTIME       Insulin/GLP-1 Refill Protocol Failed - 1/22/2021 11:49 AM        Failed - Visit with PCP or prescribing provider visit in last 6 months     Last office visit with prescriber/PCP: Visit date not found OR same dept: Visit date not found OR same specialty: 11/26/2019 Rocío Brandt MD Last physical: Visit date not found Last MTM visit: Visit date not found     Next appt within 3 mo: Visit date not found  Next physical within 3 mo: Visit date not found  Prescriber OR PCP: Rocío Brandt MD  Last diagnosis associated with med order: 1. Long-term insulin use in type 2 diabetes  - LANTUS SOLOSTAR U-100 INSULIN 100 unit/mL (3 mL) pen [Pharmacy Med Name: LANTUS SOLOSTAR 100UNIT/ML SOPN]; INJECT 36 UNITS UNDER THE SKIN EVERY NIGHT AT BEDTIME  Dispense: 15 mL; Refill: 0    If protocol passes may refill for 6 months if within 3 months of last provider visit (or a total of 9 months).              Failed - A1C in last 6 months     Hemoglobin A1c   Date Value Ref Range Status   11/26/2019 8.3 (H) 3.5 - 6.0 % Final               Failed - Microalbumin in last year     No results found for: MICROALBUR               Failed - Blood pressure in last year     BP Readings from Last 1 Encounters:   11/26/19 (!) 188/110              Failed - Creatinine done in last year     Creatinine   Date Value Ref Range Status   11/26/2019 0.95 0.70 - 1.30 mg/dL Final

## 2021-06-16 NOTE — TELEPHONE ENCOUNTER
Refill Approved    Rx renewed per Medication Renewal Policy. Medication was last renewed on 12/30/2019.  Last office visit was 07/07/2020 with PCP.    Taniya Thorpe, [facility] Triage/Med Refill 3/18/2021     Requested Prescriptions   Pending Prescriptions Disp Refills     DULoxetine (CYMBALTA) 60 MG capsule [Pharmacy Med Name: DULOXETINE HCL 60MG CPEP] 180 capsule 3     Sig: TAKE TWO CAPSULES BY MOUTH ONCE DAILY       Tricyclics/Misc Antidepressant/Antianxiety Meds Refill Protocol Passed - 3/16/2021 12:30 PM        Passed - PCP or prescribing provider visit in last year     Last office visit with prescriber/PCP: 11/26/2019 Rocío Brandt MD OR same dept: Visit date not found OR same specialty: 11/26/2019 Rocío Brandt MD  Last physical: 1/9/2019 Last MTM visit: Visit date not found   Next visit within 3 mo: Visit date not found  Next physical within 3 mo: Visit date not found  Prescriber OR PCP: Rocío Brandt MD  Last diagnosis associated with med order: 1. Mild major depression (H)  - DULoxetine (CYMBALTA) 60 MG capsule [Pharmacy Med Name: DULOXETINE HCL 60MG CPEP]; TAKE TWO CAPSULES BY MOUTH ONCE DAILY  Dispense: 180 capsule; Refill: 3    If protocol passes may refill for 12 months if within 3 months of last provider visit (or a total of 15 months).

## 2021-06-16 NOTE — TELEPHONE ENCOUNTER
RN cannot approve Refill Request    RN can NOT refill this medication historical medication requested. Last office visit: 11/26/2019 Rocío Brandt MD Last Physical: 1/9/2019 Last MTM visit: Visit date not found Last visit same specialty: 11/26/2019 Rocío Brandt MD.  Next visit within 3 mo: Visit date not found  Next physical within 3 mo: Visit date not found      Vishal Harris, [facility] Connection Triage/Med Refill 4/7/2021    Requested Prescriptions   Pending Prescriptions Disp Refills     hyoscyamine (LEVSIN/SL) 0.125 mg SL tablet 30 tablet 0     Sig: Place 1 tablet (0.125 mg total) under the tongue as needed.       GI Medications Refill Protocol Passed - 4/5/2021  3:12 PM        Passed - PCP or prescribing provider visit in last 12 or next 3 months.     Last office visit with prescriber/PCP: 11/26/2019 Rocío Brandt MD OR same dept: Visit date not found OR same specialty: 11/26/2019 Rocío Brandt MD  Last physical: 1/9/2019 Last MTM visit: Visit date not found   Next visit within 3 mo: Visit date not found  Next physical within 3 mo: Visit date not found  Prescriber OR PCP: Rocío Brandt MD  Last diagnosis associated with med order: There are no diagnoses linked to this encounter.  If protocol passes may refill for 12 months if within 3 months of last provider visit (or a total of 15 months).              Signed Prescriptions Disp Refills    hyoscyamine (LEVSIN/SL) 0.125 mg SL tablet       Sig: Place 0.125 mg under the tongue as needed.       There is no refill protocol information for this order

## 2021-06-19 NOTE — LETTER
Letter by Rocío Brandt MD at      Author: Rocío Brandt MD Service: -- Author Type: --    Filed:  Encounter Date: 7/1/2019 Status: (Other)         Obed Nickerson  [id number] [facility] [id number]             July 1, 2019        Dear Obed Nickerson :    Dr. Brandt was reviewing your chart and noticed that you are due for a blood pressure follow up.  Your last appointment addressing blood pressure was 4/18/2019.    To prevent further delays in your care and medication refills please schedule a blood pressure follow up through [employer], or contact the [facility] at [phone] and we can assist you in scheduling.    If you have transferred care and are no longer a patient of Dr. Brandt's please contact our office so we can update your chart.      Sincerely,  Your care team at [facility]

## 2021-06-20 NOTE — LETTER
Letter by Rocío Brandt MD at      Author: Rocío Brandt MD Service: -- Author Type: --    Filed:  Encounter Date: 3/2/2020 Status: (Other)         Obed Nickerson  [address] [place] [address]      2020      Dear Obed Nickerson,   : 1965      This letter is in regards to the appointment that you had scheduled on 3/2/2020 at the [facility] with Dr. Rocío Brandt.     The [facility] strives to see all patients in a timely manner and we need your help to achieve this.  The above-mentioned appointment was missed and we do not have record of a cancellation by you.  Whenever possible, we request appointment cancellations at least 72 hours in advance.  This time allows us to offer the appointment to another patient in need.      If you feel you have received this letter in error, or if you need to reschedule this appointment, please call our office so that we may update our records.      Sincerely,    [facility]

## 2021-06-20 NOTE — PROGRESS NOTES
Assessment/Plan:     Obed Nickerson is a 53 y.o. male presenting for:    1. Long-term insulin use in type 2 diabetes  He will continue using his basal clock 20 units at night as well as metformin 1000 mg twice daily.  I have asked him to take his blood sugars once daily either in the morning or an hour after meals and report back to me the numbers in 1-2 weeks.  We will see him back in 2 months for a complete physical examination at which point we can recheck his A1c.  - metFORMIN (GLUCOPHAGE) 1000 MG tablet; Take 1 tablet (1,000 mg total) by mouth 2 (two) times a day with meals.  Dispense: 180 tablet; Refill: 0  - insulin glargine (BASAGLAR KWIKPEN U-100 INSULIN) 100 unit/mL (3 mL) pen; Inject 20 Units under the skin at bedtime. Inject under the skin.  Dispense: 5 adj dose pen; Refill: 3    2. Mild major depression (H)  Refill Cymbalta sent to the pharmacy  - DULoxetine (CYMBALTA) 30 MG capsule; Take 2 capsules (60 mg total) by mouth daily.  Dispense: 180 capsule; Refill: 1    3. Chronic pain  He will continue with his pain clinic at [facility].  He will let me know if he would like a referral to the [facility] pain clinic.  I will review those records when available.  - DULoxetine (CYMBALTA) 30 MG capsule; Take 2 capsules (60 mg total) by mouth daily.  Dispense: 180 capsule; Refill: 1    4. Healthcare maintenance  - Tdap vaccine,  8yo or older,  IM  - Influenza, Seasonal,Quad Inj, 36+ MOS    5. Benign essential hypertension  Blood pressure is somewhat borderline today.  I would recommend that he continue taking his medication daily as he has not been as consistent with this as he could potentially be.  We could increase the hydrochlorothiazide and his lisinopril/hydrochlorothiazide if needed.      Medications Discontinued During This Encounter   Medication Reason     cloNIDine HCl (CATAPRES) 0.1 MG tablet      DULoxetine (CYMBALTA) 30 MG capsule Reorder     insulin glargine (BASAGLAR KWIKPEN U-100 INSULIN) 100  unit/mL (3 mL) pen Reorder         Chief Complaint:  Chief Complaint   Patient presents with     Establish Care         Subjective:     Obed Nickerson is a 53 y.o. male who is a new patient to our clinic presenting for establishment of care.    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  He enjoys his job.    He has a past medical history significant for diabetes and is currently on insulin 20 units at night.  He takes basal insulin.  He also takes metformin 1000 mg twice daily.  He states that he feels as though his blood sugars are under very good control.  About a year ago his A1c was 11.3 but about 1 month ago it was 7.6.  He states that he stopped drinking sugary beverages which is what seemed to help.    He has a past medical history significant for chronic pain and sees a pain clinic at [facility].  He takes morphine and high doses of oxycodone.  He is potentially wanting to transfer to the [facility] pain clinic as he would like to wean off of his oxycodone.    He has a has history of several back surgeries and neck surgeries.  He recently had his right rotator cuff replaced/ repaired.    Review of Systems -  12 point review of systems completed by patient and found to be negative except for what is stated above    Medications: reviewed -   Current Outpatient Prescriptions   Medication Sig Note     ALPRAZolam (XANAX) 0.5 MG tablet Take 0.5 mg by mouth. 11/12/2016: Received from: [facility]     aspirin 81 MG EC tablet Take 81 mg by mouth. 9/24/2018: Received from: [facility] Received Sig: Take 1 tablet by mouth once daily with a meal.     blood glucose test strips Dispense item covered by pt ins. E11.65 NIDDM type II, uncontrolled - Test 3 times/day, Reason: High A1C 9/24/2018: Received from: [facility]     DULoxetine (CYMBALTA) 30 MG capsule Take 2 capsules (60 mg total) by  mouth daily.      insulin glargine (BASAGLAR KWIKPEN U-100 INSULIN) 100 unit/mL (3 mL) pen Inject 20 Units under the skin at bedtime. Inject under the skin.      lisinopril-hydrochlorothiazide (PRINZIDE,ZESTORETIC) 20-12.5 mg per tablet  11/12/2016: Received from: External Pharmacy     morphine (MS CONTIN) 15 MG 12 hr tablet  9/24/2018: [facility]     naloxone (NARCAN) 4 mg/actuation nasal spray SPRAY INTO NOSTRIL AND CALL 911 FOR OVERDOSE. MAY REPEAT ONCE 9/24/2018: Received from: [facility]     oxyCODONE (ROXICODONE) 15 MG immediate release tablet  9/24/2018: Received from: [facility]     rizatriptan (MAXALT) 10 MG tablet Take by mouth. 9/24/2018: Received from: [facility] Received Sig: TAKE 1 TABLET BY MOUTH AS NEEDED FOR  HEADACHE; MAY REPEAT EVERY 2 HOURS AS NEEDED  MAX DOSE: 30MG PER 24HRS.     tiZANidine (ZANAFLEX) 4 MG tablet  11/12/2016: Received from: External Pharmacy     metFORMIN (GLUCOPHAGE) 1000 MG tablet Take 1 tablet (1,000 mg total) by mouth 2 (two) times a day with meals.        Past medical history: reviewed -   Past Medical History:   Diagnosis Date     Chronic neck pain      Hyperlipidemia      Hypertension        Past surgical history: reviewed -   Past Surgical History:   Procedure Laterality Date     APPENDECTOMY       BACK SURGERY       Multiple surgeries to right lower leg       NECK SURGERY       rotator cuff surgery, right         Family history: reviewed -   Family History   Problem Relation Age of Onset     Heart disease Mother      Diabetes Father      Stroke Father      Diabetes Sister      Hypertension Sister      Diabetes Brother      Hypertension Brother        Social history: reviewed -   Social History     Social History     Marital status:      Spouse name: N/A     Number of children: N/A     Years of education: N/A  "    Social History Main Topics     Smoking status: Never Smoker     Smokeless tobacco: Never Used     Alcohol use None     Drug use: None     Sexual activity: Not Asked     Other Topics Concern     None     Social History Narrative       Objective:  Vitals:    09/24/18 1305 09/24/18 1340   BP: 144/80 138/84   Pulse: 76    Resp: 16    Temp: 97.9  F (36.6  C)    TempSrc: Oral    Weight: (!) 250 lb (113.4 kg)    Height: 5' 6\" (1.676 m)          Vital signs reviewed and stable  General: No acute distress  Psych: Appropriate affect  HEENT: moist mucous membrane  Lymph: no cervical or supraclavicular lymphadenopathy  Cardiovascular: regular rate and rhythm with no murmur  Pulmonary: clear to auscultation bilaterally with no wheeze  Abdomen: soft, non tender, non distended with normo-active bowel sounds  Extremities: warm and well perfused with no edema  Skin: warm and dry with no rash          "

## 2021-06-22 NOTE — PROGRESS NOTES
Assessment/Plan:        Healthcare Maintenance Exam    Fasting labs pending  Immunizations updated  Healthy lifestyle modifications discussed  Discussed self testicular exams  Colonoscopy declined at this point - he will consider next year  PSA discussed and will be done when he returns in 2 months for his diabetic check  The following high BMI interventions were performed this visit: encouragement to exercise and weight monitoring      1. Benign essential hypertension  He will continue his lisinopril/hydrochlorothiazide.  I will add amlodipine as well.  Blood pressure is certainly still above goal today but slightly better than last visit.  He might benefit from a beta-blocker as well given that he has a slightly increased heart rate but he is unsure if he would like to do that at this point.  - amLODIPine (NORVASC) 5 MG tablet; Take 1 tablet (5 mg total) by mouth daily.  Dispense: 90 tablet; Refill: 3    2. Pain, dental  For his dental pain I gave him a limited supply of Norco.  I discussed with the patient that this is not something that I will be refilling for him.  He has assured me that he has discussed this with his pain clinic and they are okay with him getting this prescription as well.  I discussed with the patient that I will not fill his chronic pain medications nor will I refill this limited supply.  - HYDROcodone-acetaminophen (NORCO )  mg per tablet; Take 1 tablet by mouth every 6 (six) hours as needed for pain.  Dispense: 10 tablet; Refill: 0    3. Mild major depression (H)  He is overall doing well.  He states that he was on Paxil previously but does not feels though he needs to be on it at this point.    4. Type 2 diabetes mellitus with hyperglycemia, with long-term current use of insulin (H)  His blood sugars are clearly above goal.  Most recent A1c was 8.0.  Discussed checking his blood sugars on a more frequent basis.  He is interested in continuous blood sugar monitoring and he will  "contact his insurance about this.  He will also see the diabetic educator which I think will be helpful for him as well.  Increase to 25U lantus at night.  - Ambulatory referral to Diabetic Education Program (CDE)    5. Morbid obesity (H)  See above - discussed diet and exercise           Subjective:      Obed Nickerson is a 53 y.o. male who presents for an annual exam. Concerns are as follows:    Patient is a past medical history significant for diabetes.  He checks his blood sugar occasionally and was feeling \"tingly\" one day and check his blood sugar and it was 360.  He was seen in the emergency department and initial blood sugar was 280.  He received some IV fluid and it decreased to 170.  He is unsure why it was so high.  Most recent A1c was 8.0.  He takes 22 units of Lantus at night.    Hypertension: Patient has a history of hypertension for which she takes lisinopril/hydrochlorothiazide.  No chest pain or shortness of breath.  When he was in the emergency department 170s.  Recently had some dental work and he states his blood pressure is elevated as well.    Dental pain: Patient recently had several teeth removed after not being to the dentist for over 20 years.  He states that because he has a pain contract with his pain clinic they did not want to prescribe medication.  He has discussed this with his pain clinic and they have approved some pain medication for acute pain he was hopeful to get some medication today to help with his acute mouth pain.      Healthy Habits:   Regular Exercise: No  Sunscreen Use: No  Healthy Diet: No  Dental Visits Regularly: No  Seat Belt: Yes  Sexually active: Yes  Self Testicular Exam Monthly:Yes  Colonoscopy: No  Lipid Profile: Yes  Glucose Screen: Yes      Immunization History   Administered Date(s) Administered     Influenza, seasonal,quad inj 36+ mos 09/24/2018     Influenza,seasonal quad, PF, 36+MOS 01/16/2014, 12/15/2014, 09/14/2017     Td, Adult, Absorbed 06/01/2006 "     Td, adult adsorbed, PF 10/23/2006     Tdap 01/01/2008, 09/24/2018     Immunization status: up to date and documented.      Current Outpatient Medications   Medication Sig Dispense Refill     ALPRAZolam (XANAX) 0.5 MG tablet Take 1 tablet (0.5 mg total) by mouth daily as needed for sleep. 15 tablet 0     aspirin 81 MG EC tablet Take 81 mg by mouth.       blood glucose test strips Dispense item covered by pt ins. E11.65 NIDDM type II, uncontrolled - Test 3 times/day, Reason: High A1C       DULoxetine (CYMBALTA) 60 MG capsule Take 2 capsules (120 mg total) by mouth daily. 180 capsule 1     fluconazole (DIFLUCAN) 150 MG tablet Take 1 tablet (150 mg total) by mouth every other day. 5 tablet 0     insulin glargine (LANTUS SOLOSTAR U-100 INSULIN) 100 unit/mL (3 mL) pen Inject 20 Units under the skin daily. Do not mix Lantus with any other insulin 5 adj dose pen 3     lisinopril-hydrochlorothiazide (ZESTORETIC) 20-25 mg per tablet Take 1 tablet by mouth daily. 90 tablet 0     metFORMIN (GLUCOPHAGE) 1000 MG tablet Take 1 tablet (1,000 mg total) by mouth 2 (two) times a day with meals. 180 tablet 0     morphine (MS CONTIN) 15 MG 12 hr tablet        naloxone (NARCAN) 4 mg/actuation nasal spray SPRAY INTO NOSTRIL AND CALL 911 FOR OVERDOSE. MAY REPEAT ONCE       nystatin (MYCOSTATIN) powder Apply to affected area 3 times daily 15 g 0     oxyCODONE (ROXICODONE) 15 MG immediate release tablet        rizatriptan (MAXALT) 10 MG tablet Take by mouth.       tiZANidine (ZANAFLEX) 4 MG tablet   3     amLODIPine (NORVASC) 5 MG tablet Take 1 tablet (5 mg total) by mouth daily. 90 tablet 3     HYDROcodone-acetaminophen (NORCO )  mg per tablet Take 1 tablet by mouth every 6 (six) hours as needed for pain. 10 tablet 0     No current facility-administered medications for this visit.      Past Medical History:   Diagnosis Date     Chronic neck pain      Hyperlipidemia      Hypertension      Past Surgical History:   Procedure  Laterality Date     APPENDECTOMY       BACK SURGERY       Multiple surgeries to right lower leg       NECK SURGERY       rotator cuff surgery, right       Compazine [prochlorperazine edisylate]; Demerol [meperidine]; Gabapentin; Statins-hmg-coa reductase inhibitors; Toradol [ketorolac]; and Ultram [tramadol]  Family History   Problem Relation Age of Onset     Heart disease Mother      Diabetes Father      Stroke Father      Diabetes Sister      Hypertension Sister      Diabetes Brother      Hypertension Brother      Prostate cancer Paternal Grandfather      Social History     Socioeconomic History     Marital status:      Spouse name: Not on file     Number of children: Not on file     Years of education: Not on file     Highest education level: Not on file   Social Needs     Financial resource strain: Not on file     Food insecurity - worry: Not on file     Food insecurity - inability: Not on file     Transportation needs - medical: Not on file     Transportation needs - non-medical: Not on file   Occupational History     Not on file   Tobacco Use     Smoking status: Never Smoker     Smokeless tobacco: Never Used   Substance and Sexual Activity     Alcohol use: Not on file     Drug use: Not on file     Sexual activity: Not on file   Other Topics Concern     Not on file   Social History Narrative     Not on file       Review of Systems  General:  Denies problems  Eyes:  Denies problems  Ears/Nose/Throat:  Denies problems  Cardiovascular:  Denies problems  Respiratory:  Denies problems  Gastrointestinal:  Denies problems  Genitourinary:  Denies problems  Musculoskeletal:  Denies problems  Skin:  Denies problems  Neurologic:  Denies problems  Psychiatric:  Denies problems  Endocrine:  Denies problems  Heme/Lymphatic:  Denies problems  Allergic/Immunologic:  Denies problems         Objective:         Vitals:    01/09/19 1304 01/09/19 1336   BP: (!) 178/119 (!) 148/94   Pulse: (!) 120    Resp: 24    Temp: 97.7  F  "(36.5  C)    TempSrc: Oral    Weight: (!) 259 lb 6 oz (117.7 kg)    Height: 5' 6\" (1.676 m)        Physical Exam:  General Appearance: Alert, cooperative, no distress, appears stated age  Head: Normocephalic, without obvious abnormality, atraumatic  Eyes: PERRL, conjunctiva/corneas clear, EOM's intact  Ears: Normal TM's and external ear canals, both ears   Nose:Nares normal, septum midline,mucosa normal, no drainage   Throat:Lips, mucosa, and tongue normal; teeth and gums normal  Neck: Supple, symmetrical, trachea midline, no adenopathy;  thyroid: not enlarged, symmetric, no tenderness/mass/nodules  Back: Symmetric, no curvature, ROM normal, no CVA tenderness   Lungs: Clear to auscultation bilaterally, respirations unlabored  Chest: no visible abnormalities.  Heart: Regular rate and rhythm, S1 and S2 normal, no murmur, rub, or gallop,   Abdomen: Soft, non-tender, bowel sounds active all four quadrants,  no masses, no organomegaly  :patient deferred  Extremities: Extremities normal, atraumatic, no cyanosis or edema  Skin: Skin color, texture, turgor normal, no rashes or lesions  Lymph nodes: Cervical, supraclavicular, and axillary nodes normal  Neurologic: Normal       "

## 2021-06-22 NOTE — PROGRESS NOTES
Assessment/Plan:    Obed Nickerson is a 53 y.o. male presenting for:    1. Type 2 diabetes mellitus with hyperglycemia, with long-term current use of insulin (H)  Blood sugar control is uncertain given that the patient does not take his blood sugars on a regular basis.  He is taking 20 units of Lantus at night.  He would be willing to increase his a bit if needed.  - Comprehensive Metabolic Panel  - Glycosylated Hemoglobin A1c  - lisinopril-hydrochlorothiazide (ZESTORETIC) 20-25 mg per tablet; Take 1 tablet by mouth daily.  Dispense: 90 tablet; Refill: 0    2. Yeast infection of the skin  The patient has intertrigo in the groin area.  Nystatin sent to the pharmacy.  - nystatin (MYCOSTATIN) powder; Apply to affected area 3 times daily.  Dispense: 15 g; Refill: 0  - fluconazole (DIFLUCAN) 150 MG tablet; Take 1 tablet (150 mg total) by mouth every other day.  Dispense: 5 tablet; Refill: 0    3.  Hypertension  Not at goal today.  Increased dose of lisinopril/hydrochlorothiazide at sent to the pharmacy.  I would like to see the patient back in 1 month for a med check appointment/physical.    Medications Discontinued During This Encounter   Medication Reason     lisinopril-hydrochlorothiazide (PRINZIDE,ZESTORETIC) 20-12.5 mg per tablet            Chief Complaint:  Chief Complaint   Patient presents with     Rash     Side of face/neck and groin - pt states it is painful/itchy and irritating.  Pt states it started 1 week ago and his wife states it is getting worse.       Subjective:   Obed Nickerson is a 53-year-old gentleman presenting to the clinic today with concerns over a rash in his groin as well as around his neck.    The patient has a past medical history significant for hypertension and diabetes.  He does not check his blood sugars on a regular basis.  He uses Lantus at night as well as metformin twice daily.  He states that he is fairly good about remembering to take his medications.    The patient does not  check his blood pressure at home either.  He denies any chest pain or shortness of breath    He is noted for the last week and a half that he has had a rash in his bilateral groin area that is itchy.  He is put Gold[employer] on it which has seemed to help a bit.  He also has a rash that was fully circling his neck however now is just mostly on the right side.  This is not weeping.    12 point review of systems completed and negative except for what has been described above    Social History     Tobacco Use   Smoking Status Never Smoker   Smokeless Tobacco Never Used       Current Outpatient Medications   Medication Sig Note     ALPRAZolam (XANAX) 0.5 MG tablet Take 1 tablet (0.5 mg total) by mouth daily as needed for sleep.      aspirin 81 MG EC tablet Take 81 mg by mouth. 9[facility]24/2018: [facility] Received Sig: Take 1 tablet by mouth once daily with a meal.     blood glucose test strips Dispense item covered by pt ins. E11.65 NIDDM type II, uncontrolled - Test 3 times/day, Reason: High A1C 9/24/2018: [facility]: [facility]     DULoxetine (CYMBALTA) 60 MG capsule Take 2 capsules (120 mg total) by mouth daily.      insulin glargine (LANTUS SOLOSTAR U-100 INSULIN) 100 unit/mL (3 mL) pen Inject 20 Units under the skin daily. Do not mix Lantus with any other insulin      metFORMIN (GLUCOPHAGE) 1000 MG tablet Take 1 tablet (1,000 mg total) by mouth 2 (two) times a day with meals.      morphine (MS CONTIN) 15 MG 12 hr tablet  9/24/2018: [facility]     naloxone (NARCAN) 4 mg/actuation nasal spray SPRAY INTO NOSTRIL AND CALL 911 FOR OVERDOSE. MAY REPEAT ONCE 9/24/2018: [facility]     oxyCODONE (ROXICODONE) 15 MG immediate release tablet  9/24/2018: [facility]     rizatriptan (MAXALT) 10 MG tablet Take by  mouth. 9/24/2018: Received from: [facility] Received Sig: TAKE 1 TABLET BY MOUTH AS NEEDED FOR  HEADACHE; MAY REPEAT EVERY 2 HOURS AS NEEDED  MAX DOSE: 30MG PER 24HRS.     tiZANidine (ZANAFLEX) 4 MG tablet  11/12/2016: Received from: External Pharmacy     fluconazole (DIFLUCAN) 150 MG tablet Take 1 tablet (150 mg total) by mouth every other day.      lisinopril-hydrochlorothiazide (ZESTORETIC) 20-25 mg per tablet Take 1 tablet by mouth daily.      nystatin (MYCOSTATIN) powder Apply to affected area 3 times daily.          Objective:  Vitals:    12/11/18 1033 12/11/18 1046   BP: 166/80 160/86   Pulse: (!) 108    Weight: (!) 258 lb 6.4 oz (117.2 kg)        Body mass index is 41.71 kg/m .    Vital signs reviewed and stable  General: No acute distress  Psych: Appropriate affect  HEENT: moist mucous membranes, pupils equal, round, reactive to light and accomodation, posterior oropharynx clear of erythema or exudate, tympanic membranes are pearly grey bilaterally  Lymph: no cervical or supraclavicular lymphadenopathy  Cardiovascular: regular rate and rhythm with no murmur  Pulmonary: clear to auscultation bilaterally with no wheeze  Abdomen: soft, non tender, non distended with normo-active bowel sounds  Extremities: warm and well perfused with no edema  Skin: warm and dry with erythematous papules on neck.  Reddened skin in the groin region bilaterally with some mild breaks in the skin.         This note has been dictated and transcribed using voice recognition software.   Any errors in transcription are unintentional and inherent to the software.

## 2021-06-23 NOTE — TELEPHONE ENCOUNTER
Rx queued for MD approval.  Pt sent another message stating he would like it sent to [facility].

## 2021-06-23 NOTE — TELEPHONE ENCOUNTER
RN cannot approve Refill Request    RN can NOT refill this medication overdue for office visits and/or labs.    Rhett Posada, [facility] Triage/Med Refill 1/24/2019    Requested Prescriptions   Pending Prescriptions Disp Refills     metFORMIN (GLUCOPHAGE) 1000 MG tablet [Pharmacy Med Name: METFORMIN HCL 1000MG TABS] 120 tablet 0     Sig: TAKE ONE TABLET BY MOUTH TWICE A DAY WITH MEALS    Metformin Refill Protocol Failed - 1/21/2019  1:38 PM       Failed - Microalbumin in last year     No results found for: MICROALBUR              Passed - Blood pressure in last 12 months    BP Readings from Last 1 Encounters:   01/09/19 (!) 148/94            Passed - LFT or AST or ALT in last 12 months    Albumin   Date Value Ref Range Status   12/11/2018 4.0 3.5 - 5.0 g/dL Final     Bilirubin, Total   Date Value Ref Range Status   12/11/2018 0.2 0.0 - 1.0 mg/dL Final     Alkaline Phosphatase   Date Value Ref Range Status   12/11/2018 118 45 - 120 U/L Final     AST   Date Value Ref Range Status   12/11/2018 16 0 - 40 U/L Final     ALT   Date Value Ref Range Status   12/11/2018 27 0 - 45 U/L Final     Protein, Total   Date Value Ref Range Status   12/11/2018 7.3 6.0 - 8.0 g/dL Final               Passed - GFR or Serum Creatinine in last 6 months    GFR MDRD Non Af Amer   Date Value Ref Range Status   12/11/2018 >60 >60 mL/min/1.73m2 Final     GFR MDRD Af Amer   Date Value Ref Range Status   12/11/2018 >60 >60 mL/min/1.73m2 Final            Passed - Visit with PCP or prescribing provider visit in last 6 months or next 3 months    Last office visit with prescriber/PCP: 12/11/2018 OR same dept: 12/11/2018 Rocío Brandt MD OR same specialty: 12/11/2018 Rocío Brandt MD Last physical: 1/9/2019 Last MTM visit: Visit date not found         Next appt within 3 mo: Visit date not found  Next physical within 3 mo: Visit date not found  Prescriber OR PCP: Rocío Brandt MD  Last diagnosis associated with med  order: 1. Long-term insulin use in type 2 diabetes  - metFORMIN (GLUCOPHAGE) 1000 MG tablet [Pharmacy Med Name: METFORMIN HCL 1000MG TABS]; TAKE ONE TABLET BY MOUTH TWICE A DAY WITH MEALS  Dispense: 120 tablet; Refill: 0     If protocol passes may refill for 12 months if within 3 months of last provider visit (or a total of 15 months).          Passed - A1C in last 6 months    Hemoglobin A1c   Date Value Ref Range Status   12/11/2018 8.0 (H) 3.5 - 6.0 % Final

## 2021-06-23 NOTE — TELEPHONE ENCOUNTER
RN cannot approve Refill Request    RN can NOT refill this medication overdue for office visits and/or labs.    Rhett Posada, [facility] Triage/Med Refill 1/24/2019    Requested Prescriptions   Pending Prescriptions Disp Refills     metFORMIN (GLUCOPHAGE) 1000 MG tablet 180 tablet 0     Sig: Take 1 tablet (1,000 mg total) by mouth 2 (two) times a day with meals.    Metformin Refill Protocol Failed - 1/23/2019  4:13 PM       Failed - Microalbumin in last year     No results found for: MICROALBUR              Passed - Blood pressure in last 12 months    BP Readings from Last 1 Encounters:   01/09/19 (!) 148/94            Passed - LFT or AST or ALT in last 12 months    Albumin   Date Value Ref Range Status   12/11/2018 4.0 3.5 - 5.0 g/dL Final     Bilirubin, Total   Date Value Ref Range Status   12/11/2018 0.2 0.0 - 1.0 mg/dL Final     Alkaline Phosphatase   Date Value Ref Range Status   12/11/2018 118 45 - 120 U/L Final     AST   Date Value Ref Range Status   12/11/2018 16 0 - 40 U/L Final     ALT   Date Value Ref Range Status   12/11/2018 27 0 - 45 U/L Final     Protein, Total   Date Value Ref Range Status   12/11/2018 7.3 6.0 - 8.0 g/dL Final               Passed - GFR or Serum Creatinine in last 6 months    GFR MDRD Non Af Amer   Date Value Ref Range Status   12/11/2018 >60 >60 mL/min/1.73m2 Final     GFR MDRD Af Amer   Date Value Ref Range Status   12/11/2018 >60 >60 mL/min/1.73m2 Final            Passed - Visit with PCP or prescribing provider visit in last 6 months or next 3 months    Last office visit with prescriber/PCP: 12/11/2018 OR same dept: 12/11/2018 Rocío Brandt MD OR same specialty: 12/11/2018 Rocío Brandt MD Last physical: 1/9/2019 Last MTM visit: Visit date not found         Next appt within 3 mo: Visit date not found  Next physical within 3 mo: Visit date not found  Prescriber OR PCP: Rocío Brandt MD  Last diagnosis associated with med order: 1. Long-term  insulin use in type 2 diabetes  - metFORMIN (GLUCOPHAGE) 1000 MG tablet; Take 1 tablet (1,000 mg total) by mouth 2 (two) times a day with meals.  Dispense: 180 tablet; Refill: 0     If protocol passes may refill for 12 months if within 3 months of last provider visit (or a total of 15 months).          Passed - A1C in last 6 months    Hemoglobin A1c   Date Value Ref Range Status   12/11/2018 8.0 (H) 3.5 - 6.0 % Final

## 2021-06-24 NOTE — TELEPHONE ENCOUNTER
Dr. Brandt-  See pt's mychart message.  Not sure if I have the correct rx's queued for you or not.

## 2021-06-24 NOTE — TELEPHONE ENCOUNTER
Refill Approved    Rx renewed per Medication Renewal Policy. Medication was last renewed on 9/30/18.    Lisa Erwin, [facility] Triage/Med Refill 2/26/2019     Requested Prescriptions   Pending Prescriptions Disp Refills     DULoxetine (CYMBALTA) 60 MG capsule [Pharmacy Med Name: DULOXETINE HCL 60MG CPEP] 180 capsule 1     Sig: TAKE TWO CAPSULES BY MOUTH EVERY DAY    Tricyclics/Misc Antidepressant/Antianxiety Meds Refill Protocol Passed - 2/26/2019  2:42 PM       Passed - PCP or prescribing provider visit in last year    Last office visit with prescriber/PCP: 12/11/2018 Rocío Brandt MD OR same dept: 12/11/2018 Rocío Brandt MD OR same specialty: 12/11/2018 Rocío Brandt MD  Last physical: 1/9/2019 Last MTM visit: Visit date not found   Next visit within 3 mo: Visit date not found  Next physical within 3 mo: Visit date not found  Prescriber OR PCP: Rocío Brandt MD  Last diagnosis associated with med order: There are no diagnoses linked to this encounter.  If protocol passes may refill for 12 months if within 3 months of last provider visit (or a total of 15 months).

## 2021-07-03 NOTE — ADDENDUM NOTE
Addendum Note by Kristin Carreon CMA at 3/24/2020  2:09 PM     Author: Kristin Carreon CMA Service: -- Author Type: Certified Medical Assistant    Filed: 3/24/2020  2:09 PM Encounter Date: 3/20/2020 Status: Signed    : Kristin Carreon CMA (Certified Medical Assistant)    Addended by: KRISTIN CARREON on: 3/24/2020 02:09 PM        Modules accepted: Orders

## 2021-07-03 NOTE — ADDENDUM NOTE
Addendum Note by Rocío Brandt MD at 9/30/2018  8:14 AM     Author: Rocío Brandt MD Service: -- Author Type: Physician    Filed: 9/30/2018  8:14 AM Encounter Date: 9/27/2018 Status: Signed    : Rocío Brandt MD (Physician)    Addended by: ROCÍO BRANDT on: 9/30/2018 08:14 AM        Modules accepted: Orders

## 2021-07-03 NOTE — ADDENDUM NOTE
Addendum Note by Rocío Brandt MD at 3/25/2020  7:48 AM     Author: Rocío Brandt MD Service: -- Author Type: Physician    Filed: 3/25/2020  7:48 AM Encounter Date: 3/20/2020 Status: Signed    : Rocío Brandt MD (Physician)    Addended by: ROCÍO BRANDT on: 3/25/2020 07:48 AM        Modules accepted: Orders

## 2021-07-04 ENCOUNTER — HEALTH MAINTENANCE LETTER (OUTPATIENT)
Age: 56
End: 2021-07-04

## 2021-07-06 ENCOUNTER — OFFICE VISIT (OUTPATIENT)
Dept: FAMILY MEDICINE | Facility: CLINIC | Age: 56
End: 2021-07-06
Payer: COMMERCIAL

## 2021-07-06 VITALS
BODY MASS INDEX: 39.37 KG/M2 | TEMPERATURE: 97.7 F | SYSTOLIC BLOOD PRESSURE: 172 MMHG | RESPIRATION RATE: 16 BRPM | HEIGHT: 66 IN | WEIGHT: 245 LBS | DIASTOLIC BLOOD PRESSURE: 100 MMHG | HEART RATE: 76 BPM

## 2021-07-06 DIAGNOSIS — Z79.4 TYPE 2 DIABETES MELLITUS WITH HYPERGLYCEMIA, WITH LONG-TERM CURRENT USE OF INSULIN (H): Primary | ICD-10-CM

## 2021-07-06 DIAGNOSIS — I10 HYPERTENSION GOAL BP (BLOOD PRESSURE) < 130/80: ICD-10-CM

## 2021-07-06 DIAGNOSIS — E11.65 TYPE 2 DIABETES MELLITUS WITH HYPERGLYCEMIA, WITH LONG-TERM CURRENT USE OF INSULIN (H): Primary | ICD-10-CM

## 2021-07-06 LAB
ALBUMIN SERPL-MCNC: 3.7 G/DL (ref 3.4–5)
ALP SERPL-CCNC: 107 U/L (ref 40–150)
ALT SERPL W P-5'-P-CCNC: 33 U/L (ref 0–70)
ANION GAP SERPL CALCULATED.3IONS-SCNC: 6 MMOL/L (ref 3–14)
AST SERPL W P-5'-P-CCNC: 17 U/L (ref 0–45)
BILIRUB SERPL-MCNC: 0.2 MG/DL (ref 0.2–1.3)
BUN SERPL-MCNC: 14 MG/DL (ref 7–30)
CALCIUM SERPL-MCNC: 8.9 MG/DL (ref 8.5–10.1)
CHLORIDE SERPL-SCNC: 103 MMOL/L (ref 94–109)
CO2 SERPL-SCNC: 30 MMOL/L (ref 20–32)
CREAT SERPL-MCNC: 0.8 MG/DL (ref 0.66–1.25)
GFR SERPL CREATININE-BSD FRML MDRD: >90 ML/MIN/{1.73_M2}
GLUCOSE SERPL-MCNC: 167 MG/DL (ref 70–99)
HBA1C MFR BLD: 8.7 % (ref 0–5.6)
POTASSIUM SERPL-SCNC: 4.1 MMOL/L (ref 3.4–5.3)
PROT SERPL-MCNC: 7.7 G/DL (ref 6.8–8.8)
SODIUM SERPL-SCNC: 139 MMOL/L (ref 133–144)

## 2021-07-06 PROCEDURE — 99214 OFFICE O/P EST MOD 30 MIN: CPT | Performed by: FAMILY MEDICINE

## 2021-07-06 PROCEDURE — 36415 COLL VENOUS BLD VENIPUNCTURE: CPT | Performed by: FAMILY MEDICINE

## 2021-07-06 PROCEDURE — 80053 COMPREHEN METABOLIC PANEL: CPT | Performed by: FAMILY MEDICINE

## 2021-07-06 PROCEDURE — 83036 HEMOGLOBIN GLYCOSYLATED A1C: CPT | Performed by: FAMILY MEDICINE

## 2021-07-06 ASSESSMENT — MIFFLIN-ST. JEOR: SCORE: 1879.06

## 2021-07-06 NOTE — LETTER
July 6, 2021      Obed Nickerson  [address] [place] [id number]              To whom it may concern,    Obed has chronic pain from arthritis and degenerative disc disease for which he is on chronic pain medications.  The chronic pain and medications cause an inability to work.        Sincerely,      Rocío Brandt MD

## 2021-07-08 NOTE — PROGRESS NOTES
Assessment/Plan:    Obed Nickerson is a 56 year old male presenting for:    Type 2 diabetes mellitus with hyperglycemia, with long-term current use of insulin (H)    Lab Results   Component Value Date    A1C 8.7 07/06/2021    A1C 9.0 03/31/2021    A1C 8.8 12/30/2020    A1C 10.0 06/29/2020    A1C 7.5 02/16/2018     A1c is elevated to 8.7.  Patient has maxed out on Lantus and Metformin.  We can increase the Januvia slightly.  We could also consider mealtime insulin as I think this would be helpful.  Certainly see diabetic educator may be beneficial as well and I will reach out to him to see what he would like to do.  I would like to see him back in clinic 3 months for recheck  - Hemoglobin A1c    Hypertension goal BP (blood pressure) < 130/80  Blood pressure is very elevated today.  He is not taking his medication.  He was told to start taking 20 mg of his lisinopril and reach out to him next week to let me know what his blood pressures are.  If continuing to be elevated would potentially increase Seroquel to 40 mg daily.  He did not do well with a diuretic however could add on a beta-blocker or calcium channel blocker.    Discussed with the patient that his blood pressure is dangerously high and has been running high for quite some time putting him at increased risk for stroke  - Comprehensive metabolic panel (BMP + Alb, Alk Phos, ALT, AST, Total. Bili, TP)    Paperwork is filled out for disability.  Patient will continue to follow with pain clinic.    Otherwise, I was able to secure a [employer] Covid vaccine with the patient today.    There are no discontinued medications.        Chief Complaint:  Forms        Subjective:   Obed Nickerson is a very pleasant 56 gentleman with past medical history significant for diabetes and hypertension.    1.  diabetes: Blood sugars have certainly not very well controlled.  He is currently on Lantus 50 units at night, Januvia 50 mg and Metformin 1000 mg twice daily.   Most recent A1c was 9.0 and today is 8.7.  He states he does not eat a diabetic diet.    2.  Hypertension: Patient is a history of uncontrolled hypertension.  He previously was on several medications but weaned himself off of them.  He is currently just on lisinopril which she was actually taking for a while started taking about a week ago.  Take 10 mg daily.  He notes blood pressures at home generally in the 170s to 180s over low 100s.  He denies any chest pain or shortness of breath.  Her recent stress test was normal.    #3.  Forms: Patient is paperwork for me to fill out for him today.  He needs a letter stating his disability due to his chronic pain so he can get from [employer].      12 point review of systems completed and negative except for what has been described above    History   Smoking Status     Never Smoker   Smokeless Tobacco     Never Used         Current Outpatient Medications:      Alcohol Swabs (ALCOHOL PREP) 70 % PADS, , Disp: , Rfl:      ALPRAZolam (XANAX) 0.5 MG tablet, TK 1 T PO QD PRF SLP, Disp: 15 tablet, Rfl: 3     AMITIZA 8 MCG capsule, TAKE 1 CAPSULE BY MOUTH TWICE DAILY WITH FOOD AND WATER, Disp: , Rfl:      ASPIRIN PO, Take 81 mg by mouth daily, Disp: , Rfl:      BELBUCA 300 MCG FILM buccal film, , Disp: , Rfl:      Blood Glucose Monitoring Suppl (FIFTY50 GLUCOSE METER 2.0) w/Device KIT, Dispense meter, test strips, lancets covered by pt ins. E11.65 NIDDM type II, uncontrolled - Test 3 times/day, Reason: High A1C, Disp: , Rfl:      Continuous Blood Gluc Sensor (FREESTYLE YOKO 14 DAY SENSOR) [facility] [place], Switch device every 2 weeks, Disp: 6 each, Rfl: 2     Continuous Blood Gluc Sensor (FREESTYLE YOKO 14 DAY SENSOR) MISC, USE AS DIRECTED, Disp: , Rfl:      dicyclomine (BENTYL) 20 MG tablet, TAKE 1 TABLET BY MOUTH TWICE DAILY AS NEEDED, Disp: , Rfl:      DiphenhydrAMINE HCl (BENADRYL PO), Take 25 mg by mouth every 8 hours as needed, Disp: , Rfl:      DULoxetine (CYMBALTA) 60 MG capsule, Take 1  "capsule (60 mg) by mouth 2 times daily, Disp: 180 capsule, Rfl: 1     hyoscyamine SL (LEVSIN/SL) 0.125 MG sublingual tablet, Take 1 tablet (0.125 mg) by mouth every 4 hours as needed (for GI upset - max 1.5mg/day), Disp: 30 tablet, Rfl: 1     insulin glargine (LANTUS SOLOSTAR) 100 UNIT/ML pen, INJECT 50 UNITS UNDER THE SKIN EVERY NIGHT AT BEDTIME, Disp: 10 mL, Rfl: 3     lisinopril (ZESTRIL) 10 MG tablet, Take 1 tablet (10 mg) by mouth daily, Disp: 90 tablet, Rfl: 3     naloxone (NARCAN) 4 MG/0.1ML nasal spray, Spray 1 spray (4 mg) into one nostril alternating nostrils as needed for opioid reversal every 2-3 minutes until assistance arrives, Disp: 0.2 mL, Rfl: 3     oxyCODONE HCl (ROXICODONE) 20 MG TABS immediate release tablet, TAKE 1 TABLET BY MOUTH FOUR TIMES DAILY FOR CHRONIC PAIN, Disp: , Rfl:      oxyCODONE IR (ROXICODONE) 10 MG tablet, Take 0.5-1 tablets (5-10 mg) by mouth every 4 hours as needed for severe pain, Disp: 10 tablet, Rfl: 0     polyethylene glycol (MIRALAX) 17 GM/Dose powder, , Disp: , Rfl:      sitagliptin (JANUVIA) 50 MG tablet, Take 1 tablet (50 mg) by mouth daily, Disp: 90 tablet, Rfl: 0     SUMAtriptan (IMITREX) 50 MG tablet, Take 1 tablet (50 mg) by mouth at onset of headache for migraine May repeat in 2 hours. Max 4 tablets/24 hours., Disp: 9 tablet, Rfl: 1     tiZANidine (ZANAFLEX) 4 MG tablet, Take 1 tablet (4 mg) by mouth At Bedtime, Disp: 30 tablet, Rfl: 1     metFORMIN (GLUCOPHAGE) 1000 MG tablet, Take 1 tablet (1,000 mg) by mouth 2 times daily (with meals), Disp: 60 tablet, Rfl: 0      Objective:  Vitals:    07/06/21 1427   BP: (!) 172/100   Pulse: 76   Resp: 16   Temp: 97.7  F (36.5  C)   TempSrc: Tympanic   Weight: 111.1 kg (245 lb)   Height: 1.676 m (5' 6\")       Body mass index is 39.54 kg/m .    Vital signs reviewed and stable  General: No acute distress  Psych: Appropriate affect  HEENT: moist mucous membranes, pupils equal, round, reactive to light and accomodation, tympanic " membranes are pearly grey bilaterally  Lymph: no cervical or supraclavicular lymphadenopathy  Cardiovascular: regular rate and rhythm with no murmur  Pulmonary: clear to auscultation bilaterally with no wheeze  Abdomen: soft, non tender, non distended with normo-active bowel sounds  Extremities: warm and well perfused with no edema  Skin: warm and dry with no rash         This note has been dictated and transcribed using voice recognition software.   Any errors in transcription are unintentional and inherent to the software.

## 2021-07-24 DIAGNOSIS — M79.10 MUSCLE PAIN: ICD-10-CM

## 2021-07-26 NOTE — TELEPHONE ENCOUNTER
Routing refill request to provider for review/approval because:  Drug not on the [facility] refill protocol   Nora Suarez RN

## 2021-07-30 DIAGNOSIS — Z79.4 TYPE 2 DIABETES MELLITUS WITH HYPERGLYCEMIA, WITH LONG-TERM CURRENT USE OF INSULIN (H): ICD-10-CM

## 2021-07-30 DIAGNOSIS — E11.65 TYPE 2 DIABETES MELLITUS WITH HYPERGLYCEMIA, WITH LONG-TERM CURRENT USE OF INSULIN (H): ICD-10-CM

## 2021-08-12 DIAGNOSIS — F41.9 ANXIETY: ICD-10-CM

## 2021-08-12 DIAGNOSIS — E11.65 TYPE 2 DIABETES MELLITUS WITH HYPERGLYCEMIA, WITH LONG-TERM CURRENT USE OF INSULIN (H): ICD-10-CM

## 2021-08-12 DIAGNOSIS — Z79.4 TYPE 2 DIABETES MELLITUS WITH HYPERGLYCEMIA, WITH LONG-TERM CURRENT USE OF INSULIN (H): ICD-10-CM

## 2021-08-13 RX ORDER — SITAGLIPTIN 50 MG/1
TABLET, FILM COATED ORAL
Qty: 90 TABLET | Refills: 0 | Status: SHIPPED | OUTPATIENT
Start: 2021-08-13 | End: 2021-11-09

## 2021-08-15 RX ORDER — ALPRAZOLAM 0.5 MG
TABLET ORAL
Qty: 15 TABLET | Refills: 0 | Status: SHIPPED | OUTPATIENT
Start: 2021-08-15 | End: 2021-09-10

## 2021-08-16 DIAGNOSIS — E11.65 TYPE 2 DIABETES MELLITUS WITH HYPERGLYCEMIA, WITH LONG-TERM CURRENT USE OF INSULIN (H): ICD-10-CM

## 2021-08-16 DIAGNOSIS — Z79.4 TYPE 2 DIABETES MELLITUS WITH HYPERGLYCEMIA, WITH LONG-TERM CURRENT USE OF INSULIN (H): ICD-10-CM

## 2021-08-16 NOTE — TELEPHONE ENCOUNTER
Routing refill request to provider for review/approval because:  Drug not active on patient's medication list  Dimitry Neff RN

## 2021-09-10 ENCOUNTER — MYC REFILL (OUTPATIENT)
Dept: FAMILY MEDICINE | Facility: CLINIC | Age: 56
End: 2021-09-10

## 2021-09-10 DIAGNOSIS — E11.65 TYPE 2 DIABETES MELLITUS WITH HYPERGLYCEMIA, WITH LONG-TERM CURRENT USE OF INSULIN (H): ICD-10-CM

## 2021-09-10 DIAGNOSIS — Z79.4 TYPE 2 DIABETES MELLITUS WITH HYPERGLYCEMIA, WITH LONG-TERM CURRENT USE OF INSULIN (H): ICD-10-CM

## 2021-09-10 DIAGNOSIS — F41.9 ANXIETY: ICD-10-CM

## 2021-09-10 RX ORDER — ALPRAZOLAM 0.5 MG
TABLET ORAL
Qty: 15 TABLET | Refills: 0 | Status: SHIPPED | OUTPATIENT
Start: 2021-09-10 | End: 2021-10-05

## 2021-09-26 DIAGNOSIS — M79.10 MUSCLE PAIN: ICD-10-CM

## 2021-10-05 ENCOUNTER — MYC REFILL (OUTPATIENT)
Dept: FAMILY MEDICINE | Facility: CLINIC | Age: 56
End: 2021-10-05

## 2021-10-05 DIAGNOSIS — F41.9 ANXIETY: ICD-10-CM

## 2021-10-05 DIAGNOSIS — Z79.4 TYPE 2 DIABETES MELLITUS WITH HYPERGLYCEMIA, WITH LONG-TERM CURRENT USE OF INSULIN (H): ICD-10-CM

## 2021-10-05 DIAGNOSIS — E11.65 TYPE 2 DIABETES MELLITUS WITH HYPERGLYCEMIA, WITH LONG-TERM CURRENT USE OF INSULIN (H): ICD-10-CM

## 2021-10-05 RX ORDER — ALPRAZOLAM 0.5 MG
TABLET ORAL
Qty: 15 TABLET | Refills: 0 | Status: SHIPPED | OUTPATIENT
Start: 2021-10-05 | End: 2021-10-06

## 2021-10-07 ENCOUNTER — TELEPHONE (OUTPATIENT)
Dept: FAMILY MEDICINE | Facility: CLINIC | Age: 56
End: 2021-10-07

## 2021-10-07 DIAGNOSIS — E11.65 TYPE 2 DIABETES MELLITUS WITH HYPERGLYCEMIA, WITH LONG-TERM CURRENT USE OF INSULIN (H): Primary | ICD-10-CM

## 2021-10-07 DIAGNOSIS — Z79.4 TYPE 2 DIABETES MELLITUS WITH HYPERGLYCEMIA, WITH LONG-TERM CURRENT USE OF INSULIN (H): Primary | ICD-10-CM

## 2021-10-07 NOTE — TELEPHONE ENCOUNTER
Pharmacy requesting rx for pen needles so they can bill insurance to lower cost for patient.    LISA DASLIVA

## 2021-10-19 PROBLEM — F32.9 MAJOR DEPRESSION: Status: ACTIVE | Noted: 2018-09-24

## 2021-10-24 ENCOUNTER — HEALTH MAINTENANCE LETTER (OUTPATIENT)
Age: 56
End: 2021-10-24

## 2021-10-26 PROBLEM — R10.9 ABDOMINAL PAIN: Status: ACTIVE | Noted: 2021-03-09

## 2021-10-26 RX ORDER — BUPRENORPHINE HYDROCHLORIDE 750 UG/1
FILM, SOLUBLE BUCCAL
COMMUNITY
Start: 2021-09-21 | End: 2021-10-27

## 2021-10-26 RX ORDER — JNJ-78436735 50000000000 [PFU]/.5ML
SUSPENSION INTRAMUSCULAR
COMMUNITY
Start: 2021-07-06 | End: 2022-01-28

## 2021-10-27 ENCOUNTER — OFFICE VISIT (OUTPATIENT)
Dept: FAMILY MEDICINE | Facility: CLINIC | Age: 56
End: 2021-10-27
Payer: COMMERCIAL

## 2021-10-27 VITALS
RESPIRATION RATE: 16 BRPM | SYSTOLIC BLOOD PRESSURE: 138 MMHG | TEMPERATURE: 96.7 F | BODY MASS INDEX: 40.02 KG/M2 | HEIGHT: 66 IN | DIASTOLIC BLOOD PRESSURE: 86 MMHG | HEART RATE: 72 BPM | WEIGHT: 249 LBS

## 2021-10-27 DIAGNOSIS — F32.0 MILD MAJOR DEPRESSION (H): ICD-10-CM

## 2021-10-27 DIAGNOSIS — Z79.4 TYPE 2 DIABETES MELLITUS WITH HYPERGLYCEMIA, WITH LONG-TERM CURRENT USE OF INSULIN (H): Primary | ICD-10-CM

## 2021-10-27 DIAGNOSIS — E11.65 TYPE 2 DIABETES MELLITUS WITH HYPERGLYCEMIA, WITH LONG-TERM CURRENT USE OF INSULIN (H): Primary | ICD-10-CM

## 2021-10-27 DIAGNOSIS — I10 HYPERTENSION GOAL BP (BLOOD PRESSURE) < 130/80: ICD-10-CM

## 2021-10-27 LAB
ALBUMIN SERPL-MCNC: 3.8 G/DL (ref 3.4–5)
ALP SERPL-CCNC: 113 U/L (ref 40–150)
ALT SERPL W P-5'-P-CCNC: 40 U/L (ref 0–70)
ANION GAP SERPL CALCULATED.3IONS-SCNC: 6 MMOL/L (ref 3–14)
AST SERPL W P-5'-P-CCNC: 19 U/L (ref 0–45)
BILIRUB SERPL-MCNC: 0.2 MG/DL (ref 0.2–1.3)
BUN SERPL-MCNC: 16 MG/DL (ref 7–30)
CALCIUM SERPL-MCNC: 9.7 MG/DL (ref 8.5–10.1)
CHLORIDE BLD-SCNC: 102 MMOL/L (ref 94–109)
CHOLEST SERPL-MCNC: 302 MG/DL
CO2 SERPL-SCNC: 28 MMOL/L (ref 20–32)
CREAT SERPL-MCNC: 0.71 MG/DL (ref 0.66–1.25)
CREAT UR-MCNC: 131 MG/DL
FASTING STATUS PATIENT QL REPORTED: YES
GFR SERPL CREATININE-BSD FRML MDRD: >90 ML/MIN/1.73M2
GLUCOSE BLD-MCNC: 201 MG/DL (ref 70–99)
HBA1C MFR BLD: 7.9 % (ref 0–5.6)
HDLC SERPL-MCNC: 54 MG/DL
LDLC SERPL CALC-MCNC: 180 MG/DL
LDLC SERPL CALC-MCNC: ABNORMAL MG/DL
MICROALBUMIN UR-MCNC: 61 MG/L
MICROALBUMIN/CREAT UR: 46.56 MG/G CR (ref 0–17)
NONHDLC SERPL-MCNC: 248 MG/DL
POTASSIUM BLD-SCNC: 4.2 MMOL/L (ref 3.4–5.3)
PROT SERPL-MCNC: 8.2 G/DL (ref 6.8–8.8)
SODIUM SERPL-SCNC: 136 MMOL/L (ref 133–144)
TRIGL SERPL-MCNC: 455 MG/DL

## 2021-10-27 PROCEDURE — 90471 IMMUNIZATION ADMIN: CPT | Performed by: FAMILY MEDICINE

## 2021-10-27 PROCEDURE — 80053 COMPREHEN METABOLIC PANEL: CPT | Performed by: FAMILY MEDICINE

## 2021-10-27 PROCEDURE — 99214 OFFICE O/P EST MOD 30 MIN: CPT | Mod: 25 | Performed by: FAMILY MEDICINE

## 2021-10-27 PROCEDURE — 83721 ASSAY OF BLOOD LIPOPROTEIN: CPT | Mod: 59 | Performed by: FAMILY MEDICINE

## 2021-10-27 PROCEDURE — 82043 UR ALBUMIN QUANTITATIVE: CPT | Performed by: FAMILY MEDICINE

## 2021-10-27 PROCEDURE — 36415 COLL VENOUS BLD VENIPUNCTURE: CPT | Performed by: FAMILY MEDICINE

## 2021-10-27 PROCEDURE — 90682 RIV4 VACC RECOMBINANT DNA IM: CPT | Performed by: FAMILY MEDICINE

## 2021-10-27 PROCEDURE — 80061 LIPID PANEL: CPT | Performed by: FAMILY MEDICINE

## 2021-10-27 PROCEDURE — 83036 HEMOGLOBIN GLYCOSYLATED A1C: CPT | Performed by: FAMILY MEDICINE

## 2021-10-27 RX ORDER — BUPRENORPHINE HYDROCHLORIDE 900 UG/1
FILM, SOLUBLE BUCCAL
COMMUNITY
Start: 2021-10-19 | End: 2022-07-25

## 2021-10-27 RX ORDER — FLASH GLUCOSE SENSOR
KIT MISCELLANEOUS
Qty: 2 EACH | Refills: 4 | Status: SHIPPED | OUTPATIENT
Start: 2021-10-27 | End: 2022-04-22

## 2021-10-27 ASSESSMENT — PATIENT HEALTH QUESTIONNAIRE - PHQ9: SUM OF ALL RESPONSES TO PHQ QUESTIONS 1-9: 15

## 2021-10-27 ASSESSMENT — MIFFLIN-ST. JEOR: SCORE: 1897.21

## 2021-10-27 NOTE — PROGRESS NOTES
Assessment/Plan:    Obed Nickerson is a 56 year old male presenting for:    Type 2 diabetes mellitus with hyperglycemia, with long-term current use of insulin (H)    Lab Results   Component Value Date    A1C 7.9 10/27/2021    A1C 8.7 07/06/2021    A1C 9.0 03/31/2021    A1C 8.8 12/30/2020    A1C 10.0 06/29/2020    A1C 8.3 11/26/2019    A1C 7.5 08/09/2019    A1C 9.1 04/30/2019    A1C 8.0 12/11/2018    A1C 7.5 02/16/2018     A1c is at goal today.  Congratulations will be given.  Would like to see him back in 3 months for continued follow-up.  He will continue his dietary modifications.    He will get back to me whether or not he is taking Actos.  - Hemoglobin A1c  - Continuous Blood Gluc Sensor (FREESTYLE YOKO 14 DAY SENSOR) MISC  Dispense: 2 each; Refill: 4  - insulin glargine (LANTUS SOLOSTAR) 100 UNIT/ML pen  Dispense: 45 mL; Refill: 0  - Albumin Random Urine Quantitative with Creat Ratio  - Lipid panel reflex to direct LDL Non-fasting  - Hemoglobin A1c  - Albumin Random Urine Quantitative with Creat Ratio  - Lipid panel reflex to direct LDL Non-fasting    Hypertension goal BP (blood pressure) < 130/80  Blood pressure is at goal today for the first time in the last few years.  He will continue take medication as prescribed.    Blood pressure is at goal today.  Continue current medications.  Continue healthy diet.  - Comprehensive metabolic panel (BMP + Alb, Alk Phos, ALT, AST, Total. Bili, TP)  - Comprehensive metabolic panel (BMP + Alb, Alk Phos, ALT, AST, Total. Bili, TP)    Mild major depression (H)  Stable at this point.  Continue Cymbalta.      Medications Discontinued During This Encounter   Medication Reason     BELBUCA 300 MCG FILM buccal film      oxyCODONE HCl (ROXICODONE) 20 MG TABS immediate release tablet      polyethylene glycol (MIRALAX) 17 GM/Dose powder      Blood Glucose Monitoring Suppl (FIFTY50 GLUCOSE METER 2.0) w/Device KIT      BELBUCA 750 MCG FILM buccal film      Continuous Blood Gluc  Sensor (FREESTYLE YOKO 14 DAY SENSOR) [facility]     insulin glargine (LANTUS SOLOSTAR) 100 UNIT/ML pen            Chief Complaint:  Blood Pressure Check and Diabetes        Subjective:   Obed Nickerson is a very pleasant 56-year-old gentleman presenting to the clinic today for a diabetic and blood pressure check.    Patient is a past medical history significant for insulin-dependent type 2 diabetes.  Most recent A1c was 8.6 and had been decreasing.  He has been working hard with his diet.  He has been eating a lot of tilapia which he thinks is helpful.  He has not been checking his blood sugars much over the last few weeks.  After his last visit we had started Actos although he is unsure if he is taking his medication or not.    History of hypertension.  This is historically been fairly poorly controlled.  No chest pain or shortness of breath.  Taking his medications as prescribed.    History of chronic pain following the pain clinic.    History of depression currently on Cymbalta.  Feels stable.    12 point review of systems completed and negative except for what has been described above    History   Smoking Status     Never Smoker   Smokeless Tobacco     Never Used         Current Outpatient Medications:      Alcohol Swabs (ALCOHOL PREP) 70 % PADS, , Disp: , Rfl:      ALPRAZolam (XANAX) 0.5 MG tablet, TAKE 1 TABLET BY MOUTH EVERY DAY AS NEEDED FOR SLEEP - Due for an office visit prior to further refills (noted 10/05/21), Disp: 15 tablet, Rfl: 0     AMITIZA 8 MCG capsule, TAKE 1 CAPSULE BY MOUTH TWICE DAILY WITH FOOD AND WATER, Disp: , Rfl:      ASPIRIN PO, Take 81 mg by mouth daily, Disp: , Rfl:      BD ULTRA FINE PEN NEEDLES, Use three times daily, Disp: 200 each, Rfl: 1     BELBUCA 900 MCG FILM buccal film, PLACE 1 FILM BY BUCCAL ROUTE TWICE DAILY AGAINT THE INSIDE OF CHEEK. HOLD IN PLACE FOR 5 SECONDS, Disp: , Rfl:      Continuous Blood Gluc Sensor (FREESTYLE YOKO 14 DAY SENSOR) MISC, USE AS DIRECTED,  Disp: 2 each, Rfl: 4     Continuous Blood Gluc Sensor (FREESTYLE YOKO 14 DAY SENSOR) [facility] [place], Switch device every 2 weeks, Disp: 6 each, Rfl: 2     dicyclomine (BENTYL) 20 MG tablet, TAKE 1 TABLET BY MOUTH TWICE DAILY AS NEEDED, Disp: , Rfl:      DiphenhydrAMINE HCl (BENADRYL PO), Take 25 mg by mouth every 8 hours as needed, Disp: , Rfl:      DULoxetine (CYMBALTA) 60 MG capsule, Take 1 capsule (60 mg) by mouth 2 times daily, Disp: 180 capsule, Rfl: 1     hyoscyamine SL (LEVSIN/SL) 0.125 MG sublingual tablet, Take 1 tablet (0.125 mg) by mouth every 4 hours as needed (for GI upset - max 1.5mg/day), Disp: 30 tablet, Rfl: 1     insulin glargine (LANTUS SOLOSTAR) 100 UNIT/ML pen, INJECT 50 UNITS UNDER THE SKIN EVERY NIGHT AT BEDTIME, Disp: 45 mL, Rfl: 0     ASIYA COVID-19 VACCINE 0.5 ML injection, , Disp: , Rfl:      JANUVIA 50 MG tablet, TAKE 1 TABLET(50 MG) BY MOUTH DAILY, Disp: 90 tablet, Rfl: 0     lisinopril (ZESTRIL) 10 MG tablet, Take 1 tablet (10 mg) by mouth daily, Disp: 90 tablet, Rfl: 3     metFORMIN (GLUCOPHAGE) 1000 MG tablet, Take 1 tablet (1,000 mg) by mouth 2 times daily (with meals), Disp: 60 tablet, Rfl: 0     naloxone (NARCAN) 4 MG/0.1ML nasal spray, Spray 1 spray (4 mg) into one nostril alternating nostrils as needed for opioid reversal every 2-3 minutes until assistance arrives, Disp: 0.2 mL, Rfl: 3     oxyCODONE IR (ROXICODONE) 10 MG tablet, Take 0.5-1 tablets (5-10 mg) by mouth every 4 hours as needed for severe pain, Disp: 10 tablet, Rfl: 0     pioglitazone (ACTOS) 15 MG tablet, Take 1 tablet (15 mg) by mouth daily, Disp: 90 tablet, Rfl: 0     SUMAtriptan (IMITREX) 50 MG tablet, Take 1 tablet (50 mg) by mouth at onset of headache for migraine May repeat in 2 hours. Max 4 tablets/24 hours., Disp: 9 tablet, Rfl: 1     tiZANidine (ZANAFLEX) 4 MG tablet, TAKE 1 TABLET(4 MG) BY MOUTH AT BEDTIME, Disp: 30 tablet, Rfl: 1      Objective:  Vitals:    10/27/21 1307 10/27/21 1321   BP: (!) 142/90 138/86  "  Pulse: 72    Resp: 16    Temp: (!) 96.7  F (35.9  C)    TempSrc: Tympanic    Weight: 112.9 kg (249 lb)    Height: 1.676 m (5' 6\")        Body mass index is 40.19 kg/m .    Vital signs reviewed and stable  General: No acute distress  Psych: Appropriate affect  HEENT: moist mucous membranes, pupils equal, round, reactive to light and accomodation, tympanic membranes are pearly grey bilaterally  Lymph: no cervical or supraclavicular lymphadenopathy  Cardiovascular: regular rate and rhythm with no murmur  Pulmonary: clear to auscultation bilaterally with no wheeze  Abdomen: soft, non tender, non distended with normo-active bowel sounds  Extremities: warm and well perfused with no edema  Skin: warm and dry with no rash         This note has been dictated and transcribed using voice recognition software.   Any errors in transcription are unintentional and inherent to the software.  "

## 2021-11-02 ENCOUNTER — MYC REFILL (OUTPATIENT)
Dept: FAMILY MEDICINE | Facility: CLINIC | Age: 56
End: 2021-11-02

## 2021-11-02 DIAGNOSIS — F41.9 ANXIETY: ICD-10-CM

## 2021-11-02 RX ORDER — ALPRAZOLAM 0.5 MG
TABLET ORAL
Qty: 15 TABLET | Refills: 0 | Status: SHIPPED | OUTPATIENT
Start: 2021-11-02 | End: 2021-12-05

## 2021-11-09 ENCOUNTER — MYC REFILL (OUTPATIENT)
Dept: FAMILY MEDICINE | Facility: CLINIC | Age: 56
End: 2021-11-09
Payer: COMMERCIAL

## 2021-11-09 DIAGNOSIS — Z79.4 TYPE 2 DIABETES MELLITUS WITH HYPERGLYCEMIA, WITH LONG-TERM CURRENT USE OF INSULIN (H): ICD-10-CM

## 2021-11-09 DIAGNOSIS — E11.65 TYPE 2 DIABETES MELLITUS WITH HYPERGLYCEMIA, WITH LONG-TERM CURRENT USE OF INSULIN (H): ICD-10-CM

## 2021-11-09 RX ORDER — PIOGLITAZONEHYDROCHLORIDE 15 MG/1
15 TABLET ORAL DAILY
Qty: 90 TABLET | Refills: 0 | Status: SHIPPED | OUTPATIENT
Start: 2021-11-09 | End: 2022-02-18

## 2021-11-09 NOTE — TELEPHONE ENCOUNTER
Prescription approved per [facility] Refill Protocol.  Dimitry Neff RN

## 2021-11-19 DIAGNOSIS — E11.65 TYPE 2 DIABETES MELLITUS WITH HYPERGLYCEMIA, WITH LONG-TERM CURRENT USE OF INSULIN (H): ICD-10-CM

## 2021-11-19 DIAGNOSIS — Z79.4 TYPE 2 DIABETES MELLITUS WITH HYPERGLYCEMIA, WITH LONG-TERM CURRENT USE OF INSULIN (H): ICD-10-CM

## 2021-11-19 RX ORDER — SITAGLIPTIN 50 MG/1
TABLET, FILM COATED ORAL
Qty: 90 TABLET | Refills: 1 | OUTPATIENT
Start: 2021-11-19

## 2021-12-05 ENCOUNTER — MYC REFILL (OUTPATIENT)
Dept: FAMILY MEDICINE | Facility: CLINIC | Age: 56
End: 2021-12-05
Payer: COMMERCIAL

## 2021-12-05 DIAGNOSIS — F41.9 ANXIETY: ICD-10-CM

## 2021-12-06 RX ORDER — ALPRAZOLAM 0.5 MG
TABLET ORAL
Qty: 15 TABLET | Refills: 0 | Status: SHIPPED | OUTPATIENT
Start: 2021-12-06 | End: 2022-01-01

## 2021-12-25 ENCOUNTER — MYC REFILL (OUTPATIENT)
Dept: FAMILY MEDICINE | Facility: CLINIC | Age: 56
End: 2021-12-25
Payer: COMMERCIAL

## 2021-12-25 DIAGNOSIS — Z79.4 TYPE 2 DIABETES MELLITUS WITH HYPERGLYCEMIA, WITH LONG-TERM CURRENT USE OF INSULIN (H): ICD-10-CM

## 2021-12-25 DIAGNOSIS — E11.65 TYPE 2 DIABETES MELLITUS WITH HYPERGLYCEMIA, WITH LONG-TERM CURRENT USE OF INSULIN (H): ICD-10-CM

## 2021-12-27 RX ORDER — INSULIN GLARGINE 100 [IU]/ML
INJECTION, SOLUTION SUBCUTANEOUS
Qty: 45 ML | Refills: 0 | Status: SHIPPED | OUTPATIENT
Start: 2021-12-27 | End: 2022-03-25

## 2022-01-01 ENCOUNTER — MYC REFILL (OUTPATIENT)
Dept: FAMILY MEDICINE | Facility: CLINIC | Age: 57
End: 2022-01-01
Payer: COMMERCIAL

## 2022-01-01 DIAGNOSIS — F41.9 ANXIETY: ICD-10-CM

## 2022-01-02 RX ORDER — ALPRAZOLAM 0.5 MG
TABLET ORAL
Qty: 15 TABLET | Refills: 0 | Status: SHIPPED | OUTPATIENT
Start: 2022-01-02 | End: 2022-01-26

## 2022-01-02 NOTE — TELEPHONE ENCOUNTER
Routing alprazolam refill request to Provider because it is not on the RN refill protocol.  Thank you.  Dimitry Neff RN

## 2022-01-26 ENCOUNTER — MYC REFILL (OUTPATIENT)
Dept: FAMILY MEDICINE | Facility: CLINIC | Age: 57
End: 2022-01-26
Payer: COMMERCIAL

## 2022-01-26 DIAGNOSIS — F41.9 ANXIETY: ICD-10-CM

## 2022-01-26 RX ORDER — ALPRAZOLAM 0.5 MG
TABLET ORAL
Qty: 15 TABLET | Refills: 0 | Status: SHIPPED | OUTPATIENT
Start: 2022-01-26 | End: 2022-02-11

## 2022-01-26 NOTE — TELEPHONE ENCOUNTER
Routing refill request to provider for review/approval because:  Drug not on the [facility] refill protocol   Thank you.  Dimitry Neff RN

## 2022-01-28 ENCOUNTER — HOSPITAL ENCOUNTER (OUTPATIENT)
Facility: HOSPITAL | Age: 57
Setting detail: OBSERVATION
Discharge: HOME | End: 2022-01-30
Attending: EMERGENCY MEDICINE | Admitting: STUDENT IN AN ORGANIZED HEALTH CARE EDUCATION/TRAINING PROGRAM
Payer: COMMERCIAL

## 2022-01-28 ENCOUNTER — NURSE TRIAGE (OUTPATIENT)
Dept: FAMILY MEDICINE | Facility: CLINIC | Age: 57
End: 2022-01-28
Payer: COMMERCIAL

## 2022-01-28 ENCOUNTER — APPOINTMENT (OUTPATIENT)
Dept: CT IMAGING | Facility: HOSPITAL | Age: 57
End: 2022-01-28
Attending: EMERGENCY MEDICINE
Payer: COMMERCIAL

## 2022-01-28 DIAGNOSIS — U07.1 INFECTION DUE TO 2019 NOVEL CORONAVIRUS: ICD-10-CM

## 2022-01-28 DIAGNOSIS — I16.0 HYPERTENSIVE URGENCY: Primary | ICD-10-CM

## 2022-01-28 DIAGNOSIS — R53.83 OTHER FATIGUE: ICD-10-CM
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
## 2022-01-28 PROBLEM — E11.9 DIABETES MELLITUS, TYPE 2 (H): Status: ACTIVE | Noted: 2020-12-30

## 2022-01-28 LAB
ABO/RH(D): NORMAL
ALBUMIN SERPL-MCNC: 4.3 G/DL (ref 3.5–5)
ALBUMIN UR-MCNC: NEGATIVE MG/DL
ALP SERPL-CCNC: 99 U/L (ref 45–120)
ALT SERPL W P-5'-P-CCNC: 36 U/L (ref 0–45)
AMMONIA PLAS-SCNC: 27 UMOL/L (ref 11–35)
ANION GAP SERPL CALCULATED.3IONS-SCNC: 12 MMOL/L (ref 5–18)
APPEARANCE UR: CLEAR
APTT PPP: 24 SECONDS (ref 22–38)
AST SERPL W P-5'-P-CCNC: 31 U/L (ref 0–40)
BILIRUB DIRECT SERPL-MCNC: <0.1 MG/DL
BILIRUB SERPL-MCNC: 0.2 MG/DL (ref 0–1)
BILIRUB UR QL STRIP: NEGATIVE
BUN SERPL-MCNC: 13 MG/DL (ref 8–22)
C REACTIVE PROTEIN LHE: 0.9 MG/DL (ref 0–0.8)
CALCIUM SERPL-MCNC: 9.7 MG/DL (ref 8.5–10.5)
CHLORIDE BLD-SCNC: 105 MMOL/L (ref 98–107)
CK SERPL-CCNC: 80 U/L (ref 30–190)
CO2 SERPL-SCNC: 22 MMOL/L (ref 22–31)
COLOR UR AUTO: ABNORMAL
CREAT SERPL-MCNC: 0.82 MG/DL (ref 0.7–1.3)
D DIMER PPP FEU-MCNC: 0.37 UG/ML FEU (ref 0–0.5)
ERYTHROCYTE [DISTWIDTH] IN BLOOD BY AUTOMATED COUNT: 13.9 % (ref 10–15)
ETHANOL SERPL-MCNC: <10 MG/DL
FIBRINOGEN PPP-MCNC: 384 MG/DL (ref 170–490)
FLUAV RNA SPEC QL NAA+PROBE: NEGATIVE
FLUBV RNA RESP QL NAA+PROBE: NEGATIVE
GFR SERPL CREATININE-BSD FRML MDRD: >90 ML/MIN/1.73M2
GLUCOSE BLD-MCNC: 245 MG/DL (ref 70–125)
GLUCOSE BLDC GLUCOMTR-MCNC: 157 MG/DL (ref 70–99)
GLUCOSE UR STRIP-MCNC: >1000 MG/DL
HCT VFR BLD AUTO: 45.5 % (ref 40–53)
HGB BLD-MCNC: 14.8 G/DL (ref 13.3–17.7)
HGB UR QL STRIP: NEGATIVE
INR PPP: 0.89 (ref 0.9–1.15)
KETONES UR STRIP-MCNC: NEGATIVE MG/DL
LACTATE SERPL-SCNC: 1.8 MMOL/L (ref 0.7–2)
LEUKOCYTE ESTERASE UR QL STRIP: NEGATIVE
MCH RBC QN AUTO: 29.6 PG (ref 26.5–33)
MCHC RBC AUTO-ENTMCNC: 32.5 G/DL (ref 31.5–36.5)
MCV RBC AUTO: 91 FL (ref 78–100)
MUCOUS THREADS #/AREA URNS LPF: PRESENT /LPF
NITRATE UR QL: NEGATIVE
PH UR STRIP: 5 [PH] (ref 5–7)
PLATELET # BLD AUTO: 296 10E3/UL (ref 150–450)
POTASSIUM BLD-SCNC: 4.2 MMOL/L (ref 3.5–5)
PROT SERPL-MCNC: 8.3 G/DL (ref 6–8)
RBC # BLD AUTO: 5 10E6/UL (ref 4.4–5.9)
RBC URINE: 1 /HPF
RETICS # AUTO: 0.07 10E6/UL (ref 0.01–0.11)
RETICS/RBC NFR AUTO: 1.4 % (ref 0.8–2.7)
SARS-COV-2 RNA RESP QL NAA+PROBE: POSITIVE
SODIUM SERPL-SCNC: 139 MMOL/L (ref 136–145)
SP GR UR STRIP: 1.03 (ref 1–1.03)
SPECIMEN EXPIRATION DATE: NORMAL
SQUAMOUS EPITHELIAL: <1 /HPF
TRANSITIONAL EPI: <1 /HPF
TROPONIN I SERPL-MCNC: 0.01 NG/ML (ref 0–0.29)
TROPONIN I SERPL-MCNC: 0.02 NG/ML (ref 0–0.29)
UROBILINOGEN UR STRIP-MCNC: <2 MG/DL
WBC # BLD AUTO: 7.7 10E3/UL (ref 4–11)
WBC URINE: 0 /HPF

## 2022-01-28 PROCEDURE — 85045 AUTOMATED RETICULOCYTE COUNT: CPT | Performed by: HOSPITALIST

## 2022-01-28 PROCEDURE — 82550 ASSAY OF CK (CPK): CPT | Performed by: HOSPITALIST

## 2022-01-28 PROCEDURE — 96361 HYDRATE IV INFUSION ADD-ON: CPT

## 2022-01-28 PROCEDURE — 85610 PROTHROMBIN TIME: CPT | Performed by: EMERGENCY MEDICINE

## 2022-01-28 PROCEDURE — 36415 COLL VENOUS BLD VENIPUNCTURE: CPT | Performed by: EMERGENCY MEDICINE

## 2022-01-28 PROCEDURE — 85027 COMPLETE CBC AUTOMATED: CPT | Performed by: EMERGENCY MEDICINE

## 2022-01-28 PROCEDURE — 36415 COLL VENOUS BLD VENIPUNCTURE: CPT | Performed by: HOSPITALIST

## 2022-01-28 PROCEDURE — 83529 ASAY OF INTERLEUKIN-6 (IL-6): CPT | Performed by: HOSPITALIST

## 2022-01-28 PROCEDURE — 87636 SARSCOV2 & INF A&B AMP PRB: CPT | Performed by: EMERGENCY MEDICINE

## 2022-01-28 PROCEDURE — G0378 HOSPITAL OBSERVATION PER HR: HCPCS

## 2022-01-28 PROCEDURE — 85730 THROMBOPLASTIN TIME PARTIAL: CPT | Performed by: EMERGENCY MEDICINE

## 2022-01-28 PROCEDURE — 84484 ASSAY OF TROPONIN QUANT: CPT | Performed by: EMERGENCY MEDICINE

## 2022-01-28 PROCEDURE — 258N000003 HC RX IP 258 OP 636: Performed by: EMERGENCY MEDICINE

## 2022-01-28 PROCEDURE — 99220 PR INITIAL OBSERVATION CARE,LEVEL III: CPT | Performed by: HOSPITALIST

## 2022-01-28 PROCEDURE — 82248 BILIRUBIN DIRECT: CPT | Performed by: EMERGENCY MEDICINE

## 2022-01-28 PROCEDURE — 82077 ASSAY SPEC XCP UR&BREATH IA: CPT | Performed by: EMERGENCY MEDICINE

## 2022-01-28 PROCEDURE — C9803 HOPD COVID-19 SPEC COLLECT: HCPCS

## 2022-01-28 PROCEDURE — 71275 CT ANGIOGRAPHY CHEST: CPT

## 2022-01-28 PROCEDURE — 250N000011 HC RX IP 250 OP 636: Performed by: EMERGENCY MEDICINE

## 2022-01-28 PROCEDURE — 93005 ELECTROCARDIOGRAM TRACING: CPT | Performed by: EMERGENCY MEDICINE

## 2022-01-28 PROCEDURE — 250N000013 HC RX MED GY IP 250 OP 250 PS 637: Performed by: HOSPITALIST

## 2022-01-28 PROCEDURE — 70450 CT HEAD/BRAIN W/O DYE: CPT

## 2022-01-28 PROCEDURE — 99285 EMERGENCY DEPT VISIT HI MDM: CPT | Mod: 25

## 2022-01-28 PROCEDURE — 85379 FIBRIN DEGRADATION QUANT: CPT | Performed by: HOSPITALIST

## 2022-01-28 PROCEDURE — 250N000011 HC RX IP 250 OP 636: Performed by: HOSPITALIST

## 2022-01-28 PROCEDURE — 96372 THER/PROPH/DIAG INJ SC/IM: CPT | Mod: 59 | Performed by: HOSPITALIST

## 2022-01-28 PROCEDURE — 84484 ASSAY OF TROPONIN QUANT: CPT | Performed by: HOSPITALIST

## 2022-01-28 PROCEDURE — 85300 ANTITHROMBIN III ACTIVITY: CPT | Performed by: HOSPITALIST

## 2022-01-28 PROCEDURE — 87040 BLOOD CULTURE FOR BACTERIA: CPT | Performed by: EMERGENCY MEDICINE

## 2022-01-28 PROCEDURE — 81001 URINALYSIS AUTO W/SCOPE: CPT | Performed by: EMERGENCY MEDICINE

## 2022-01-28 PROCEDURE — 96374 THER/PROPH/DIAG INJ IV PUSH: CPT | Mod: 59

## 2022-01-28 PROCEDURE — 82728 ASSAY OF FERRITIN: CPT | Performed by: HOSPITALIST

## 2022-01-28 PROCEDURE — 84145 PROCALCITONIN (PCT): CPT | Performed by: HOSPITALIST

## 2022-01-28 PROCEDURE — 86901 BLOOD TYPING SEROLOGIC RH(D): CPT | Performed by: HOSPITALIST

## 2022-01-28 PROCEDURE — 85384 FIBRINOGEN ACTIVITY: CPT | Performed by: HOSPITALIST

## 2022-01-28 PROCEDURE — 86140 C-REACTIVE PROTEIN: CPT | Performed by: HOSPITALIST

## 2022-01-28 PROCEDURE — 82962 GLUCOSE BLOOD TEST: CPT

## 2022-01-28 PROCEDURE — 80307 DRUG TEST PRSMV CHEM ANLYZR: CPT | Performed by: EMERGENCY MEDICINE

## 2022-01-28 PROCEDURE — 82140 ASSAY OF AMMONIA: CPT | Performed by: EMERGENCY MEDICINE

## 2022-01-28 PROCEDURE — 83615 LACTATE (LD) (LDH) ENZYME: CPT | Performed by: HOSPITALIST

## 2022-01-28 PROCEDURE — 83605 ASSAY OF LACTIC ACID: CPT | Performed by: EMERGENCY MEDICINE

## 2022-01-28 RX ORDER — PIOGLITAZONEHYDROCHLORIDE 15 MG/1
15 TABLET ORAL DAILY
Status: DISCONTINUED | OUTPATIENT
Start: 2022-01-29 | End: 2022-01-30 | Stop reason: HOSPADM

## 2022-01-28 RX ORDER — DEXTROSE MONOHYDRATE 25 G/50ML
25-50 INJECTION, SOLUTION INTRAVENOUS
Status: DISCONTINUED | OUTPATIENT
Start: 2022-01-28 | End: 2022-01-30 | Stop reason: HOSPADM

## 2022-01-28 RX ORDER — OXYCODONE HYDROCHLORIDE 10 MG/1
10 TABLET ORAL EVERY 6 HOURS PRN
COMMUNITY

## 2022-01-28 RX ORDER — NALOXONE HYDROCHLORIDE 0.4 MG/ML
0.4 INJECTION, SOLUTION INTRAMUSCULAR; INTRAVENOUS; SUBCUTANEOUS
Status: DISCONTINUED | OUTPATIENT
Start: 2022-01-28 | End: 2022-01-30 | Stop reason: HOSPADM

## 2022-01-28 RX ORDER — OXYCODONE HYDROCHLORIDE 5 MG/1
10 TABLET ORAL EVERY 6 HOURS PRN
Status: DISCONTINUED | OUTPATIENT
Start: 2022-01-28 | End: 2022-01-29

## 2022-01-28 RX ORDER — NALOXONE HYDROCHLORIDE 0.4 MG/ML
0.2 INJECTION, SOLUTION INTRAMUSCULAR; INTRAVENOUS; SUBCUTANEOUS
Status: DISCONTINUED | OUTPATIENT
Start: 2022-01-28 | End: 2022-01-30 | Stop reason: HOSPADM

## 2022-01-28 RX ORDER — ACETAMINOPHEN 650 MG/1
650 SUPPOSITORY RECTAL EVERY 6 HOURS PRN
Status: DISCONTINUED | OUTPATIENT
Start: 2022-01-28 | End: 2022-01-30 | Stop reason: HOSPADM

## 2022-01-28 RX ORDER — LISINOPRIL 5 MG/1
10 TABLET ORAL DAILY
Status: DISCONTINUED | OUTPATIENT
Start: 2022-01-29 | End: 2022-01-29

## 2022-01-28 RX ORDER — SODIUM CHLORIDE 9 MG/ML
INJECTION, SOLUTION INTRAVENOUS CONTINUOUS
Status: ACTIVE | OUTPATIENT
Start: 2022-01-28 | End: 2022-01-29

## 2022-01-28 RX ORDER — DULOXETIN HYDROCHLORIDE 60 MG/1
60 CAPSULE, DELAYED RELEASE ORAL 2 TIMES DAILY
Status: DISCONTINUED | OUTPATIENT
Start: 2022-01-28 | End: 2022-01-30 | Stop reason: HOSPADM

## 2022-01-28 RX ORDER — POLYETHYLENE GLYCOL 3350 17 G/17G
17 POWDER, FOR SOLUTION ORAL DAILY
Status: DISCONTINUED | OUTPATIENT
Start: 2022-01-28 | End: 2022-01-30 | Stop reason: HOSPADM

## 2022-01-28 RX ORDER — HYDRALAZINE HYDROCHLORIDE 20 MG/ML
10 INJECTION INTRAMUSCULAR; INTRAVENOUS EVERY 4 HOURS PRN
Status: DISCONTINUED | OUTPATIENT
Start: 2022-01-28 | End: 2022-01-29

## 2022-01-28 RX ORDER — ACETAMINOPHEN 325 MG/1
650 TABLET ORAL EVERY 6 HOURS PRN
Status: DISCONTINUED | OUTPATIENT
Start: 2022-01-28 | End: 2022-01-30 | Stop reason: HOSPADM

## 2022-01-28 RX ORDER — NICOTINE POLACRILEX 4 MG
15-30 LOZENGE BUCCAL
Status: DISCONTINUED | OUTPATIENT
Start: 2022-01-28 | End: 2022-01-30 | Stop reason: HOSPADM

## 2022-01-28 RX ORDER — ONDANSETRON 2 MG/ML
4 INJECTION INTRAMUSCULAR; INTRAVENOUS EVERY 6 HOURS PRN
Status: DISCONTINUED | OUTPATIENT
Start: 2022-01-28 | End: 2022-01-30 | Stop reason: HOSPADM

## 2022-01-28 RX ORDER — IOPAMIDOL 755 MG/ML
100 INJECTION, SOLUTION INTRAVASCULAR ONCE
Status: COMPLETED | OUTPATIENT
Start: 2022-01-28 | End: 2022-01-28

## 2022-01-28 RX ORDER — ONDANSETRON 4 MG/1
4 TABLET, ORALLY DISINTEGRATING ORAL EVERY 6 HOURS PRN
Status: DISCONTINUED | OUTPATIENT
Start: 2022-01-28 | End: 2022-01-30 | Stop reason: HOSPADM

## 2022-01-28 RX ORDER — LIDOCAINE 40 MG/G
CREAM TOPICAL
Status: DISCONTINUED | OUTPATIENT
Start: 2022-01-28 | End: 2022-01-30 | Stop reason: HOSPADM

## 2022-01-28 RX ADMIN — SODIUM CHLORIDE 1000 ML: 9 INJECTION, SOLUTION INTRAVENOUS at 18:03

## 2022-01-28 RX ADMIN — IOPAMIDOL 100 ML: 755 INJECTION, SOLUTION INTRAVENOUS at 19:27

## 2022-01-28 RX ADMIN — HYDRALAZINE HYDROCHLORIDE 10 MG: 20 INJECTION, SOLUTION INTRAMUSCULAR; INTRAVENOUS at 22:35

## 2022-01-28 RX ADMIN — OXYCODONE HYDROCHLORIDE 10 MG: 5 TABLET ORAL at 23:54

## 2022-01-28 RX ADMIN — ENOXAPARIN SODIUM 40 MG: 40 INJECTION SUBCUTANEOUS at 22:32

## 2022-01-28 NOTE — ED PROVIDER NOTES
"EMERGENCY DEPARTMENT ENCOUNTER      NAME: Obed Nickerson  AGE: 56 year old male  YOB: 1965  MRN: [id number]  EVALUATION DATE & TIME: No admission date for patient encounter.    PCP: Rocío Brandt    ED PROVIDER: Bailey Han M.D.      Chief Complaint   Patient presents with     Head Injury     Altered Mental Status         FINAL IMPRESSION:  1. Infection due to 2019 novel coronavirus    2. Other fatigue    3. Fall, initial encounter          ED COURSE & MEDICAL DECISION MAKING:    ED Course as of 01/28/22 2057 Fri Jan 28, 2022   1716 Pt atypically tachycardic with known HTN with 2x syncope vs. \"falling asleep\" on toilet last night, seen in private waiting room area to expedite care, trauma alert with somnolence today with head strike x2, neurovascularly intact but tired appearing on exam, will CTA  r/o PE with syncope and  with PERC+ and wells PE 4, troponin, EKG and clinically reassess, pt and wife amenable to plan. EKG ordered, RN updated re: need for EKG stat, cardiac telemetry ordered, pt trauma alert and CT head reassuringly negative, COVID19 PCR underway   2006 UA reassuringly without signs of UTI. BMP with glucose 245 with known Dm2, Co2 normal at 22 and anion gap 12 thus no signs of DKA. LFTs WNL, troponin negative, EtOH negative and no EtOH per him, CBC normal. Ammonia normal range, cultures pending and in process. EKG fairly similar to 2017, CTA without PE and with findings to suggest COVID19 pneumonia overall but sat 97% RA which is very much reassuring, COVID19 PCR in process. Pt's wife tested + COVID19 a few weeks ago and he had symptoms at that time, now improving per family overall and so presumed + for COVID19 at that time. Will reassess now, HR now improved and on cardiac monitor.   2018 Pt reassessed, COVID19+ here still, amenable to admission with falls/fatigue/syncope and higher risk overall, got J&J vaccine remotely but no booster yet, patient and wife updated " and hospitalist paged   8271 Pt endorsed to hospitalist Dr Oviedo to [facility] obs       5:06 PM I met with the patient to gather history and to perform my initial exam. I discussed the plan for care while in the Emergency Department.   8:14 PM I re-evaluated and updated patient.   8:55 PM I discussed the case with hospitalist, Dr. Oviedo, who accepts the patient for admission.       Pertinent Labs & Imaging studies reviewed. (See chart for details)    N95 worn  A face shield was worn also  COVID PPE      At the conclusion of the encounter I discussed the results of all of the tests and the disposition. The questions were answered. The patient or family acknowledged understanding and was agreeable with the care plan.     MEDICATIONS GIVEN IN THE EMERGENCY:  Medications   0.9% sodium chloride BOLUS (1,000 mLs Intravenous New Bag 1/28/22 1803)   iopamidol (ISOVUE-370) solution 100 mL (100 mLs Intravenous Given 1/28/22 1927)       NEW PRESCRIPTIONS STARTED AT TODAY'S ER VISIT  New Prescriptions    No medications on file          =================================================================    HPI      Obed Nickerson is a 56 year old male with PMHx of HTN, HLD, DM II, morbid obesity, who presents to the ED today via walk in with head injury.    Patient states during the night last night he woke up around 0300 to have a bowel movement. He then fell asleep on the toilet and fell, hitting his head to the right forehead. Patient went back to bed and then got up 30 minutes later to have another bowel movement. Patient states he fell asleep on the toilet and hit his head again in the same spot. Patient states it is not abnormal to get up twice in the night to use the bathroom but he denies a history of similar episodes. Denies any dizziness or lightheadedness prior to these episodes. Patient is not on blood thinners.    Of note, patient states he had some difficulty sleeping last night and took 2 xanax before bed. He states  he had his prescription refilled yesterday and denies any recent medication changes. Patient slept normally last night but has had increased tiredness throughout the day today. He reports typically going through 15 xanax pills every 1-1.5 months and has taken 2 xanax previously without any similar symptoms. Patient states he is taking additional medications as prescribed.    Patient does note his household had COVID last month although they never got tested. Patient feels improved but notes a persistent loss of taste. He otherwise denies recent illness and denies fever, cough, vomiting, diarrhea, chest pain, shortness of breath, rash, skin color change, or additional medical concerns or complaints at this time.      REVIEW OF SYSTEMS   All other systems reviewed and are negative except as noted above in HPI.    PAST MEDICAL HISTORY:  Past Medical History:   Diagnosis Date     Chronic pain syndrome 1/4/2008    Previously followed by Dr. Moran at [facility], then left in 2008, then started seeing Dr. Lo at [facility] in [place].  Plans to continue with this provider.  Here to establish care/PCP, does not want/need me to manage his chronic narcotics or pain.    Had neck injury in 2000, and is s/p Cervical Fusion x 2 (2-6 presently).  Currently on Vicodin 5mg/325 QID.  Feels this helps the pain, though does not completely control pain.  He has been MS Contin, and OxyContin (briefly).  Had been on Neurontin (x 1 week- excessive SE), Amitriptyline, Tegretol (mental slow).  Has not tried Effexor or Cymbalta.   Is s/p SCS for lumbar radiculitis- which did not work and had it removed.  Has tried PT and numerous spine injection  Plans for some Carpal Tunnel surgery (pending EMG soon).  Has been told that he could have spine surgery, but is holding off surgery for as long as he can.        Tinnitus of both ears 1/16/2014    Pt reports life-long ringing in both ears.  Was raised around farm equipment  and airplanes.  Feels these are major contributors.  Has had audiology recently, per pt has mild hearing loss left worse than right and positive for tinnitus.  However they said there was no treatment available for tinnitus.          PAST SURGICAL HISTORY:  Past Surgical History:   Procedure Laterality Date     ?? previous implanted morphine pump??  during 1990's    eventually explanted due to infection     APPENDECTOMY       APPENDECTOMY OPEN       BACK SURGERY       FUSION CERVICAL ANTERIOR THREE+ LEVELS      C2-6     NECK SURGERY       OTHER SURGICAL HISTORY      Multiple surgeries to right lower leg     OTHER SURGICAL HISTORY      rotator cuff surgery, right     right lower leg limb reattachment       skin grafts      many       CURRENT MEDICATIONS:    Alcohol Swabs (ALCOHOL PREP) 70 % PADS  ALPRAZolam (XANAX) 0.5 MG tablet  AMITIZA 8 MCG capsule  ASPIRIN PO  BD ULTRA FINE PEN NEEDLES  BELBUCA 900 MCG FILM buccal film  Continuous Blood Gluc Sensor (FREESTYLE YOKO 14 DAY SENSOR) MISC  Continuous Blood Gluc Sensor (FREESTYLE YOKO 14 DAY SENSOR) MISC  dicyclomine (BENTYL) 20 MG tablet  DiphenhydrAMINE HCl (BENADRYL PO)  DULoxetine (CYMBALTA) 60 MG capsule  hyoscyamine SL (LEVSIN/SL) 0.125 MG sublingual tablet  insulin glargine (LANTUS SOLOSTAR) 100 UNIT/ML pen  Voxa COVID-19 VACCINE 0.5 ML injection  lisinopril (ZESTRIL) 10 MG tablet  metFORMIN (GLUCOPHAGE) 1000 MG tablet  naloxone (NARCAN) 4 MG/0.1ML nasal spray  oxyCODONE IR (ROXICODONE) 10 MG tablet  pioglitazone (ACTOS) 15 MG tablet  sitagliptin (JANUVIA) 50 MG tablet  SUMAtriptan (IMITREX) 50 MG tablet  tiZANidine (ZANAFLEX) 4 MG tablet        ALLERGIES:  Allergies   Allergen Reactions     Gabapentin      Other reaction(s): Angioedema, Edema  Lip numbness, tongue swelling  Tongue swells   Nose itches     Ketorolac Swelling     Tongue swelling.    Both for injection and oral       Phenothiazines Nausea and Vomiting     compazine  compazine       Compazine  [Prochlorperazine] Nausea     And jittery     Demerol Nausea     Naproxen Nausea and Vomiting     Statins [Hmg-Coa-R Inhibitors]      Memory loss     Tolmetin Nausea and Vomiting     Ultram [Tramadol] Itching       FAMILY HISTORY:  Family History   Problem Relation Age of Onset     C.A.D. Mother 60     C.A.D. Father 72     Cerebrovascular Disease Father      Breast Cancer No family hx of      Cancer - colorectal No family hx of      Prostate Cancer Father         at 67     Heart Disease Mother      Diabetes Father      Diabetes Sister      Hypertension Sister      Diabetes Brother      Hypertension Brother      Prostate Cancer Paternal Grandfather        SOCIAL HISTORY:   Social History     Socioeconomic History     Marital status:      Spouse name: Not on file     Number of children: Not on file     Years of education: Not on file     Highest education level: Not on file   Occupational History     Not on file   Tobacco Use     Smoking status: Never Smoker     Smokeless tobacco: Never Used   Substance and Sexual Activity     Alcohol use: No     Drug use: No     Sexual activity: Not on file   Other Topics Concern     Not on file   Social History Narrative     Not on file     Social Determinants of Health     Financial Resource Strain: Not on file   Food Insecurity: Not on file   Transportation Needs: Not on file   Physical Activity: Not on file   Stress: Not on file   Social Connections: Not on file   Intimate Partner Violence: Not on file   Housing Stability: Not on file       VITALS:  Patient Vitals for the past 24 hrs:   BP Temp Temp src Pulse Resp SpO2 Weight   01/28/22 1604 (!) 187/109 97.8  F (36.6  C) Temporal 120 18 97 % 108.9 kg (240 lb)       PHYSICAL EXAM    PHYSICAL EXAM    VITAL SIGNS: BP (!) 187/109   Pulse 120   Temp 97.8  F (36.6  C) (Temporal)   Resp 18   Wt 108.9 kg (240 lb)   SpO2 97%   BMI 38.74 kg/m     GENERAL: Awake, alert.  In no acute distress. GCS 15  HEENT: Right frontal  forehead contusion without bleeding, slightly scabbed over.  Pupils equal, round and reactive. Conjunctiva normal.  EOMI. No jones sign, no racoon eyes, no mastoid tenderness, no hemotympanum, no facial instability, no nasal bridge pain, no nasal septal hematoma, no intraoral lacerations, no loose teeth, no mandible pain or deformity  NECK: No stridor or apparent deformity. No midline pain to palpation.  PULMONARY: Symmetrical breath sounds without distress.  Lungs clear to auscultation bilaterally without wheezes, rhonchi or rales.  CARDIO: Rapid regular heart rate.  No significant murmur, rub or gallop.  Radial pulses strong and symmetrical.  THORAX: No focal chest wall deformity or crepitus  BACK: No focal tenderness or deformity to each vertebral level in midline  ABDOMINAL: Abdomen soft, non-distended and non-tender to palpation.  No CVAT, BL.  EXTREMITIES: No lower extremity swelling or edema. Pelvis stable and without focal tenderness. Bilateral pedal pulses 2+ and equal.  NEURO: Alert and oriented to person, place and time.  Cranial nerves grossly intact.  No focal motor deficit. Sensation globally intact.  PSYCH: Normal mood and affect  SKIN: No rashes     LAB:  All pertinent labs reviewed and interpreted.  Results for orders placed or performed during the hospital encounter of 01/28/22   Head CT w/o contrast    Impression    IMPRESSION:  1.  No acute intracranial process.  2.  Right frontal scalp swelling. No skull fracture.  3.  Mild inflammatory changes of the paranasal sinuses.   CT Chest Pulmonary Embolism w Contrast    Impression    IMPRESSION:  1.  No pulmonary embolus.  2.  Covid pneumonia.  3.  Hepatic steatosis.   CBC (+ platelets, no diff)   Result Value Ref Range    WBC Count 7.7 4.0 - 11.0 10e3/uL    RBC Count 5.00 4.40 - 5.90 10e6/uL    Hemoglobin 14.8 13.3 - 17.7 g/dL    Hematocrit 45.5 40.0 - 53.0 %    MCV 91 78 - 100 fL    MCH 29.6 26.5 - 33.0 pg    MCHC 32.5 31.5 - 36.5 g/dL    RDW 13.9  10.0 - 15.0 %    Platelet Count 296 150 - 450 10e3/uL   Basic metabolic panel   Result Value Ref Range    Sodium 139 136 - 145 mmol/L    Potassium 4.2 3.5 - 5.0 mmol/L    Chloride 105 98 - 107 mmol/L    Carbon Dioxide (CO2) 22 22 - 31 mmol/L    Anion Gap 12 5 - 18 mmol/L    Urea Nitrogen 13 8 - 22 mg/dL    Creatinine 0.82 0.70 - 1.30 mg/dL    Calcium 9.7 8.5 - 10.5 mg/dL    Glucose 245 (H) 70 - 125 mg/dL    GFR Estimate >90 >60 mL/min/1.73m2   Result Value Ref Range    INR 0.89 (L) 0.90 - 1.15   Result Value Ref Range    aPTT 24 22 - 38 Seconds   Hepatic function panel   Result Value Ref Range    Bilirubin Total 0.2 0.0 - 1.0 mg/dL    Bilirubin Direct <0.1 <=0.5 mg/dL    Protein Total 8.3 (H) 6.0 - 8.0 g/dL    Albumin 4.3 3.5 - 5.0 g/dL    Alkaline Phosphatase 99 45 - 120 U/L    AST 31 0 - 40 U/L    ALT 36 0 - 45 U/L   Ammonia (on ice)   Result Value Ref Range    Ammonia 27 11 - 35 umol/L   Result Value Ref Range    Troponin I 0.01 0.00 - 0.29 ng/mL   Ethyl Alcohol Level   Result Value Ref Range    Alcohol, Blood <10 None detected mg/dL   UA with Microscopic reflex to Culture    Specimen: Urine, Clean Catch   Result Value Ref Range    Color Urine Light Yellow Colorless, Straw, Light Yellow, Yellow    Appearance Urine Clear Clear    Glucose Urine >1000 (A) Negative mg/dL    Bilirubin Urine Negative Negative    Ketones Urine Negative Negative mg/dL    Specific Gravity Urine 1.026 1.001 - 1.030    Blood Urine Negative Negative    pH Urine 5.0 5.0 - 7.0    Protein Albumin Urine Negative Negative mg/dL    Urobilinogen Urine <2.0 <2.0 mg/dL    Nitrite Urine Negative Negative    Leukocyte Esterase Urine Negative Negative    Mucus Urine Present (A) None Seen /LPF    RBC Urine 1 <=2 /HPF    WBC Urine 0 <=5 /HPF    Squamous Epithelials Urine <1 <=1 /HPF    Transitional Epithelials Urine <1 <=1 /HPF   Symptomatic; Unknown Influenza A/B & SARS-CoV2 (COVID-19) Virus PCR Multiplex Nasopharyngeal    Specimen: Nasopharyngeal; Swab    Result Value Ref Range    Influenza A PCR Negative Negative    Influenza B PCR Negative Negative    SARS CoV2 PCR Positive (A) Negative   Lactic acid whole blood   Result Value Ref Range    Lactic Acid 1.8 0.7 - 2.0 mmol/L       RADIOLOGY:  Reviewed all pertinent imaging. Please see official radiology report.  CT Chest Pulmonary Embolism w Contrast   Final Result   IMPRESSION:   1.  No pulmonary embolus.   2.  Covid pneumonia.   3.  Hepatic steatosis.      Head CT w/o contrast   Final Result   IMPRESSION:   1.  No acute intracranial process.   2.  Right frontal scalp swelling. No skull fracture.   3.  Mild inflammatory changes of the paranasal sinuses.            EKG:    Reviewed and interpreted as: 1826 sinus tachycardia  without ST abnormalities,similar to prior EKG from April 3, 2017 except T wave inversion lead III now present but otherwise very similar, and T inversion isolated      I have independently reviewed and interpreted the EKG(s) documented above.        I, Jia Mcghee, am serving as a scribe to document services personally performed by Dr. Bailey Han based on my observation and the provider's statements to me. I, Bailey Han MD attest that Jia Mcghee is acting in a scribe capacity, has observed my performance of the services and has documented them in accordance with my direction.       Bailey Han MD  01/28/22 2057

## 2022-01-28 NOTE — TELEPHONE ENCOUNTER
See triage protocol below.  Did not gather any other information than what below, as advised patients wife to call 911.    CALL  NOW: Immediate medical attention is needed. You need to hang up and call 911 (or an ambulance).    Patient has been sleeping most of the day.  He is extremely fatigued, lightheaded, dizzy.  His BP is also 247/150 (per his wife).    Patients wife refused to call 911, and stated that they are 2 minutes from ER and they will go now.  Advised her that his symptoms are emergent and could be life threatening.    Patients wife stated understanding and agreeable.    Bruna RN,BSN  Triage Nurse  [facility]: [facility]    Reason for Disposition    Sounds like a life-threatening emergency to the triager    Additional Information    Negative: ACUTE NEUROLOGIC SYMPTOM and symptom present now    Negative: Knocked out (unconscious) > 1 minute    Negative: Seizure (convulsion) occurred (Exception: prior history of seizures and now alert and without Acute Neurologic Symptoms)    Negative: Neck pain after dangerous injury (e.g., MVA, diving, trampoline, contact sports, fall > 10 feet or 3 meters) (Exception: neck pain began > 1 hour after injury)    Negative: Major bleeding (actively dripping or spurting) that can't be stopped    Negative: Penetrating head injury (e.g., knife, gunshot wound, metal object)    Negative: Can't remember what happened (amnesia)    Negative: Vomiting once or more    Negative: Watery or blood-tinged fluid dripping from the nose or ears    Negative: Severe headache    Negative: Dangerous injury (e.g., MVA, diving, trampoline, contact sports, fall > 10 feet or 3 meters) or severe blow from hard object (e.g., golf club or baseball bat)    Negative: Large swelling or bruise > 2 inches (5 cm)    Negative: Skin is split open or gaping (length > 1/2 inch or 12 mm)    Answer Assessment - Initial Assessment Questions  Information below gathered per patients wife.    1.  "MECHANISM: \"How did the injury happen?\" For falls, ask: \"What height did you fall from?\" and \"What surface did you fall against?\"      He tripped and fell forward and hit the front if his head, hard on the tub  2. ONSET: \"When did the injury happen?\" (Minutes or hours ago)       yesterdat  3. NEUROLOGIC SYMPTOMS: \"Was there any loss of consciousness?\" \"Are there any other neurological symptoms?\"       Denies LOC.  He is very sleepy, and he has kept falling a sleep all day.  4. MENTAL STATUS: \"Does the person know who he is, who you are, and where he is?\"       Is is oriented to person, place, and time  5. LOCATION: \"What part of the head was hit?\"       front  6. SCALP APPEARANCE: \"What does the scalp look like? Is it bleeding now?\" If so, ask: \"Is it difficult to stop?\"       Large bump  7. SIZE: For cuts, bruises, or swelling, ask: \"How large is it?\" (e.g., inches or centimeters)       large  8. PAIN: \"Is there any pain?\" If so, ask: \"How bad is it?\"  (e.g., Scale 1-10; or mild, moderate, severe)      yes  9. TETANUS: For any breaks in the skin, ask: \"When was the last tetanus booster?\"      unknown  10. OTHER SYMPTOMS: \"Do you have any other symptoms?\" (e.g., neck pain, vomiting)        His BP right now is 247/150 also. He is dizzy and lightheaded    Protocols used: HEAD INJURY-A-OH    "

## 2022-01-28 NOTE — ED TRIAGE NOTES
"Patient arrives with wife from home.  Reported that patient has been \"very sleepy\" x 2 days.  Yesterday was sitting on the toilet when he fell forward striking his head.  Denies any LOC.  Denies any blood thinning medications.  Patient reports has pain where he hit his head- denies any vision changes or N/V.  Patient appears very unsteady on his feet.  Denies any ETOH use.  Denies any blood thinning medications.  PERRLA.  Patient alert and oriented x 4 upon arrival. GCS 15.   "

## 2022-01-28 NOTE — ED PROVIDER NOTES
ED Triage Provider Note  [facility]  Encounter Date: Jan 28, 2022    History:  Chief Complaint   Patient presents with     Head Injury     Altered Mental Status     Obed Nickerson is a 56 year old male who presents to the ED with fall. Fell last night, hit head on tub. [place] sleepy last night and today. He's asking repeated questions. He was sitting on toilet when he fell, thinks he fell asleep.      Review of Systems:  No fever, no blurred vision, nausea or vomiting. No black or tarry stools.    Exam:  BP (!) 187/109   Pulse 120   Temp 97.8  F (36.6  C) (Temporal)   Resp 18   Wt 108.9 kg (240 lb)   SpO2 97%   BMI 38.74 kg/m    General: No acute distress. Appears stated age. Tired appearing  HEENT: contusion to right forehead. No midline cervical spine tenderness. No midline pain with active flexion, extension, or lateral rotation bilaterally.  Cardio: Regular rate, extremities well perfused  Resp: Normal work of breathing, grossly normal respiratory rate  Neuro: Alert. CN II-XII grossly intact. Grossly intact strength.       Medical Decision Making:  Patient arriving to the ED with problem as above. A medical screening exam was performed. CT imaging orders initiated from Triage. The patient is appropriate to be immediately roomed.    Fall, head injury, eval for ICH. Trauma alert called. Lab workup started to eval for fatigue that precipitated fall. Unclear if syncope was involved.       Flo Costa MD on 1/28/2022 at 4:00 PM     Flo Costa MD  01/28/22 5933

## 2022-01-29 LAB
AMPHETAMINES UR QL SCN: ABNORMAL
ANION GAP SERPL CALCULATED.3IONS-SCNC: 12 MMOL/L (ref 5–18)
ATRIAL RATE - MUSE: 104 BPM
BARBITURATES UR QL: ABNORMAL
BENZODIAZ UR QL: ABNORMAL
BUN SERPL-MCNC: 9 MG/DL (ref 8–22)
C REACTIVE PROTEIN LHE: 0.9 MG/DL (ref 0–0.8)
CALCIUM SERPL-MCNC: 9.4 MG/DL (ref 8.5–10.5)
CANNABINOIDS UR QL SCN: ABNORMAL
CHLORIDE BLD-SCNC: 104 MMOL/L (ref 98–107)
CO2 SERPL-SCNC: 22 MMOL/L (ref 22–31)
COCAINE UR QL: ABNORMAL
CREAT SERPL-MCNC: 0.77 MG/DL (ref 0.7–1.3)
CREAT UR-MCNC: 62 MG/DL
D DIMER PPP FEU-MCNC: 0.52 UG/ML FEU (ref 0–0.5)
DIASTOLIC BLOOD PRESSURE - MUSE: NORMAL MMHG
ERYTHROCYTE [DISTWIDTH] IN BLOOD BY AUTOMATED COUNT: 13.9 % (ref 10–15)
FERRITIN SERPL-MCNC: 84 NG/ML (ref 27–300)
GFR SERPL CREATININE-BSD FRML MDRD: >90 ML/MIN/1.73M2
GLUCOSE BLD-MCNC: 185 MG/DL (ref 70–125)
GLUCOSE BLDC GLUCOMTR-MCNC: 174 MG/DL (ref 70–99)
GLUCOSE BLDC GLUCOMTR-MCNC: 182 MG/DL (ref 70–99)
GLUCOSE BLDC GLUCOMTR-MCNC: 186 MG/DL (ref 70–99)
GLUCOSE BLDC GLUCOMTR-MCNC: 220 MG/DL (ref 70–99)
GLUCOSE BLDC GLUCOMTR-MCNC: 259 MG/DL (ref 70–99)
GLUCOSE BLDC GLUCOMTR-MCNC: 264 MG/DL (ref 70–99)
HBA1C MFR BLD: 8.6 %
HCT VFR BLD AUTO: 40.7 % (ref 40–53)
HGB BLD-MCNC: 13.5 G/DL (ref 13.3–17.7)
INTERPRETATION ECG - MUSE: NORMAL
LDH SERPL L TO P-CCNC: 273 U/L (ref 125–220)
MCH RBC QN AUTO: 29.7 PG (ref 26.5–33)
MCHC RBC AUTO-ENTMCNC: 33.2 G/DL (ref 31.5–36.5)
MCV RBC AUTO: 90 FL (ref 78–100)
METHADONE UR QL SCN: ABNORMAL
OPIATES UR QL SCN: ABNORMAL
OXYCODONE UR QL: ABNORMAL
P AXIS - MUSE: 6 DEGREES
PCP UR QL SCN: ABNORMAL
PLATELET # BLD AUTO: 271 10E3/UL (ref 150–450)
POTASSIUM BLD-SCNC: 3.7 MMOL/L (ref 3.5–5)
PR INTERVAL - MUSE: 154 MS
PROCALCITONIN SERPL-MCNC: 0.03 NG/ML (ref 0–0.49)
QRS DURATION - MUSE: 88 MS
QT - MUSE: 344 MS
QTC - MUSE: 452 MS
R AXIS - MUSE: 23 DEGREES
RBC # BLD AUTO: 4.54 10E6/UL (ref 4.4–5.9)
SODIUM SERPL-SCNC: 138 MMOL/L (ref 136–145)
SYSTOLIC BLOOD PRESSURE - MUSE: NORMAL MMHG
T AXIS - MUSE: -7 DEGREES
TROPONIN I SERPL-MCNC: 0.01 NG/ML (ref 0–0.29)
VENTRICULAR RATE- MUSE: 104 BPM
WBC # BLD AUTO: 5.8 10E3/UL (ref 4–11)

## 2022-01-29 PROCEDURE — 82962 GLUCOSE BLOOD TEST: CPT

## 2022-01-29 PROCEDURE — G0378 HOSPITAL OBSERVATION PER HR: HCPCS

## 2022-01-29 PROCEDURE — 84484 ASSAY OF TROPONIN QUANT: CPT | Performed by: HOSPITALIST

## 2022-01-29 PROCEDURE — 99207 PR CDG-CODE CATEGORY CHANGED: CPT | Performed by: STUDENT IN AN ORGANIZED HEALTH CARE EDUCATION/TRAINING PROGRAM

## 2022-01-29 PROCEDURE — 99225 PR SUBSEQUENT OBSERVATION CARE,LEVEL II: CPT | Performed by: STUDENT IN AN ORGANIZED HEALTH CARE EDUCATION/TRAINING PROGRAM

## 2022-01-29 PROCEDURE — 250N000011 HC RX IP 250 OP 636: Performed by: HOSPITALIST

## 2022-01-29 PROCEDURE — 250N000013 HC RX MED GY IP 250 OP 250 PS 637: Performed by: HOSPITALIST

## 2022-01-29 PROCEDURE — 36415 COLL VENOUS BLD VENIPUNCTURE: CPT | Performed by: HOSPITALIST

## 2022-01-29 PROCEDURE — 96372 THER/PROPH/DIAG INJ SC/IM: CPT | Performed by: HOSPITALIST

## 2022-01-29 PROCEDURE — 86140 C-REACTIVE PROTEIN: CPT | Performed by: HOSPITALIST

## 2022-01-29 PROCEDURE — 96376 TX/PRO/DX INJ SAME DRUG ADON: CPT

## 2022-01-29 PROCEDURE — 96361 HYDRATE IV INFUSION ADD-ON: CPT

## 2022-01-29 PROCEDURE — 250N000011 HC RX IP 250 OP 636: Performed by: STUDENT IN AN ORGANIZED HEALTH CARE EDUCATION/TRAINING PROGRAM

## 2022-01-29 PROCEDURE — 85379 FIBRIN DEGRADATION QUANT: CPT | Performed by: HOSPITALIST

## 2022-01-29 PROCEDURE — 85018 HEMOGLOBIN: CPT | Performed by: HOSPITALIST

## 2022-01-29 PROCEDURE — 250N000013 HC RX MED GY IP 250 OP 250 PS 637: Performed by: STUDENT IN AN ORGANIZED HEALTH CARE EDUCATION/TRAINING PROGRAM

## 2022-01-29 PROCEDURE — 250N000012 HC RX MED GY IP 250 OP 636 PS 637: Performed by: HOSPITALIST

## 2022-01-29 PROCEDURE — 258N000003 HC RX IP 258 OP 636: Performed by: HOSPITALIST

## 2022-01-29 PROCEDURE — 83036 HEMOGLOBIN GLYCOSYLATED A1C: CPT | Performed by: HOSPITALIST

## 2022-01-29 PROCEDURE — 84132 ASSAY OF SERUM POTASSIUM: CPT | Performed by: HOSPITALIST

## 2022-01-29 RX ORDER — HYDRALAZINE HYDROCHLORIDE 20 MG/ML
15 INJECTION INTRAMUSCULAR; INTRAVENOUS EVERY 4 HOURS PRN
Status: DISCONTINUED | OUTPATIENT
Start: 2022-01-29 | End: 2022-01-30 | Stop reason: HOSPADM

## 2022-01-29 RX ORDER — METOPROLOL TARTRATE 25 MG/1
25 TABLET, FILM COATED ORAL 2 TIMES DAILY
Status: DISCONTINUED | OUTPATIENT
Start: 2022-01-29 | End: 2022-01-30

## 2022-01-29 RX ORDER — OXYCODONE HYDROCHLORIDE 5 MG/1
10 TABLET ORAL EVERY 4 HOURS PRN
Status: DISCONTINUED | OUTPATIENT
Start: 2022-01-29 | End: 2022-01-30 | Stop reason: HOSPADM

## 2022-01-29 RX ORDER — LISINOPRIL 20 MG/1
20 TABLET ORAL DAILY
Status: DISCONTINUED | OUTPATIENT
Start: 2022-01-30 | End: 2022-01-30 | Stop reason: HOSPADM

## 2022-01-29 RX ADMIN — LISINOPRIL 10 MG: 5 TABLET ORAL at 08:46

## 2022-01-29 RX ADMIN — TIZANIDINE 4 MG: 4 TABLET ORAL at 00:12

## 2022-01-29 RX ADMIN — ACETAMINOPHEN 650 MG: 325 TABLET ORAL at 08:46

## 2022-01-29 RX ADMIN — PIOGLITAZONE 15 MG: 15 TABLET ORAL at 08:49

## 2022-01-29 RX ADMIN — ENOXAPARIN SODIUM 40 MG: 40 INJECTION SUBCUTANEOUS at 21:52

## 2022-01-29 RX ADMIN — OXYCODONE HYDROCHLORIDE 10 MG: 5 TABLET ORAL at 15:13

## 2022-01-29 RX ADMIN — OXYCODONE HYDROCHLORIDE 10 MG: 5 TABLET ORAL at 10:42

## 2022-01-29 RX ADMIN — INSULIN GLARGINE 50 UNITS: 100 INJECTION, SOLUTION SUBCUTANEOUS at 00:13

## 2022-01-29 RX ADMIN — DULOXETINE HYDROCHLORIDE 60 MG: 60 CAPSULE, DELAYED RELEASE ORAL at 08:46

## 2022-01-29 RX ADMIN — SITAGLIPTIN 50 MG: 25 TABLET, FILM COATED ORAL at 08:49

## 2022-01-29 RX ADMIN — DULOXETINE HYDROCHLORIDE 60 MG: 60 CAPSULE, DELAYED RELEASE ORAL at 00:12

## 2022-01-29 RX ADMIN — ACETAMINOPHEN, ASPIRIN AND CAFFEINE 2 TABLET: 250; 250; 65 TABLET, FILM COATED ORAL at 18:12

## 2022-01-29 RX ADMIN — SODIUM CHLORIDE: 9 INJECTION, SOLUTION INTRAVENOUS at 08:43

## 2022-01-29 RX ADMIN — INSULIN GLARGINE 50 UNITS: 100 INJECTION, SOLUTION SUBCUTANEOUS at 21:54

## 2022-01-29 RX ADMIN — INSULIN ASPART 2 UNITS: 100 INJECTION, SOLUTION INTRAVENOUS; SUBCUTANEOUS at 21:52

## 2022-01-29 RX ADMIN — OXYCODONE HYDROCHLORIDE 10 MG: 5 TABLET ORAL at 22:32

## 2022-01-29 RX ADMIN — HYDRALAZINE HYDROCHLORIDE 15 MG: 20 INJECTION, SOLUTION INTRAMUSCULAR; INTRAVENOUS at 13:12

## 2022-01-29 RX ADMIN — HYDRALAZINE HYDROCHLORIDE 10 MG: 20 INJECTION, SOLUTION INTRAMUSCULAR; INTRAVENOUS at 08:38

## 2022-01-29 RX ADMIN — DULOXETINE HYDROCHLORIDE 60 MG: 60 CAPSULE, DELAYED RELEASE ORAL at 20:50

## 2022-01-29 RX ADMIN — METOPROLOL TARTRATE 25 MG: 25 TABLET, FILM COATED ORAL at 20:50

## 2022-01-29 RX ADMIN — SODIUM CHLORIDE 75 ML/HR: 9 INJECTION, SOLUTION INTRAVENOUS at 00:30

## 2022-01-29 RX ADMIN — TIZANIDINE 4 MG: 4 TABLET ORAL at 22:32

## 2022-01-29 RX ADMIN — METOPROLOL TARTRATE 25 MG: 25 TABLET, FILM COATED ORAL at 14:59

## 2022-01-29 RX ADMIN — ACETAMINOPHEN, ASPIRIN AND CAFFEINE 2 TABLET: 250; 250; 65 TABLET, FILM COATED ORAL at 12:02

## 2022-01-29 RX ADMIN — OXYCODONE HYDROCHLORIDE 10 MG: 5 TABLET ORAL at 05:37

## 2022-01-29 RX ADMIN — OXYCODONE HYDROCHLORIDE 10 MG: 5 TABLET ORAL at 19:01

## 2022-01-29 ASSESSMENT — MIFFLIN-ST. JEOR: SCORE: 1879.52

## 2022-01-29 ASSESSMENT — ACTIVITIES OF DAILY LIVING (ADL): DEPENDENT_IADLS:: INDEPENDENT

## 2022-01-29 NOTE — PROGRESS NOTES
Progress Note    Date of admission: 01/28/2022    Assessment/Plan  Obed Nickerson is a 56 year old male admitted on 1/28/2022. He was brought to the emergency department for evaluation of falls and head trauma.      Falls  No loss of consciousness or neurologic deficits  Likely effect of taking extra Xanax before bedtime  CT head without acute intracranial process or skull fracture but right frontal scalp swelling  Supportive management  Telemetry monitoring overnight to rule out arrhythmias although less likely  A nuclear stress test on February 2021 was negative for inducible myocardial ischemia or infarction and ejection fraction was greater than 70%      Hypertensive urgency  Patient is asymptomatic  Increase PTA lisinopril 15 mg  -Add metoprolol 25 mg p.o. twice daily  IV hydralazine as needed  Patient states his blood pressure is usually uncontrolled systolic blood pressure above 190.  -Discussed with the patient about the need to control blood pressure and the risks of repeated blood pressure.     Type 2 diabetes mellitus with long-term insulin  Monitor with insulin sliding scale  Hold Metformin x48 hours after IV contrast  Reconcile PTA Lantus, pioglitazone, sitagliptin  A1c 8.6    Depression, anxiety  Hold Xanax for now      Chronic pain  Supportive management      Dyslipidemia  Patient is intolerant to statins    DVT PPX : Lovenox    Barriers to discharge: Elevated blood pressure and tachycardia    Anticipated Discharge date  : Tomorrow    Subjective  Patient breathing room air with no distress.  States that he is having left-sided headache with eye pain.  States he has chronic migraine headache.  Blood pressure is noted to be significantly elevated with tachycardia.  Management plan explained to the patient and he expressed understanding.    Objective  Vital signs in last 24 hours  Temp:  [97.8  F (36.6  C)-98.3  F (36.8  C)] 97.8  F (36.6  C)  Pulse:  [] 125  Resp:  [9-43] 16  BP:  (140-255)/() 140/90  SpO2:  [94 %-99 %] 97 %    Input and Output in 24 hrs     Intake/Output Summary (Last 24 hours) at 1/29/2022 1742  Last data filed at 1/29/2022 1319  Gross per 24 hour   Intake 370 ml   Output 2120 ml   Net -1750 ml       Physical Exam:  GEN: Alert and oriented. Not in acute distress  HEENT: Atraumatic    Pupils- round and reactive to light bilaterally   Neck- supple, no JVP elevation, no lymphadenopathy or thyromegaly   Sclera- anicteric   Mucous membrane- moist and pink  CHEST: Clear to auscultation bilaterally  HEART: S1S2 regular. No murmurs, rubs or gallops  ABDOMEN: Soft. Non-tender, non-distended. No organomegaly. No guarding or rigidity. Bowel sounds- active  Extremities: No pedal oedema  CNS: No focal neurological deficit. No involuntary movements  SKIN: No skin rash, no cyanosis or clubbing      Pertinent Labs   Lab Results: Personally Reviewed    Recent Results (from the past 24 hour(s))   Ammonia (on ice)    Collection Time: 01/28/22  5:57 PM   Result Value Ref Range    Ammonia 27 11 - 35 umol/L   Blood Culture Peripheral Blood    Collection Time: 01/28/22  5:57 PM    Specimen: Peripheral Blood   Result Value Ref Range    Culture No growth after 12 hours    Lactic acid whole blood    Collection Time: 01/28/22  5:57 PM   Result Value Ref Range    Lactic Acid 1.8 0.7 - 2.0 mmol/L   CBC (+ platelets, no diff)    Collection Time: 01/28/22  5:58 PM   Result Value Ref Range    WBC Count 7.7 4.0 - 11.0 10e3/uL    RBC Count 5.00 4.40 - 5.90 10e6/uL    Hemoglobin 14.8 13.3 - 17.7 g/dL    Hematocrit 45.5 40.0 - 53.0 %    MCV 91 78 - 100 fL    MCH 29.6 26.5 - 33.0 pg    MCHC 32.5 31.5 - 36.5 g/dL    RDW 13.9 10.0 - 15.0 %    Platelet Count 296 150 - 450 10e3/uL   Basic metabolic panel    Collection Time: 01/28/22  5:58 PM   Result Value Ref Range    Sodium 139 136 - 145 mmol/L    Potassium 4.2 3.5 - 5.0 mmol/L    Chloride 105 98 - 107 mmol/L    Carbon Dioxide (CO2) 22 22 - 31 mmol/L     Anion Gap 12 5 - 18 mmol/L    Urea Nitrogen 13 8 - 22 mg/dL    Creatinine 0.82 0.70 - 1.30 mg/dL    Calcium 9.7 8.5 - 10.5 mg/dL    Glucose 245 (H) 70 - 125 mg/dL    GFR Estimate >90 >60 mL/min/1.73m2   INR    Collection Time: 01/28/22  5:58 PM   Result Value Ref Range    INR 0.89 (L) 0.90 - 1.15   PTT    Collection Time: 01/28/22  5:58 PM   Result Value Ref Range    aPTT 24 22 - 38 Seconds   Hepatic function panel    Collection Time: 01/28/22  5:58 PM   Result Value Ref Range    Bilirubin Total 0.2 0.0 - 1.0 mg/dL    Bilirubin Direct <0.1 <=0.5 mg/dL    Protein Total 8.3 (H) 6.0 - 8.0 g/dL    Albumin 4.3 3.5 - 5.0 g/dL    Alkaline Phosphatase 99 45 - 120 U/L    AST 31 0 - 40 U/L    ALT 36 0 - 45 U/L   Troponin I    Collection Time: 01/28/22  5:58 PM   Result Value Ref Range    Troponin I 0.01 0.00 - 0.29 ng/mL   Ethyl Alcohol Level    Collection Time: 01/28/22  5:58 PM   Result Value Ref Range    Alcohol, Blood <10 None detected mg/dL   UA with Microscopic reflex to Culture    Collection Time: 01/28/22  5:58 PM    Specimen: Urine, Clean Catch   Result Value Ref Range    Color Urine Light Yellow Colorless, Straw, Light Yellow, Yellow    Appearance Urine Clear Clear    Glucose Urine >1000 (A) Negative mg/dL    Bilirubin Urine Negative Negative    Ketones Urine Negative Negative mg/dL    Specific Gravity Urine 1.026 1.001 - 1.030    Blood Urine Negative Negative    pH Urine 5.0 5.0 - 7.0    Protein Albumin Urine Negative Negative mg/dL    Urobilinogen Urine <2.0 <2.0 mg/dL    Nitrite Urine Negative Negative    Leukocyte Esterase Urine Negative Negative    Mucus Urine Present (A) None Seen /LPF    RBC Urine 1 <=2 /HPF    WBC Urine 0 <=5 /HPF    Squamous Epithelials Urine <1 <=1 /HPF    Transitional Epithelials Urine <1 <=1 /HPF   Blood Culture Peripheral Blood    Collection Time: 01/28/22  5:58 PM    Specimen: Peripheral Blood   Result Value Ref Range    Culture No growth after 12 hours    Drugs of Abuse 1+ Panel,  Urine ([facility] Only)    Collection Time: 01/28/22  5:58 PM   Result Value Ref Range    Amphetamines Urine Screen Negative Screen Negative    Benzodiazepines Urine Screen Positive (A) Screen Negative    Opiates Urine Screen Positive (A) Screen Negative    PCP Urine Screen Negative Screen Negative    Cannabinoids Urine Screen Negative Screen Negative    Barbiturates Urine Screen Negative Screen Negative    Cocaine Urine Screen Negative Screen Negative    Methadone Urine Screen Negative Screen Negative    Oxycodone Urine Screen Positive (A) Screen Negative    Creatinine Urine mg/dL 62 mg/dL   D dimer quantitative    Collection Time: 01/28/22  5:58 PM   Result Value Ref Range    D-Dimer Quantitative 0.37 0.00 - 0.50 ug/mL FEU   CRP inflammation    Collection Time: 01/28/22  5:58 PM   Result Value Ref Range    CRP 0.9 (H) 0.0-<0.8 mg/dL   Reticulocyte count    Collection Time: 01/28/22  5:58 PM   Result Value Ref Range    % Reticulocyte 1.4 0.8 - 2.7 %    Absolute Reticulocyte 0.067 0.010 - 0.110 10e6/uL   CK total    Collection Time: 01/28/22  5:58 PM   Result Value Ref Range    CK 80 30 - 190 U/L   Fibrinogen activity    Collection Time: 01/28/22  5:58 PM   Result Value Ref Range    Fibrinogen Activity 384 170 - 490 mg/dL   ABO and Rh    Collection Time: 01/28/22  5:58 PM   Result Value Ref Range    ABO/RH(D) AB POS     SPECIMEN EXPIRATION DATE [id number]    Symptomatic; Unknown Influenza A/B & SARS-CoV2 (COVID-19) Virus PCR Multiplex Nasopharyngeal    Collection Time: 01/28/22  5:59 PM    Specimen: Nasopharyngeal; Swab   Result Value Ref Range    Influenza A PCR Negative Negative    Influenza B PCR Negative Negative    SARS CoV2 PCR Positive (A) Negative   ECG 12-LEAD WITH MUSE ([facility])    Collection Time: 01/28/22  6:26 PM   Result Value Ref Range    Systolic Blood Pressure  mmHg    Diastolic Blood Pressure  mmHg    Ventricular Rate 104 BPM    Atrial Rate 104 BPM    SC Interval 154 ms    QRS Duration 88 ms      ms    QTc 452 ms    P Axis 6 degrees    R AXIS 23 degrees    T Axis -7 degrees    Interpretation ECG       Sinus tachycardia  Otherwise normal ECG  When compared with ECG of 03-APR-2017 21:59,  T wave inversion now evident in Inferior leads  Confirmed by SEE ED PROVIDER NOTE FOR, ECG INTERPRETATION ([id number]),  LOGAN SANTOS ([id number]) on 1/29/2022 9:57:12 AM     Lactate Dehydrogenase    Collection Time: 01/28/22 11:04 PM   Result Value Ref Range    Lactate Dehydrogenase 273 (H) 125 - 220 U/L   Troponin I    Collection Time: 01/28/22 11:04 PM   Result Value Ref Range    Troponin I 0.02 0.00 - 0.29 ng/mL   Ferritin    Collection Time: 01/28/22 11:04 PM   Result Value Ref Range    Ferritin 84 27 - 300 ng/mL   Procalcitonin    Collection Time: 01/28/22 11:04 PM   Result Value Ref Range    Procalcitonin 0.03 0.00 - 0.49 ng/mL   Glucose by meter    Collection Time: 01/28/22 11:08 PM   Result Value Ref Range    GLUCOSE BY METER POCT 157 (H) 70 - 99 mg/dL   Hemoglobin A1c    Collection Time: 01/29/22  1:26 AM   Result Value Ref Range    Hemoglobin A1C 8.6 (H) <=5.6 %   Glucose by meter    Collection Time: 01/29/22  1:29 AM   Result Value Ref Range    GLUCOSE BY METER POCT 264 (H) 70 - 99 mg/dL   CBC with platelets    Collection Time: 01/29/22  5:47 AM   Result Value Ref Range    WBC Count 5.8 4.0 - 11.0 10e3/uL    RBC Count 4.54 4.40 - 5.90 10e6/uL    Hemoglobin 13.5 13.3 - 17.7 g/dL    Hematocrit 40.7 40.0 - 53.0 %    MCV 90 78 - 100 fL    MCH 29.7 26.5 - 33.0 pg    MCHC 33.2 31.5 - 36.5 g/dL    RDW 13.9 10.0 - 15.0 %    Platelet Count 271 150 - 450 10e3/uL   Basic metabolic panel    Collection Time: 01/29/22  5:47 AM   Result Value Ref Range    Sodium 138 136 - 145 mmol/L    Potassium 3.7 3.5 - 5.0 mmol/L    Chloride 104 98 - 107 mmol/L    Carbon Dioxide (CO2) 22 22 - 31 mmol/L    Anion Gap 12 5 - 18 mmol/L    Urea Nitrogen 9 8 - 22 mg/dL    Creatinine 0.77 0.70 - 1.30 mg/dL    Calcium 9.4 8.5 - 10.5 mg/dL    Glucose  185 (H) 70 - 125 mg/dL    GFR Estimate >90 >60 mL/min/1.73m2   CRP inflammation    Collection Time: 01/29/22  5:47 AM   Result Value Ref Range    CRP 0.9 (H) 0.0-<0.8 mg/dL   D dimer quantitative    Collection Time: 01/29/22  5:47 AM   Result Value Ref Range    D-Dimer Quantitative 0.52 (H) 0.00 - 0.50 ug/mL FEU   Troponin I    Collection Time: 01/29/22  5:47 AM   Result Value Ref Range    Troponin I 0.01 0.00 - 0.29 ng/mL   Glucose by meter    Collection Time: 01/29/22  5:51 AM   Result Value Ref Range    GLUCOSE BY METER POCT 174 (H) 70 - 99 mg/dL   Glucose by meter    Collection Time: 01/29/22  8:54 AM   Result Value Ref Range    GLUCOSE BY METER POCT 182 (H) 70 - 99 mg/dL   Glucose by meter    Collection Time: 01/29/22 10:38 AM   Result Value Ref Range    GLUCOSE BY METER POCT 220 (H) 70 - 99 mg/dL       Pertinent Radiology   Radiology Results reviewed      EKG Results reviewed       Advanced Care Planning      Aminata Rios MD   [facility]

## 2022-01-29 NOTE — ED NOTES
"Paged Dr. Rios:    \"ED Boarder RM 14- KrishanObed:  continues to have headache 6/10 after tylenol and hydralazine, states Oxy does not usually affect headaches, only Excedrin works at home.  Anything else we can give? - Natali MINAYA\"      \"ED RM 14:  /93 after hydrazlazine IV PRN.  255/120 prior.  Pt states BPs typically 190s and has headaches daily. - Natali MINAYA\"        "

## 2022-01-29 NOTE — PHARMACY-ADMISSION MEDICATION HISTORY
Pharmacy Note - Admission Medication History    Pertinent Provider Information:      ______________________________________________________________________    Prior To Admission (PTA) med list completed and updated in EMR.       PTA Med List   Medication Sig Last Dose     ALPRAZolam (XANAX) 0.5 MG tablet TAKE 1 TABLET BY MOUTH EVERY DAY AS NEEDED FOR SLEEP - Due for an office visit prior to further refills (noted 10/05/21) 1/27/2022 at Unknown time     BELBUCA 900 MCG FILM buccal film PLACE 1 FILM BY BUCCAL ROUTE TWICE DAILY AGAINT THE INSIDE OF CHEEK. HOLD IN PLACE FOR 5 SECONDS 1/28/2022 at Unknown time     DULoxetine (CYMBALTA) 60 MG capsule Take 1 capsule (60 mg) by mouth 2 times daily 1/28/2022 at x 1     insulin glargine (LANTUS SOLOSTAR) 100 UNIT/ML pen INJECT 50 UNITS UNDER THE SKIN EVERY NIGHT AT BEDTIME 1/27/2022 at Unknown time     lisinopril (ZESTRIL) 10 MG tablet Take 1 tablet (10 mg) by mouth daily 1/28/2022 at Unknown time     metFORMIN (GLUCOPHAGE) 1000 MG tablet Take 1 tablet (1,000 mg) by mouth 2 times daily (with meals) 1/28/2022 at Unknown time     oxyCODONE IR (ROXICODONE) 10 MG tablet Take 10 mg by mouth every 6 hours as needed for severe pain Up  to 4 tabs/day 1/28/2022 at x 1     pioglitazone (ACTOS) 15 MG tablet Take 1 tablet (15 mg) by mouth daily 1/28/2022 at Unknown time     sitagliptin (JANUVIA) 50 MG tablet Take 1 tablet (50 mg) by mouth daily 1/28/2022 at Unknown time     SUMAtriptan (IMITREX) 50 MG tablet Take 1 tablet (50 mg) by mouth at onset of headache for migraine May repeat in 2 hours. Max 4 tablets/24 hours. More than a month at Unknown time     tiZANidine (ZANAFLEX) 4 MG tablet TAKE 1 TABLET(4 MG) BY MOUTH AT BEDTIME Past Week at Unknown time       Information source(s): Patient and CareEverywhere/SureScripts  Method of interview communication: in-person    Summary of Changes to PTA Med List  New:   Discontinued: bentyl, aspirin  Changed: none    Patient was asked about  OTC/herbal products specifically.  PTA med list reflects this.    In the past week, patient estimated taking medication this percent of the time:  greater than 90%.    Allergies were reviewed, assessed, and updated with the patient.      Patient does not use any multi-dose medications prior to admission.    The information provided in this note is only as accurate as the sources available at the time of the update(s).    Thank you for the opportunity to participate in the care of this patient.    Rocío Mckenzie [facility]  1/28/2022 10:56 PM

## 2022-01-29 NOTE — CONSULTS
Care Management Initial Consult    General Information  Assessment completed with: Patient (via room phone - COVID), Mati  Type of CM/SW Visit: Initial Assessment    Primary Care Provider verified and updated as needed: Yes   Readmission within the last 30 days: no previous admission in last 30 days      Reason for Consult: discharge planning  Advance Care Planning: Advance Care Planning Reviewed: other (comment)  declines       Communication Assessment  Patient's communication style: spoken language (English or Bilingual)             Cognitive  Cognitive/Neuro/Behavioral: WDL                      Living Environment:   People in home: spouse,child(yin), adult  Alison Bradley  Current living Arrangements: house      Able to return to prior arrangements: yes       Family/Social Support:  Care provided by: self  Provides care for: no one  Marital Status:   Wife,Children  Mirna       Description of Support System: Supportive,Involved    Support Assessment: Adequate family and caregiver support,Adequate social supports,Patient communicates needs well met    Current Resources:   Patient receiving home care services: No     Community Resources: None  Equipment currently used at home: cane, straight  Supplies currently used at home: None    Employment/Financial:  Employment Status: retired        Financial Concerns:             Lifestyle & Psychosocial Needs:  Social Determinants of Health     Tobacco Use: Low Risk      Smoking Tobacco Use: Never Smoker     Smokeless Tobacco Use: Never Used   Alcohol Use: Not on file   Financial Resource Strain: Not on file   Food Insecurity: Not on file   Transportation Needs: Not on file   Physical Activity: Not on file   Stress: Not on file   Social Connections: Not on file   Intimate Partner Violence: Not on file   Depression: At risk     PHQ-2 Score: 3   Housing Stability: Not on file       Functional Status:  Prior to admission patient needed assistance:   Dependent ADLs::  Ambulation-cane  Dependent IADLs:: Independent  Assesssment of Functional Status: At functional baseline    Mental Health Status:          Chemical Dependency Status:                Values/Beliefs:  Spiritual, Cultural Beliefs, Evangelical Practices, Values that affect care:                 Additional Information:    MEGAN assessed.  COVID +  Pt lives in home with wife and adult daughter.  Pt is independent with ADLs, no services.  Pt declines any HCD information.  Pt will have transport home at time of discharge.    Lakia Miller RN

## 2022-01-29 NOTE — H&P
[facility]    History and Physical - Hospitalist Service       Date of Admission:  1/28/2022    Assessment & Plan      Obed Nickerson is a 56 year old male admitted on 1/28/2022. He was brought to the emergency department for evaluation of falls and head trauma.    1.  Falls  No loss of consciousness or neurologic deficits  Likely effect of taking extra Xanax before bedtime  CT head without acute intracranial process or skull fracture but right frontal scalp swelling  Supportive management  Telemetry monitoring overnight to rule out arrhythmias although less likely  Serial troponin  Check orthostatic vital signs  A nuclear stress test on February 2021 was negative for inducible myocardial ischemia or infarction and ejection fraction was greater than 70%    2.  Hypertensive urgency  Patient is asymptomatic  Reconcile PTA lisinopril  IV hydralazine as needed    3.  Type 2 diabetes mellitus with long-term insulin  Monitor with insulin sliding scale  Hold Metformin x48 hours after IV contrast  Reconcile PTA Lantus, pioglitazone, sitagliptin    4.  Depression, anxiety  Hold Xanax for now    5.  Chronic pain  Supportive management    6.  Dyslipidemia  Patient is intolerant to statins    # Confirmed COVID-19 infection, recovered based on symptoms onset  # Viral Pneumonia secondary to COVID-19 infection     Symptom Onset January 1, 2022   Date of 1st Positive Test January 28, 2022   Vaccination Status  vaccinated with [employer]       - COVID-19 special precautions, continuous pulse-ox  - Oxygen: continue current support with Room air; titrate to keep SpO2 between 90-96%  - Labs: Standard COVID admission labs ordered (CBC with diff, CMP, INR, D-dimer, CRP).   - Imaging: no additional imaging needed at this time  - Breathing treatments: no inhalers needed; avoid nebulizers in favor of MDIs   - IV fluids: not indicated at this time  - Antibiotics: not indicated   - COVID-Focused Medications:  No COVID-specific therapies are appropriate at this time.  - DVT Prophylaxis: at high risk of thrombotic complications due to COVID-19 (DDimer = N/A ).          - PROPHYLACTIC dosing: lovenox 40mg daily        - consider anticoag on discharge for 30 days & until return to normal mobility     Diet: Combination Diet Regular Diet Adult    DVT Prophylaxis: Enoxaparin (Lovenox) SQ  Bundy Catheter: Not present  Central Lines: None  Cardiac Monitoring: ACTIVE order. Indication: Syncope- high cardiac risk (48 hours)  Code Status: Full Code      Clinically Significant Risk Factors Present on Admission              # Platelet Defect: home medication list includes an antiplatelet medication       Disposition Plan   Expected Discharge:  tomorrow  Anticipated discharge location:  Awaiting care coordination huddle  Delays:    Hypertensive urgency, tachycardia       The patient's care was discussed with the Patient and Patient's Family.    Juice Oviedo MD  Hospitalist Service  [facility]  Securely message with the Vocera Web Console (learn more here)  Text page via [employer] Paging/Directory         ______________________________________________________________________    Chief Complaint   Fall, head trauma    History is obtained from the patient, electronic health record, emergency department physician and patient's spouse    History of Present Illness   Obed Nickerson is a 56 year old male who was brought to the emergency department for evaluation of head trauma after 2 falls.  Past medical history of hypertension, diabetes, hyperlipidemia, obesity, chronic pain, depression, anxiety.  Patient's wife tested positive for Covid on December 30, 2021 and patient developed a cough on January 1, 2022.  They felt it was not necessary for him to be tested and assumed that he was positive.  Patient also described change in taste and smell that is slowly improving and last night was able to eat and taste a  "steak.  However, he felt that his \"system has been off\" and took two 0.5 mg Xanax before bedtime last night.  He woke up around 2 in the morning, as he usually does, to urinate and \"fell asleep on the toilet\".  He fell forward and hit his right forehead but denies loss of consciousness and was able to get up and walk back to bed.  Within the next hour, he got up to urinate again and the same thing happened.  Patient denied chest pain, shortness of breath, palpitations, fevers, chills, nausea, vomiting or diarrhea.  However, during the day he was noted to be groggy and unsteady on his feet so his wife was concerned about a skull fracture or bleed.  Patient states that he has taken 2 Xanax in the past without any issues.  He denies tobacco, alcohol or illicit drugs.  His mother had heart failure and father had multiple strokes.  In the ED, he was hypertensive 187/109 and tachycardic 120 bpm upon arrival.  Vital signs did not change despite several hours in the ED and 1 L normal saline bolus.    Review of Systems    The 10 point Review of Systems is negative other than noted in the HPI or here.     Past Medical History    I have reviewed this patient's medical history and updated it with pertinent information if needed.   Past Medical History:   Diagnosis Date     Chronic pain syndrome 1/4/2008    Previously followed by Dr. Moran at [facility], then left in 2008, then started seeing Dr. Lo at [facility] in [place].  Plans to continue with this provider.  Here to establish care/PCP, does not want/need me to manage his chronic narcotics or pain.    Had neck injury in 2000, and is s/p Cervical Fusion x 2 (2-6 presently).  Currently on Vicodin 5mg/325 QID.  Feels this helps the pain, though does not completely control pain.  He has been MS Contin, and OxyContin (briefly).  Had been on Neurontin (x 1 week- excessive SE), Amitriptyline, Tegretol (mental slow).  Has not tried Effexor or Cymbalta.   " Is s/p SCS for lumbar radiculitis- which did not work and had it removed.  Has tried PT and numerous spine injection  Plans for some Carpal Tunnel surgery (pending EMG soon).  Has been told that he could have spine surgery, but is holding off surgery for as long as he can.        Tinnitus of both ears 1/16/2014    Pt reports life-long ringing in both ears.  Was raised around farm equipment and airplanes.  Feels these are major contributors.  Has had audiology recently, per pt has mild hearing loss left worse than right and positive for tinnitus.  However they said there was no treatment available for tinnitus.          Past Surgical History   I have reviewed this patient's surgical history and updated it with pertinent information if needed.  Past Surgical History:   Procedure Laterality Date     ?? previous implanted morphine pump??  during 1990's    eventually explanted due to infection     APPENDECTOMY       APPENDECTOMY OPEN       BACK SURGERY       FUSION CERVICAL ANTERIOR THREE+ LEVELS      C2-6     NECK SURGERY       OTHER SURGICAL HISTORY      Multiple surgeries to right lower leg     OTHER SURGICAL HISTORY      rotator cuff surgery, right     right lower leg limb reattachment       skin grafts      many       Social History   I have reviewed this patient's social history and updated it with pertinent information if needed.  Social History     Tobacco Use     Smoking status: Never Smoker     Smokeless tobacco: Never Used   Substance Use Topics     Alcohol use: No     Drug use: No       Family History   I have reviewed this patient's family history and updated it with pertinent information if needed.  Family History   Problem Relation Age of Onset     C.A.D. Mother 60     C.A.D. Father 72     Cerebrovascular Disease Father      Breast Cancer No family hx of      Cancer - colorectal No family hx of      Prostate Cancer Father         at 67     Heart Disease Mother      Diabetes Father      Diabetes Sister       Hypertension Sister      Diabetes Brother      Hypertension Brother      Prostate Cancer Paternal Grandfather        Prior to Admission Medications   Prior to Admission Medications   Prescriptions Last Dose Informant Patient Reported? Taking?   ALPRAZolam (XANAX) 0.5 MG tablet   No No   Sig: TAKE 1 TABLET BY MOUTH EVERY DAY AS NEEDED FOR SLEEP - Due for an office visit prior to further refills (noted 10/05/21)   AMITIZA 8 MCG capsule   Yes No   Sig: TAKE 1 CAPSULE BY MOUTH TWICE DAILY WITH FOOD AND WATER   ASPIRIN PO   Yes No   Sig: Take 81 mg by mouth daily   Alcohol Swabs (ALCOHOL PREP) 70 % PADS   Yes No   BD ULTRA FINE PEN NEEDLES   No No   Sig: Use three times daily   BELBUCA 900 MCG FILM buccal film   Yes No   Sig: PLACE 1 FILM BY BUCCAL ROUTE TWICE DAILY AGAINT THE INSIDE OF CHEEK. HOLD IN PLACE FOR 5 SECONDS   Continuous Blood Gluc Sensor (FREESTYLE YOKO 14 DAY SENSOR) [facility]   No No   Sig: Switch device every 2 weeks   Continuous Blood Gluc Sensor (FREESTYLE YOKO 14 DAY SENSOR) [facility]   No No   Sig: USE AS DIRECTED   DULoxetine (CYMBALTA) 60 MG capsule   No No   Sig: Take 1 capsule (60 mg) by mouth 2 times daily   DiphenhydrAMINE HCl (BENADRYL PO)  Self Yes No   Sig: Take 25 mg by mouth every 8 hours as needed   ASIYA COVID-19 VACCINE 0.5 ML injection   Yes No   SUMAtriptan (IMITREX) 50 MG tablet   No No   Sig: Take 1 tablet (50 mg) by mouth at onset of headache for migraine May repeat in 2 hours. Max 4 tablets/24 hours.   dicyclomine (BENTYL) 20 MG tablet   Yes No   Sig: TAKE 1 TABLET BY MOUTH TWICE DAILY AS NEEDED   hyoscyamine SL (LEVSIN/SL) 0.125 MG sublingual tablet   No No   Sig: Take 1 tablet (0.125 mg) by mouth every 4 hours as needed (for GI upset - max 1.5mg/day)   insulin glargine (LANTUS SOLOSTAR) 100 UNIT/ML pen   No No   Sig: INJECT 50 UNITS UNDER THE SKIN EVERY NIGHT AT BEDTIME   lisinopril (ZESTRIL) 10 MG tablet   No No   Sig: Take 1 tablet (10 mg) by mouth daily   metFORMIN (GLUCOPHAGE)  1000 MG tablet   No No   Sig: Take 1 tablet (1,000 mg) by mouth 2 times daily (with meals)   naloxone (NARCAN) 4 MG/0.1ML nasal spray   No No   Sig: Spray 1 spray (4 mg) into one nostril alternating nostrils as needed for opioid reversal every 2-3 minutes until assistance arrives   oxyCODONE IR (ROXICODONE) 10 MG tablet   No No   Sig: Take 0.5-1 tablets (5-10 mg) by mouth every 4 hours as needed for severe pain   pioglitazone (ACTOS) 15 MG tablet   No No   Sig: Take 1 tablet (15 mg) by mouth daily   sitagliptin (JANUVIA) 50 MG tablet   No No   Sig: Take 1 tablet (50 mg) by mouth daily   tiZANidine (ZANAFLEX) 4 MG tablet   No No   Sig: TAKE 1 TABLET(4 MG) BY MOUTH AT BEDTIME      Facility-Administered Medications: None     Allergies   Allergies   Allergen Reactions     Gabapentin      Other reaction(s): Angioedema, Edema  Lip numbness, tongue swelling  Tongue swells   Nose itches     Ketorolac Swelling     Tongue swelling.    Both for injection and oral       Phenothiazines Nausea and Vomiting     compazine  compazine       Compazine [Prochlorperazine] Nausea     And jittery     Demerol Nausea     Naproxen Nausea and Vomiting     Statins [Hmg-Coa-R Inhibitors]      Memory loss     Tolmetin Nausea and Vomiting     Ultram [Tramadol] Itching       Physical Exam   Vital Signs: Temp: 97.8  F (36.6  C) Temp src: Temporal BP: (!) 225/105 Pulse: 108   Resp: 15 SpO2: 99 % O2 Device: None (Room air)    Weight: 240 lbs 0 oz    Constitutional: awake, alert, cooperative, no apparent distress, and appears stated age  Eyes: extra-ocular muscles intact and sclera clear  ENT: Right forehead contusion  Hematologic / Lymphatic: no cervical lymphadenopathy and no supraclavicular lymphadenopathy  Respiratory: No increased work of breathing, good air exchange, clear to auscultation bilaterally, no crackles or wheezing  Cardiovascular: tachycardic with regular rhythm and normal S1 and S2  GI: normal bowel sounds, soft, non-distended and  non-tender  Skin: no bruising or bleeding and no redness, warmth, or swelling  Musculoskeletal: Right leg tenderness is chronic, surgical scars in place  Neurologic: Mental Status Exam:  Level of Alertness:   awake  Orientation:   person, place, time  Motor Exam:  moves all extremities well and symmetrically  Neuropsychiatric: General: normal, calm and normal eye contact    Data   Data reviewed today: I reviewed all medications, new labs and imaging results over the last 24 hours. I personally reviewed the EKG tracing showing Sinus tachycardia at 104 bpm, nonspecific inferior T waves abnormalities are new when compared to EKG from 2017..    Recent Labs   Lab 01/28/22  1758   WBC 7.7   HGB 14.8   MCV 91      INR 0.89*      POTASSIUM 4.2   CHLORIDE 105   CO2 22   BUN 13   CR 0.82   ANIONGAP 12   MARYURI 9.7   *   ALBUMIN 4.3   PROTTOTAL 8.3*   BILITOTAL 0.2   ALKPHOS 99   ALT 36   AST 31     Recent Results (from the past 24 hour(s))   Head CT w/o contrast    Narrative    EXAM: CT HEAD W/O CONTRAST  LOCATION: [facility]  DATE/TIME: 1/28/2022 4:16 PM    INDICATION: Fall. Head injury. Confusion.  COMPARISON: CT head 02/15/2018  TECHNIQUE: Routine CT Head without IV contrast. Multiplanar reformats. Dose reduction techniques were used.    FINDINGS:  INTRACRANIAL CONTENTS: No intracranial hemorrhage, extraaxial collection, or mass effect.  No CT evidence of acute infarct. Unchanged, age-appropriate parenchymal attenuation. Unchanged brain parenchymal volume and ventricular size. No hydrocephalus.     VISUALIZED ORBITS/SINUSES/MASTOIDS: No intraorbital abnormality. Mild mucosal thickening scattered about the paranasal sinuses. No middle ear or mastoid effusion.    BONES/SOFT TISSUES: Right frontal scalp swelling. No skull fracture.      Impression    IMPRESSION:  1.  No acute intracranial process.  2.  Right frontal scalp swelling. No skull fracture.  3.  Mild inflammatory changes of  the paranasal sinuses.   CT Chest Pulmonary Embolism w Contrast    Narrative    EXAM: CT CHEST PULMONARY EMBOLISM W CONTRAST  LOCATION: [facility]  DATE/TIME: 1/28/2022 7:21 PM    INDICATION: PE suspected, high prob, shortness of breath  COMPARISON: None.  TECHNIQUE: CT chest pulmonary angiogram during arterial phase injection of IV contrast. Multiplanar reformats and MIP reconstructions were performed. Dose reduction techniques were used.   CONTRAST: isovue 370 100ml    FINDINGS:  ANGIOGRAM CHEST: The main pulmonary artery is enlarged at 3.0 cm which may be seen with pulmonary artery hypertension. Thoracic aorta is negative for dissection. No CT evidence of right heart strain.    LUNGS AND PLEURA: Mildly elevated right hemidiaphragm. A few small patchy peripheral groundglass opacities with a basilar predominance are consistent with Covid pneumonia. No pleural effusion. No suspicious pulmonary nodule.    MEDIASTINUM/AXILLAE: Normal.    CORONARY ARTERY CALCIFICATION: Mild.    UPPER ABDOMEN: Hepatic steatosis.    MUSCULOSKELETAL: Mild degenerative changes of the spine.      Impression    IMPRESSION:  1.  No pulmonary embolus.  2.  Covid pneumonia.  3.  Hepatic steatosis.

## 2022-01-30 VITALS
SYSTOLIC BLOOD PRESSURE: 160 MMHG | OXYGEN SATURATION: 93 % | BODY MASS INDEX: 37.92 KG/M2 | TEMPERATURE: 98.2 F | RESPIRATION RATE: 16 BRPM | WEIGHT: 241.6 LBS | DIASTOLIC BLOOD PRESSURE: 94 MMHG | HEART RATE: 102 BPM | HEIGHT: 67 IN

## 2022-01-30 DIAGNOSIS — M79.10 MUSCLE PAIN: ICD-10-CM

## 2022-01-30 LAB
ANION GAP SERPL CALCULATED.3IONS-SCNC: 12 MMOL/L (ref 5–18)
AT III ACT/NOR PPP CHRO: 81 % (ref 85–135)
BUN SERPL-MCNC: 13 MG/DL (ref 8–22)
C REACTIVE PROTEIN LHE: 0.7 MG/DL (ref 0–0.8)
CALCIUM SERPL-MCNC: 9.4 MG/DL (ref 8.5–10.5)
CHLORIDE BLD-SCNC: 104 MMOL/L (ref 98–107)
CO2 SERPL-SCNC: 20 MMOL/L (ref 22–31)
CREAT SERPL-MCNC: 0.84 MG/DL (ref 0.7–1.3)
D DIMER PPP FEU-MCNC: 0.45 UG/ML FEU (ref 0–0.5)
ERYTHROCYTE [DISTWIDTH] IN BLOOD BY AUTOMATED COUNT: 14.2 % (ref 10–15)
GFR SERPL CREATININE-BSD FRML MDRD: >90 ML/MIN/1.73M2
GLUCOSE BLD-MCNC: 238 MG/DL (ref 70–125)
GLUCOSE BLDC GLUCOMTR-MCNC: 211 MG/DL (ref 70–99)
GLUCOSE BLDC GLUCOMTR-MCNC: 228 MG/DL (ref 70–99)
HCT VFR BLD AUTO: 43.4 % (ref 40–53)
HGB BLD-MCNC: 14.3 G/DL (ref 13.3–17.7)
MCH RBC QN AUTO: 29.5 PG (ref 26.5–33)
MCHC RBC AUTO-ENTMCNC: 32.9 G/DL (ref 31.5–36.5)
MCV RBC AUTO: 90 FL (ref 78–100)
PLATELET # BLD AUTO: 281 10E3/UL (ref 150–450)
POTASSIUM BLD-SCNC: 3.8 MMOL/L (ref 3.5–5)
RBC # BLD AUTO: 4.84 10E6/UL (ref 4.4–5.9)
SODIUM SERPL-SCNC: 136 MMOL/L (ref 136–145)
WBC # BLD AUTO: 7.8 10E3/UL (ref 4–11)

## 2022-01-30 PROCEDURE — 85379 FIBRIN DEGRADATION QUANT: CPT | Performed by: HOSPITALIST

## 2022-01-30 PROCEDURE — 96376 TX/PRO/DX INJ SAME DRUG ADON: CPT

## 2022-01-30 PROCEDURE — G0378 HOSPITAL OBSERVATION PER HR: HCPCS

## 2022-01-30 PROCEDURE — 86140 C-REACTIVE PROTEIN: CPT | Performed by: HOSPITALIST

## 2022-01-30 PROCEDURE — 99207 PR CDG-CODE CATEGORY CHANGED: CPT | Performed by: STUDENT IN AN ORGANIZED HEALTH CARE EDUCATION/TRAINING PROGRAM

## 2022-01-30 PROCEDURE — 250N000013 HC RX MED GY IP 250 OP 250 PS 637: Performed by: STUDENT IN AN ORGANIZED HEALTH CARE EDUCATION/TRAINING PROGRAM

## 2022-01-30 PROCEDURE — 85027 COMPLETE CBC AUTOMATED: CPT | Performed by: HOSPITALIST

## 2022-01-30 PROCEDURE — 82374 ASSAY BLOOD CARBON DIOXIDE: CPT | Performed by: HOSPITALIST

## 2022-01-30 PROCEDURE — 96372 THER/PROPH/DIAG INJ SC/IM: CPT

## 2022-01-30 PROCEDURE — 250N000011 HC RX IP 250 OP 636: Performed by: STUDENT IN AN ORGANIZED HEALTH CARE EDUCATION/TRAINING PROGRAM

## 2022-01-30 PROCEDURE — 82962 GLUCOSE BLOOD TEST: CPT

## 2022-01-30 PROCEDURE — 250N000013 HC RX MED GY IP 250 OP 250 PS 637: Performed by: HOSPITALIST

## 2022-01-30 PROCEDURE — 36415 COLL VENOUS BLD VENIPUNCTURE: CPT | Performed by: HOSPITALIST

## 2022-01-30 PROCEDURE — 99217 PR OBSERVATION CARE DISCHARGE: CPT | Performed by: STUDENT IN AN ORGANIZED HEALTH CARE EDUCATION/TRAINING PROGRAM

## 2022-01-30 RX ORDER — LORAZEPAM 1 MG/1
1 TABLET ORAL EVERY 4 HOURS PRN
Status: DISCONTINUED | OUTPATIENT
Start: 2022-01-30 | End: 2022-01-30 | Stop reason: HOSPADM

## 2022-01-30 RX ORDER — METOPROLOL TARTRATE 50 MG
50 TABLET ORAL 2 TIMES DAILY
Qty: 60 TABLET | Refills: 0 | Status: SHIPPED | OUTPATIENT
Start: 2022-01-30 | End: 2022-07-25

## 2022-01-30 RX ORDER — LISINOPRIL 20 MG/1
20 TABLET ORAL DAILY
Qty: 30 TABLET | Refills: 0 | Status: SHIPPED | OUTPATIENT
Start: 2022-01-31 | End: 2022-05-03

## 2022-01-30 RX ORDER — METOPROLOL TARTRATE 25 MG/1
50 TABLET, FILM COATED ORAL 2 TIMES DAILY
Status: DISCONTINUED | OUTPATIENT
Start: 2022-01-30 | End: 2022-01-30 | Stop reason: HOSPADM

## 2022-01-30 RX ADMIN — LISINOPRIL 20 MG: 20 TABLET ORAL at 08:52

## 2022-01-30 RX ADMIN — HYDRALAZINE HYDROCHLORIDE 15 MG: 20 INJECTION, SOLUTION INTRAMUSCULAR; INTRAVENOUS at 10:49

## 2022-01-30 RX ADMIN — DULOXETINE HYDROCHLORIDE 60 MG: 60 CAPSULE, DELAYED RELEASE ORAL at 08:52

## 2022-01-30 RX ADMIN — HYDRALAZINE HYDROCHLORIDE 15 MG: 20 INJECTION, SOLUTION INTRAMUSCULAR; INTRAVENOUS at 03:10

## 2022-01-30 RX ADMIN — OXYCODONE HYDROCHLORIDE 10 MG: 5 TABLET ORAL at 06:57

## 2022-01-30 RX ADMIN — ACETAMINOPHEN, ASPIRIN AND CAFFEINE 2 TABLET: 250; 250; 65 TABLET, FILM COATED ORAL at 11:58

## 2022-01-30 RX ADMIN — METOPROLOL TARTRATE 50 MG: 25 TABLET, FILM COATED ORAL at 08:52

## 2022-01-30 RX ADMIN — ACETAMINOPHEN, ASPIRIN AND CAFFEINE 2 TABLET: 250; 250; 65 TABLET, FILM COATED ORAL at 06:07

## 2022-01-30 RX ADMIN — PIOGLITAZONE 15 MG: 15 TABLET ORAL at 08:52

## 2022-01-30 RX ADMIN — LORAZEPAM 1 MG: 1 TABLET ORAL at 12:37

## 2022-01-30 RX ADMIN — OXYCODONE HYDROCHLORIDE 10 MG: 5 TABLET ORAL at 10:44

## 2022-01-30 RX ADMIN — OXYCODONE HYDROCHLORIDE 10 MG: 5 TABLET ORAL at 03:00

## 2022-01-30 RX ADMIN — SITAGLIPTIN 50 MG: 25 TABLET, FILM COATED ORAL at 08:52

## 2022-01-30 NOTE — PLAN OF CARE
Problem: Functional Ability Impaired (Spinal Surgery)  Goal: Optimal Functional Ability  Intervention: Optimize Functional Status  Recent Flowsheet Documentation  Taken 1/29/2022 1600 by Julian Madrigal RN  Activity Management: activity adjusted per tolerance   Pt endorses generalized weakness. Declines help with ambulation to bathroom and back. Denies shortness of breath or cough. Good appetite.

## 2022-01-30 NOTE — DISCHARGE SUMMARY
[facility] MEDICINE  DISCHARGE SUMMARY     Primary Care Physician: Rocío Brandt  Admission Date: 1/28/2022   Discharge Provider: Aminata Rios MD Discharge Date: 1/30/2022   Diet:   Active Diet and Nourishment Order   Procedures     Combination Diet Regular Diet Adult     Diet       Code Status: Full Code   Activity: DCACTIVITY: Activity as tolerated        Condition at Discharge: Stable     REASON FOR PRESENTATION(See Admission Note for Details)   Admitted for having falls.    PRINCIPAL & ACTIVE DISCHARGE DIAGNOSES     Active Problems:    Chronic pain syndrome    Morbid obesity (H)    Hypertension goal BP (blood pressure) < 130/80    Diabetes mellitus, type 2 (H)    Fall, initial encounter    Other fatigue    Infection due to 2019 novel coronavirus      PENDING LABS     Unresulted Labs Ordered in the Past 30 Days of this Admission     Date and Time Order Name Status Description    1/28/2022  9:35 PM Interleukin 6 Blood In process     1/28/2022  5:19 PM Blood Culture Peripheral Blood Preliminary     1/28/2022  5:19 PM Blood Culture Peripheral Blood Preliminary             PROCEDURES ( this hospitalization only)          RECOMMENDATIONS TO OUTPATIENT PROVIDER FOR F/U VISIT     Follow-up Appointments     Follow-up and recommended labs and tests       Follow up with primary care provider, Rocío Brandt, within 7   days to evaluate medication change and for hospital follow- up.  No follow   up labs or test are needed.  Patient needs to see PCP soon to get   medication regimen to control blood pressure.                 DISPOSITION     Home    SUMMARY OF HOSPITAL COURSE:    Obed Nickerson is a 56 year old male admitted on 1/28/2022. He was brought to the emergency department for evaluation of falls and head trauma.    Falls  No loss of consciousness or neurologic deficits  Likely effect of taking extra Xanax before bedtime  CT head without acute  intracranial process or skull fracture but right frontal scalp swelling  Supportive management  Telemetry monitoring overnight to rule out arrhythmias although less likely  A nuclear stress test on February 2021 was negative for inducible myocardial ischemia or infarction and ejection fraction was greater than 70%    Hypertensive urgency  Patient is asymptomatic  Increase PTA lisinopril 20 mg  -Add metoprolol 50 mg p.o. twice daily  IV hydralazine as needed  Patient states his blood pressure is usually uncontrolled systolic blood pressure above 190.  -Discussed with the patient about the need to control blood pressure and the risks of repeated blood pressure.   Type 2 diabetes mellitus with long-term insulin  Monitor with insulin sliding scale  Hold Metformin x48 hours after IV contrast  Reconcile PTA Lantus, pioglitazone, sitagliptin  A1c 8.6   Depression, anxiety  Hold Xanax for now    Chronic pain  Supportive management    Dyslipidemia  Patient is intolerant to statins     Patient is clinically and hemodynamically stable enough to be discharged home today.  Patient has had persistently elevated blood pressure for which medication adjustments have been done.  Pressure has improved.  Patient has been advised to see his primary care provider soon to get a better medication regimen to control blood pressure.  Medication reconciliation is done and meds are sent to pharmacy.    Discharge Medications with Med changes:     Current Discharge Medication List      START taking these medications    Details   metoprolol tartrate (LOPRESSOR) 50 MG tablet Take 1 tablet (50 mg) by mouth 2 times daily  Qty: 60 tablet, Refills: 0    Associated Diagnoses: Hypertensive urgency         CONTINUE these medications which have CHANGED    Details   lisinopril (ZESTRIL) 20 MG tablet Take 1 tablet (20 mg) by mouth daily  Qty: 30 tablet, Refills: 0    Associated Diagnoses: Hypertensive urgency         CONTINUE these medications which have  NOT CHANGED    Details   ALPRAZolam (XANAX) 0.5 MG tablet TAKE 1 TABLET BY MOUTH EVERY DAY AS NEEDED FOR SLEEP - Due for an office visit prior to further refills (noted 10/05/21)  Qty: 15 tablet, Refills: 0    Associated Diagnoses: Anxiety      BELBUCA 900 MCG FILM buccal film PLACE 1 FILM BY BUCCAL ROUTE TWICE DAILY AGAINT THE INSIDE OF CHEEK. HOLD IN PLACE FOR 5 SECONDS      DULoxetine (CYMBALTA) 60 MG capsule Take 1 capsule (60 mg) by mouth 2 times daily  Qty: 180 capsule, Refills: 1    Comments: Please fill 6/1/21 - possibly lost in move?  Associated Diagnoses: Anxiety      insulin glargine (LANTUS SOLOSTAR) 100 UNIT/ML pen INJECT 50 UNITS UNDER THE SKIN EVERY NIGHT AT BEDTIME  Qty: 45 mL, Refills: 0    Comments: If Lantus is not covered by insurance, may substitute Basaglar at same dose and frequency.    Associated Diagnoses: Type 2 diabetes mellitus with hyperglycemia, with long-term current use of insulin (H)      metFORMIN (GLUCOPHAGE) 1000 MG tablet Take 1 tablet (1,000 mg) by mouth 2 times daily (with meals)  Qty: 180 tablet, Refills: 0    Associated Diagnoses: Type 2 diabetes mellitus with hyperglycemia, with long-term current use of insulin (H)      oxyCODONE IR (ROXICODONE) 10 MG tablet Take 10 mg by mouth every 6 hours as needed for severe pain Up  to 4 tabs/day      pioglitazone (ACTOS) 15 MG tablet Take 1 tablet (15 mg) by mouth daily  Qty: 90 tablet, Refills: 0    Associated Diagnoses: Type 2 diabetes mellitus with hyperglycemia, with long-term current use of insulin (H)      sitagliptin (JANUVIA) 50 MG tablet Take 1 tablet (50 mg) by mouth daily  Qty: 90 tablet, Refills: 0    Associated Diagnoses: Type 2 diabetes mellitus with hyperglycemia, with long-term current use of insulin (H)      SUMAtriptan (IMITREX) 50 MG tablet Take 1 tablet (50 mg) by mouth at onset of headache for migraine May repeat in 2 hours. Max 4 tablets/24 hours.  Qty: 9 tablet, Refills: 1    Associated Diagnoses: Other migraine  without status migrainosus, not intractable      tiZANidine (ZANAFLEX) 4 MG tablet TAKE 1 TABLET(4 MG) BY MOUTH AT BEDTIME  Qty: 30 tablet, Refills: 1    Associated Diagnoses: Muscle pain      BD ULTRA FINE PEN NEEDLES Use three times daily  Qty: 200 each, Refills: 1    Comments: Please dispense type/ gauge per insurance  Associated Diagnoses: Type 2 diabetes mellitus with hyperglycemia, with long-term current use of insulin (H)      !! Continuous Blood Gluc Sensor (FREESTYLE YOKO 14 DAY SENSOR) MISC USE AS DIRECTED  Qty: 2 each, Refills: 4    Associated Diagnoses: Type 2 diabetes mellitus with hyperglycemia, with long-term current use of insulin (H)      !! Continuous Blood Gluc Sensor (FREESTYLE YOKO 14 DAY SENSOR) MISC Switch device every 2 weeks  Qty: 6 each, Refills: 2    Associated Diagnoses: Type 2 diabetes mellitus with hyperglycemia, with long-term current use of insulin (H)      hyoscyamine SL (LEVSIN/SL) 0.125 MG sublingual tablet Take 1 tablet (0.125 mg) by mouth every 4 hours as needed (for GI upset - max 1.5mg/day)  Qty: 30 tablet, Refills: 1    Associated Diagnoses: Acute abdominal pain      naloxone (NARCAN) 4 MG/0.1ML nasal spray Spray 1 spray (4 mg) into one nostril alternating nostrils as needed for opioid reversal every 2-3 minutes until assistance arrives  Qty: 0.2 mL, Refills: 3    Associated Diagnoses: Acute abdominal pain; Chronic pain syndrome       !! - Potential duplicate medications found. Please discuss with provider.                Rationale for medication changes:              Consults       SOCIAL WORK IP CONSULT    Immunizations given this encounter     Most Recent Immunizations   Administered Date(s) Administered     COVID-19,PF,Liss 07/06/2021     Influenza Quad, Recombinant, pf(RIV4) (Flublok) 10/27/2021     Influenza Vaccine IM > 6 months Valent IIV4 (Alfuria,Fluzone) 09/14/2017     Influenza Vaccine, 6+MO IM (QUADRIVALENT W/PRESERVATIVES) 09/24/2018     TD (ADULT, 7+)  10/23/2006     TDAP Vaccine (Boostrix) 09/24/2018     Tdap (Adacel,Boostrix) 01/01/2008           Anticoagulation Information      Recent INR results:   Recent Labs   Lab 01/28/22 1758   INR 0.89*     Warfarin doses (if applicable) or name of other anticoagulant:       SIGNIFICANT IMAGING FINDINGS     Results for orders placed or performed during the hospital encounter of 01/28/22   Head CT w/o contrast    Impression    IMPRESSION:  1.  No acute intracranial process.  2.  Right frontal scalp swelling. No skull fracture.  3.  Mild inflammatory changes of the paranasal sinuses.   CT Chest Pulmonary Embolism w Contrast    Impression    IMPRESSION:  1.  No pulmonary embolus.  2.  Covid pneumonia.  3.  Hepatic steatosis.       SIGNIFICANT LABORATORY FINDINGS     Most Recent 3 CBC's:Recent Labs   Lab Test 01/30/22  0638 01/29/22  0547 01/28/22 1758   WBC 7.8 5.8 7.7   HGB 14.3 13.5 14.8   MCV 90 90 91    271 296     Most Recent 3 BMP's:Recent Labs   Lab Test 01/30/22  1220 01/30/22  0832 01/30/22  0638 01/29/22  0551 01/29/22  0547 01/28/22  2308 01/28/22  1758   NA  --   --  136  --  138  --  139   POTASSIUM  --   --  3.8  --  3.7  --  4.2   CHLORIDE  --   --  104  --  104  --  105   CO2  --   --  20*  --  22  --  22   BUN  --   --  13  --  9  --  13   CR  --   --  0.84  --  0.77  --  0.82   ANIONGAP  --   --  12  --  12  --  12   MARYURI  --   --  9.4  --  9.4  --  9.7   * 211* 238*   < > 185*   < > 245*    < > = values in this interval not displayed.     Most Recent 2 LFT's:Recent Labs   Lab Test 01/28/22  1758 10/27/21  1334   AST 31 19   ALT 36 40   ALKPHOS 99 113   BILITOTAL 0.2 0.2     Most Recent 3 INR's:Recent Labs   Lab Test 01/28/22  1758 02/15/18  1240   INR 0.89* 0.91     Most Recent INR's and Anticoagulation Dosing History:  Anticoagulation Dose History     Recent Dosing and Labs Latest Ref Rng & Units 2/15/2018 1/28/2022    INR 0.90 - 1.15 0.91 0.89(L)        Most Recent 3 Creatinines:Recent  Labs   Lab Test 01/30/22  0638 01/29/22  0547 01/28/22  1758   CR 0.84 0.77 0.82     Most Recent 3 Hemoglobins:Recent Labs   Lab Test 01/30/22  0638 01/29/22  0547 01/28/22  1758   HGB 14.3 13.5 14.8     Most Recent 3 Troponin's:No lab results found.  Most Recent 3 BNP's:No lab results found.  Most Recent D-dimer:Recent Labs   Lab Test 01/30/22  0638   DD 0.45     Most Recent Cholesterol Panel:Recent Labs   Lab Test 10/27/21  1334   CHOL 302*   *   HDL 54   TRIG 455*     Most Recent 6 Bacteria Isolates From Any Culture (See EPIC Reports for Culture Details):Recent Labs   Lab Test 02/16/18 2055   CULT No growth     Most Recent TSH and T4:Recent Labs   Lab Test 05/30/14  1517   TSH 1.66     Most Recent Hemoglobin A1c:Recent Labs   Lab Test 01/29/22  0126   A1C 8.6*           Discharge Orders        COVID-19 GetWell Loop Referral      Reason for your hospital stay    Admitted for falls, hypertensive urgency and COVID-19 infection.     Follow-up and recommended labs and tests     Follow up with primary care provider, Rocío Brandt, within 7 days to evaluate medication change and for hospital follow- up.  No follow up labs or test are needed.  Patient needs to see PCP soon to get medication regimen to control blood pressure.     Contact provider    Contact your primary care provider if If increased trouble breathing, new arm/leg swelling, dizziness/passing out, falls, bleeding that doesn't stop, or uncontrolled pain.     Discharge - Quarantine/Isolation Instruction    Date of symptom onset: January 1, 2022   Date of first positive test: January 28, 2022  If you tested positive COVID-19 and show symptoms (fever, cough, body aches or trouble breathing):        Stay home and away from others (self-isolate) until:        At least 10 days have passed since your symptoms started. AND...        You've had no fever-and no medicine that reduces fever for 1 full day (24 hours). AND...         Your other symptoms  have resolved (gotten better).  If you tested positive for COVID-19 but don't show symptoms:       Stay home and away from others (self-isolate) until at least 10 days have passed since the date of your first positive COVID-19 test.     Activity    Your activity upon discharge: activity as tolerated     Diet    Follow this diet upon discharge: Orders Placed This Encounter      Combination Diet Regular Diet Adult       Examination   Physical Exam   Temp:  [97.8  F (36.6  C)-99  F (37.2  C)] 98.2  F (36.8  C)  Pulse:  [] 102  Resp:  [16-18] 16  BP: (103-199)/() 160/94  SpO2:  [93 %-98 %] 93 %  Wt Readings from Last 1 Encounters:   01/29/22 109.6 kg (241 lb 9.6 oz)       GEN: Alert and oriented. Not in acute distress  HEENT: Atraumatic               Pupils- round and reactive to light bilaterally              Neck- supple, no JVP elevation, no lymphadenopathy or thyromegaly              Sclera- anicteric              Mucous membrane- moist and pink  CHEST: Clear to auscultation bilaterally  HEART: S1S2 regular. No murmurs, rubs or gallops  ABDOMEN: Soft. Non-tender, non-distended. No organomegaly. No guarding or rigidity. Bowel sounds- active  Extremities: No pedal oedema  CNS: No focal neurological deficit. No involuntary movements  SKIN: No skin rash, no cyanosis or clubbing      Please see EMR for more detailed significant labs, imaging, consultant notes etc.    IAminata MD, personally saw the patient today and spent greater than 30 minutes discharging this patient.    Aminata Rios MD  [facility]    CC:Rocío Brandt

## 2022-01-30 NOTE — PLAN OF CARE
Problem: Adult Inpatient Plan of Care  Goal: Plan of Care Review  Outcome: Improving  Goal: Patient-Specific Goal (Individualized)  Outcome: Improving  Goal: Absence of Hospital-Acquired Illness or Injury  Outcome: Improving  Intervention: Identify and Manage Fall Risk  Recent Flowsheet Documentation  Taken 1/30/2022 0353 by Carla Chávez RN  Safety Promotion/Fall Prevention: nonskid shoes/slippers when out of bed  Taken 1/30/2022 0030 by Carla Chávez RN  Safety Promotion/Fall Prevention: nonskid shoes/slippers when out of bed  Intervention: Prevent Skin Injury  Recent Flowsheet Documentation  Taken 1/30/2022 0353 by Carla Chávez RN  Body Position: position changed independently  Taken 1/30/2022 0030 by Carla Chávez RN  Body Position: position changed independently  Goal: Optimal Comfort and Wellbeing  Outcome: Improving  Goal: Readiness for Transition of Care  Outcome: Improving     Problem: Functional Ability Impaired (Spinal Surgery)  Goal: Optimal Functional Ability  Outcome: Improving  Intervention: Optimize Functional Status  Recent Flowsheet Documentation  Taken 1/30/2022 0353 by Carla Chávez RN  Activity Management: activity adjusted per tolerance  Taken 1/30/2022 0030 by Carla Chávez RN  Activity Management: activity adjusted per tolerance     Problem: Pain (Spinal Surgery)  Goal: Acceptable Pain Control  Outcome: Improving  Intervention: Prevent or Manage Pain  Recent Flowsheet Documentation  Taken 1/30/2022 0300 by Carla Chávez RN  Pain Management Interventions: medication (see MAR)     Pt blood pressure improved at beginning of shift but elevated on recheck (199/116). Pt noted to be painful in the neck / left shoulder and had a headache at the time. Prn hydralazine given with bp down to 167/94.  Pt continues to have headache with Excedrin to be given. Will monitor.

## 2022-01-30 NOTE — PLAN OF CARE
Problem: Adult Inpatient Plan of Care  Goal: Absence of Hospital-Acquired Illness or Injury  Outcome: Adequate for Discharge  Intervention: Prevent Skin Injury  Recent Flowsheet Documentation  Taken 1/30/2022 0900 by Olesya Cole RN  Body Position: position changed independently   Patient found out he tested positive for COVID when he was admitted.  He had symptoms that began on 1/1/22.      Problem: Pain (Spinal Surgery)  Goal: Acceptable Pain Control  Outcome: Adequate for Discharge  Intervention: Prevent or Manage Pain  Recent Flowsheet Documentation  Taken 1/30/2022 1046 by Olesya Cole RN  Pain Management Interventions: medication (see MAR)   Bilateral shoulder and arm pain was rated about 7/10.  Oxycodone was given and pain remained the same.  Patient also gets frequent headaches that he is receiving Excedrin for in addition to increased antihypertensive medications for his high blood pressures.

## 2022-01-30 NOTE — PLAN OF CARE
PRIMARY DIAGNOSIS: GENERALIZED WEAKNESS    OUTPATIENT/OBSERVATION GOALS TO BE MET BEFORE DISCHARGE  1. Orthostatic performed:No    2. Tolerating PO medications: Yes    3. Return to near baseline physical activity: Yes    4. Cleared for discharge by consultants (if involved):Yes    Discharge Planner Nurse   Safe discharge environment identified: Yes  Barriers to discharge: No       Entered by: Sonya Dalal 01/30/2022 1:28 PM   Pt given PRN excedrin for a headache and effective.PRN lorazepam given for anxiety.Pt discharge instructions given and pt called caregiver and will be discharging home.  Please review provider order for any additional goals.   Nurse to notify provider when observation goals have been met and patient is ready for discharge.

## 2022-01-30 NOTE — PLAN OF CARE
Acute Traumatic Pain     1.  Pain controlled with oral analgesia: Yes      2.  Vital signs stable: Yes bp monitoring d/t changes in medicatons      3.  Diagnotic testing complete: Yes      4.  Cleared from consultants (if applicable): No      5. Return to near baseline physical activity: Yes some shortness of breath with ambulation to bathroom.

## 2022-01-30 NOTE — PROGRESS NOTES
Care Management Discharge Note    Discharge Date: 01/30/2022       Discharge Disposition:  Home with family     Discharge Services: None    Discharge DME: None    Discharge Transportation: family or friend will provide    Private pay costs discussed: Not applicable    PAS Confirmation Code:    Patient/family educated on Medicare website which has current facility and service quality ratings:      Education Provided on the Discharge Plan:    Persons Notified of Discharge Plans: yes, via telephone  Patient/Family in Agreement with the Plan: yes    Handoff Referral Completed: No    Additional Information:  Patient discharged home. Family provided transportation.         Mattie Graff RN

## 2022-01-31 LAB — IL6 SERPL-MCNC: 3.2 PG/ML

## 2022-02-02 LAB
BACTERIA BLD CULT: NO GROWTH
BACTERIA BLD CULT: NO GROWTH

## 2022-02-07 ENCOUNTER — VIRTUAL VISIT (OUTPATIENT)
Dept: FAMILY MEDICINE | Facility: CLINIC | Age: 57
End: 2022-02-07
Payer: COMMERCIAL

## 2022-02-07 DIAGNOSIS — E66.01 MORBID OBESITY (H): ICD-10-CM

## 2022-02-07 DIAGNOSIS — E11.65 TYPE 2 DIABETES MELLITUS WITH HYPERGLYCEMIA, WITH LONG-TERM CURRENT USE OF INSULIN (H): ICD-10-CM

## 2022-02-07 DIAGNOSIS — F32.0 MILD MAJOR DEPRESSION (H): ICD-10-CM

## 2022-02-07 DIAGNOSIS — Z09 HOSPITAL DISCHARGE FOLLOW-UP: ICD-10-CM

## 2022-02-07 DIAGNOSIS — Z79.4 TYPE 2 DIABETES MELLITUS WITH HYPERGLYCEMIA, WITH LONG-TERM CURRENT USE OF INSULIN (H): ICD-10-CM

## 2022-02-07 DIAGNOSIS — I10 HYPERTENSION GOAL BP (BLOOD PRESSURE) < 130/80: Primary | ICD-10-CM

## 2022-02-07 PROBLEM — N17.9 AKI (ACUTE KIDNEY INJURY) (H): Status: RESOLVED | Noted: 2021-01-07 | Resolved: 2022-02-07

## 2022-02-07 PROCEDURE — 99214 OFFICE O/P EST MOD 30 MIN: CPT | Mod: 95 | Performed by: FAMILY MEDICINE

## 2022-02-07 NOTE — PROGRESS NOTES
Mati is a 56 year old who is being evaluated via a billable telephone visit.      What phone number would you like to be contacted at? home  How would you like to obtain your AVS? Brent    --------------------------------    Assessment/Plan:    Obed Nickerson is a 56 year old male who is being evaluated remotely for:    Hospital discharge follow-up  Hospitalization was reviewed and medications reconciled.  Increase lisinopril to 40 mg daily.    Hypertension goal BP (blood pressure) < 130/80  Increase lisinopril to 40 mg daily.  He does not need any prescription at this point.  He will continue to monitor his blood pressure and let me know how things are looking.  - Basic metabolic panel  (Ca, Cl, CO2, Creat, Gluc, K, Na, BUN)    Morbid obesity (H)  Continue to work to optimize blood sugar and blood pressure control.  Diet and exercise discussed    Mild major depression (H)  Stable overall.    Type 2 diabetes mellitus with hyperglycemia, with long-term current use of insulin (H)  He will work on being more cognizant of taking his Metformin and Januvia.        There are no discontinued medications.        Chief Complaint:  No chief complaint on file.        Subjective:   Obed Nickerson is a 56 year old male who is being evaluated via telephone visit today for a hospital discharge follow-up.  Patient was hospitalized at [facility] from January 28 to January 30, 2022.  He was originally presenting to the hospital after having a fall off the toilet but was found to have very high blood pressure.  He also was found to incidentally have COVID-19 although he is fully vaccinated and was not having any symptoms.    He was kept in the hospital due to elevated blood pressures.  His lisinopril was increased from 10 mg to 20 mg daily.  He was also started on metoprolol 50 mg twice daily.    He states that he has been doing well with the medications her blood pressures are still in the 150s to 160s systolic.    He  has overall been otherwise feeling okay.  He is unsure what has caused his falls or confusion.  In the ER they thought maybe he had taken extra dose of Xanax but the patient states that he did not do so.  He states that he is overall feeling fairly well at this point.    His A1c was 8.6 in the hospital.  He states that he has not taking his oral medications as often as he should and will work on doing that and contact me in the next few weeks to let me know how his blood sugars are looking.    12 point review of systems completed and negative except for what has been described above    History   Smoking Status     Never Smoker   Smokeless Tobacco     Never Used         Current Outpatient Medications:      ALPRAZolam (XANAX) 0.5 MG tablet, TAKE 1 TABLET BY MOUTH EVERY DAY AS NEEDED FOR SLEEP - Due for an office visit prior to further refills (noted 10/05/21), Disp: 15 tablet, Rfl: 0     BD ULTRA FINE PEN NEEDLES, Use three times daily, Disp: 200 each, Rfl: 1     BELBUCA 900 MCG FILM buccal film, PLACE 1 FILM BY BUCCAL ROUTE TWICE DAILY AGAINT THE INSIDE OF CHEEK. HOLD IN PLACE FOR 5 SECONDS, Disp: , Rfl:      Continuous Blood Gluc Sensor (FREESTYLE YOKO 14 DAY SENSOR) MISC, USE AS DIRECTED, Disp: 2 each, Rfl: 4     Continuous Blood Gluc Sensor (FREESTYLE YOKO 14 DAY SENSOR) [facility], Switch device every 2 weeks, Disp: 6 each, Rfl: 2     DULoxetine (CYMBALTA) 60 MG capsule, Take 1 capsule (60 mg) by mouth 2 times daily, Disp: 180 capsule, Rfl: 1     hyoscyamine SL (LEVSIN/SL) 0.125 MG sublingual tablet, Take 1 tablet (0.125 mg) by mouth every 4 hours as needed (for GI upset - max 1.5mg/day), Disp: 30 tablet, Rfl: 1     insulin glargine (LANTUS SOLOSTAR) 100 UNIT/ML pen, INJECT 50 UNITS UNDER THE SKIN EVERY NIGHT AT BEDTIME, Disp: 45 mL, Rfl: 0     lisinopril (ZESTRIL) 20 MG tablet, Take 1 tablet (20 mg) by mouth daily, Disp: 30 tablet, Rfl: 0     metFORMIN (GLUCOPHAGE) 1000 MG tablet, Take 1 tablet (1,000 mg) by mouth 2  times daily (with meals), Disp: 180 tablet, Rfl: 0     metoprolol tartrate (LOPRESSOR) 50 MG tablet, Take 1 tablet (50 mg) by mouth 2 times daily, Disp: 60 tablet, Rfl: 0     naloxone (NARCAN) 4 MG/0.1ML nasal spray, Spray 1 spray (4 mg) into one nostril alternating nostrils as needed for opioid reversal every 2-3 minutes until assistance arrives, Disp: 0.2 mL, Rfl: 3     oxyCODONE IR (ROXICODONE) 10 MG tablet, Take 10 mg by mouth every 6 hours as needed for severe pain Up  to 4 tabs/day, Disp: , Rfl:      pioglitazone (ACTOS) 15 MG tablet, Take 1 tablet (15 mg) by mouth daily, Disp: 90 tablet, Rfl: 0     sitagliptin (JANUVIA) 50 MG tablet, Take 1 tablet (50 mg) by mouth daily, Disp: 90 tablet, Rfl: 0     SUMAtriptan (IMITREX) 50 MG tablet, Take 1 tablet (50 mg) by mouth at onset of headache for migraine May repeat in 2 hours. Max 4 tablets/24 hours., Disp: 9 tablet, Rfl: 1     tiZANidine (ZANAFLEX) 4 MG tablet, TAKE 1 TABLET(4 MG) BY MOUTH AT BEDTIME, Disp: 30 tablet, Rfl: 1        Objective:  No vitals were done due to the remote nature of this visit    No flowsheet data found.              General: No acute distress, sounds well  Psych: Appropriate affect, pleasant  Pulmonary: Breathing comfortably, speaking in complete sentences     This note has been dictated and transcribed using voice recognition software.   Any errors in transcription are unintentional and inherent to the software.    Phone call duration: 18 minutes

## 2022-02-11 ENCOUNTER — OFFICE VISIT (OUTPATIENT)
Dept: FAMILY MEDICINE | Facility: CLINIC | Age: 57
End: 2022-02-11
Payer: COMMERCIAL

## 2022-02-11 VITALS
RESPIRATION RATE: 20 BRPM | WEIGHT: 240 LBS | HEART RATE: 97 BPM | DIASTOLIC BLOOD PRESSURE: 110 MMHG | OXYGEN SATURATION: 98 % | TEMPERATURE: 97.9 F | HEIGHT: 67 IN | BODY MASS INDEX: 37.67 KG/M2 | SYSTOLIC BLOOD PRESSURE: 170 MMHG

## 2022-02-11 DIAGNOSIS — I10 HYPERTENSION GOAL BP (BLOOD PRESSURE) < 130/80: ICD-10-CM

## 2022-02-11 DIAGNOSIS — F41.9 ANXIETY: ICD-10-CM

## 2022-02-11 DIAGNOSIS — I16.0 HYPERTENSIVE URGENCY: ICD-10-CM

## 2022-02-11 LAB
ANION GAP SERPL CALCULATED.3IONS-SCNC: 8 MMOL/L (ref 3–14)
BUN SERPL-MCNC: 17 MG/DL (ref 7–30)
CALCIUM SERPL-MCNC: 9.8 MG/DL (ref 8.5–10.1)
CHLORIDE BLD-SCNC: 100 MMOL/L (ref 94–109)
CO2 SERPL-SCNC: 26 MMOL/L (ref 20–32)
CREAT SERPL-MCNC: 0.83 MG/DL (ref 0.66–1.25)
GFR SERPL CREATININE-BSD FRML MDRD: >90 ML/MIN/1.73M2
GLUCOSE BLD-MCNC: 144 MG/DL (ref 70–99)
POTASSIUM BLD-SCNC: 3.9 MMOL/L (ref 3.4–5.3)
SODIUM SERPL-SCNC: 134 MMOL/L (ref 133–144)

## 2022-02-11 PROCEDURE — 99214 OFFICE O/P EST MOD 30 MIN: CPT | Performed by: FAMILY MEDICINE

## 2022-02-11 PROCEDURE — 80048 BASIC METABOLIC PNL TOTAL CA: CPT | Performed by: FAMILY MEDICINE

## 2022-02-11 PROCEDURE — 36415 COLL VENOUS BLD VENIPUNCTURE: CPT | Performed by: FAMILY MEDICINE

## 2022-02-11 RX ORDER — METOPROLOL TARTRATE 100 MG
100 TABLET ORAL 2 TIMES DAILY
Qty: 180 TABLET | Refills: 0 | Status: SHIPPED | OUTPATIENT
Start: 2022-02-11 | End: 2022-07-25

## 2022-02-11 RX ORDER — METOPROLOL TARTRATE 50 MG
50 TABLET ORAL 2 TIMES DAILY
Qty: 60 TABLET | Refills: 0 | Status: CANCELLED | OUTPATIENT
Start: 2022-02-11

## 2022-02-11 RX ORDER — ALPRAZOLAM 0.5 MG
TABLET ORAL
Qty: 15 TABLET | Refills: 0 | Status: SHIPPED | OUTPATIENT
Start: 2022-02-11 | End: 2022-03-03

## 2022-02-11 RX ORDER — HYDRALAZINE HYDROCHLORIDE 10 MG/1
10 TABLET, FILM COATED ORAL 3 TIMES DAILY
Qty: 90 TABLET | Refills: 1 | Status: SHIPPED | OUTPATIENT
Start: 2022-02-11 | End: 2022-09-19

## 2022-02-11 ASSESSMENT — ANXIETY QUESTIONNAIRES
GAD7 TOTAL SCORE: 17
GAD7 TOTAL SCORE: 17
4. TROUBLE RELAXING: NEARLY EVERY DAY
7. FEELING AFRAID AS IF SOMETHING AWFUL MIGHT HAPPEN: SEVERAL DAYS
2. NOT BEING ABLE TO STOP OR CONTROL WORRYING: MORE THAN HALF THE DAYS
3. WORRYING TOO MUCH ABOUT DIFFERENT THINGS: NEARLY EVERY DAY
GAD7 TOTAL SCORE: 17
5. BEING SO RESTLESS THAT IT IS HARD TO SIT STILL: MORE THAN HALF THE DAYS
6. BECOMING EASILY ANNOYED OR IRRITABLE: NEARLY EVERY DAY
7. FEELING AFRAID AS IF SOMETHING AWFUL MIGHT HAPPEN: SEVERAL DAYS
1. FEELING NERVOUS, ANXIOUS, OR ON EDGE: NEARLY EVERY DAY

## 2022-02-11 ASSESSMENT — PATIENT HEALTH QUESTIONNAIRE - PHQ9
10. IF YOU CHECKED OFF ANY PROBLEMS, HOW DIFFICULT HAVE THESE PROBLEMS MADE IT FOR YOU TO DO YOUR WORK, TAKE CARE OF THINGS AT HOME, OR GET ALONG WITH OTHER PEOPLE: SOMEWHAT DIFFICULT
SUM OF ALL RESPONSES TO PHQ QUESTIONS 1-9: 18
SUM OF ALL RESPONSES TO PHQ QUESTIONS 1-9: 18

## 2022-02-11 ASSESSMENT — MIFFLIN-ST. JEOR: SCORE: 1872.26

## 2022-02-11 NOTE — PROGRESS NOTES
Answers for HPI/ROS submitted by the patient on 2/11/2022  If you checked off any problems, how difficult have these problems made it for you to do your work, take care of things at home, or get along with other people?: Somewhat difficult  PHQ9 TOTAL SCORE: 18  DEACON 7 TOTAL SCORE: 17  Do you check your blood pressure regularly outside of the clinic?: Yes  Are your blood pressures ever more than 140 on the top number (systolic) OR more than 90 on the bottom number (diastolic)? (For example, greater than 140/90): Yes  Are you following a low salt diet?: No  Frequency of checking blood sugars:: one time daily  What time of day are you checking your blood sugars : after meals  Have you had any blood sugars above 200?: Yes  Have you had any blood sugars below 70?: No  Hypoglycemia symptoms:: none  Diabetic concerns:: none  Paraesthesia present:: numbness in feet, burning in feet, excessive thirst  Have you had a diabetic eye exam within the last year?: No  Headache Symptoms are: worsened  How often are you getting headaches or migraines? : 4 or more days a week  Are you able to do normal daily activities when you have a migraine?: No  Migraine Rescue/Relief Medications:: Excedrin  Effectiveness of rescue/relief medications:: I get some relief  Migraine Preventative Medications:: no medications to prevent migraines  ER or UC Visits:: 0 times  Depression/Anxiety: Depression & Anxiety  Status since last visit:: bad  Anxiety since last: : bad  Other associated symptoms of depression:: No  Other associated symotome: : No  Significant life event: : financial concerns, housing concerns  Anxious:: Yes  Current substance use:: No  How many servings of fruits and vegetables do you eat daily?: 0-1  On average, how many sweetened beverages do you drink each day (Examples: soda, juice, sweet tea, etc.  Do NOT count diet or artificially sweetened beverages)?: 0  How many minutes a day do you exercise enough to make your heart beat  faster?: 9 or less  How many days a week do you exercise enough to make your heart beat faster?: 3 or less  How many days per week do you miss taking your medication?: 2  What makes it hard for you to take your medication every day?: remembering to take    Assessment/Plan:    Obed Nickerson is a 56 year old male presenting for:    Hypertensive urgency  Blood pressure is certainly still significantly elevated today.  Hydralazine sent to the pharmacy as this has worked well for him in the hospital.  Increase metoprolol to 100 mg twice daily from 50 mg twice daily.  He will continue on his elevated dose of lisinopril.  Recheck BMP today.  Would recommend a recheck of BMP in 2 weeks on his new medication regimen  - hydrALAZINE (APRESOLINE) 10 MG tablet  Dispense: 90 tablet; Refill: 1  - metoprolol tartrate (LOPRESSOR) 100 MG tablet  Dispense: 180 tablet; Refill: 0    Anxiety  Refill Xanax provided.  Patient uses this fairly sparingly.  - ALPRAZolam (XANAX) 0.5 MG tablet  Dispense: 15 tablet; Refill: 0    Hypertension goal BP (blood pressure) < 130/80  - Basic metabolic panel  (Ca, Cl, CO2, Creat, Gluc, K, Na, BUN)      Medications Discontinued During This Encounter   Medication Reason     ALPRAZolam (XANAX) 0.5 MG tablet Reorder       COVID-19 precautions were used during this visit given patient's positive COVID-19 diagnosis on January 28    Chief Complaint:  Blood Pressure Check        Subjective:   Obed Nickerson is a very pleasant 56-year-old gentleman with a past medical history significant for diabetes, chronic pain and hypertension.  She was recently hospitalized for hypertensive urgency.  His blood pressure was fairly well controlled in the hospital.  Was diagnosed with COVID-19 has fairly minimal symptoms of that.    He is noting that blood pressures at home are in the 200s over 120s.  He has noted some mild headaches.  Mild shortness of breath think that this is may be due to Covid.  We had spoken over a  video visit and I increase his lisinopril to once daily which he is taking.  He is also using metoprolol 50 mg daily (although it is prescribed twice daily).    He is hopeful to come up with a plan of keeping his blood pressure under good goal.  In the hospital use hydralazine which she states he tolerated very well and is hopeful to maybe do a trial of at home.  He is hopeful once his Covid symptoms are better his blood pressure is 1.    Also with a history of diabetes.  Most recent A1c was 8.6.    12 point review of systems completed and negative except for what has been described above    History   Smoking Status     Never Smoker   Smokeless Tobacco     Never Used         Current Outpatient Medications:      ALPRAZolam (XANAX) 0.5 MG tablet, TAKE 1 TABLET BY MOUTH EVERY DAY AS NEEDED FOR SLEEP, Disp: 15 tablet, Rfl: 0     BD ULTRA FINE PEN NEEDLES, Use three times daily, Disp: 200 each, Rfl: 1     BELBUCA 900 MCG FILM buccal film, PLACE 1 FILM BY BUCCAL ROUTE TWICE DAILY AGAINT THE INSIDE OF CHEEK. HOLD IN PLACE FOR 5 SECONDS, Disp: , Rfl:      Continuous Blood Gluc Sensor (FREESTYLE YOOK 14 DAY SENSOR) MISC, USE AS DIRECTED, Disp: 2 each, Rfl: 4     Continuous Blood Gluc Sensor (FREESTYLE YOKO 14 DAY SENSOR) [facility], Switch device every 2 weeks, Disp: 6 each, Rfl: 2     DULoxetine (CYMBALTA) 60 MG capsule, Take 1 capsule (60 mg) by mouth 2 times daily, Disp: 180 capsule, Rfl: 1     hydrALAZINE (APRESOLINE) 10 MG tablet, Take 1 tablet (10 mg) by mouth 3 times daily, Disp: 90 tablet, Rfl: 1     hyoscyamine SL (LEVSIN/SL) 0.125 MG sublingual tablet, Take 1 tablet (0.125 mg) by mouth every 4 hours as needed (for GI upset - max 1.5mg/day), Disp: 30 tablet, Rfl: 1     insulin glargine (LANTUS SOLOSTAR) 100 UNIT/ML pen, INJECT 50 UNITS UNDER THE SKIN EVERY NIGHT AT BEDTIME, Disp: 45 mL, Rfl: 0     lisinopril (ZESTRIL) 20 MG tablet, Take 1 tablet (20 mg) by mouth daily, Disp: 30 tablet, Rfl: 0     metFORMIN (GLUCOPHAGE)  "1000 MG tablet, Take 1 tablet (1,000 mg) by mouth 2 times daily (with meals), Disp: 180 tablet, Rfl: 0     metoprolol tartrate (LOPRESSOR) 100 MG tablet, Take 1 tablet (100 mg) by mouth 2 times daily, Disp: 180 tablet, Rfl: 0     metoprolol tartrate (LOPRESSOR) 50 MG tablet, Take 1 tablet (50 mg) by mouth 2 times daily, Disp: 60 tablet, Rfl: 0     naloxone (NARCAN) 4 MG/0.1ML nasal spray, Spray 1 spray (4 mg) into one nostril alternating nostrils as needed for opioid reversal every 2-3 minutes until assistance arrives, Disp: 0.2 mL, Rfl: 3     oxyCODONE IR (ROXICODONE) 10 MG tablet, Take 10 mg by mouth every 6 hours as needed for severe pain Up  to 4 tabs/day, Disp: , Rfl:      pioglitazone (ACTOS) 15 MG tablet, Take 1 tablet (15 mg) by mouth daily, Disp: 90 tablet, Rfl: 0     sitagliptin (JANUVIA) 50 MG tablet, Take 1 tablet (50 mg) by mouth daily, Disp: 90 tablet, Rfl: 0     SUMAtriptan (IMITREX) 50 MG tablet, Take 1 tablet (50 mg) by mouth at onset of headache for migraine May repeat in 2 hours. Max 4 tablets/24 hours., Disp: 9 tablet, Rfl: 1     tiZANidine (ZANAFLEX) 4 MG tablet, TAKE 1 TABLET(4 MG) BY MOUTH AT BEDTIME, Disp: 30 tablet, Rfl: 1      Objective:  Vitals:    02/11/22 0823 02/11/22 0855   BP: (!) 188/116 (!) 170/110   Pulse: 97    Resp: 20    Temp: 97.9  F (36.6  C)    TempSrc: Tympanic    SpO2: 98%    Weight: 108.9 kg (240 lb)    Height: 1.702 m (5' 7\")        Body mass index is 37.59 kg/m .    Vital signs reviewed and stable  General: No acute distress  Psych: Appropriate affect  HEENT: moist mucous membranes, pupils equal, round, reactive to light and accomodation, tympanic membranes are pearly grey bilaterally  Lymph: no cervical or supraclavicular lymphadenopathy  Cardiovascular: regular rate and rhythm with no murmur  Pulmonary: clear to auscultation bilaterally with no wheeze  Abdomen: soft, non tender, non distended with normo-active bowel sounds  Extremities: warm and well perfused with no " edema  Skin: warm and dry with no rash         This note has been dictated and transcribed using voice recognition software.   Any errors in transcription are unintentional and inherent to the software.

## 2022-02-12 ASSESSMENT — ANXIETY QUESTIONNAIRES: GAD7 TOTAL SCORE: 17

## 2022-02-12 ASSESSMENT — PATIENT HEALTH QUESTIONNAIRE - PHQ9: SUM OF ALL RESPONSES TO PHQ QUESTIONS 1-9: 18

## 2022-02-17 DIAGNOSIS — R10.9 ACUTE ABDOMINAL PAIN: ICD-10-CM

## 2022-02-18 DIAGNOSIS — Z79.4 TYPE 2 DIABETES MELLITUS WITH HYPERGLYCEMIA, WITH LONG-TERM CURRENT USE OF INSULIN (H): ICD-10-CM

## 2022-02-18 DIAGNOSIS — E11.65 TYPE 2 DIABETES MELLITUS WITH HYPERGLYCEMIA, WITH LONG-TERM CURRENT USE OF INSULIN (H): ICD-10-CM

## 2022-02-18 RX ORDER — SITAGLIPTIN 50 MG/1
TABLET, FILM COATED ORAL
Qty: 90 TABLET | Refills: 0 | Status: SHIPPED | OUTPATIENT
Start: 2022-02-18 | End: 2022-04-29 | Stop reason: ALTCHOICE

## 2022-02-18 RX ORDER — PIOGLITAZONEHYDROCHLORIDE 15 MG/1
TABLET ORAL
Qty: 90 TABLET | Refills: 0 | Status: SHIPPED | OUTPATIENT
Start: 2022-02-18 | End: 2022-07-14

## 2022-02-18 NOTE — TELEPHONE ENCOUNTER
Routing refill request to provider for review/approval because:  PCP to determine refill    Juice Bond RN

## 2022-02-28 ENCOUNTER — MYC MEDICAL ADVICE (OUTPATIENT)
Dept: FAMILY MEDICINE | Facility: CLINIC | Age: 57
End: 2022-02-28
Payer: COMMERCIAL

## 2022-02-28 DIAGNOSIS — F41.9 ANXIETY: ICD-10-CM

## 2022-02-28 RX ORDER — DULOXETIN HYDROCHLORIDE 60 MG/1
60 CAPSULE, DELAYED RELEASE ORAL 2 TIMES DAILY
Qty: 180 CAPSULE | Refills: 1 | Status: SHIPPED | OUTPATIENT
Start: 2022-02-28 | End: 2022-09-21

## 2022-03-03 ENCOUNTER — MYC REFILL (OUTPATIENT)
Dept: FAMILY MEDICINE | Facility: CLINIC | Age: 57
End: 2022-03-03
Payer: COMMERCIAL

## 2022-03-03 DIAGNOSIS — F41.9 ANXIETY: ICD-10-CM

## 2022-03-03 RX ORDER — ALPRAZOLAM 0.5 MG
TABLET ORAL
Qty: 15 TABLET | Refills: 0 | Status: SHIPPED | OUTPATIENT
Start: 2022-03-03 | End: 2022-03-23

## 2022-03-23 DIAGNOSIS — F41.9 ANXIETY: ICD-10-CM

## 2022-03-23 RX ORDER — ALPRAZOLAM 0.5 MG
TABLET ORAL
Qty: 15 TABLET | Refills: 0 | Status: SHIPPED | OUTPATIENT
Start: 2022-03-23 | End: 2022-04-21

## 2022-03-25 DIAGNOSIS — E11.65 TYPE 2 DIABETES MELLITUS WITH HYPERGLYCEMIA, WITH LONG-TERM CURRENT USE OF INSULIN (H): ICD-10-CM

## 2022-03-25 DIAGNOSIS — Z79.4 TYPE 2 DIABETES MELLITUS WITH HYPERGLYCEMIA, WITH LONG-TERM CURRENT USE OF INSULIN (H): ICD-10-CM

## 2022-03-25 RX ORDER — INSULIN GLARGINE 100 [IU]/ML
INJECTION, SOLUTION SUBCUTANEOUS
Qty: 45 ML | Refills: 0 | Status: SHIPPED | OUTPATIENT
Start: 2022-03-25 | End: 2022-06-20

## 2022-03-31 DIAGNOSIS — M79.10 MUSCLE PAIN: ICD-10-CM

## 2022-04-04 ENCOUNTER — TRANSFERRED RECORDS (OUTPATIENT)
Dept: HEALTH INFORMATION MANAGEMENT | Facility: CLINIC | Age: 57
End: 2022-04-04
Payer: COMMERCIAL

## 2022-04-04 LAB — RETINOPATHY: NEGATIVE

## 2022-04-10 ENCOUNTER — HEALTH MAINTENANCE LETTER (OUTPATIENT)
Age: 57
End: 2022-04-10

## 2022-04-21 DIAGNOSIS — F41.9 ANXIETY: ICD-10-CM

## 2022-04-21 DIAGNOSIS — E11.65 TYPE 2 DIABETES MELLITUS WITH HYPERGLYCEMIA, WITH LONG-TERM CURRENT USE OF INSULIN (H): ICD-10-CM

## 2022-04-21 DIAGNOSIS — Z79.4 TYPE 2 DIABETES MELLITUS WITH HYPERGLYCEMIA, WITH LONG-TERM CURRENT USE OF INSULIN (H): ICD-10-CM

## 2022-04-21 RX ORDER — ALPRAZOLAM 0.5 MG
TABLET ORAL
Qty: 15 TABLET | Refills: 0 | Status: SHIPPED | OUTPATIENT
Start: 2022-04-21 | End: 2022-05-10

## 2022-04-22 ENCOUNTER — TRANSFERRED RECORDS (OUTPATIENT)
Dept: HEALTH INFORMATION MANAGEMENT | Facility: CLINIC | Age: 57
End: 2022-04-22
Payer: COMMERCIAL

## 2022-04-22 RX ORDER — FLASH GLUCOSE SENSOR
KIT MISCELLANEOUS
Qty: 2 EACH | Refills: 3 | Status: SHIPPED | OUTPATIENT
Start: 2022-04-22 | End: 2022-08-19

## 2022-04-28 ENCOUNTER — VIRTUAL VISIT (OUTPATIENT)
Dept: EDUCATION SERVICES | Facility: CLINIC | Age: 57
End: 2022-04-28
Attending: FAMILY MEDICINE
Payer: COMMERCIAL

## 2022-04-28 DIAGNOSIS — E11.65 TYPE 2 DIABETES MELLITUS WITH HYPERGLYCEMIA, WITH LONG-TERM CURRENT USE OF INSULIN (H): Primary | ICD-10-CM

## 2022-04-28 DIAGNOSIS — Z79.4 TYPE 2 DIABETES MELLITUS WITH HYPERGLYCEMIA, WITH LONG-TERM CURRENT USE OF INSULIN (H): ICD-10-CM

## 2022-04-28 DIAGNOSIS — E11.65 TYPE 2 DIABETES MELLITUS WITH HYPERGLYCEMIA, WITH LONG-TERM CURRENT USE OF INSULIN (H): ICD-10-CM

## 2022-04-28 DIAGNOSIS — Z79.4 TYPE 2 DIABETES MELLITUS WITH HYPERGLYCEMIA, WITH LONG-TERM CURRENT USE OF INSULIN (H): Primary | ICD-10-CM

## 2022-04-28 PROCEDURE — G0108 DIAB MANAGE TRN  PER INDIV: HCPCS | Mod: AE | Performed by: DIETITIAN, REGISTERED

## 2022-04-28 RX ORDER — LIRAGLUTIDE 6 MG/ML
1.2 INJECTION SUBCUTANEOUS DAILY
Qty: 6 ML | Refills: 1 | Status: SHIPPED | OUTPATIENT
Start: 2022-04-28 | End: 2022-06-29

## 2022-04-28 NOTE — LETTER
4/28/2022         RE: Obed Nickerson  [address]  [facility] [address]        Dear Colleague,    Thank you for referring your patient, Obed Nickerson, to the [facility] [place]. Please see a copy of my visit note below.    Diabetes Self-Management Education & Support    Presents for: Individual review    Type of Visit: Telephone Visit    How would patient like to obtain AVS? MyChart    ASSESSMENT:    Patient interested in insulin pump, currently not on MDI. Is using Freestyle Ernesto CGMS to check blood sugars and is concerned with significant variability in blood sugars - thinking a pump would be helpful. Discussed med options, when an insulin pump would be appropriate (transition from MDI plan), lifestyle adjustments including diet, exercise & more frequent scanning of Ernesto. Patient utilized Ozempic in the past but was no longer on formulary so stopped this. Is interested in once daily Victoza shot - looks like it might be covered per EMR. Discussed transitioning to this per PCP approval and discontinuing Januvia. He is agreeable. Patient is moving to an apartment building, thinks he will have to walk more there and this will help increase exercise in the future. No further questions or concerns today. Will await start of Victoza prior to adjusting Lantus dose any further, although it's likely he will need more long-acting insulin in the future.     Patient's most recent   Lab Results   Component Value Date    A1C 8.6 01/29/2022    A1C 8.7 07/06/2021    is not meeting goal of <7.0    Diabetes knowledge and skills assessment:   Patient is knowledgeable in diabetes management concepts related to: Taking Medication    Continue education with the following diabetes management concepts: Healthy Eating, Being Active, Monitoring, Taking Medication, Problem Solving, Reducing Risks and Healthy Coping    Based on learning assessment above, most appropriate setting for further diabetes  education would be: Group class or Individual setting.    INTERVENTIONS:    Education provided today on:  AADE Self-Care Behaviors:  Healthy Eating: carbohydrate counting, consistency in amount, composition, and timing of food intake, weight reduction, portion control, plate planning method and label reading  Being Active: relationship to blood glucose and describe appropriate activity program  Monitoring: individual blood glucose targets and frequency of monitoring  Taking Medication: action of prescribed medication, side effects of prescribed medications and when to take medications  Problem Solving: low blood glucose - causes, signs/symptoms, treatment and prevention    Opportunities for ongoing education and support in diabetes-self management were discussed. Pt verbalized understanding of concepts discussed and recommendations provided today.       Education Materials Provided:  [facility] Understanding Diabetes Booklet, BG Log Sheet and My Plate Planner    PLAN    Recommend initiation of Victoza starting at 0.6 mg/day x 1 week, then increase to 1.2 mg daily. Will reach out to PCP for sign off in separate encounter.  Count carbohydrates at meals & snacks - 60-75 grams/meal & 15-30 grams/snack  Increase exercise with goal of >150 minutes/week moderate physical activity   Scan Ernesto at least every 8 hrs - fasting, before & 2 hrs after meals, before bed      Topics to cover at upcoming visits: Monitoring, Taking Medication, Problem Solving, Reducing Risks and Healthy Coping  Follow-up: 5/26/22    See Goals Section for co-developed, patient-stated behavior change goals.  AVS provided to patient today.      SUBJECTIVE / OBJECTIVE:  Presents for: Individual review  Accompanied by: Self  Diabetes education in the past 24mo: No  Focus of Visit: Insulin Pump  Type of Pump visit: Pre-pump  Diabetes type: Type 2  Disease course: Getting harder to manage  How confident are you filling out medical forms by yourself::  "Not Assessed  Diabetes management related comments/concerns: Seeing big variability with bgs, wondering if an insulin pump would help  Other concerns:: None  Cultural Influences/Ethnic Background:  Not  or     Diabetes Symptoms & Complications:  Neuropathy: Sometimes  Polydipsia: Yes  Polyphagia: Yes  Polyuria: Sometimes  Visual change: No  Symptom course: Worsening  Weight trend: Increasing  Complications assessed today?: No    Patient Problem List and Family Medical History reviewed for relevant medical history, current medical status, and diabetes risk factors.    Vitals:  There were no vitals taken for this visit.  Estimated body mass index is 37.59 kg/m  as calculated from the following:    Height as of 2/11/22: 1.702 m (5' 7\").    Weight as of 2/11/22: 108.9 kg (240 lb).   Last 3 BP:   BP Readings from Last 3 Encounters:   02/11/22 (!) 170/110   01/30/22 (!) 160/94   10/27/21 138/86       History   Smoking Status     Never Smoker   Smokeless Tobacco     Never Used       Labs:  Lab Results   Component Value Date    A1C 8.6 01/29/2022    A1C 8.7 07/06/2021     Lab Results   Component Value Date     02/11/2022     07/06/2021     Lab Results   Component Value Date    LDL  10/27/2021      Comment:      Cannot estimate LDL when triglyceride exceeds 400 mg/dL     10/27/2021     02/02/2021     HDL Cholesterol   Date Value Ref Range Status   02/02/2021 46 >39 mg/dL Final     Direct Measure HDL   Date Value Ref Range Status   10/27/2021 54 >=40 mg/dL Final   ]  GFR Estimate   Date Value Ref Range Status   02/11/2022 >90 >60 mL/min/1.73m2 Final     Comment:     Effective December 21, 2021 eGFRcr in adults is calculated using the 2021 CKD-EPI creatinine equation which includes age and gender (Agustin ny al., NEJM, DOI: 10.1056/YERZiq7631124)   07/06/2021 >90 >60 mL/min/[1.73_m2] Final     Comment:     Non  GFR Calc  Starting 12/18/2018, serum creatinine based " estimated GFR (eGFR) will be   calculated using the Chronic Kidney Disease Epidemiology Collaboration   (CKD-EPI) equation.       GFR Estimate If Black   Date Value Ref Range Status   07/06/2021 >90 >60 mL/min/[1.73_m2] Final     Comment:      GFR Calc  Starting 12/18/2018, serum creatinine based estimated GFR (eGFR) will be   calculated using the Chronic Kidney Disease Epidemiology Collaboration   (CKD-EPI) equation.       Lab Results   Component Value Date    CR 0.83 02/11/2022    CR 0.80 07/06/2021     No results found for: MICROALBUMIN    Healthy Eating:  Healthy Eating Assessed Today: Yes  Meal planning/habits: None  Meals include: Breakfast, Lunch, Afternoon Snack, Dinner, Evening Snack  Breakfast: breakfast bowl - eggs, honeycutt, potato, cheese, whole milk or SF lemonade  Lunch: can ravioli OR can of soup, whole milk  Dinner: potstickers, whole milk  Snacks: PM - liverwurst + crackers OR peanuts; HS - crackers & cheese OR grapes OR orange OR popcorn; MN - crackers & cheese  Other: 5 smaller meals  Beverages: Milk (SF lemonade)  Has patient met with a dietitian in the past?: No    Being Active:  Being Active Assessed Today: Yes  Exercise:: Currently not exercising  Barrier to exercise: None    Monitoring:  Monitoring Assessed Today: Yes  Did patient bring glucose meter to appointment? : Yes  Blood Glucose Meter: CGM  Times checking blood sugar at home (number): 2  Times checking blood sugar at home (per): Day  Blood glucose trend: Fluctuating    Glucose data:            Taking Medications:  Diabetes Medication(s)     Biguanides       metFORMIN (GLUCOPHAGE) 1000 MG tablet    TAKE 1 TABLET BY MOUTH TWICE DAILY(WITH MEALS).    Dipeptidyl Peptidase-4 (DPP-4) Inhibitors       JANUVIA 50 MG tablet    TAKE 1 TABLET BY MOUTH EVERY DAY    Insulin       insulin glargine (LANTUS SOLOSTAR) 100 UNIT/ML pen    ADMINISTER 50 UNITS UNDER THE SKIN EVERY NIGHT AT BEDTIME    Insulin Sensitizing Agents        pioglitazone (ACTOS) 15 MG tablet    TAKE 1 TABLET BY MOUTH DAILY          Taking Medication Assessed Today: Yes  Current Treatments: Insulin Injections, Oral Medication (taken by mouth)  Dose schedule: At bedtime  Given by: Patient  Injection/Infusion sites: Abdomen  Problems taking diabetes medications regularly?: Yes    Problem Solving:  Problem Solving Assessed Today: Yes  Is the patient at risk for hypoglycemia?: Yes  Hypoglycemia Frequency: Rarely  Hypoglycemia Treatment: Candy  Medical ID: No  Does patient have glucagon emergency kit?: No  Is the patient at risk for DKA?: No  Does patient have severe weather/disaster plan for diabetes management?: No  Does patient have sick day plan for diabetes management?: No     Reducing Risks:  Reducing Risks Assessed Today: No  Diabetes Risks: Age over 45 years, Sedentary Lifestyle  CAD Risks: Diabetes Mellitus, Male sex, Hypertension, Sedentary lifestyle, Obesity    Healthy Coping:  Healthy Coping Assessed Today: Yes  Emotional response to diabetes: Ready to learn, Concern for health and well-being  Informal Support system:: Spouse  Stage of change: PREPARATION (Decided to change - considering how)  Support resources: None  Patient Activation Measure Survey Score:  No flowsheet data found.          Selene Rodriguez RD, LD, [facility]   Time Spent: 60 minutes  Encounter Type: Individual        Any diabetes medication dose changes were made via the Certified Diabetes Care & Education Protocol in collaboration with the patient's referring provider. A copy of this encounter was shared with the provider.

## 2022-04-28 NOTE — PROGRESS NOTES
Diabetes Self-Management Education & Support    Presents for: Individual review    Type of Visit: Telephone Visit    How would patient like to obtain AVS? MyChart    ASSESSMENT:    Patient interested in insulin pump, currently not on MDI. Is using Freestyle Ernesto CGMS to check blood sugars and is concerned with significant variability in blood sugars - thinking a pump would be helpful. Discussed med options, when an insulin pump would be appropriate (transition from MDI plan), lifestyle adjustments including diet, exercise & more frequent scanning of Ernesto. Patient utilized Ozempic in the past but was no longer on formulary so stopped this. Is interested in once daily Victoza shot - looks like it might be covered per EMR. Discussed transitioning to this per PCP approval and discontinuing Januvia. He is agreeable. Patient is moving to an apartment building, thinks he will have to walk more there and this will help increase exercise in the future. No further questions or concerns today. Will await start of Victoza prior to adjusting Lantus dose any further, although it's likely he will need more long-acting insulin in the future.     Patient's most recent   Lab Results   Component Value Date    A1C 8.6 01/29/2022    A1C 8.7 07/06/2021    is not meeting goal of <7.0    Diabetes knowledge and skills assessment:   Patient is knowledgeable in diabetes management concepts related to: Taking Medication    Continue education with the following diabetes management concepts: Healthy Eating, Being Active, Monitoring, Taking Medication, Problem Solving, Reducing Risks and Healthy Coping    Based on learning assessment above, most appropriate setting for further diabetes education would be: Group class or Individual setting.    INTERVENTIONS:    Education provided today on:  AADE Self-Care Behaviors:  Healthy Eating: carbohydrate counting, consistency in amount, composition, and timing of food intake, weight reduction, portion  control, plate planning method and label reading  Being Active: relationship to blood glucose and describe appropriate activity program  Monitoring: individual blood glucose targets and frequency of monitoring  Taking Medication: action of prescribed medication, side effects of prescribed medications and when to take medications  Problem Solving: low blood glucose - causes, signs/symptoms, treatment and prevention    Opportunities for ongoing education and support in diabetes-self management were discussed. Pt verbalized understanding of concepts discussed and recommendations provided today.       Education Materials Provided:   [employer] Understanding Diabetes Booklet, BG Log Sheet and My Plate Planner    PLAN    Recommend initiation of Victoza starting at 0.6 mg/day x 1 week, then increase to 1.2 mg daily. Will reach out to PCP for sign off in separate encounter.  Count carbohydrates at meals & snacks - 60-75 grams/meal & 15-30 grams/snack  Increase exercise with goal of >150 minutes/week moderate physical activity   Scan Ernesto at least every 8 hrs - fasting, before & 2 hrs after meals, before bed      Topics to cover at upcoming visits: Monitoring, Taking Medication, Problem Solving, Reducing Risks and Healthy Coping  Follow-up: 5/26/22    See Goals Section for co-developed, patient-stated behavior change goals.  AVS provided to patient today.      SUBJECTIVE / OBJECTIVE:  Presents for: Individual review  Accompanied by: Self  Diabetes education in the past 24mo: No  Focus of Visit: Insulin Pump  Type of Pump visit: Pre-pump  Diabetes type: Type 2  Disease course: Getting harder to manage  How confident are you filling out medical forms by yourself:: Not Assessed  Diabetes management related comments/concerns: Seeing big variability with bgs, wondering if an insulin pump would help  Other concerns:: None  Cultural Influences/Ethnic Background:  Not  or     Diabetes Symptoms &  "Complications:  Neuropathy: Sometimes  Polydipsia: Yes  Polyphagia: Yes  Polyuria: Sometimes  Visual change: No  Symptom course: Worsening  Weight trend: Increasing  Complications assessed today?: No    Patient Problem List and Family Medical History reviewed for relevant medical history, current medical status, and diabetes risk factors.    Vitals:  There were no vitals taken for this visit.  Estimated body mass index is 37.59 kg/m  as calculated from the following:    Height as of 2/11/22: 1.702 m (5' 7\").    Weight as of 2/11/22: 108.9 kg (240 lb).   Last 3 BP:   BP Readings from Last 3 Encounters:   02/11/22 (!) 170/110   01/30/22 (!) 160/94   10/27/21 138/86       History   Smoking Status     Never Smoker   Smokeless Tobacco     Never Used       Labs:  Lab Results   Component Value Date    A1C 8.6 01/29/2022    A1C 8.7 07/06/2021     Lab Results   Component Value Date     02/11/2022     07/06/2021     Lab Results   Component Value Date    LDL  10/27/2021      Comment:      Cannot estimate LDL when triglyceride exceeds 400 mg/dL     10/27/2021     02/02/2021     HDL Cholesterol   Date Value Ref Range Status   02/02/2021 46 >39 mg/dL Final     Direct Measure HDL   Date Value Ref Range Status   10/27/2021 54 >=40 mg/dL Final   ]  GFR Estimate   Date Value Ref Range Status   02/11/2022 >90 >60 mL/min/1.73m2 Final     Comment:     Effective December 21, 2021 eGFRcr in adults is calculated using the 2021 CKD-EPI creatinine equation which includes age and gender (Agustin et al., NEJM, DOI: 10.1056/OVUGvz4583016)   07/06/2021 >90 >60 mL/min/[1.73_m2] Final     Comment:     Non  GFR Calc  Starting 12/18/2018, serum creatinine based estimated GFR (eGFR) will be   calculated using the Chronic Kidney Disease Epidemiology Collaboration   (CKD-EPI) equation.       GFR Estimate If Black   Date Value Ref Range Status   07/06/2021 >90 >60 mL/min/[1.73_m2] Final     Comment:      " American GFR Calc  Starting 12/18/2018, serum creatinine based estimated GFR (eGFR) will be   calculated using the Chronic Kidney Disease Epidemiology Collaboration   (CKD-EPI) equation.       Lab Results   Component Value Date    CR 0.83 02/11/2022    CR 0.80 07/06/2021     No results found for: MICROALBUMIN    Healthy Eating:  Healthy Eating Assessed Today: Yes  Meal planning/habits: None  Meals include: Breakfast, Lunch, Afternoon Snack, Dinner, Evening Snack  Breakfast: breakfast bowl - eggs, honeycutt, potato, cheese, whole milk or SF lemonade  Lunch: can ravioli OR can of soup, whole milk  Dinner: potstickers, whole milk  Snacks: PM - liverwurst + crackers OR peanuts; HS - crackers & cheese OR grapes OR orange OR popcorn; MN - crackers & cheese  Other: 5 smaller meals  Beverages: Milk (SF lemonade)  Has patient met with a dietitian in the past?: No    Being Active:  Being Active Assessed Today: Yes  Exercise:: Currently not exercising  Barrier to exercise: None    Monitoring:  Monitoring Assessed Today: Yes  Did patient bring glucose meter to appointment? : Yes  Blood Glucose Meter: CGM  Times checking blood sugar at home (number): 2  Times checking blood sugar at home (per): Day  Blood glucose trend: Fluctuating    Glucose data:            Taking Medications:  Diabetes Medication(s)     Biguanides       metFORMIN (GLUCOPHAGE) 1000 MG tablet    TAKE 1 TABLET BY MOUTH TWICE DAILY(WITH MEALS).    Dipeptidyl Peptidase-4 (DPP-4) Inhibitors       JANUVIA 50 MG tablet    TAKE 1 TABLET BY MOUTH EVERY DAY    Insulin       insulin glargine (LANTUS SOLOSTAR) 100 UNIT/ML pen    ADMINISTER 50 UNITS UNDER THE SKIN EVERY NIGHT AT BEDTIME    Insulin Sensitizing Agents       pioglitazone (ACTOS) 15 MG tablet    TAKE 1 TABLET BY MOUTH DAILY          Taking Medication Assessed Today: Yes  Current Treatments: Insulin Injections, Oral Medication (taken by mouth)  Dose schedule: At bedtime  Given by: Patient  Injection/Infusion sites:  Abdomen  Problems taking diabetes medications regularly?: Yes    Problem Solving:  Problem Solving Assessed Today: Yes  Is the patient at risk for hypoglycemia?: Yes  Hypoglycemia Frequency: Rarely  Hypoglycemia Treatment: Amy  Medical ID: No  Does patient have glucagon emergency kit?: No  Is the patient at risk for DKA?: No  Does patient have severe weather/disaster plan for diabetes management?: No  Does patient have sick day plan for diabetes management?: No     Reducing Risks:  Reducing Risks Assessed Today: No  Diabetes Risks: Age over 45 years, Sedentary Lifestyle  CAD Risks: Diabetes Mellitus, Male sex, Hypertension, Sedentary lifestyle, Obesity    Healthy Coping:  Healthy Coping Assessed Today: Yes  Emotional response to diabetes: Ready to learn, Concern for health and well-being  Informal Support system:: Spouse  Stage of change: PREPARATION (Decided to change - considering how)  Support resources: None  Patient Activation Measure Survey Score:  No flowsheet data found.          Selene Rodriguez RD, LD, [facility]   Time Spent: 60 minutes  Encounter Type: Individual        Any diabetes medication dose changes were made via the Certified Diabetes Care & Education Protocol in collaboration with the patient's referring provider. A copy of this encounter was shared with the provider.

## 2022-04-28 NOTE — PATIENT INSTRUCTIONS
Count carbohydrates at meals & snacks - 60-75 grams/meal & 15-30 grams/snack  Increase exercise with goal of >150 minutes/week moderate physical activity   Scan Ernesto at least every 8 hrs - fasting, before & 2 hrs after meals, before bed    I will reach out to Dr. Brandt about the Victoza    Call or send a [employer] Information message with any questions or concerns    Selene Rodriguez RD, LD, [facility]   Diabetes Education Triage Line: [phone]  Diabetes Education Appointment [phone]

## 2022-04-29 NOTE — TELEPHONE ENCOUNTER
Dr. Brandt,   Patient had success with Ozempic in the past but was taken off of it due to insurance formulary issues. I think he would benefit from Victoza once daily GLP1ra - starting at 0.6 mg/day x 1 week and then increase to 1.2 mg daily. With start of Victoza, I'd recommend discontinuing Januvia due to similar mechanism of action. I've pended orders. Please sign off if you agree or provide an alternative plan.   Selene Rodriguez RD, LD, [facility]       
Informed patient of new med start + discontinuation of Januvia via MyChart. See separate encounter.     Selene Rodriguez RD, LD, [facility]     
That sounds great - I signed the order and I really appreciate your help!    Mirna    
X Size Of Lesion In Cm (Optional): 0
Detail Level: Generalized

## 2022-05-05 ENCOUNTER — TRANSFERRED RECORDS (OUTPATIENT)
Dept: HEALTH INFORMATION MANAGEMENT | Facility: CLINIC | Age: 57
End: 2022-05-05
Payer: COMMERCIAL

## 2022-05-20 ENCOUNTER — TRANSFERRED RECORDS (OUTPATIENT)
Dept: HEALTH INFORMATION MANAGEMENT | Facility: CLINIC | Age: 57
End: 2022-05-20
Payer: COMMERCIAL

## 2022-05-20 LAB — PHQ9 SCORE: 9

## 2022-06-14 ENCOUNTER — MYC REFILL (OUTPATIENT)
Dept: FAMILY MEDICINE | Facility: CLINIC | Age: 57
End: 2022-06-14
Payer: COMMERCIAL

## 2022-06-14 DIAGNOSIS — F41.9 ANXIETY: ICD-10-CM

## 2022-06-14 RX ORDER — ALPRAZOLAM 0.5 MG
0.5 TABLET ORAL
Qty: 15 TABLET | Refills: 0 | Status: SHIPPED | OUTPATIENT
Start: 2022-06-14 | End: 2022-07-05

## 2022-06-17 ENCOUNTER — TRANSFERRED RECORDS (OUTPATIENT)
Dept: HEALTH INFORMATION MANAGEMENT | Facility: CLINIC | Age: 57
End: 2022-06-17

## 2022-06-19 DIAGNOSIS — E11.65 TYPE 2 DIABETES MELLITUS WITH HYPERGLYCEMIA, WITH LONG-TERM CURRENT USE OF INSULIN (H): ICD-10-CM

## 2022-06-19 DIAGNOSIS — M79.10 MUSCLE PAIN: ICD-10-CM

## 2022-06-19 DIAGNOSIS — Z79.4 TYPE 2 DIABETES MELLITUS WITH HYPERGLYCEMIA, WITH LONG-TERM CURRENT USE OF INSULIN (H): ICD-10-CM

## 2022-06-20 DIAGNOSIS — Z79.4 TYPE 2 DIABETES MELLITUS WITH HYPERGLYCEMIA, WITH LONG-TERM CURRENT USE OF INSULIN (H): ICD-10-CM

## 2022-06-20 DIAGNOSIS — E11.65 TYPE 2 DIABETES MELLITUS WITH HYPERGLYCEMIA, WITH LONG-TERM CURRENT USE OF INSULIN (H): ICD-10-CM

## 2022-06-20 RX ORDER — INSULIN GLARGINE 100 [IU]/ML
INJECTION, SOLUTION SUBCUTANEOUS
Qty: 45 ML | Refills: 0 | Status: SHIPPED | OUTPATIENT
Start: 2022-06-20 | End: 2022-07-25

## 2022-06-20 RX ORDER — INSULIN GLARGINE 100 [IU]/ML
INJECTION, SOLUTION SUBCUTANEOUS
Qty: 45 ML | Refills: 0 | Status: SHIPPED | OUTPATIENT
Start: 2022-06-20 | End: 2022-11-11

## 2022-06-20 NOTE — TELEPHONE ENCOUNTER
Routing refill request to provider for review/approval because:  Labs not current:  A1C overdue    Juice Bond RN

## 2022-06-20 NOTE — TELEPHONE ENCOUNTER
Routing refill request to provider for review/approval because:  Tizanidine is ot on the RN refill protocol.  1/29/22 Hgba1c was 8.6  Chart indicates that Rodhas a 7/25/22 appointment with dr. Brandt.  Dimitry Neff, RN

## 2022-06-20 NOTE — TELEPHONE ENCOUNTER
Routing refill request to provider for review/approval because:  Labs out of range:  Not current,A1C 8.6  Patient has appointment with PCP 7/25/22.  Nora Suarez RN

## 2022-06-24 ENCOUNTER — PATIENT OUTREACH (OUTPATIENT)
Dept: FAMILY MEDICINE | Facility: CLINIC | Age: 57
End: 2022-06-24

## 2022-06-24 NOTE — TELEPHONE ENCOUNTER
Patient Quality Outreach      Summary:    Patient has the following on his problem list/HM:   Hypertension   Last three blood pressure readings:  BP Readings from Last 3 Encounters:   02/11/22 (!) 170/110   01/30/22 (!) 160/94   10/27/21 138/86     Blood pressure: Failed    HTN Guidelines:  ? 139/89     Patient is due/failing the following:   Hypertension -  Hypertension follow-up visit    Type of outreach:    Sent [employer] message. and Sent letter.    Questions for provider review:    None                                                                                                                                     Evelin Markham, CMA

## 2022-07-05 DIAGNOSIS — F41.9 ANXIETY: ICD-10-CM

## 2022-07-05 RX ORDER — ALPRAZOLAM 0.5 MG
TABLET ORAL
Qty: 15 TABLET | Refills: 0 | Status: SHIPPED | OUTPATIENT
Start: 2022-07-05 | End: 2022-07-18

## 2022-07-08 ENCOUNTER — TELEPHONE (OUTPATIENT)
Dept: FAMILY MEDICINE | Facility: CLINIC | Age: 57
End: 2022-07-08

## 2022-07-08 ENCOUNTER — OFFICE VISIT (OUTPATIENT)
Dept: FAMILY MEDICINE | Facility: CLINIC | Age: 57
End: 2022-07-08
Payer: COMMERCIAL

## 2022-07-08 VITALS
DIASTOLIC BLOOD PRESSURE: 92 MMHG | RESPIRATION RATE: 16 BRPM | TEMPERATURE: 98 F | BODY MASS INDEX: 39.5 KG/M2 | OXYGEN SATURATION: 97 % | SYSTOLIC BLOOD PRESSURE: 150 MMHG | HEART RATE: 97 BPM | WEIGHT: 252.2 LBS

## 2022-07-08 DIAGNOSIS — G89.4 CHRONIC PAIN SYNDROME: ICD-10-CM

## 2022-07-08 DIAGNOSIS — G89.4 CHRONIC PAIN SYNDROME: Primary | ICD-10-CM

## 2022-07-08 DIAGNOSIS — Z01.818 PREOP GENERAL PHYSICAL EXAM: Primary | ICD-10-CM

## 2022-07-08 PROCEDURE — 99214 OFFICE O/P EST MOD 30 MIN: CPT | Performed by: PHYSICIAN ASSISTANT

## 2022-07-08 RX ORDER — MORPHINE SULFATE 15 MG/1
15 TABLET, FILM COATED, EXTENDED RELEASE ORAL EVERY 12 HOURS
COMMUNITY
End: 2024-07-12

## 2022-07-08 ASSESSMENT — PATIENT HEALTH QUESTIONNAIRE - PHQ9
SUM OF ALL RESPONSES TO PHQ QUESTIONS 1-9: 17
10. IF YOU CHECKED OFF ANY PROBLEMS, HOW DIFFICULT HAVE THESE PROBLEMS MADE IT FOR YOU TO DO YOUR WORK, TAKE CARE OF THINGS AT HOME, OR GET ALONG WITH OTHER PEOPLE: EXTREMELY DIFFICULT
SUM OF ALL RESPONSES TO PHQ QUESTIONS 1-9: 17

## 2022-07-08 ASSESSMENT — PAIN SCALES - GENERAL: PAINLEVEL: SEVERE PAIN (7)

## 2022-07-08 NOTE — TELEPHONE ENCOUNTER
[place] called back and stated that the Evzio auto injectable, is no longer made.    They are asking if he can have 2 narcan nasal sprays instead.    Bruna RN,BSN  Triage Nurse  [facility]: [facility]  Ph: [phone]

## 2022-07-08 NOTE — PATIENT INSTRUCTIONS
Shellie Thorne,    Thank you for allowing [facility] to manage your care.    Take 40 units of your Lantus the night before your surgery instead of your normal dose. Skip your metformin and Victoza the day of the surgery. No ibuprofen or naproxen for 4 days before the procedure.    Your blood pressure was high today. Get a blood pressure cuff for use at home. Take and record your blood pressure daily for 2 weeks. If your blood pressure is greater than or equal to 140/90mm Hg more than half of the time, please schedule an appointment with us for a recheck.    If you have any questions or concerns, please feel free to call us at [phone]    Sincerely,    Kemar Ingram PA-C    Did you know?      You can schedule a video visit for follow-up appointments as well as future appointments for certain conditions.  Please see the below link.     [email or web address]    If you have not already done so,  I encourage you to sign up for [employer] ([email or web address]).  This will allow you to review your results, securely communicate with a provider, and schedule virtual visits as well.    Preparing for Your Surgery  Getting started  A nurse will call you to review your health history and instructions. They will give you an arrival time based on your scheduled surgery time. Please be ready to share:  Your doctor's clinic name and phone number  Your medical, surgical and anesthesia history  A list of allergies and sensitivities  A list of medicines, including herbal treatments and over-the-counter drugs  Whether the patient has a legal guardian (ask how to send us the papers in advance)  Please tell us if you're pregnant--or if there's any chance you might be pregnant. Some surgeries may injure a fetus (unborn baby), so they require a pregnancy test. Surgeries that are safe for a fetus don't always need a test, and you can choose whether to have one.   If you have a child  who's having surgery, please ask for a copy of Preparing for Your Child's Surgery.    Preparing for surgery  Within 30 days of surgery: Have a pre-op exam (sometimes called an H&P, or History and Physical). This can be done at a clinic or pre-operative center.  If you're having a , you may not need this exam. Talk to your care team.  At your pre-op exam, talk to your care team about all medicines you take. If you need to stop any medicines before surgery, ask when to start taking them again.  We do this for your safety. Many medicines can make you bleed too much during surgery. Some change how well surgery (anesthesia) drugs work.  Call your insurance company to let them know you're having surgery. (If you don't have insurance, call [phone].)  Call your clinic if there's any change in your health. This includes signs of a cold or flu (sore throat, runny nose, cough, rash, fever). It also includes a scrape or scratch near the surgery site.  If you have questions on the day of surgery, call your hospital or surgery center.  COVID testing  You may need to be tested for COVID-19 before having surgery. If so, we will give you instructions.  Eating and drinking guidelines  For your safety: Unless your surgeon tells you otherwise, follow the guidelines below.  Eat and drink as usual until 8 hours before surgery. After that, no food or milk.  Drink clear liquids until 2 hours before surgery. These are liquids you can see through, like water, Gatorade and Propel Water. You may also have black coffee and tea (no cream or milk).  Nothing by mouth within 2 hours of surgery. This includes gum, candy and breath mints.  If you drink alcohol: Stop drinking it the night before surgery.  If your care team tells you to take medicine on the morning of surgery, it's okay to take it with a sip of water.  Preventing infection  Shower or bathe the night before and morning of your surgery. Follow the instructions your clinic  gave you. (If no instructions, use regular soap.)  Don't shave or clip hair near your surgery site. We'll remove the hair if needed.  Don't smoke or vape the morning of surgery. You may chew nicotine gum up to 2 hours before surgery. A nicotine patch is okay.  Note: Some surgeries require you to completely quit smoking and nicotine. Check with your surgeon.  Your care team will make every effort to keep you safe from infection. We will:  Clean our hands often with soap and water (or an alcohol-based hand rub).  Clean the skin at your surgery site with a special soap that kills germs.  Give you a special gown to keep you warm. (Cold raises the risk of infection.)  Wear special hair covers, masks, gowns and gloves during surgery.  Give antibiotic medicine, if prescribed. Not all surgeries need antibiotics.  What to bring on the day of surgery  Photo ID and insurance card  Copy of your health care directive, if you have one  Glasses and hearing aides (bring cases)  You can't wear contacts during surgery  Inhaler and eye drops, if you use them (tell us about these when you arrive)  CPAP machine or breathing device, if you use them  A few personal items, if spending the night  If you have . . .  A pacemaker, ICD (cardiac defibrillator) or other implant: Bring the ID card.  An implanted stimulator: Bring the remote control.  A legal guardian: Bring a copy of the certified (court-stamped) guardianship papers.  Please remove any jewelry, including body piercings. Leave jewelry and other valuables at home.  If you're going home the day of surgery  You must have a responsible adult drive you home. They should stay with you overnight as well.  If you don't have someone to stay with you, and you aren't safe to go home alone, we may keep you overnight. Insurance often won't pay for this.  After surgery  If it's hard to control your pain or you need more pain medicine, please call your surgeon's office.  Questions?   If you have  any questions for your care team, list them here: _________________________________________________________________________________________________________________________________________________________________________ ____________________________________ ____________________________________ ____________________________________  For informational purposes only. Not to replace the advice of your health care provider. Copyright   2003, 2019 [facility]. All rights reserved. Clinically reviewed by Bekah Bernstein MD. [employer]ART[employer] 021794 - REV 07/21.
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
## 2022-07-08 NOTE — TELEPHONE ENCOUNTER
Naloxone 4 mg/0.1 ml elayne spry 2 matthias not covered by patient plan. The preferred alternative is NARCAN. Please send a new Rx. Thx!

## 2022-07-18 ENCOUNTER — MYC REFILL (OUTPATIENT)
Dept: FAMILY MEDICINE | Facility: CLINIC | Age: 57
End: 2022-07-18

## 2022-07-18 DIAGNOSIS — F41.9 ANXIETY: ICD-10-CM

## 2022-07-18 RX ORDER — ALPRAZOLAM 0.5 MG
0.5 TABLET ORAL
Qty: 15 TABLET | Refills: 0 | Status: SHIPPED | OUTPATIENT
Start: 2022-07-18 | End: 2022-07-25

## 2022-07-18 NOTE — TELEPHONE ENCOUNTER
Routing MyChart note and  refill request to provider for review/approval because:  Drug not on the [facility] refill protocol   Thank you.  Dimitry Neff RN

## 2022-07-20 RX ORDER — PEN NEEDLE, DIABETIC 31 GX5/16"
NEEDLE, DISPOSABLE MISCELLANEOUS
COMMUNITY
Start: 2022-06-20 | End: 2022-08-22

## 2022-07-25 ENCOUNTER — OFFICE VISIT (OUTPATIENT)
Dept: FAMILY MEDICINE | Facility: CLINIC | Age: 57
End: 2022-07-25
Payer: COMMERCIAL

## 2022-07-25 VITALS
HEIGHT: 67 IN | RESPIRATION RATE: 16 BRPM | WEIGHT: 250.13 LBS | TEMPERATURE: 98.6 F | HEART RATE: 105 BPM | DIASTOLIC BLOOD PRESSURE: 110 MMHG | OXYGEN SATURATION: 97 % | SYSTOLIC BLOOD PRESSURE: 164 MMHG | BODY MASS INDEX: 39.26 KG/M2

## 2022-07-25 DIAGNOSIS — Z11.4 SCREENING FOR HIV (HUMAN IMMUNODEFICIENCY VIRUS): ICD-10-CM

## 2022-07-25 DIAGNOSIS — I10 HYPERTENSION GOAL BP (BLOOD PRESSURE) < 130/80: ICD-10-CM

## 2022-07-25 DIAGNOSIS — E11.65 TYPE 2 DIABETES MELLITUS WITH HYPERGLYCEMIA, WITH LONG-TERM CURRENT USE OF INSULIN (H): Primary | ICD-10-CM

## 2022-07-25 DIAGNOSIS — F41.9 ANXIETY: ICD-10-CM

## 2022-07-25 DIAGNOSIS — E78.5 HYPERLIPIDEMIA LDL GOAL <130: ICD-10-CM

## 2022-07-25 DIAGNOSIS — Z79.4 TYPE 2 DIABETES MELLITUS WITH HYPERGLYCEMIA, WITH LONG-TERM CURRENT USE OF INSULIN (H): Primary | ICD-10-CM

## 2022-07-25 LAB — HBA1C MFR BLD: 8 % (ref 0–5.6)

## 2022-07-25 PROCEDURE — 36415 COLL VENOUS BLD VENIPUNCTURE: CPT | Performed by: FAMILY MEDICINE

## 2022-07-25 PROCEDURE — 83036 HEMOGLOBIN GLYCOSYLATED A1C: CPT | Performed by: FAMILY MEDICINE

## 2022-07-25 PROCEDURE — 80053 COMPREHEN METABOLIC PANEL: CPT | Performed by: FAMILY MEDICINE

## 2022-07-25 PROCEDURE — 99214 OFFICE O/P EST MOD 30 MIN: CPT | Performed by: FAMILY MEDICINE

## 2022-07-25 RX ORDER — LISINOPRIL AND HYDROCHLOROTHIAZIDE 20; 25 MG/1; MG/1
1 TABLET ORAL DAILY
Qty: 90 TABLET | Refills: 1 | Status: SHIPPED | OUTPATIENT
Start: 2022-07-25 | End: 2022-09-18

## 2022-07-25 RX ORDER — ALPRAZOLAM 0.5 MG
0.5 TABLET ORAL
Qty: 15 TABLET | Refills: 0 | Status: SHIPPED | OUTPATIENT
Start: 2022-07-25 | End: 2022-07-25

## 2022-07-25 RX ORDER — CEPHALEXIN 500 MG/1
CAPSULE ORAL
COMMUNITY
Start: 2022-07-15 | End: 2022-09-19

## 2022-07-25 ASSESSMENT — ANXIETY QUESTIONNAIRES
3. WORRYING TOO MUCH ABOUT DIFFERENT THINGS: NEARLY EVERY DAY
4. TROUBLE RELAXING: MORE THAN HALF THE DAYS
GAD7 TOTAL SCORE: 14
8. IF YOU CHECKED OFF ANY PROBLEMS, HOW DIFFICULT HAVE THESE MADE IT FOR YOU TO DO YOUR WORK, TAKE CARE OF THINGS AT HOME, OR GET ALONG WITH OTHER PEOPLE?: EXTREMELY DIFFICULT
IF YOU CHECKED OFF ANY PROBLEMS ON THIS QUESTIONNAIRE, HOW DIFFICULT HAVE THESE PROBLEMS MADE IT FOR YOU TO DO YOUR WORK, TAKE CARE OF THINGS AT HOME, OR GET ALONG WITH OTHER PEOPLE: EXTREMELY DIFFICULT
5. BEING SO RESTLESS THAT IT IS HARD TO SIT STILL: SEVERAL DAYS
GAD7 TOTAL SCORE: 14
7. FEELING AFRAID AS IF SOMETHING AWFUL MIGHT HAPPEN: SEVERAL DAYS
6. BECOMING EASILY ANNOYED OR IRRITABLE: NEARLY EVERY DAY
7. FEELING AFRAID AS IF SOMETHING AWFUL MIGHT HAPPEN: SEVERAL DAYS
2. NOT BEING ABLE TO STOP OR CONTROL WORRYING: MORE THAN HALF THE DAYS
1. FEELING NERVOUS, ANXIOUS, OR ON EDGE: MORE THAN HALF THE DAYS
GAD7 TOTAL SCORE: 14

## 2022-07-25 ASSESSMENT — PATIENT HEALTH QUESTIONNAIRE - PHQ9
SUM OF ALL RESPONSES TO PHQ QUESTIONS 1-9: 13
SUM OF ALL RESPONSES TO PHQ QUESTIONS 1-9: 13
10. IF YOU CHECKED OFF ANY PROBLEMS, HOW DIFFICULT HAVE THESE PROBLEMS MADE IT FOR YOU TO DO YOUR WORK, TAKE CARE OF THINGS AT HOME, OR GET ALONG WITH OTHER PEOPLE: VERY DIFFICULT

## 2022-07-25 NOTE — PROGRESS NOTES
Answers for HPI/ROS submitted by the patient on 7/25/2022  If you checked off any problems, how difficult have these problems made it for you to do your work, take care of things at home, or get along with other people?: Very difficult  PHQ9 TOTAL SCORE: 13    Assessment/Plan:    Obed Nickerson is a 57 year old male presenting for:    Type 2 diabetes mellitus with hyperglycemia, with long-term current use of insulin (H)  Lab Results   Component Value Date    A1C 8.0 07/25/2022    A1C 8.6 01/29/2022    A1C 7.9 10/27/2021    A1C 8.7 07/06/2021    A1C 9.0 03/31/2021    A1C 8.8 12/30/2020    A1C 10.0 06/29/2020    A1C 8.3 11/26/2019    A1C 7.5 08/09/2019    A1C 7.5 02/16/2018     A1c is at 8.0 today.  This is an improvement from about 6 months ago when this was 8.6.  We will recommend he increase his Lantus to 55 units at night.  I had discussed starting him on Ozempic as he thought the once weekly dosing would be easier than daily dosing but insurance would not cover.  He will continue his Victoza.  He will let me know if there is anything else I can do.  Would consider using Jardiance as well.  Follow-up A1c in 3 months  - Hemoglobin A1c  - Hemoglobin A1c    Anxiety  Patient currently on Cymbalta with occasional Xanax use.  Xanax use has been higher over the last few months given his move but this should settle down.  Does not need a refill today.    Hypertension goal BP (blood pressure) < 130/80  Blood pressure continues to be elevated.  We will start lisinopril/hydrochlorothiazide instead of the lisinopril on his own.  Apparently he had some issues with potential dehydration with lisinopril/hydrochlorothiazide and I have asked him to return to the clinic in 7 to 10 days for a blood pressure check/comprehensive metabolic panel.  - Comprehensive metabolic panel (BMP + Alb, Alk Phos, ALT, AST, Total. Bili, TP)  - lisinopril-hydrochlorothiazide (ZESTORETIC) 20-25 MG tablet  Dispense: 90 tablet; Refill: 1  -  Comprehensive metabolic panel (BMP + Alb, Alk Phos, ALT, AST, Total. Bili, TP)    Hyperlipidemia LDL goal <130        Medications Discontinued During This Encounter   Medication Reason     BELBUCA 900 MCG FILM buccal film      LANTUS SOLOSTAR 100 UNIT/ML soln      metoprolol tartrate (LOPRESSOR) 100 MG tablet      metoprolol tartrate (LOPRESSOR) 50 MG tablet      naloxone (EVZIO) 0.4 MG/0.4ML auto-injector      naloxone (NARCAN) 4 MG/0.1ML nasal spray      pioglitazone (ACTOS) 15 MG tablet      ALPRAZolam (XANAX) 0.5 MG tablet Reorder           Chief Complaint:  Diabetes and Mental Health Problem        Subjective:   Obed Nickerson is a pleasant 57-year-old gentleman with a past medical history significant for hypertension, diabetes and hyperlipidemia.    Diabetes: Patient is on 50 units of insulin at night.  Additionally, he uses Victoza and metformin.  He does not describe any low blood sugars.  He does not check his blood sugars on a regular basis but when he does find them to be generally in the 200s.  This is an improvement from the 400s previously.    Hypertension: History of hypertension currently on lisinopril and hydralazine.  Blood pressure has historically been uncontrolled but he had not taken his medications previously.  He states that he is taking his medications now and overall feeling fairly well.    History of anxiety: Patient is currently on Cymbalta 60 mg twice daily for which he takes for pain and anxiety.  He has had a lot going on in his life recently as he was forced to move due to a rent issue.  He has been using his Xanax more frequently recently but is planning on cutting back on this.    12 point review of systems completed and negative except for what has been described above    History   Smoking Status     Never Smoker   Smokeless Tobacco     Never Used         Current Outpatient Medications:      B-D U/F 31G X 8 MM insulin pen needle, USE THREE TIMES DAILY, Disp: , Rfl:      BD ULTRA  FINE PEN NEEDLES, Use three times daily, Disp: 200 each, Rfl: 1     cephALEXin (KEFLEX) 500 MG capsule, TAKE 1 CAPSULE BY MOUTH 4 TIMES DAILY FOR 10 DAYS, Disp: , Rfl:      Continuous Blood Gluc Sensor (FREESTYLE YOKO 14 DAY SENSOR) [facility], AS DIRECTED, Disp: 2 each, Rfl: 3     Continuous Blood Gluc Sensor (FREESTYLE YOKO 14 DAY SENSOR) [facility], Switch device every 2 weeks, Disp: 6 each, Rfl: 2     DULoxetine (CYMBALTA) 60 MG capsule, Take 1 capsule (60 mg) by mouth 2 times daily, Disp: 180 capsule, Rfl: 1     hydrALAZINE (APRESOLINE) 10 MG tablet, Take 1 tablet (10 mg) by mouth 3 times daily, Disp: 90 tablet, Rfl: 1     hyoscyamine (LEVSIN/SL) 0.125 MG sublingual tablet, DISSOLVE 1 TABLET UNDER THE TONGUE EVERY 4 HOURS AS NEEDED FOR GI UPSET; MAIMUM OF 1 AND 1/2 MG PER DAY, Disp: 30 tablet, Rfl: 1     LANTUS SOLOSTAR 100 UNIT/ML soln, ADMINISTER 50 UNITS UNDER THE SKIN EVERY NIGHT AT BEDTIME, Disp: 45 mL, Rfl: 0     liraglutide (VICTOZA) 18 MG/3ML solution, Inject 1.2 mg Subcutaneous daily, Disp: 6 mL, Rfl: 1     lisinopril (ZESTRIL) 20 MG tablet, TAKE 1 TABLET(20 MG) BY MOUTH DAILY, Disp: 90 tablet, Rfl: 0     lisinopril-hydrochlorothiazide (ZESTORETIC) 20-25 MG tablet, Take 1 tablet by mouth daily, Disp: 90 tablet, Rfl: 1     metFORMIN (GLUCOPHAGE) 1000 MG tablet, TAKE 1 TABLET BY MOUTH TWICE DAILY(WITH MEALS)., Disp: 180 tablet, Rfl: 0     morphine (MS CONTIN) 15 MG CR tablet, Take 15 mg by mouth every 12 hours, Disp: , Rfl:      naloxone (NARCAN) 4 MG/0.1ML nasal spray, Spray 1 spray (4 mg) into one nostril alternating nostrils as needed for opioid reversal every 2-3 minutes until assistance arrives, Disp: 0.2 mL, Rfl: 3     oxyCODONE IR (ROXICODONE) 10 MG tablet, Take 10 mg by mouth every 6 hours as needed for severe pain Up  to 4 tabs/day, Disp: , Rfl:      SUMAtriptan (IMITREX) 50 MG tablet, TAKE 1 TABLET(50 MG) BY MOUTH AT ONSET OF HEADACHE FOR MIGRAINE. MAY REPEAT IN 2 HOURS. MAX 4 TABLETS/ 24 HOURS, Disp: 9  "tablet, Rfl: 1     tiZANidine (ZANAFLEX) 4 MG tablet, TAKE 1 TABLET(4 MG) BY MOUTH AT BEDTIME, Disp: 30 tablet, Rfl: 1     ALPRAZolam (XANAX) 0.5 MG tablet, Take 1 tablet (0.5 mg) by mouth 2 times daily as needed for anxiety USE SPARINGLY, Disp: 15 tablet, Rfl: 0     semaglutide (OZEMPIC) 2 MG/1.5ML SOPN pen, Inject 0.25 mg Subcutaneous every 7 days for 21 days, THEN 0.5 mg every 7 days for 42 days., Disp: 3.75 mg, Rfl: 0      Objective:  Vitals:    07/25/22 1345   BP: (!) 164/110   Pulse: 105   Resp: 16   Temp: 98.6  F (37  C)   TempSrc: Tympanic   SpO2: 97%   Weight: 113.5 kg (250 lb 2 oz)   Height: 1.702 m (5' 7\")       Body mass index is 39.18 kg/m .    Vital signs reviewed and stable  General: No acute distress  Psych: Appropriate affect  HEENT: moist mucous membranes, pupils equal, round, reactive to light and accomodation, tympanic membranes are pearly grey bilaterally  Lymph: no cervical or supraclavicular lymphadenopathy  Cardiovascular: regular rate and rhythm with no murmur  Pulmonary: clear to auscultation bilaterally with no wheeze  Abdomen: soft, non tender, non distended with normo-active bowel sounds  Extremities: warm and well perfused with no edema  Skin: warm and dry with no rash         This note has been dictated and transcribed using voice recognition software.   Any errors in transcription are unintentional and inherent to the software.  "

## 2022-07-26 LAB
ALBUMIN SERPL-MCNC: 3.6 G/DL (ref 3.4–5)
ALP SERPL-CCNC: 102 U/L (ref 40–150)
ALT SERPL W P-5'-P-CCNC: 37 U/L (ref 0–70)
ANION GAP SERPL CALCULATED.3IONS-SCNC: 7 MMOL/L (ref 3–14)
AST SERPL W P-5'-P-CCNC: 18 U/L (ref 0–45)
BILIRUB SERPL-MCNC: 0.2 MG/DL (ref 0.2–1.3)
BUN SERPL-MCNC: 15 MG/DL (ref 7–30)
CALCIUM SERPL-MCNC: 9.4 MG/DL (ref 8.5–10.1)
CHLORIDE BLD-SCNC: 104 MMOL/L (ref 94–109)
CO2 SERPL-SCNC: 25 MMOL/L (ref 20–32)
CREAT SERPL-MCNC: 0.69 MG/DL (ref 0.66–1.25)
GFR SERPL CREATININE-BSD FRML MDRD: >90 ML/MIN/1.73M2
GLUCOSE BLD-MCNC: 241 MG/DL (ref 70–99)
POTASSIUM BLD-SCNC: 4.1 MMOL/L (ref 3.4–5.3)
PROT SERPL-MCNC: 7.9 G/DL (ref 6.8–8.8)
SODIUM SERPL-SCNC: 136 MMOL/L (ref 133–144)

## 2022-07-27 ENCOUNTER — TRANSFERRED RECORDS (OUTPATIENT)
Dept: HEALTH INFORMATION MANAGEMENT | Facility: CLINIC | Age: 57
End: 2022-07-27

## 2022-07-27 ENCOUNTER — TELEPHONE (OUTPATIENT)
Dept: FAMILY MEDICINE | Facility: CLINIC | Age: 57
End: 2022-07-27

## 2022-07-27 NOTE — TELEPHONE ENCOUNTER
Prior Authorization Retail Medication Request    Medication/Dose:   ICD code (if different than what is on RX):  Type 2 diabetes mellitus with hyperglycemia, with long-term current use of insulin (H) [E11.65, Z79.4]   Previously Tried and Failed:    Rationale:      Insurance Name: [employer]  Insurance ID:  [id number]      Pharmacy Information (if different than what is on RX)  Name:  [facility]  Phone:  [phone]

## 2022-07-28 NOTE — TELEPHONE ENCOUNTER
PRIOR AUTHORIZATION DENIED    Medication: Ozempic    Denial Date: 7/28/2022    Denial Rational:  Patient must have a history of trial & failure to the formulary alternative(s) or have a contraindication or intolerance to the formulary alternatives:                Appeal Information:    If you would like to appeal, please supply P/A team with a letter of medical necessity with clinical reason.

## 2022-07-28 NOTE — TELEPHONE ENCOUNTER
Central Prior Authorization Team   Phone: [phone]    PA Initiation    Medication: Ozempic  Insurance Company: [employer] - Phone [phone] Fax [phone]  Pharmacy Filling the Rx: [id number] [address]  AT [facility]  Filling Pharmacy Phone: [phone]  Filling Pharmacy Fax:    Start Date: 7/28/2022

## 2022-07-29 NOTE — TELEPHONE ENCOUNTER
Please call the patient and get more info in case Dr Brandt wants to do a letter.     What else has he tried and how did he react to the other meds?     SUSSY Alexander MD

## 2022-07-30 DIAGNOSIS — F41.9 ANXIETY: ICD-10-CM

## 2022-08-01 RX ORDER — ALPRAZOLAM 0.5 MG
0.5 TABLET ORAL 2 TIMES DAILY PRN
Qty: 15 TABLET | Refills: 0 | Status: SHIPPED | OUTPATIENT
Start: 2022-08-01 | End: 2022-08-10

## 2022-08-01 NOTE — TELEPHONE ENCOUNTER
I am not comfortable filling this prescription.  Pt has been receiving multiple script for alprazolam over the last month and use seem to be increasing in frequency.  He is also on high dose narcotics.    Last office not not completed yet with plan for benzo use mkoving forward.    Will hold for Dr Brandt to address upon her return    Rosario Vang DO

## 2022-08-10 ENCOUNTER — MYC REFILL (OUTPATIENT)
Dept: FAMILY MEDICINE | Facility: CLINIC | Age: 57
End: 2022-08-10

## 2022-08-10 DIAGNOSIS — F41.9 ANXIETY: ICD-10-CM

## 2022-08-10 RX ORDER — ALPRAZOLAM 0.5 MG
0.5 TABLET ORAL 2 TIMES DAILY PRN
Qty: 15 TABLET | Refills: 0 | Status: SHIPPED | OUTPATIENT
Start: 2022-08-10 | End: 2022-08-25

## 2022-08-15 ENCOUNTER — TRANSFERRED RECORDS (OUTPATIENT)
Dept: HEALTH INFORMATION MANAGEMENT | Facility: CLINIC | Age: 57
End: 2022-08-15

## 2022-08-19 DIAGNOSIS — Z79.4 TYPE 2 DIABETES MELLITUS WITH HYPERGLYCEMIA, WITH LONG-TERM CURRENT USE OF INSULIN (H): ICD-10-CM

## 2022-08-19 DIAGNOSIS — E11.65 TYPE 2 DIABETES MELLITUS WITH HYPERGLYCEMIA, WITH LONG-TERM CURRENT USE OF INSULIN (H): ICD-10-CM

## 2022-08-19 RX ORDER — FLASH GLUCOSE SENSOR
KIT MISCELLANEOUS
Qty: 1 EACH | Refills: 5 | Status: SHIPPED | OUTPATIENT
Start: 2022-08-19 | End: 2022-11-29

## 2022-08-21 DIAGNOSIS — Z79.4 TYPE 2 DIABETES MELLITUS WITH HYPERGLYCEMIA, WITH LONG-TERM CURRENT USE OF INSULIN (H): Primary | ICD-10-CM

## 2022-08-21 DIAGNOSIS — E11.65 TYPE 2 DIABETES MELLITUS WITH HYPERGLYCEMIA, WITH LONG-TERM CURRENT USE OF INSULIN (H): Primary | ICD-10-CM

## 2022-08-21 DIAGNOSIS — M79.10 MUSCLE PAIN: ICD-10-CM

## 2022-08-22 RX ORDER — PEN NEEDLE, DIABETIC 31 GX5/16"
NEEDLE, DISPOSABLE MISCELLANEOUS
Qty: 100 EACH | Refills: 11 | Status: SHIPPED | OUTPATIENT
Start: 2022-08-22

## 2022-08-25 ENCOUNTER — MYC REFILL (OUTPATIENT)
Dept: FAMILY MEDICINE | Facility: CLINIC | Age: 57
End: 2022-08-25

## 2022-08-25 DIAGNOSIS — F41.9 ANXIETY: ICD-10-CM

## 2022-08-25 RX ORDER — ALPRAZOLAM 0.5 MG
0.5 TABLET ORAL 2 TIMES DAILY PRN
Qty: 15 TABLET | Refills: 0 | Status: SHIPPED | OUTPATIENT
Start: 2022-08-25 | End: 2022-09-07

## 2022-09-07 ENCOUNTER — MYC REFILL (OUTPATIENT)
Dept: FAMILY MEDICINE | Facility: CLINIC | Age: 57
End: 2022-09-07

## 2022-09-07 DIAGNOSIS — F41.9 ANXIETY: ICD-10-CM

## 2022-09-07 RX ORDER — ALPRAZOLAM 0.5 MG
0.5 TABLET ORAL 2 TIMES DAILY PRN
Qty: 15 TABLET | Refills: 0 | Status: SHIPPED | OUTPATIENT
Start: 2022-09-07 | End: 2022-09-23

## 2022-09-12 ENCOUNTER — TRANSFERRED RECORDS (OUTPATIENT)
Dept: HEALTH INFORMATION MANAGEMENT | Facility: CLINIC | Age: 57
End: 2022-09-12

## 2022-09-16 ASSESSMENT — ENCOUNTER SYMPTOMS
PANIC: 0
DECREASED CONCENTRATION: 0
INSOMNIA: 1
DEPRESSION: 1
NERVOUS/ANXIOUS: 1

## 2022-09-18 NOTE — PROGRESS NOTES
"Video visit    Start time 8:20, stop time 8:55; additional 15 minutes spent on the date of the encounter doing chart review, documentation and further activities as noted. This did not include time for CGM review.    Provider location: [facility], [address] [place] [address] [address], [place] [address]    Patient location: patient home    Mode of transmission: video     Subjective:    New patient, no prior Endocrine notes including Care Everywhere    Obed Nickerson is a 57 year old male who presents for DM.    Diagnosed with DM: diagnosed around 2017    History of DKA/HHS: no    FH of DM: brother takes metformin     FH of autoimmune disease: sister has thyroid pathology - details not known     Glycemic control over the years:         2017: HbA1c 11.3%    Current DM therapy:  -Lantus 0-0-0-55  -Metformin 1000 mg BID  -Victoza 1.2 mg daily (maximum dose tried)     Prior DM therapy:  -No prior use of prandial insulin  -No prior use of a SGLT2 inhibitor     Current glycemic control:    Diet: 3 meals/day and snacks with fruit     Physical activity: limited     Tobacco/alcohol use: no    Complications noted in the A/P section.     No personal/FH: pancreatitis, pancreatic cancer, thyroid cancer, MEN    No prior UTIs.             He was not on a thiazide in 2019 with serum calcium was mildly elevated.    No prior PTH assay.     S/p left orchiectomy after a traumatic accident.     Objective:    250 #, 5'7\" with BMI ~39 kg/m2    Assessment/Plan:    # DM-2  -CT A/P 3/2021: per radiology pancreas normal   # No retinopathy, most recent eye exam 4/2022  # Hypertension, microalbuminuria, on lisinopril 20 mg daily    -7/2022: GFR >90  -10/2021: urine microalbumin 46.56 mg/g Cr  # Peripheral neuropathy, no prior ulceration    # Mild coronary artery calcification on imaging, no prior ASCVD event, not on ASA  # Mixed hyperlipidemia  -Statins previously caused \"memory loss\"  -10/2021: ,  (not previously >500 " mg/dL), HDL 54, LDL not calculated (but previously >200 mg/dL)   # Medically complicated obesity   # Hepatic steatosis  # Other  -1/8/2021 at 05:43 AM serum cortisol 2.0 mcg/dL     CGM reviewed in detail.    Will start Jardiance in addition to his current regimen. Increase Jardiance from 10 to 25 mg 2 weeks after starting if BMP is satisfactory. Labs ordered for 2 weeks to also include HbA1c, lipid panel, urine microalbumin. We reviewed precautions and adverse effects of Jardiance in detail. I would like to review his CGM data again when labs are back.     Note his statin history - would benefit from alternate therapy, will likely have him seen in the lipid clinic based on test results.     Return to see me in 3 months.

## 2022-09-19 ENCOUNTER — VIRTUAL VISIT (OUTPATIENT)
Dept: ENDOCRINOLOGY | Facility: CLINIC | Age: 57
End: 2022-09-19
Attending: FAMILY MEDICINE
Payer: COMMERCIAL

## 2022-09-19 DIAGNOSIS — E11.65 TYPE 2 DIABETES MELLITUS WITH HYPERGLYCEMIA, WITH LONG-TERM CURRENT USE OF INSULIN (H): ICD-10-CM

## 2022-09-19 DIAGNOSIS — E11.42 TYPE 2 DIABETES MELLITUS WITH DIABETIC POLYNEUROPATHY, WITH LONG-TERM CURRENT USE OF INSULIN (H): ICD-10-CM

## 2022-09-19 DIAGNOSIS — E78.2 MIXED HYPERLIPIDEMIA: ICD-10-CM

## 2022-09-19 DIAGNOSIS — E66.01 CLASS 2 SEVERE OBESITY DUE TO EXCESS CALORIES WITH SERIOUS COMORBIDITY AND BODY MASS INDEX (BMI) OF 39.0 TO 39.9 IN ADULT (H): ICD-10-CM

## 2022-09-19 DIAGNOSIS — Z79.4 TYPE 2 DIABETES MELLITUS WITH DIABETIC POLYNEUROPATHY, WITH LONG-TERM CURRENT USE OF INSULIN (H): ICD-10-CM

## 2022-09-19 DIAGNOSIS — K76.0 HEPATIC STEATOSIS: ICD-10-CM

## 2022-09-19 DIAGNOSIS — Z79.4 TYPE 2 DIABETES MELLITUS WITH HYPERGLYCEMIA, WITH LONG-TERM CURRENT USE OF INSULIN (H): ICD-10-CM

## 2022-09-19 DIAGNOSIS — E88.819 INSULIN RESISTANCE: ICD-10-CM

## 2022-09-19 DIAGNOSIS — Z79.4 TYPE 2 DIABETES MELLITUS WITH MICROALBUMINURIA, WITH LONG-TERM CURRENT USE OF INSULIN (H): ICD-10-CM

## 2022-09-19 DIAGNOSIS — R80.9 TYPE 2 DIABETES MELLITUS WITH MICROALBUMINURIA, WITH LONG-TERM CURRENT USE OF INSULIN (H): ICD-10-CM

## 2022-09-19 DIAGNOSIS — I10 HYPERTENSION, UNSPECIFIED TYPE: ICD-10-CM

## 2022-09-19 DIAGNOSIS — Z79.4 LONG TERM (CURRENT) USE OF INSULIN (H): ICD-10-CM

## 2022-09-19 DIAGNOSIS — E11.29 TYPE 2 DIABETES MELLITUS WITH MICROALBUMINURIA, WITH LONG-TERM CURRENT USE OF INSULIN (H): ICD-10-CM

## 2022-09-19 DIAGNOSIS — E66.812 CLASS 2 SEVERE OBESITY DUE TO EXCESS CALORIES WITH SERIOUS COMORBIDITY AND BODY MASS INDEX (BMI) OF 39.0 TO 39.9 IN ADULT (H): ICD-10-CM

## 2022-09-19 PROCEDURE — 95251 CONT GLUC MNTR ANALYSIS I&R: CPT | Performed by: INTERNAL MEDICINE

## 2022-09-19 PROCEDURE — 99204 OFFICE O/P NEW MOD 45 MIN: CPT | Mod: 95 | Performed by: INTERNAL MEDICINE

## 2022-09-19 RX ORDER — LISINOPRIL 40 MG/1
20 TABLET ORAL DAILY
COMMUNITY
Start: 2022-09-18 | End: 2023-01-18

## 2022-09-19 NOTE — LETTER
"    9/19/2022         RE: Obed Nickerson  [address]  [facility] [address]        Dear Colleague,    Thank you for referring your patient, Obed Nickerson, to the [facility]. Please see a copy of my visit note below.    Video visit    Start time 8:20, stop time 8:55; additional 15 minutes spent on the date of the encounter doing chart review, documentation and further activities as noted. This did not include time for CGM review.    Provider location: [facility], [address] [place] [address] [address], [place] [address]    Patient location: patient home    Mode of transmission: video     Subjective:    New patient, no prior Endocrine notes including Care Everywhere    Obed Nickerson is a 57 year old male who presents for DM.    Diagnosed with DM: diagnosed around 2017    History of DKA/HHS: no    FH of DM: brother takes metformin     FH of autoimmune disease: sister has thyroid pathology - details not known     Glycemic control over the years:         2017: HbA1c 11.3%    Current DM therapy:  -Lantus 0-0-0-55  -Metformin 1000 mg BID  -Victoza 1.2 mg daily (maximum dose tried)     Prior DM therapy:  -No prior use of prandial insulin  -No prior use of a SGLT2 inhibitor     Current glycemic control:    Diet: 3 meals/day and snacks with fruit     Physical activity: limited     Tobacco/alcohol use: no    Complications noted in the A/P section.     No personal/FH: pancreatitis, pancreatic cancer, thyroid cancer, MEN    No prior UTIs.             He was not on a thiazide in 2019 with serum calcium was mildly elevated.    No prior PTH assay.     S/p left orchiectomy after a traumatic accident.     Objective:    250 #, 5'7\" with BMI ~39 kg/m2    Assessment/Plan:    # DM-2  -CT A/P 3/2021: per radiology pancreas normal   # No retinopathy, most recent eye exam 4/2022  # Hypertension, microalbuminuria, on lisinopril 20 mg daily    -7/2022: GFR >90  -10/2021: urine " "microalbumin 46.56 mg/g Cr  # Peripheral neuropathy, no prior ulceration    # Mild coronary artery calcification on imaging, no prior ASCVD event, not on ASA  # Mixed hyperlipidemia  -Statins previously caused \"memory loss\"  -10/2021: ,  (not previously >500 mg/dL), HDL 54, LDL not calculated (but previously >200 mg/dL)   # Medically complicated obesity   # Hepatic steatosis  # Other  -1/8/2021 at 05:43 AM serum cortisol 2.0 mcg/dL     CGM reviewed in detail.    Will start Jardiance in addition to his current regimen. Increase Jardiance from 10 to 25 mg 2 weeks after starting if BMP is satisfactory. Labs ordered for 2 weeks to also include HbA1c, lipid panel, urine microalbumin. We reviewed precautions and adverse effects of Jardiance in detail. I would like to review his CGM data again when labs are back.     Note his statin history - would benefit from alternate therapy, will likely have him seen in the lipid clinic based on test results.     Return to see me in 3 months.         Again, thank you for allowing me to participate in the care of your patient.        Sincerely,        Marcus Al MD    "

## 2022-09-21 DIAGNOSIS — F41.9 ANXIETY: ICD-10-CM

## 2022-09-21 RX ORDER — DULOXETIN HYDROCHLORIDE 60 MG/1
CAPSULE, DELAYED RELEASE ORAL
Qty: 180 CAPSULE | Refills: 1 | Status: SHIPPED | OUTPATIENT
Start: 2022-09-21 | End: 2023-03-14

## 2022-09-21 NOTE — TELEPHONE ENCOUNTER
Routing refill request to provider for review/approval because:  Last recorded blood pressure does not meet RN protocol parameters    Juice Bond RN

## 2022-09-23 ENCOUNTER — MYC REFILL (OUTPATIENT)
Dept: FAMILY MEDICINE | Facility: CLINIC | Age: 57
End: 2022-09-23

## 2022-09-23 DIAGNOSIS — F41.9 ANXIETY: ICD-10-CM

## 2022-09-23 RX ORDER — ALPRAZOLAM 0.5 MG
0.5 TABLET ORAL 2 TIMES DAILY PRN
Qty: 15 TABLET | Refills: 0 | Status: SHIPPED | OUTPATIENT
Start: 2022-09-23 | End: 2022-10-06

## 2022-10-06 ENCOUNTER — MYC REFILL (OUTPATIENT)
Dept: FAMILY MEDICINE | Facility: CLINIC | Age: 57
End: 2022-10-06

## 2022-10-06 DIAGNOSIS — F41.9 ANXIETY: ICD-10-CM

## 2022-10-06 RX ORDER — ALPRAZOLAM 0.5 MG
0.5 TABLET ORAL 2 TIMES DAILY PRN
Qty: 15 TABLET | Refills: 0 | Status: SHIPPED | OUTPATIENT
Start: 2022-10-06 | End: 2022-11-01

## 2022-10-15 ENCOUNTER — HEALTH MAINTENANCE LETTER (OUTPATIENT)
Age: 57
End: 2022-10-15

## 2022-10-18 ENCOUNTER — MYC REFILL (OUTPATIENT)
Dept: FAMILY MEDICINE | Facility: CLINIC | Age: 57
End: 2022-10-18

## 2022-10-18 DIAGNOSIS — F41.9 ANXIETY: ICD-10-CM

## 2022-10-18 RX ORDER — ALPRAZOLAM 0.5 MG
0.5 TABLET ORAL 2 TIMES DAILY PRN
Qty: 15 TABLET | Refills: 0 | Status: CANCELLED | OUTPATIENT
Start: 2022-10-18

## 2022-10-21 DIAGNOSIS — M79.10 MUSCLE PAIN: ICD-10-CM

## 2022-11-11 DIAGNOSIS — Z79.4 TYPE 2 DIABETES MELLITUS WITH HYPERGLYCEMIA, WITH LONG-TERM CURRENT USE OF INSULIN (H): ICD-10-CM

## 2022-11-11 DIAGNOSIS — E11.65 TYPE 2 DIABETES MELLITUS WITH HYPERGLYCEMIA, WITH LONG-TERM CURRENT USE OF INSULIN (H): ICD-10-CM

## 2022-11-11 RX ORDER — INSULIN GLARGINE 100 [IU]/ML
INJECTION, SOLUTION SUBCUTANEOUS
Qty: 60 ML | Refills: 0 | Status: SHIPPED | OUTPATIENT
Start: 2022-11-11 | End: 2023-02-20

## 2022-11-20 DIAGNOSIS — E11.65 TYPE 2 DIABETES MELLITUS WITH HYPERGLYCEMIA, WITH LONG-TERM CURRENT USE OF INSULIN (H): ICD-10-CM

## 2022-11-20 DIAGNOSIS — Z79.4 TYPE 2 DIABETES MELLITUS WITH HYPERGLYCEMIA, WITH LONG-TERM CURRENT USE OF INSULIN (H): ICD-10-CM

## 2022-11-21 DIAGNOSIS — E11.65 TYPE 2 DIABETES MELLITUS WITH HYPERGLYCEMIA, WITH LONG-TERM CURRENT USE OF INSULIN (H): ICD-10-CM

## 2022-11-21 DIAGNOSIS — Z79.4 TYPE 2 DIABETES MELLITUS WITH HYPERGLYCEMIA, WITH LONG-TERM CURRENT USE OF INSULIN (H): ICD-10-CM

## 2022-11-21 RX ORDER — LIRAGLUTIDE 6 MG/ML
INJECTION SUBCUTANEOUS
Qty: 6 ML | Refills: 2 | OUTPATIENT
Start: 2022-11-21

## 2022-11-21 RX ORDER — LIRAGLUTIDE 6 MG/ML
INJECTION SUBCUTANEOUS
Qty: 6 ML | Refills: 0 | Status: SHIPPED | OUTPATIENT
Start: 2022-11-21 | End: 2023-02-03

## 2022-11-23 ENCOUNTER — MYC REFILL (OUTPATIENT)
Dept: FAMILY MEDICINE | Facility: CLINIC | Age: 57
End: 2022-11-23

## 2022-11-23 DIAGNOSIS — F41.9 ANXIETY: ICD-10-CM

## 2022-11-23 RX ORDER — ALPRAZOLAM 0.5 MG
0.5 TABLET ORAL 2 TIMES DAILY PRN
Qty: 15 TABLET | Refills: 0 | Status: SHIPPED | OUTPATIENT
Start: 2022-11-23 | End: 2022-12-15

## 2022-11-23 NOTE — TELEPHONE ENCOUNTER
Routing refill request to provider for review/approval because:  Drug not on the [facility] refill protocol       MARIMAR Lockhart

## 2022-11-28 DIAGNOSIS — Z79.4 TYPE 2 DIABETES MELLITUS WITH HYPERGLYCEMIA, WITH LONG-TERM CURRENT USE OF INSULIN (H): ICD-10-CM

## 2022-11-28 DIAGNOSIS — E11.65 TYPE 2 DIABETES MELLITUS WITH HYPERGLYCEMIA, WITH LONG-TERM CURRENT USE OF INSULIN (H): ICD-10-CM

## 2022-11-29 RX ORDER — FLASH GLUCOSE SENSOR
KIT MISCELLANEOUS
Qty: 2 EACH | Refills: 1 | Status: SHIPPED | OUTPATIENT
Start: 2022-11-29 | End: 2023-01-13

## 2022-12-09 ENCOUNTER — TRANSFERRED RECORDS (OUTPATIENT)
Dept: HEALTH INFORMATION MANAGEMENT | Facility: CLINIC | Age: 57
End: 2022-12-09

## 2022-12-15 ENCOUNTER — MYC REFILL (OUTPATIENT)
Dept: FAMILY MEDICINE | Facility: CLINIC | Age: 57
End: 2022-12-15

## 2022-12-15 DIAGNOSIS — F41.9 ANXIETY: ICD-10-CM

## 2022-12-15 RX ORDER — ALPRAZOLAM 0.5 MG
0.5 TABLET ORAL 2 TIMES DAILY PRN
Qty: 15 TABLET | Refills: 0 | Status: SHIPPED | OUTPATIENT
Start: 2022-12-15 | End: 2023-01-08

## 2022-12-19 ENCOUNTER — VIRTUAL VISIT (OUTPATIENT)
Dept: ENDOCRINOLOGY | Facility: CLINIC | Age: 57
End: 2022-12-19
Payer: COMMERCIAL

## 2022-12-19 ENCOUNTER — TELEPHONE (OUTPATIENT)
Dept: ENDOCRINOLOGY | Facility: CLINIC | Age: 57
End: 2022-12-19

## 2022-12-19 ENCOUNTER — MYC MEDICAL ADVICE (OUTPATIENT)
Dept: ENDOCRINOLOGY | Facility: CLINIC | Age: 57
End: 2022-12-19

## 2022-12-19 DIAGNOSIS — R80.9 TYPE 2 DIABETES MELLITUS WITH MICROALBUMINURIA, WITH LONG-TERM CURRENT USE OF INSULIN (H): ICD-10-CM

## 2022-12-19 DIAGNOSIS — I10 HYPERTENSION, UNSPECIFIED TYPE: ICD-10-CM

## 2022-12-19 DIAGNOSIS — Z79.4 LONG TERM (CURRENT) USE OF INSULIN (H): ICD-10-CM

## 2022-12-19 DIAGNOSIS — E66.01 CLASS 2 SEVERE OBESITY DUE TO EXCESS CALORIES WITH SERIOUS COMORBIDITY AND BODY MASS INDEX (BMI) OF 39.0 TO 39.9 IN ADULT (H): ICD-10-CM

## 2022-12-19 DIAGNOSIS — E11.42 TYPE 2 DIABETES MELLITUS WITH DIABETIC POLYNEUROPATHY, WITH LONG-TERM CURRENT USE OF INSULIN (H): ICD-10-CM

## 2022-12-19 DIAGNOSIS — E78.2 MIXED HYPERLIPIDEMIA: ICD-10-CM

## 2022-12-19 DIAGNOSIS — K76.0 HEPATIC STEATOSIS: ICD-10-CM

## 2022-12-19 DIAGNOSIS — Z79.4 TYPE 2 DIABETES MELLITUS WITH HYPERGLYCEMIA, WITH LONG-TERM CURRENT USE OF INSULIN (H): Primary | ICD-10-CM

## 2022-12-19 DIAGNOSIS — Z79.4 TYPE 2 DIABETES MELLITUS WITH DIABETIC POLYNEUROPATHY, WITH LONG-TERM CURRENT USE OF INSULIN (H): ICD-10-CM

## 2022-12-19 DIAGNOSIS — Z79.4 TYPE 2 DIABETES MELLITUS WITH MICROALBUMINURIA, WITH LONG-TERM CURRENT USE OF INSULIN (H): ICD-10-CM

## 2022-12-19 DIAGNOSIS — E11.29 TYPE 2 DIABETES MELLITUS WITH MICROALBUMINURIA, WITH LONG-TERM CURRENT USE OF INSULIN (H): ICD-10-CM

## 2022-12-19 DIAGNOSIS — E88.819 INSULIN RESISTANCE: ICD-10-CM

## 2022-12-19 DIAGNOSIS — E11.65 TYPE 2 DIABETES MELLITUS WITH HYPERGLYCEMIA, WITH LONG-TERM CURRENT USE OF INSULIN (H): Primary | ICD-10-CM

## 2022-12-19 DIAGNOSIS — E66.812 CLASS 2 SEVERE OBESITY DUE TO EXCESS CALORIES WITH SERIOUS COMORBIDITY AND BODY MASS INDEX (BMI) OF 39.0 TO 39.9 IN ADULT (H): ICD-10-CM

## 2022-12-19 PROCEDURE — 99213 OFFICE O/P EST LOW 20 MIN: CPT | Mod: 95 | Performed by: INTERNAL MEDICINE

## 2022-12-19 PROCEDURE — 95251 CONT GLUC MNTR ANALYSIS I&R: CPT | Performed by: INTERNAL MEDICINE

## 2022-12-19 NOTE — PROGRESS NOTES
"Video visit    Start time 11:44, stop time 11:57; additional 10 minutes spent on the date of the encounter doing chart review, documentation and further activities as noted. This did not include time spent on CGM review.     Provider location: [facility], [address] [place] [address] [address], [place] [address]    Patient location: patient home    Mode of transmission: video     Subjective:    Established patient    Obed Nickerson is a 57 year old male who presents for DM.     Current DM therapy:  -Lantus 0-0-0-50  -Metformin 1000 mg BID  -Victoza 1.2 mg daily (maximum dose tried)  -Empagliflozin started 9/2022, he is on 25 mg daily; well tolerated     He notes frequent nausea.      Prior DM therapy:  -No prior use of prandial insulin     Diet: 3 meals/day and snacks with fruit      He was not on a thiazide in 2019 with serum calcium was mildly elevated.     No prior PTH assay.      S/p left orchiectomy after a traumatic accident.     Objective:    12/2022: he notes a stable weight, virtual visit    9/2022: 250 #, 5'7\" with BMI ~39 kg/m2    Assessment/Plan:    # DM-2  -CT A/P 3/2021: per radiology pancreas normal   # No retinopathy, most recent eye exam 4/2022  # Hypertension, microalbuminuria, on lisinopril 20 mg daily    -7/2022: GFR >90  -10/2021: urine microalbumin 46.56 mg/g Cr  # Peripheral neuropathy, no prior ulceration    # Mild coronary artery calcification on imaging, no prior ASCVD event, not on ASA  # Mixed hyperlipidemia  -Statins previously caused \"memory loss\"  -10/2021: ,  (not previously >500 mg/dL), HDL 54, LDL not calculated (but previously >200 mg/dL)   # Medically complicated obesity   # Hepatic steatosis  # Other  -1/8/2021 at 05:43 AM serum cortisol 2.0 mcg/dL      CGM reviewed in detail, see Tina's note. Frequent gaps in data, but overall TIR has improved.    Because of significant nausea, we will have Mati hold metformin and let me know in a few weeks if this " helps.    Otherwise he will schedule a lab draw (orders active) and schedule a follow-up with me in a few months.

## 2022-12-19 NOTE — LETTER
"    12/19/2022         RE: Obed Nickerson  [address]  [facility] [address]        Dear Colleague,    Thank you for referring your patient, Obed Nickerson, to the [facility]. Please see a copy of my visit note below.    Video visit    Start time 11:44, stop time 11:57; additional 10 minutes spent on the date of the encounter doing chart review, documentation and further activities as noted. This did not include time spent on CGM review.     Provider location: [facility], [address] [place] [address], [place] [address]    Patient location: patient home    Mode of transmission: video     Subjective:    Established patient    Obed Nickerson is a 57 year old male who presents for DM.     Current DM therapy:  -Lantus 0-0-0-50  -Metformin 1000 mg BID  -Victoza 1.2 mg daily (maximum dose tried)  -Empagliflozin started 9/2022, he is on 25 mg daily; well tolerated     He notes frequent nausea.      Prior DM therapy:  -No prior use of prandial insulin     Diet: 3 meals/day and snacks with fruit      He was not on a thiazide in 2019 with serum calcium was mildly elevated.     No prior PTH assay.      S/p left orchiectomy after a traumatic accident.     Objective:    12/2022: he notes a stable weight, virtual visit    9/2022: 250 #, 5'7\" with BMI ~39 kg/m2    Assessment/Plan:    # DM-2  -CT A/P 3/2021: per radiology pancreas normal   # No retinopathy, most recent eye exam 4/2022  # Hypertension, microalbuminuria, on lisinopril 20 mg daily    -7/2022: GFR >90  -10/2021: urine microalbumin 46.56 mg/g Cr  # Peripheral neuropathy, no prior ulceration    # Mild coronary artery calcification on imaging, no prior ASCVD event, not on ASA  # Mixed hyperlipidemia  -Statins previously caused \"memory loss\"  -10/2021: ,  (not previously >500 mg/dL), HDL 54, LDL not calculated (but previously >200 mg/dL)   # Medically complicated obesity   # Hepatic steatosis  # " Other  -1/8/2021 at 05:43 AM serum cortisol 2.0 mcg/dL      CGM reviewed in detail, see Tina's note. Frequent gaps in data, but overall TIR has improved.    Because of significant nausea, we will have Mati hold metformin and let me know in a few weeks if this helps.    Otherwise he will schedule a lab draw (orders active) and schedule a follow-up with me in a few months.                    Again, thank you for allowing me to participate in the care of your patient.        Sincerely,        Marcus Al MD

## 2022-12-19 NOTE — TELEPHONE ENCOUNTER
Called and LM for patient to return call. Please assist patient with a fasting lab in the coming up weeks and a follow up visit with Dr. Al (at his next return slot).

## 2022-12-22 DIAGNOSIS — M79.10 MUSCLE PAIN: ICD-10-CM

## 2023-01-08 ENCOUNTER — MYC REFILL (OUTPATIENT)
Dept: FAMILY MEDICINE | Facility: CLINIC | Age: 58
End: 2023-01-08

## 2023-01-08 DIAGNOSIS — F41.9 ANXIETY: ICD-10-CM

## 2023-01-09 ENCOUNTER — TRANSFERRED RECORDS (OUTPATIENT)
Dept: HEALTH INFORMATION MANAGEMENT | Facility: CLINIC | Age: 58
End: 2023-01-09
Payer: COMMERCIAL

## 2023-01-09 RX ORDER — ALPRAZOLAM 0.5 MG
0.5 TABLET ORAL 2 TIMES DAILY PRN
Qty: 15 TABLET | Refills: 0 | Status: SHIPPED | OUTPATIENT
Start: 2023-01-09 | End: 2023-01-30

## 2023-01-13 DIAGNOSIS — E11.65 TYPE 2 DIABETES MELLITUS WITH HYPERGLYCEMIA, WITH LONG-TERM CURRENT USE OF INSULIN (H): ICD-10-CM

## 2023-01-13 DIAGNOSIS — Z79.4 TYPE 2 DIABETES MELLITUS WITH HYPERGLYCEMIA, WITH LONG-TERM CURRENT USE OF INSULIN (H): ICD-10-CM

## 2023-01-13 RX ORDER — FLASH GLUCOSE SENSOR
KIT MISCELLANEOUS
Qty: 2 EACH | Refills: 1 | Status: SHIPPED | OUTPATIENT
Start: 2023-01-13 | End: 2023-04-10

## 2023-01-30 ENCOUNTER — MYC REFILL (OUTPATIENT)
Dept: FAMILY MEDICINE | Facility: CLINIC | Age: 58
End: 2023-01-30
Payer: COMMERCIAL

## 2023-01-30 DIAGNOSIS — F41.9 ANXIETY: ICD-10-CM

## 2023-01-30 RX ORDER — ALPRAZOLAM 0.5 MG
0.5 TABLET ORAL 2 TIMES DAILY PRN
Qty: 15 TABLET | Refills: 0 | Status: SHIPPED | OUTPATIENT
Start: 2023-01-30 | End: 2023-02-13

## 2023-01-30 NOTE — TELEPHONE ENCOUNTER
Routing to ordering provider for consideration, not on refill protocol.           Savannah Robles     RN MSN

## 2023-01-31 RX ORDER — HYDROXYZINE PAMOATE 25 MG/1
25 CAPSULE ORAL 4 TIMES DAILY
COMMUNITY
Start: 2022-05-20 | End: 2023-02-03

## 2023-02-01 ASSESSMENT — ENCOUNTER SYMPTOMS
FEVER: 0
DYSURIA: 0
WEAKNESS: 1
ABDOMINAL PAIN: 1
CONSTIPATION: 1
HEMATOCHEZIA: 0
NERVOUS/ANXIOUS: 1
HEARTBURN: 1
FREQUENCY: 1
ARTHRALGIAS: 1
DIARRHEA: 0
HEADACHES: 1
HEMATURIA: 0
SORE THROAT: 0
SHORTNESS OF BREATH: 1
PARESTHESIAS: 0
JOINT SWELLING: 0
PALPITATIONS: 0
CHILLS: 0
DIZZINESS: 1
MYALGIAS: 1
COUGH: 0
EYE PAIN: 0

## 2023-02-03 ENCOUNTER — OFFICE VISIT (OUTPATIENT)
Dept: FAMILY MEDICINE | Facility: CLINIC | Age: 58
End: 2023-02-03
Payer: COMMERCIAL

## 2023-02-03 DIAGNOSIS — Z23 NEED FOR PROPHYLACTIC VACCINATION AND INOCULATION AGAINST INFLUENZA: ICD-10-CM

## 2023-02-03 DIAGNOSIS — Z79.4 TYPE 2 DIABETES MELLITUS WITH MICROALBUMINURIA, WITH LONG-TERM CURRENT USE OF INSULIN (H): ICD-10-CM

## 2023-02-03 DIAGNOSIS — R35.0 URINARY FREQUENCY: ICD-10-CM

## 2023-02-03 DIAGNOSIS — F32.0 MILD MAJOR DEPRESSION (H): ICD-10-CM

## 2023-02-03 DIAGNOSIS — E66.01 CLASS 2 SEVERE OBESITY DUE TO EXCESS CALORIES WITH SERIOUS COMORBIDITY AND BODY MASS INDEX (BMI) OF 39.0 TO 39.9 IN ADULT (H): ICD-10-CM

## 2023-02-03 DIAGNOSIS — Z79.4 TYPE 2 DIABETES MELLITUS WITH HYPERGLYCEMIA, WITH LONG-TERM CURRENT USE OF INSULIN (H): ICD-10-CM

## 2023-02-03 DIAGNOSIS — E66.812 CLASS 2 SEVERE OBESITY DUE TO EXCESS CALORIES WITH SERIOUS COMORBIDITY AND BODY MASS INDEX (BMI) OF 39.0 TO 39.9 IN ADULT (H): ICD-10-CM

## 2023-02-03 DIAGNOSIS — E11.29 TYPE 2 DIABETES MELLITUS WITH MICROALBUMINURIA, WITH LONG-TERM CURRENT USE OF INSULIN (H): ICD-10-CM

## 2023-02-03 DIAGNOSIS — R26.81 UNSTEADY GAIT: ICD-10-CM

## 2023-02-03 DIAGNOSIS — E78.2 MIXED HYPERLIPIDEMIA: ICD-10-CM

## 2023-02-03 DIAGNOSIS — R80.9 TYPE 2 DIABETES MELLITUS WITH MICROALBUMINURIA, WITH LONG-TERM CURRENT USE OF INSULIN (H): ICD-10-CM

## 2023-02-03 DIAGNOSIS — Z12.5 SCREENING FOR PROSTATE CANCER: ICD-10-CM

## 2023-02-03 DIAGNOSIS — N52.9 ERECTILE DYSFUNCTION, UNSPECIFIED ERECTILE DYSFUNCTION TYPE: ICD-10-CM

## 2023-02-03 DIAGNOSIS — E11.65 TYPE 2 DIABETES MELLITUS WITH HYPERGLYCEMIA, WITH LONG-TERM CURRENT USE OF INSULIN (H): ICD-10-CM

## 2023-02-03 DIAGNOSIS — Z00.00 HEALTHCARE MAINTENANCE: Primary | ICD-10-CM

## 2023-02-03 LAB
ALBUMIN UR-MCNC: NEGATIVE MG/DL
ANION GAP SERPL CALCULATED.3IONS-SCNC: 13 MMOL/L (ref 7–15)
APPEARANCE UR: CLEAR
BILIRUB UR QL STRIP: NEGATIVE
BUN SERPL-MCNC: 26.4 MG/DL (ref 6–20)
CALCIUM SERPL-MCNC: 9.8 MG/DL (ref 8.6–10)
CHLORIDE SERPL-SCNC: 101 MMOL/L (ref 98–107)
CHOLEST SERPL-MCNC: 290 MG/DL
COLOR UR AUTO: YELLOW
CREAT SERPL-MCNC: 0.73 MG/DL (ref 0.67–1.17)
CREAT UR-MCNC: 48.8 MG/DL
DEPRECATED HCO3 PLAS-SCNC: 23 MMOL/L (ref 22–29)
GFR SERPL CREATININE-BSD FRML MDRD: >90 ML/MIN/1.73M2
GLUCOSE SERPL-MCNC: 188 MG/DL (ref 70–99)
GLUCOSE UR STRIP-MCNC: 500 MG/DL
HBA1C MFR BLD: 8.1 % (ref 0–5.6)
HDLC SERPL-MCNC: 47 MG/DL
HGB UR QL STRIP: NEGATIVE
KETONES UR STRIP-MCNC: NEGATIVE MG/DL
LDLC SERPL CALC-MCNC: 203 MG/DL
LEUKOCYTE ESTERASE UR QL STRIP: NEGATIVE
MICROALBUMIN UR-MCNC: <12 MG/L
MICROALBUMIN/CREAT UR: NORMAL MG/G{CREAT}
NITRATE UR QL: NEGATIVE
NONHDLC SERPL-MCNC: 243 MG/DL
PH UR STRIP: 5 [PH] (ref 5–7)
POTASSIUM SERPL-SCNC: 4.1 MMOL/L (ref 3.4–5.3)
PSA SERPL-MCNC: 0.59 NG/ML (ref 0–3.5)
SODIUM SERPL-SCNC: 137 MMOL/L (ref 136–145)
SP GR UR STRIP: 1.02 (ref 1–1.03)
TRIGL SERPL-MCNC: 201 MG/DL
TSH SERPL DL<=0.005 MIU/L-ACNC: 2.43 UIU/ML (ref 0.3–4.2)
UROBILINOGEN UR STRIP-ACNC: 0.2 E.U./DL

## 2023-02-03 PROCEDURE — 82570 ASSAY OF URINE CREATININE: CPT | Performed by: FAMILY MEDICINE

## 2023-02-03 PROCEDURE — 96127 BRIEF EMOTIONAL/BEHAV ASSMT: CPT | Performed by: FAMILY MEDICINE

## 2023-02-03 PROCEDURE — 36415 COLL VENOUS BLD VENIPUNCTURE: CPT | Performed by: FAMILY MEDICINE

## 2023-02-03 PROCEDURE — 90677 PCV20 VACCINE IM: CPT | Performed by: FAMILY MEDICINE

## 2023-02-03 PROCEDURE — 90682 RIV4 VACC RECOMBINANT DNA IM: CPT | Performed by: FAMILY MEDICINE

## 2023-02-03 PROCEDURE — 99207 PR FOOT EXAM NO CHARGE: CPT | Mod: 25 | Performed by: FAMILY MEDICINE

## 2023-02-03 PROCEDURE — 99396 PREV VISIT EST AGE 40-64: CPT | Mod: 25 | Performed by: FAMILY MEDICINE

## 2023-02-03 PROCEDURE — 90471 IMMUNIZATION ADMIN: CPT | Performed by: FAMILY MEDICINE

## 2023-02-03 PROCEDURE — 82043 UR ALBUMIN QUANTITATIVE: CPT | Performed by: FAMILY MEDICINE

## 2023-02-03 PROCEDURE — 84443 ASSAY THYROID STIM HORMONE: CPT | Performed by: FAMILY MEDICINE

## 2023-02-03 PROCEDURE — 99214 OFFICE O/P EST MOD 30 MIN: CPT | Mod: 25 | Performed by: FAMILY MEDICINE

## 2023-02-03 PROCEDURE — 83036 HEMOGLOBIN GLYCOSYLATED A1C: CPT | Performed by: FAMILY MEDICINE

## 2023-02-03 PROCEDURE — G0103 PSA SCREENING: HCPCS | Performed by: FAMILY MEDICINE

## 2023-02-03 PROCEDURE — 90472 IMMUNIZATION ADMIN EACH ADD: CPT | Performed by: FAMILY MEDICINE

## 2023-02-03 PROCEDURE — 80048 BASIC METABOLIC PNL TOTAL CA: CPT | Performed by: FAMILY MEDICINE

## 2023-02-03 PROCEDURE — 81003 URINALYSIS AUTO W/O SCOPE: CPT | Performed by: FAMILY MEDICINE

## 2023-02-03 PROCEDURE — 80061 LIPID PANEL: CPT | Performed by: FAMILY MEDICINE

## 2023-02-03 RX ORDER — LIRAGLUTIDE 6 MG/ML
INJECTION SUBCUTANEOUS
Qty: 6 ML | Refills: 6 | Status: SHIPPED | OUTPATIENT
Start: 2023-02-03 | End: 2023-07-05

## 2023-02-03 RX ORDER — SILDENAFIL 100 MG/1
50-100 TABLET, FILM COATED ORAL DAILY PRN
Qty: 20 TABLET | Refills: 0 | Status: SHIPPED | OUTPATIENT
Start: 2023-02-03 | End: 2024-03-29

## 2023-02-03 ASSESSMENT — PATIENT HEALTH QUESTIONNAIRE - PHQ9
SUM OF ALL RESPONSES TO PHQ QUESTIONS 1-9: 19
10. IF YOU CHECKED OFF ANY PROBLEMS, HOW DIFFICULT HAVE THESE PROBLEMS MADE IT FOR YOU TO DO YOUR WORK, TAKE CARE OF THINGS AT HOME, OR GET ALONG WITH OTHER PEOPLE: VERY DIFFICULT
SUM OF ALL RESPONSES TO PHQ QUESTIONS 1-9: 19

## 2023-02-03 NOTE — PROGRESS NOTES
"Answers for HPI/ROS submitted by the patient on 2/1/2023  Getting at least 3 servings of Calcium per day:: Yes  Diet:: Diabetic  Taking medications regularly:: Yes  Medication side effects:: Significant flushing  Bi-annual eye exam:: Yes  Dental care twice a year:: NO  Sleep apnea or symptoms of sleep apnea:: None  abdominal pain: Yes  Blood in stool: No  Blood in urine: No  chest pain: No  chills: No  congestion: No  constipation: Yes  cough: No  diarrhea: No  dizziness: Yes  eye pain: No  nervous/anxious: Yes  fever: No  frequency: Yes  genital sores: No  headaches: Yes  hearing loss: Yes  heartburn: Yes  arthralgias: Yes  joint swelling: No  peripheral edema: No  mood changes: No  myalgias: Yes  dysuria: No  palpitations: No  Skin sensation changes: No  sore throat: No  urgency: Yes  rash: No  shortness of breath: Yes  visual disturbance: No  weakness: Yes  impotence: Yes  penile discharge: No  Additional concerns today:: Yes  Duration of exercise:: Less than 15 minutes    Assessment/Plan:     Healthcare Maintenance Exam    Fasting labs pending  Immunizations updated  Healthy lifestyle modifications discussed  Discussed self testicular exams  Colonoscopy UTD  PSA discussed and done    BMI:   Estimated body mass index is 38.11 kg/m  as calculated from the following:    Height as of this encounter: 1.68 m (5' 6.14\").    Weight as of this encounter: 107.6 kg (237 lb 2 oz).   Weight management plan: Discussed healthy diet and exercise guidelines          Healthcare maintenance  - Pneumococcal 20 Valent Conjugate (Prevnar 20)    Mild major depression (H)  Continue Cymbalta and occasional Xanax    Urinary frequency  No UTI noted.  - UA Macro with Reflex to Micro and Culture - lab collect  - UA Macro with Reflex to Micro and Culture - lab collect    Type 2 diabetes mellitus with microalbuminuria, with long-term current use of insulin (H)  Continue insulin.  Continue Jardiance.    Lab Results   Component Value Date    " "A1C 8.1 02/03/2023    A1C 8.0 07/25/2022    A1C 8.6 01/29/2022    A1C 7.9 10/27/2021    A1C 8.7 07/06/2021    A1C 9.0 03/31/2021    A1C 8.8 12/30/2020    A1C 10.0 06/29/2020    A1C 8.3 11/26/2019    A1C 7.5 02/16/2018       - FOOT EXAM    Type 2 diabetes mellitus with hyperglycemia, with long-term current use of insulin (H)  - liraglutide (VICTOZA PEN) 18 MG/3ML solution  Dispense: 6 mL; Refill: 6  - Basic metabolic panel  (Ca, Cl, CO2, Creat, Gluc, K, Na, BUN)  - Lipid Profile  - Hemoglobin A1c  - Albumin Random Urine Quantitative with Creat Ratio  - TSH with free T4 reflex    Class 2 severe obesity due to excess calories with serious comorbidity and body mass index (BMI) of 39.0 to 39.9 in adult (H)  Discussed diet and physical activity.  Unfortunately physical activity is difficult for the patient now given his chronic pain.    Screening for prostate cance  - PSA, screen  - PSA, screen    Erectile dysfunction, unspecified erectile dysfunction type  Trial of Viagra sent to the pharmacy.  Discussed a \"good Rx\" coupon  - sildenafil (VIAGRA) 100 MG tablet  Dispense: 20 tablet; Refill: 0    Mixed hyperlipidemia  Recheck  - Basic metabolic panel  (Ca, Cl, CO2, Creat, Gluc, K, Na, BUN)  - Lipid Profile  - Hemoglobin A1c  - Albumin Random Urine Quantitative with Creat Ratio    Need for prophylactic vaccination and inoculation against influenza  - INFLUENZA QUAD, RECOMBINANT, P-FREE (RIV4) (FLUBLOK)    Chronic pain, unsteady gait, difficulty with ambulation, at risk for falls: Patient asked about getting a scooter that he could use both at home as well as when he is out in public.  I do believe that he would qualify based on his significant comorbidities.  He has a very significant back pain as well as radiculopathy and numbness in his legs and feet.  He is certainly at risk for falling.  He states that his daily activities are quite inhibited by his mobility in the sense that he is unable to really get around his house " very well and rarely goes and does anything due to mobility concerns.    Patient has been advised of split billing requirements and indicates understanding: Yes    Subjective:     Obed Nickerson is a 57 year old male who presents for an annual exam. Concerns are as follows:    Chronic pain: Patient is a past medical history significant for chronic pain and follows with the pain clinic.  He has chronic low back pain and significant radiculopathy and neuropathy in his bilateral lower extremities.  He notes that he is having a more more difficult time getting around his house as well as going and doing anything in public due to this pain and difficulty with ambulation.  He is thinking about requesting a scooter and wants to know what he needs to do about that.    He has a history of type 2 diabetes currently using Lantus, Jardiance and Victoza.  He is doing well with the medications.    He has issues with erectile dysfunction at times and is wondering about getting a medication for that.    Healthy Habits:   Regular Exercise: No  Sunscreen Use: yes  Healthy Diet: Yes  Dental Visits Regularly: yes  Seat Belt: yes  Sexually active: Yes  Self Testicular Exam Monthly:Yes  Colonoscopy: Yes  Lipid Profile: Yes  Glucose Screen: N/A    Immunization status: needs Pneumonia and influenza c=vaccines.      Current Outpatient Medications   Medication Sig Dispense Refill     ALPRAZolam (XANAX) 0.5 MG tablet Take 1 tablet (0.5 mg) by mouth 2 times daily as needed for anxiety USE SPARINGLY 15 tablet 0     B-D U/F 31G X 8 MM insulin pen needle USE THREE TIMES DAILY 100 each 11     BD ULTRA FINE PEN NEEDLES Use three times daily 200 each 1     Continuous Blood Gluc Sensor (FREESTYLE YOKO 14 DAY SENSOR) [facility] USE AS DIRECTED( DUE FOR AN OFFICE VISIT PRIOR TO FURTHER REFILLS NOTED 11/29/22) 2 each 1     DULoxetine (CYMBALTA) 60 MG capsule TAKE 1 CAPSULE BY MOUTH TWICE DAILY 180 capsule 1     empagliflozin (JARDIANCE) 25 MG TABS tablet  Take 1 tablet (25 mg) by mouth daily 90 tablet 4     LANTUS SOLOSTAR 100 UNIT/ML soln ADMINISTER 50 UNITS UNDER THE SKIN EVERY NIGHT AT BEDTIME 60 mL 0     liraglutide (VICTOZA PEN) 18 MG/3ML solution ADMINISTER 1.2 MG UNDER THE SKIN DAILY 6 mL 6     lisinopril (ZESTRIL) 40 MG tablet Take 0.5 tablets (20 mg) by mouth daily 45 tablet 0     morphine (MS CONTIN) 15 MG CR tablet Take 15 mg by mouth every 12 hours       naloxone (NARCAN) 4 MG/0.1ML nasal spray Spray 1 spray (4 mg) into one nostril alternating nostrils as needed for opioid reversal every 2-3 minutes until assistance arrives 0.2 mL 3     oxyCODONE IR (ROXICODONE) 10 MG tablet Take 10 mg by mouth every 6 hours as needed for severe pain Up  to 4 tabs/day       sildenafil (VIAGRA) 100 MG tablet Take 0.5-1 tablets ( mg) by mouth daily as needed 20 tablet 0     SUMAtriptan (IMITREX) 50 MG tablet TAKE 1 TABLET(50 MG) BY MOUTH AT ONSET OF HEADACHE FOR MIGRAINE. MAY REPEAT IN 2 HOURS. MAX 4 TABLETS/ 24 HOURS 9 tablet 1     tiZANidine (ZANAFLEX) 4 MG tablet TAKE 1 TABLET(4 MG) BY MOUTH AT BEDTIME 30 tablet 1     Past Medical History:   Diagnosis Date     Chronic pain syndrome 1/4/2008    Previously followed by Dr. Moran at [facility], then left in 2008, then started seeing Dr. Lo at [facility] in [place].  Plans to continue with this provider.  Here to establish care/PCP, does not want/need me to manage his chronic narcotics or pain.    Had neck injury in 2000, and is s/p Cervical Fusion x 2 (2-6 presently).  Currently on Vicodin 5mg/325 QID.  Feels this helps the pain, though does not completely control pain.  He has been MS Contin, and OxyContin (briefly).  Had been on Neurontin (x 1 week- excessive SE), Amitriptyline, Tegretol (mental slow).  Has not tried Effexor or Cymbalta.   Is s/p SCS for lumbar radiculitis- which did not work and had it removed.  Has tried PT and numerous spine injection  Plans for some Carpal Tunnel  surgery (pending EMG soon).  Has been told that he could have spine surgery, but is holding off surgery for as long as he can.        Tinnitus of both ears 1/16/2014    Pt reports life-long ringing in both ears.  Was raised around farm equipment and airplanes.  Feels these are major contributors.  Has had audiology recently, per pt has mild hearing loss left worse than right and positive for tinnitus.  However they said there was no treatment available for tinnitus.        Past Surgical History:   Procedure Laterality Date     ?? previous implanted morphine pump??  during 1990's    eventually explanted due to infection     APPENDECTOMY       APPENDECTOMY OPEN       BACK SURGERY       FUSION CERVICAL ANTERIOR THREE+ LEVELS      C2-6     NECK SURGERY       OTHER SURGICAL HISTORY      Multiple surgeries to right lower leg     OTHER SURGICAL HISTORY      rotator cuff surgery, right     right lower leg limb reattachment       skin grafts      many     Gabapentin, Ketorolac, Phenothiazines, Compazine [prochlorperazine], Demerol, Naproxen, Statins [hmg-coa-r inhibitors], Strawberry, Tolmetin, and Ultram [tramadol]  Family History   Problem Relation Age of Onset     C.A.D. Mother 60     C.A.D. Father 72     Cerebrovascular Disease Father      Breast Cancer No family hx of      Cancer - colorectal No family hx of      Prostate Cancer Father         at 67     Heart Disease Mother      Diabetes Father      Diabetes Sister      Hypertension Sister      Diabetes Brother      Hypertension Brother      Prostate Cancer Paternal Grandfather      Social History     Socioeconomic History     Marital status:      Spouse name: Not on file     Number of children: Not on file     Years of education: Not on file     Highest education level: Not on file   Occupational History     Not on file   Tobacco Use     Smoking status: Never     Smokeless tobacco: Never   Vaping Use     Vaping Use: Never used   Substance and Sexual Activity      "Alcohol use: No     Drug use: No     Sexual activity: Not Currently   Other Topics Concern     Not on file   Social History Narrative     Not on file     Social Determinants of Health     Financial Resource Strain: Not on file   Food Insecurity: Not on file   Transportation Needs: Not on file   Physical Activity: Not on file   Stress: Not on file   Social Connections: Not on file   Intimate Partner Violence: Not on file   Housing Stability: Not on file       Review of Systems  General:  Denies problems  Eyes:  Denies problems  Ears/Nose/Throat:  Denies problems  Cardiovascular:  Denies problems  Respiratory:  Denies problems  Gastrointestinal:  Denies problems  Genitourinary:  Denies problems  Musculoskeletal:  Denies problems  Skin:  Denies problems  Neurologic:  Denies problems  Psychiatric:  Denies problems  Endocrine:  Denies problems  Heme/Lymphatic:  Denies problems  Allergic/Immunologic:  Denies problems      Objective:      Vitals:    02/03/23 1024 02/09/23 1449   BP: (!) 138/92 138/84   Pulse: 97    Resp: 16    Temp: 97.2  F (36.2  C)    TempSrc: Tympanic    SpO2: 99%    Weight: 107.6 kg (237 lb 2 oz)    Height: 1.68 m (5' 6.14\")          Physical Exam:  General Appearance: Alert, cooperative, no distress, appears stated age, patient moves very slowly from chair to examination table in obvious discomfort with standing and moving.  Patient also seems a bit unsteady holding chair and then holding examination table for stability.  Head: Normocephalic, without obvious abnormality, atraumatic  Eyes: PERRL, conjunctiva/corneas clear, EOM's intact  Ears: Normal TM's and external ear canals, both ears   Neck: Supple, symmetrical, trachea midline, no adenopathy;  thyroid: not enlarged, symmetric, no tenderness/mass/nodules  Back: Symmetric, no curvature, ROM normal, no CVA tenderness   Lungs: Clear to auscultation bilaterally, respirations unlabored  Chest: no visible abnormalities.  Heart: Regular rate and rhythm, " S1 and S2 normal, no murmur, rub, or gallop,   Abdomen: Soft, non-tender, bowel sounds active all four quadrants,  no masses, no organomegaly  Extremities: Extremities normal, atraumatic, no cyanosis or edema  Skin: Skin color, texture, turgor normal, no rashes or lesions  Lymph nodes: Cervical, supraclavicular, and axillary nodes normal  Neurologic: Normal  Foot: Monofilament examination performed and patient has significant decrease sensation bilaterally to the knee area.

## 2023-02-09 VITALS
WEIGHT: 237.13 LBS | HEART RATE: 97 BPM | HEIGHT: 66 IN | OXYGEN SATURATION: 99 % | SYSTOLIC BLOOD PRESSURE: 138 MMHG | TEMPERATURE: 97.2 F | DIASTOLIC BLOOD PRESSURE: 84 MMHG | BODY MASS INDEX: 38.11 KG/M2 | RESPIRATION RATE: 16 BRPM

## 2023-02-10 ENCOUNTER — TRANSFERRED RECORDS (OUTPATIENT)
Dept: HEALTH INFORMATION MANAGEMENT | Facility: CLINIC | Age: 58
End: 2023-02-10
Payer: COMMERCIAL

## 2023-02-12 DIAGNOSIS — Z79.4 TYPE 2 DIABETES MELLITUS WITH HYPERGLYCEMIA, WITH LONG-TERM CURRENT USE OF INSULIN (H): ICD-10-CM

## 2023-02-12 DIAGNOSIS — E11.65 TYPE 2 DIABETES MELLITUS WITH HYPERGLYCEMIA, WITH LONG-TERM CURRENT USE OF INSULIN (H): ICD-10-CM

## 2023-02-13 ENCOUNTER — MYC REFILL (OUTPATIENT)
Dept: FAMILY MEDICINE | Facility: CLINIC | Age: 58
End: 2023-02-13
Payer: COMMERCIAL

## 2023-02-13 DIAGNOSIS — F41.9 ANXIETY: ICD-10-CM

## 2023-02-14 NOTE — TELEPHONE ENCOUNTER
Call placed to Patient states that he does not take this medication any more  It has been discontinued  Av Pelaez RN

## 2023-02-14 NOTE — TELEPHONE ENCOUNTER
Routing refill request to provider for review/approval because:  Drug not on the [facility] refill protocol   Av Pelaez RN

## 2023-02-15 DIAGNOSIS — Z79.4 TYPE 2 DIABETES MELLITUS WITH HYPERGLYCEMIA, WITH LONG-TERM CURRENT USE OF INSULIN (H): ICD-10-CM

## 2023-02-15 DIAGNOSIS — E11.65 TYPE 2 DIABETES MELLITUS WITH HYPERGLYCEMIA, WITH LONG-TERM CURRENT USE OF INSULIN (H): ICD-10-CM

## 2023-02-15 RX ORDER — ALPRAZOLAM 0.5 MG
0.5 TABLET ORAL 2 TIMES DAILY PRN
Qty: 15 TABLET | Refills: 0 | Status: SHIPPED | OUTPATIENT
Start: 2023-02-15 | End: 2023-02-22

## 2023-02-20 RX ORDER — INSULIN GLARGINE 100 [IU]/ML
INJECTION, SOLUTION SUBCUTANEOUS
Qty: 60 ML | Refills: 0 | Status: SHIPPED | OUTPATIENT
Start: 2023-02-20 | End: 2023-05-24

## 2023-02-22 ENCOUNTER — MYC REFILL (OUTPATIENT)
Dept: FAMILY MEDICINE | Facility: CLINIC | Age: 58
End: 2023-02-22
Payer: COMMERCIAL

## 2023-02-22 DIAGNOSIS — F41.9 ANXIETY: ICD-10-CM

## 2023-02-23 RX ORDER — ALPRAZOLAM 0.5 MG
0.5 TABLET ORAL 2 TIMES DAILY PRN
Qty: 15 TABLET | Refills: 0 | Status: SHIPPED | OUTPATIENT
Start: 2023-02-23 | End: 2023-03-11

## 2023-02-28 ENCOUNTER — MYC MEDICAL ADVICE (OUTPATIENT)
Dept: ENDOCRINOLOGY | Facility: CLINIC | Age: 58
End: 2023-02-28
Payer: COMMERCIAL

## 2023-03-06 ENCOUNTER — MYC MEDICAL ADVICE (OUTPATIENT)
Dept: FAMILY MEDICINE | Facility: CLINIC | Age: 58
End: 2023-03-06
Payer: COMMERCIAL

## 2023-03-06 DIAGNOSIS — M79.10 MUSCLE PAIN: ICD-10-CM

## 2023-03-11 ENCOUNTER — MYC REFILL (OUTPATIENT)
Dept: FAMILY MEDICINE | Facility: CLINIC | Age: 58
End: 2023-03-11
Payer: COMMERCIAL

## 2023-03-11 DIAGNOSIS — F41.9 ANXIETY: ICD-10-CM

## 2023-03-13 ENCOUNTER — TRANSFERRED RECORDS (OUTPATIENT)
Dept: HEALTH INFORMATION MANAGEMENT | Facility: CLINIC | Age: 58
End: 2023-03-13
Payer: COMMERCIAL

## 2023-03-13 RX ORDER — ALPRAZOLAM 0.5 MG
0.5 TABLET ORAL 2 TIMES DAILY PRN
Qty: 15 TABLET | Refills: 0 | Status: SHIPPED | OUTPATIENT
Start: 2023-03-13 | End: 2023-03-28

## 2023-03-14 DIAGNOSIS — F41.9 ANXIETY: ICD-10-CM

## 2023-03-14 RX ORDER — DULOXETIN HYDROCHLORIDE 60 MG/1
CAPSULE, DELAYED RELEASE ORAL
Qty: 180 CAPSULE | Refills: 1 | Status: SHIPPED | OUTPATIENT
Start: 2023-03-14 | End: 2023-08-29

## 2023-03-14 NOTE — TELEPHONE ENCOUNTER
"Last Written Prescription Date: 9/21/22  Last Fill Quantity: 180, # refills: 1  Last office visit provider: 2/02/23        Requested Prescriptions   Pending Prescriptions Disp Refills     DULoxetine (CYMBALTA) 60 MG capsule [Pharmacy Med Name: DULOXETINE DR 60MG CAPSULES] 180 capsule 1     Sig: TAKE ONE CAPSULE BY MOUTH TWICE DAILY.       Serotonin-Norepinephrine Reuptake Inhibitors  Passed - 3/14/2023  9:34 AM        Passed - Blood pressure under 140/90 in past 12 months     BP Readings from Last 3 Encounters:   02/09/23 138/84   07/25/22 (!) 164/110   07/08/22 (!) 150/92                 Passed - Recent (12 mo) or future (30 days) visit within the authorizing provider's specialty     Patient has had an office visit with the authorizing provider or a provider within the authorizing providers department within the previous 12 mos or has a future within next 30 days. See \"Patient Info\" tab in inbasket, or \"Choose Columns\" in Meds & Orders section of the refill encounter.              Passed - Medication is active on med list        Passed - Patient is age 18 or older                 Rocío Snaz RN 03/14/23 3:40 PM  "

## 2023-03-28 ENCOUNTER — MYC REFILL (OUTPATIENT)
Dept: FAMILY MEDICINE | Facility: CLINIC | Age: 58
End: 2023-03-28
Payer: COMMERCIAL

## 2023-03-28 DIAGNOSIS — F41.9 ANXIETY: ICD-10-CM

## 2023-03-29 RX ORDER — ALPRAZOLAM 0.5 MG
0.5 TABLET ORAL 2 TIMES DAILY PRN
Qty: 15 TABLET | Refills: 0 | Status: SHIPPED | OUTPATIENT
Start: 2023-03-29 | End: 2023-04-12

## 2023-04-12 ENCOUNTER — MYC REFILL (OUTPATIENT)
Dept: FAMILY MEDICINE | Facility: CLINIC | Age: 58
End: 2023-04-12
Payer: COMMERCIAL

## 2023-04-12 ENCOUNTER — TRANSFERRED RECORDS (OUTPATIENT)
Dept: HEALTH INFORMATION MANAGEMENT | Facility: CLINIC | Age: 58
End: 2023-04-12
Payer: COMMERCIAL

## 2023-04-12 DIAGNOSIS — F41.9 ANXIETY: ICD-10-CM

## 2023-04-12 RX ORDER — ALPRAZOLAM 0.5 MG
0.5 TABLET ORAL 2 TIMES DAILY PRN
Qty: 15 TABLET | Refills: 0 | Status: SHIPPED | OUTPATIENT
Start: 2023-04-12 | End: 2023-04-23

## 2023-04-17 DIAGNOSIS — I10 HYPERTENSION GOAL BP (BLOOD PRESSURE) < 130/80: ICD-10-CM

## 2023-04-17 RX ORDER — LISINOPRIL 40 MG/1
TABLET ORAL
Qty: 45 TABLET | Refills: 0 | Status: SHIPPED | OUTPATIENT
Start: 2023-04-17 | End: 2023-07-27

## 2023-04-22 DIAGNOSIS — M79.10 MUSCLE PAIN: ICD-10-CM

## 2023-04-23 ENCOUNTER — MYC REFILL (OUTPATIENT)
Dept: FAMILY MEDICINE | Facility: CLINIC | Age: 58
End: 2023-04-23
Payer: COMMERCIAL

## 2023-04-23 DIAGNOSIS — F41.9 ANXIETY: ICD-10-CM

## 2023-04-24 RX ORDER — ALPRAZOLAM 0.5 MG
0.5 TABLET ORAL 2 TIMES DAILY PRN
Qty: 15 TABLET | Refills: 0 | Status: SHIPPED | OUTPATIENT
Start: 2023-04-24 | End: 2023-05-10

## 2023-05-10 ENCOUNTER — MYC REFILL (OUTPATIENT)
Dept: FAMILY MEDICINE | Facility: CLINIC | Age: 58
End: 2023-05-10
Payer: COMMERCIAL

## 2023-05-10 DIAGNOSIS — F41.9 ANXIETY: ICD-10-CM

## 2023-05-10 RX ORDER — ALPRAZOLAM 0.5 MG
0.5 TABLET ORAL 2 TIMES DAILY PRN
Qty: 15 TABLET | Refills: 0 | Status: SHIPPED | OUTPATIENT
Start: 2023-05-10 | End: 2023-05-25

## 2023-05-24 ASSESSMENT — ANXIETY QUESTIONNAIRES
7. FEELING AFRAID AS IF SOMETHING AWFUL MIGHT HAPPEN: SEVERAL DAYS
5. BEING SO RESTLESS THAT IT IS HARD TO SIT STILL: MORE THAN HALF THE DAYS
8. IF YOU CHECKED OFF ANY PROBLEMS, HOW DIFFICULT HAVE THESE MADE IT FOR YOU TO DO YOUR WORK, TAKE CARE OF THINGS AT HOME, OR GET ALONG WITH OTHER PEOPLE?: EXTREMELY DIFFICULT
1. FEELING NERVOUS, ANXIOUS, OR ON EDGE: NEARLY EVERY DAY
4. TROUBLE RELAXING: NEARLY EVERY DAY
3. WORRYING TOO MUCH ABOUT DIFFERENT THINGS: MORE THAN HALF THE DAYS
IF YOU CHECKED OFF ANY PROBLEMS ON THIS QUESTIONNAIRE, HOW DIFFICULT HAVE THESE PROBLEMS MADE IT FOR YOU TO DO YOUR WORK, TAKE CARE OF THINGS AT HOME, OR GET ALONG WITH OTHER PEOPLE: EXTREMELY DIFFICULT
GAD7 TOTAL SCORE: 15
GAD7 TOTAL SCORE: 15
7. FEELING AFRAID AS IF SOMETHING AWFUL MIGHT HAPPEN: SEVERAL DAYS
6. BECOMING EASILY ANNOYED OR IRRITABLE: MORE THAN HALF THE DAYS
2. NOT BEING ABLE TO STOP OR CONTROL WORRYING: MORE THAN HALF THE DAYS

## 2023-05-24 ASSESSMENT — PAIN SCALES - PAIN ENJOYMENT GENERAL ACTIVITY SCALE (PEG)
AVG_PAIN_PASTWEEK: 8
INTERFERED_GENERAL_ACTIVITY: 9
PEG_TOTALSCORE: 8.67
AVG_PAIN_PASTWEEK: 8
INTERFERED_GENERAL_ACTIVITY: 9
INTERFERED_ENJOYMENT_LIFE: 9
PEG_TOTALSCORE: 8.67
INTERFERED_ENJOYMENT_LIFE: 9

## 2023-05-25 ENCOUNTER — MYC REFILL (OUTPATIENT)
Dept: FAMILY MEDICINE | Facility: CLINIC | Age: 58
End: 2023-05-25
Payer: COMMERCIAL

## 2023-05-25 ENCOUNTER — OFFICE VISIT (OUTPATIENT)
Dept: PALLIATIVE MEDICINE | Facility: OTHER | Age: 58
End: 2023-05-25
Attending: FAMILY MEDICINE
Payer: COMMERCIAL

## 2023-05-25 VITALS
OXYGEN SATURATION: 98 % | WEIGHT: 228 LBS | SYSTOLIC BLOOD PRESSURE: 199 MMHG | HEART RATE: 90 BPM | DIASTOLIC BLOOD PRESSURE: 107 MMHG | BODY MASS INDEX: 36.64 KG/M2

## 2023-05-25 DIAGNOSIS — R10.9 ACUTE ABDOMINAL PAIN: ICD-10-CM

## 2023-05-25 DIAGNOSIS — M47.812 CERVICAL SPONDYLOSIS: ICD-10-CM

## 2023-05-25 DIAGNOSIS — M54.2 CERVICALGIA: ICD-10-CM

## 2023-05-25 DIAGNOSIS — F41.9 ANXIETY: ICD-10-CM

## 2023-05-25 DIAGNOSIS — Z79.891 LONG TERM (CURRENT) USE OF OPIATE ANALGESIC: ICD-10-CM

## 2023-05-25 DIAGNOSIS — M15.0 PRIMARY OSTEOARTHRITIS INVOLVING MULTIPLE JOINTS: ICD-10-CM

## 2023-05-25 DIAGNOSIS — Z79.899 HIGH RISK MEDICATION USE: ICD-10-CM

## 2023-05-25 DIAGNOSIS — Z98.1 S/P CERVICAL SPINAL FUSION: ICD-10-CM

## 2023-05-25 DIAGNOSIS — G89.29 CHRONIC INTRACTABLE PAIN: Primary | ICD-10-CM

## 2023-05-25 DIAGNOSIS — M25.50 POLYARTHRALGIA: ICD-10-CM

## 2023-05-25 LAB
CANNABINOIDS UR QL SCN: NORMAL
CREAT UR-MCNC: 59 MG/DL
ETHANOL UR QL SCN: NORMAL

## 2023-05-25 PROCEDURE — 80365 DRUG SCREENING OXYCODONE: CPT | Performed by: NURSE PRACTITIONER

## 2023-05-25 PROCEDURE — 99205 OFFICE O/P NEW HI 60 MIN: CPT | Performed by: NURSE PRACTITIONER

## 2023-05-25 PROCEDURE — 80307 DRUG TEST PRSMV CHEM ANLYZR: CPT | Performed by: NURSE PRACTITIONER

## 2023-05-25 PROCEDURE — G0463 HOSPITAL OUTPT CLINIC VISIT: HCPCS

## 2023-05-25 PROCEDURE — 80320 DRUG SCREEN QUANTALCOHOLS: CPT | Performed by: NURSE PRACTITIONER

## 2023-05-25 PROCEDURE — 80357 KETAMINE AND NORKETAMINE: CPT | Performed by: NURSE PRACTITIONER

## 2023-05-25 RX ORDER — ALPRAZOLAM 0.5 MG
0.5 TABLET ORAL 2 TIMES DAILY PRN
Qty: 15 TABLET | Refills: 0 | Status: SHIPPED | OUTPATIENT
Start: 2023-05-25 | End: 2023-06-08

## 2023-05-25 ASSESSMENT — PAIN SCALES - GENERAL: PAINLEVEL: EXTREME PAIN (8)

## 2023-05-25 NOTE — PROGRESS NOTES
Patient presents to the clinic today for a follow up with CATHIE Correa CNP regarding Pain Management.          5/25/2023     8:52 AM   PEG Score   PEG Total Score 8.67       UDS/CSA- Na    Medications- Morphine 05.24.2023 @8pm    Oxycodone 05.24.2023 @8pm    QUESTIONS:    Evelin CHO [employer] Visit Facilitator

## 2023-05-25 NOTE — LETTER
Opioid / Opioid Plus Controlled Substance Agreement    This is an agreement between you and your provider about the safe and appropriate use of controlled substance/opioids prescribed by your care team. Controlled substances are medicines that can cause physical and mental dependence (abuse).    There are strict laws about having and using these medicines. We here at [facility] are committing to working with you in your efforts to get better. To support you in this work, we ll help you schedule regular office appointments for medicine refills. If we must cancel or change your appointment for any reason, we ll make sure you have enough medicine to last until your next appointment.     As a Provider, I will:    Listen carefully to your concerns and treat you with respect.     Recommend a treatment plan that I believe is in your best interest. This plan may involve therapies other than opioid pain medication.     Talk with you often about the possible benefits, and the risk of harm of any medicine that we prescribe for you.     Provide a plan on how to taper (discontinue or go off) using this medicine if the decision is made to stop its use.    As a Patient, I understand that opioid(s):     Are a controlled substance prescribed by my care team to help me function or work and manage my condition(s).     Are strong medicines and can cause serious side effects such as:    Drowsiness, which can seriously affect my driving ability    A lower breathing rate, enough to cause death    Harm to my thinking ability     Depression     Abuse of and addiction to this medicine    Need to be taken exactly as prescribed. Combining opioids with certain medicines or chemicals (such as illegal drugs, sedatives, sleeping pills, and benzodiazepines) can be dangerous or even fatal. If I stop opioids suddenly, I may have severe withdrawal symptoms.    Do not work for all types of pain nor for all patients. If they re not helpful, I may  be asked to stop them.    {Benzo / Stimulant (Optional):553804}    The risks, benefits and side effects of these medicine(s) were explained to me. I agree that:  1. I will take part in other treatments as advised by my care team. This may be psychiatry or counseling, physical therapy, behavioral therapy, group treatment or a referral to a specialist.     2. I will keep all my appointments. I understand that this is part of the monitoring of opioids. My care team may require an office visit for EVERY opioid/controlled substance refill. If I miss appointments or don t follow instructions, my care team may stop my medicine.    3. I will take my medicines as prescribed. I will not change the dose or schedule unless my care team tells me to. There will be no refills if I run out early.     4. I may be asked to come to the clinic and complete a urine drug test or complete a pill count at any time. If I don t give a urine sample or participate in a pill count, the care team may stop my medicine.    5. I will only receive prescriptions from this clinic for chronic pain. If I am treated by another provider for acute pain issues, I will tell them that I am taking opioid pain medication for chronic pain and that I have a treatment agreement with this provider. I will inform my [facility] care team within one business day if I am given a prescription for any pain medication by another healthcare provider. My [facility] care team can contact other providers and pharmacists about my use of any medicines.    6. It is up to me to make sure that I don t run out of my medicines on weekends or holidays. If my care team is willing to refill my opioid prescription without a visit, I must request refills only during office hours. Refills may take up to 3 business days to process. I will use one pharmacy to fill all my opioid and other controlled substance prescriptions. I will notify the clinic about any changes to my  insurance or medication availability.    7. I am responsible for my prescriptions. If the medicine/prescription is lost, stolen or destroyed, it will not be replaced. I also agree not to share controlled substance medicines with anyone.    8. I am aware I should not use any illegal or recreational drugs. I agree not to drink alcohol unless my care team says I can.       9. If I enroll in the [place] [facility] program, I will tell my care team prior to my next refill.     10. I will tell my care team right away if I become pregnant, have a new medical problem treated outside of my regular clinic, or have a change in my medications.    11. I understand that this medicine can affect my thinking, judgment and reaction time. Alcohol and drugs affect the brain and body, which can affect the safety of my driving. Being under the influence of alcohol or drugs can affect my decision-making, behaviors, personal safety, and the safety of others. Driving while impaired (DWI) can occur if a person is driving, operating, or in physical control of a car, motorcycle, boat, snowmobile, ATV, motorbike, off-road vehicle, or any other motor vehicle ([place] Statute 169A.20). I understand the risk if I choose to drive or operate any vehicle or machinery.    I understand that if I do not follow any of the conditions above, my prescriptions or treatment may be stopped or changed.          Opioids  What You Need to Know    What are opioids?   Opioids are pain medicines that must be prescribed by a doctor. They are also known as narcotics.     Examples are:   1. morphine (MS Contin, Farheen)  2. oxycodone (Oxycontin)  3. oxycodone and acetaminophen (Percocet)  4. hydrocodone and acetaminophen (Vicodin, Norco)   5. fentanyl patch (Duragesic)   6. hydromorphone (Dilaudid)   7. methadone  8. codeine (Tylenol #3)     What do opioids do well?   Opioids are best for severe short-term pain such as after a surgery or injury. They may work well  for cancer pain. They may help some people with long-lasting (chronic) pain.     What do opioids NOT do well?   Opioids never get rid of pain entirely, and they don t work well for most patients with chronic pain. Opioids don t reduce swelling, one of the causes of pain.                                    Other ways to manage chronic pain and improve function include:       Treat the health problem that may be causing pain    Anti-inflammation medicines, which reduce swelling and tenderness, such as ibuprofen (Advil, Motrin) or naproxen (Aleve)    Acetaminophen (Tylenol)    Antidepressants and anti-seizure medicines, especially for nerve pain    Topical treatments such as patches or creams    Injections or nerve blocks    Chiropractic or osteopathic treatment    Acupuncture, massage, deep breathing, meditation, visual imagery, aromatherapy    Use heat or ice at the pain site    Physical therapy     Exercise    Stop smoking    Take part in therapy       Risks and side effects     Talk to your doctor before you start or decide to keep taking opioids. Possible side effects include:      Lowering your breathing rate enough to cause death    Overdose, including death, especially if taking higher than prescribed doses    Worse depression symptoms; less pleasure in things you usually enjoy    Feeling tired or sluggish    Slower thoughts or cloudy thinking    Being more sensitive to pain over time; pain is harder to control    Trouble sleeping or restless sleep    Changes in hormone levels (for example, less testosterone)    Changes in sex drive or ability to have sex    Constipation    Unsafe driving    Itching and sweating    Dizziness    Nausea, throwing up and dry mouth    What else should I know about opioids?    Opioids may lead to dependence, tolerance, or addiction.      Dependence means that if you stop or reduce the medicine too quickly, you will have withdrawal symptoms. These include loose poop (diarrhea),  jitters, flu-like symptoms, nervousness and tremors. Dependence is not the same as addiction.                       Tolerance means needing higher doses over time to get the same effect. This may increase the chance of serious side effects.      Addiction is when people improperly use a substance that harms their body, their mind or their relations with others. Use of opiates can cause a relapse of addiction if you have a history of drug or alcohol abuse.      People who have used opioids for a long time may have a lower quality of life, worse depression, higher levels of pain and more visits to doctors.    You can overdose on opioids. Take these steps to lower your risk of overdose:    1. Recognize the signs:  Signs of overdose include decrease or loss of consciousness (blackout), slowed breathing, trouble waking up and blue lips. If someone is worried about overdose, they should call 911.    2. Talk to your doctor about Narcan (naloxone).   If you are at risk for overdose, you may be given a prescription for Narcan. This medicine very quickly reverses the effects of opioids.   If you overdose, a friend or family member can give you Narcan while waiting for the ambulance. They need to know the signs of overdose and how to give Narcan.     3. Don't use alcohol or street drugs.   Taking them with opioids can cause death.    4. Do not take any of these medicines unless your doctor says it s OK. Taking these with opioids can cause death:    Benzodiazepines, such as lorazepam (Ativan), alprazolam (Xanax) or diazepam (Valium)    Muscle relaxers, such as cyclobenzaprine (Flexeril)    Sleeping pills like zolpidem (Ambien)     Other opioids      How to keep you and other people safe while taking opioids:    1. Never share your opioids with others.  Opioid medicines are regulated by the Drug Enforcement Agency (SHIRAZ). Selling or sharing medications is a criminal act.    2. Be sure to store opioids in a secure place, locked up  if possible. Young children can easily swallow them and overdose.    3. When you are traveling with your medicines, keep them in the original bottles. If you use a pill box, be sure you also carry a copy of your medicine list from your clinic or pharmacy.    4. Safe disposal of opioids    Most pharmacies have places to get rid of medicine, called disposal kiosks. Medicine disposal options are also available in every [facility]. Search your county and  medication disposal  to find more options. You can find more details at:  https://www.pca.[email or web address].[email or web address]./living-green/managing-unwanted-medications     I agree that my provider, clinic care team, and pharmacy may work with any city, state or federal law enforcement agency that investigates the misuse, sale, or other diversion of my controlled medicine. I will allow my provider to discuss my care with, or share a copy of, this agreement with any other treating provider, pharmacy or emergency room where I receive care.    I have read this agreement and have asked questions about anything I did not understand.    _______________________________________________________  Patient Signature - Obed Nickerson _____________________                   Date     _______________________________________________________  Provider Signature - CATHIE Correa CNP   _____________________                   Date     _______________________________________________________  Witness Signature (required if provider not present while patient signing)   _____________________                   Date

## 2023-05-25 NOTE — TELEPHONE ENCOUNTER
Routing refill request to provider for review/approval because:  Drug not on the [facility] refill protocol       Requested Prescriptions   Pending Prescriptions Disp Refills    ALPRAZolam (XANAX) 0.5 MG tablet 15 tablet 0     Sig: Take 1 tablet (0.5 mg) by mouth 2 times daily as needed for anxiety USE SPARINGLY       There is no refill protocol information for this order              Juice Bond RN 05/25/23 1:41 PM

## 2023-05-25 NOTE — PATIENT INSTRUCTIONS
After Visit Instructions:     Thank you for coming to [facility] for your care. It is my goal to partner with you to help you reach your optimal state of health.   Continue daily self-care, identifying contributing factors, and monitoring variations in pain level. Continue to integrate self-care into your life.      Physical therapy: YES Order for pool therapy and list of pools provided   60 minutes Clinic follow-up with CATHIE Antonio NP-C on 6/8/23 @ 9 am  Release of Information: [facility] [place]  Look up Opiate Induced Hyperalgesia  Labs: Urine drug screen   Imaging: We may need to update imaging  Procedures recommended: we can discuss in the future    Medication Management : We will discuss options at the next visit  STOP USING EXCEDRIN   Continue with current prescribers until next appt.     CATHIE Palafox NP-C  [facility]  [facility] - Monday and Friday  Critical access hospital - Tuesday [place] - Thursday    Be sure to request ALL medication refills 5 days prior to the due date unless you will see your medical provider in an appointment before the due date.  This is YOUR responsibility. Do not expect same day refills. If you do not plan ahead you may run out of medications.   NO early refills are allowed. It is your responsibility to manage your medications responsibility and keep them safely stored. Lost or destroyed medications WILL NOT be replaced    Scheduling/Clinic telephone Number for ALL locations:  [phone]    After Hours On-Call Service:  [phone]    Call with any questions about your care and for scheduling assistance.   Calls are returned Monday through Friday between 8 AM and 4:00 PM. We usually get back to you within 2 business days depending on the issue/request.    If we are prescribing your medications:  For opioid medication refills, call the clinic or send a Far[employer] message 7 days in advance.  Please include:  Your name  and date of birth.   Name of requested medication  Name of the pharmacy.  For non-opioid medications, call your pharmacy directly to request a refill. Please allow 3-4 days to be processed.   Per [place] State Law:  All controlled substance prescriptions must be filled within 30 days of being written.    For those controlled substances allowing refills, pickup must occur within 30 days of last fill.      We believe regular attendance is key to your success in our program!    Any time you are unable to keep your appointment we ask that you call us at least 24 hours in advance to cancel.This will allow us to offer the appointment time to another patient.   Multiple missed appointments may lead to dismissal from the clinic.
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
## 2023-05-30 LAB
BUPRENORPHINE UR CFM-MCNC: 20 NG/ML
BUPRENORPHINE/CREAT UR: 34 NG/MG {CREAT}
MORPHINE UR CFM-MCNC: 1460 NG/ML
MORPHINE/CREAT UR: 2475 NG/MG {CREAT}
NORBUPRENORPHINE UR CFM-MCNC: 20 NG/ML
NORBUPRENORPHINE/CREAT UR: 34 NG/MG {CREAT}
OXYCODONE UR CFM-MCNC: 109 NG/ML
OXYCODONE/CREAT UR: 185 NG/MG {CREAT}
OXYMORPHONE UR CFM-MCNC: 222 NG/ML
OXYMORPHONE/CREAT UR: 376 NG/MG {CREAT}

## 2023-06-08 ENCOUNTER — MYC REFILL (OUTPATIENT)
Dept: FAMILY MEDICINE | Facility: CLINIC | Age: 58
End: 2023-06-08
Payer: COMMERCIAL

## 2023-06-08 DIAGNOSIS — F41.9 ANXIETY: ICD-10-CM

## 2023-06-08 NOTE — TELEPHONE ENCOUNTER
Routing refill request to provider for review/approval because:  Drug not on the [facility] refill protocol         Requested Prescriptions   Pending Prescriptions Disp Refills    ALPRAZolam (XANAX) 0.5 MG tablet 15 tablet 0     Sig: Take 1 tablet (0.5 mg) by mouth 2 times daily as needed for anxiety USE SPARINGLY       There is no refill protocol information for this order              Tomasa Mohan RN 06/08/23 5:34 PM

## 2023-06-09 RX ORDER — ALPRAZOLAM 0.5 MG
0.5 TABLET ORAL 2 TIMES DAILY PRN
Qty: 15 TABLET | Refills: 0 | Status: SHIPPED | OUTPATIENT
Start: 2023-06-09 | End: 2023-06-22

## 2023-06-11 DIAGNOSIS — M79.10 MUSCLE PAIN: ICD-10-CM

## 2023-06-20 ENCOUNTER — TELEPHONE (OUTPATIENT)
Dept: PALLIATIVE MEDICINE | Facility: OTHER | Age: 58
End: 2023-06-20
Payer: COMMERCIAL

## 2023-06-20 NOTE — TELEPHONE ENCOUNTER
I left a message for the patient to return call to confirm appt change from 6/29 to 7/28 at 830am for a 1 hour appt with Ondina Ty  If patient is needing med refill please inform him to call nurse and will have provider that is covering order refills. Pt is on a waiting list for any cancelled appt.

## 2023-06-22 ENCOUNTER — MYC REFILL (OUTPATIENT)
Dept: FAMILY MEDICINE | Facility: CLINIC | Age: 58
End: 2023-06-22
Payer: COMMERCIAL

## 2023-06-22 DIAGNOSIS — F41.9 ANXIETY: ICD-10-CM

## 2023-06-22 RX ORDER — ALPRAZOLAM 0.5 MG
0.5 TABLET ORAL 2 TIMES DAILY PRN
Qty: 15 TABLET | Refills: 0 | Status: SHIPPED | OUTPATIENT
Start: 2023-06-22 | End: 2023-07-02

## 2023-06-27 DIAGNOSIS — E11.65 TYPE 2 DIABETES MELLITUS WITH HYPERGLYCEMIA, WITH LONG-TERM CURRENT USE OF INSULIN (H): ICD-10-CM

## 2023-06-27 DIAGNOSIS — Z79.4 TYPE 2 DIABETES MELLITUS WITH HYPERGLYCEMIA, WITH LONG-TERM CURRENT USE OF INSULIN (H): ICD-10-CM

## 2023-06-28 RX ORDER — FLASH GLUCOSE SENSOR
KIT MISCELLANEOUS
Qty: 2 EACH | Refills: 1 | Status: SHIPPED | OUTPATIENT
Start: 2023-06-28 | End: 2023-12-06

## 2023-06-30 ENCOUNTER — VIRTUAL VISIT (OUTPATIENT)
Dept: ENDOCRINOLOGY | Facility: CLINIC | Age: 58
End: 2023-06-30
Payer: COMMERCIAL

## 2023-06-30 ENCOUNTER — TELEPHONE (OUTPATIENT)
Dept: ENDOCRINOLOGY | Facility: CLINIC | Age: 58
End: 2023-06-30

## 2023-06-30 DIAGNOSIS — E66.812 CLASS 2 SEVERE OBESITY DUE TO EXCESS CALORIES WITH SERIOUS COMORBIDITY AND BODY MASS INDEX (BMI) OF 35.0 TO 35.9 IN ADULT (H): ICD-10-CM

## 2023-06-30 DIAGNOSIS — E78.2 MIXED HYPERLIPIDEMIA: ICD-10-CM

## 2023-06-30 DIAGNOSIS — Z79.4 TYPE 2 DIABETES MELLITUS WITH DIABETIC POLYNEUROPATHY, WITH LONG-TERM CURRENT USE OF INSULIN (H): ICD-10-CM

## 2023-06-30 DIAGNOSIS — E88.819 INSULIN RESISTANCE: ICD-10-CM

## 2023-06-30 DIAGNOSIS — I10 HYPERTENSION, UNSPECIFIED TYPE: ICD-10-CM

## 2023-06-30 DIAGNOSIS — E11.42 TYPE 2 DIABETES MELLITUS WITH DIABETIC POLYNEUROPATHY, WITH LONG-TERM CURRENT USE OF INSULIN (H): ICD-10-CM

## 2023-06-30 DIAGNOSIS — E11.65 TYPE 2 DIABETES MELLITUS WITH HYPERGLYCEMIA, WITH LONG-TERM CURRENT USE OF INSULIN (H): Primary | ICD-10-CM

## 2023-06-30 DIAGNOSIS — Z79.4 LONG TERM (CURRENT) USE OF INSULIN (H): ICD-10-CM

## 2023-06-30 DIAGNOSIS — E66.01 CLASS 2 SEVERE OBESITY DUE TO EXCESS CALORIES WITH SERIOUS COMORBIDITY AND BODY MASS INDEX (BMI) OF 35.0 TO 35.9 IN ADULT (H): ICD-10-CM

## 2023-06-30 DIAGNOSIS — Z79.4 TYPE 2 DIABETES MELLITUS WITH HYPERGLYCEMIA, WITH LONG-TERM CURRENT USE OF INSULIN (H): Primary | ICD-10-CM

## 2023-06-30 DIAGNOSIS — K76.0 HEPATIC STEATOSIS: ICD-10-CM

## 2023-06-30 PROCEDURE — 99442 PR PHYSICIAN TELEPHONE EVALUATION 11-20 MIN: CPT | Mod: VID | Performed by: INTERNAL MEDICINE

## 2023-06-30 PROCEDURE — 95251 CONT GLUC MNTR ANALYSIS I&R: CPT | Performed by: INTERNAL MEDICINE

## 2023-06-30 RX ORDER — BLOOD-GLUCOSE SENSOR
1 EACH MISCELLANEOUS
Qty: 6 EACH | Refills: 11 | Status: SHIPPED | OUTPATIENT
Start: 2023-06-30 | End: 2024-01-01

## 2023-06-30 NOTE — LETTER
"    6/30/2023         RE: Obed Nickerson  [address]  [facility] [address]        Dear Colleague,    Thank you for referring your patient, Obed Nickerson, to the [facility]. Please see a copy of my visit note below.    Phone visit    Start time 8:28, stop time 8:48; additional 10 minutes spent on the date of the encounter doing chart review, documentation and further activities as noted. This did not include time spent on CGM review.     Provider location: [facility], [address] [place] [address] [address], [place] [address]    Patient location: patient home    Mode of transmission: phone    Subjective:    Established patient    Obed Nickerson is a 58 year old male who presents for DM.     Current DM therapy:  -Lantus 0-0-0-50  -Metformin 1000 mg BID stopped 12/2022 due to nausea and the nausea subsequently improved but is still present but tolerable   -Victoza 1.2 mg daily (maximum dose tried); see above re nausea   -Empagliflozin 25 mg daily; well tolerated     Prior DM therapy:  -No prior use of prandial insulin     Diet: smaller frequent meals    Activity is limited due to chronic pain.    He has lost ~25 # over the past 9 months.     Objective:    6/30/2023: phone visit today, 226 #    12/2022: he notes a stable weight, virtual visit    9/2022: 250 #, 5'7\" with BMI ~39 kg/m2    Assessment/Plan:    # DM-2  -CT A/P 3/2021: per radiology pancreas normal   -2/2023: HbA1c 8.1% (stable)  # No retinopathy, most recent eye exam 4/2022  -He will set this up locally  # Hypertension, on lisinopril 20 mg daily    -2/2023: GFR >90, urine microalbumin undetectable (previously mildly elevated)  # Peripheral neuropathy, no prior ulceration    # Mild coronary artery calcification on imaging, no prior ASCVD event, not on ASA  # Mixed hyperlipidemia  -Statins previously caused \"memory loss\"  -2/2023: ,  (not previously >500 mg/dL), HDL 47,   # Medically " complicated obesity   # Hepatic steatosis  # Other  -1/8/2021 at 05:43 AM serum cortisol 2.0 mcg/dL      CGM reviewed in detail. Between June 6 and June 19, 2023 his average glucose was 220 mg/deciliter, target range 32%, high 41%, very high 27%.  June 19, 2023 was the last day he used his CGM (sensor issue and unable to get new sensors yet) and we do not have any blood glucose monitoring data subsequent to that.  He denies any symptoms suggestive of hypoglycemia.    Because of ongoing nausea he will stop Victoza.  He will temporarily increase Lantus to 60 units daily.  He will continue empagliflozin 25 mg daily.    I prescribed a glucometer and testing supplies, he will test at least twice until he restarts his CGM.  I also gave him a new prescription for freestyle octavia 3 that he will start when he is able to do so.    In 10 days or so he will let me know via My Chart if his nausea has resolved with stopping Victoza.  If it has we will try an alternative GLP-1 receptor agonist.    He has an upcoming glucocorticoid injection on July 18 and last time this caused significant hyperglycemia.  When he checks in with me in about 10 days and we make our plan we will need to take this into account.    I also put in for standard labs to be done in the next few weeks.  Note that he has significantly elevated LDL and has previously had a statin intolerance.  I did refer him to [facility].                       Again, thank you for allowing me to participate in the care of your patient.        Sincerely,        Marcus Al MD

## 2023-06-30 NOTE — PROGRESS NOTES
"Phone visit    Start time 8:28, stop time 8:48; additional 10 minutes spent on the date of the encounter doing chart review, documentation and further activities as noted. This did not include time spent on CGM review.     Provider location: [facility], [address] [place] [address] [address], [place] [address]    Patient location: patient home    Mode of transmission: phone    Subjective:    Established patient    Obed Nickerson is a 58 year old male who presents for DM.     Current DM therapy:  -Lantus 0-0-0-50  -Metformin 1000 mg BID stopped 12/2022 due to nausea and the nausea subsequently improved but is still present but tolerable   -Victoza 1.2 mg daily (maximum dose tried); see above re nausea   -Empagliflozin 25 mg daily; well tolerated     Prior DM therapy:  -No prior use of prandial insulin     Diet: smaller frequent meals    Activity is limited due to chronic pain.    He has lost ~25 # over the past 9 months.     Objective:    6/30/2023: phone visit today, 226 #    12/2022: he notes a stable weight, virtual visit    9/2022: 250 #, 5'7\" with BMI ~39 kg/m2    Assessment/Plan:    # DM-2  -CT A/P 3/2021: per radiology pancreas normal   -2/2023: HbA1c 8.1% (stable)  # No retinopathy, most recent eye exam 4/2022  -He will set this up locally  # Hypertension, on lisinopril 20 mg daily    -2/2023: GFR >90, urine microalbumin undetectable (previously mildly elevated)  # Peripheral neuropathy, no prior ulceration    # Mild coronary artery calcification on imaging, no prior ASCVD event, not on ASA  # Mixed hyperlipidemia  -Statins previously caused \"memory loss\"  -2/2023: ,  (not previously >500 mg/dL), HDL 47,   # Medically complicated obesity   # Hepatic steatosis  # Other  -1/8/2021 at 05:43 AM serum cortisol 2.0 mcg/dL      CGM reviewed in detail. Between June 6 and June 19, 2023 his average glucose was 220 mg/deciliter, target range 32%, high 41%, very high 27%.  June 19, 2023 " was the last day he used his CGM (sensor issue and unable to get new sensors yet) and we do not have any blood glucose monitoring data subsequent to that.  He denies any symptoms suggestive of hypoglycemia.    Because of ongoing nausea he will stop Victoza.  He will temporarily increase Lantus to 60 units daily.  He will continue empagliflozin 25 mg daily.    I prescribed a glucometer and testing supplies, he will test at least twice until he restarts his CGM.  I also gave him a new prescription for freestyle octavia 3 that he will start when he is able to do so.    In 10 days or so he will let me know via My Chart if his nausea has resolved with stopping Victoza.  If it has we will try an alternative GLP-1 receptor agonist.    He has an upcoming glucocorticoid injection on July 18 and last time this caused significant hyperglycemia.  When he checks in with me in about 10 days and we make our plan we will need to take this into account.    I also put in for standard labs to be done in the next few weeks.  Note that he has significantly elevated LDL and has previously had a statin intolerance.  I did refer him to the [facility].

## 2023-06-30 NOTE — TELEPHONE ENCOUNTER
LVM needs fasting lab work done in the next few weeks.  Please assist patient in scheduling lab appt.

## 2023-07-02 ENCOUNTER — MYC REFILL (OUTPATIENT)
Dept: FAMILY MEDICINE | Facility: CLINIC | Age: 58
End: 2023-07-02
Payer: COMMERCIAL

## 2023-07-02 DIAGNOSIS — F41.9 ANXIETY: ICD-10-CM

## 2023-07-03 RX ORDER — ALPRAZOLAM 0.5 MG
0.5 TABLET ORAL 2 TIMES DAILY PRN
Qty: 15 TABLET | Refills: 0 | Status: SHIPPED | OUTPATIENT
Start: 2023-07-03 | End: 2023-07-10

## 2023-07-05 ENCOUNTER — TELEPHONE (OUTPATIENT)
Dept: ENDOCRINOLOGY | Facility: CLINIC | Age: 58
End: 2023-07-05
Payer: COMMERCIAL

## 2023-07-05 DIAGNOSIS — E11.65 TYPE 2 DIABETES MELLITUS WITH HYPERGLYCEMIA, WITH LONG-TERM CURRENT USE OF INSULIN (H): Primary | ICD-10-CM

## 2023-07-05 DIAGNOSIS — Z79.4 TYPE 2 DIABETES MELLITUS WITH HYPERGLYCEMIA, WITH LONG-TERM CURRENT USE OF INSULIN (H): Primary | ICD-10-CM

## 2023-07-05 NOTE — TELEPHONE ENCOUNTER
PA Initiation    Medication: OZEMPIC (0.25 OR 0.5 MG/DOSE) 2 MG/3ML SC SOPN  Insurance Company: [employer] - Phone [phone] Fax [phone]  Pharmacy Filling the Rx: [id number] [address]  [facility]  Filling Pharmacy Phone: [phone]  Filling Pharmacy Fax: [phone]  Start Date: 7/5/2023

## 2023-07-05 NOTE — LETTER
7/5/2023        RE: Obed Nickerson  [address]  [facility] [place] [facility] [address]        To Whom in may Concern;    We are asking that you reconsider your decision to deny this patient the use of Ozempic. Obed Nickerson is seen in our clinic for Type 2 Diabetes Mellitus. Patient has tried and failed both Victoza and Bydureon, as evidence by patients A1c. This patient would greatly benefit from the use of Ozempic, and help provide better glycemic control. Please reconsider authorizing the use of Ozempic for this patient.     If you have any questions please contact our clinic at [phone].        Sincerely,        Marcus Al MD

## 2023-07-06 NOTE — TELEPHONE ENCOUNTER
PRIOR AUTHORIZATION DENIED    Medication: OZEMPIC (0.25 OR 0.5 MG/DOSE) 2 [facility]  Insurance Company: [employer] - Phone [phone] Fax [phone]  Denial Date:    Denial Rational:    Appeal Information:        Patient Notified:

## 2023-07-07 DIAGNOSIS — Z79.4 TYPE 2 DIABETES MELLITUS WITH HYPERGLYCEMIA, WITH LONG-TERM CURRENT USE OF INSULIN (H): Primary | ICD-10-CM

## 2023-07-07 DIAGNOSIS — E11.65 TYPE 2 DIABETES MELLITUS WITH HYPERGLYCEMIA, WITH LONG-TERM CURRENT USE OF INSULIN (H): Primary | ICD-10-CM

## 2023-07-10 ENCOUNTER — MYC REFILL (OUTPATIENT)
Dept: FAMILY MEDICINE | Facility: CLINIC | Age: 58
End: 2023-07-10
Payer: COMMERCIAL

## 2023-07-10 DIAGNOSIS — F41.9 ANXIETY: ICD-10-CM

## 2023-07-10 NOTE — TELEPHONE ENCOUNTER
Routing refill request to provider for review/approval because:  Drug not on the [facility] refill protocol         Requested Prescriptions   Pending Prescriptions Disp Refills    ALPRAZolam (XANAX) 0.5 MG tablet 15 tablet 0     Sig: Take 1 tablet (0.5 mg) by mouth 2 times daily as needed for anxiety USE SPARINGLY       There is no refill protocol information for this order              Tomasa Mohan RN 07/10/23 5:38 PM

## 2023-07-11 RX ORDER — ALPRAZOLAM 0.5 MG
0.5 TABLET ORAL 2 TIMES DAILY PRN
Qty: 15 TABLET | Refills: 0 | Status: SHIPPED | OUTPATIENT
Start: 2023-07-11 | End: 2023-07-20

## 2023-07-20 ENCOUNTER — MYC REFILL (OUTPATIENT)
Dept: FAMILY MEDICINE | Facility: CLINIC | Age: 58
End: 2023-07-20
Payer: COMMERCIAL

## 2023-07-20 DIAGNOSIS — F41.9 ANXIETY: ICD-10-CM

## 2023-07-20 RX ORDER — ALPRAZOLAM 0.5 MG
0.5 TABLET ORAL 2 TIMES DAILY PRN
Qty: 15 TABLET | Refills: 0 | Status: SHIPPED | OUTPATIENT
Start: 2023-07-20 | End: 2023-07-27

## 2023-07-20 NOTE — TELEPHONE ENCOUNTER
Routing refill request to provider for review/approval because:  Drug not on the [facility] refill protocol         Requested Prescriptions   Pending Prescriptions Disp Refills    ALPRAZolam (XANAX) 0.5 MG tablet 15 tablet 0     Sig: Take 1 tablet (0.5 mg) by mouth 2 times daily as needed for anxiety USE SPARINGLY       There is no refill protocol information for this order              Tomasa Mohan RN 07/20/23 1:41 PM

## 2023-08-01 DIAGNOSIS — M79.10 MUSCLE PAIN: ICD-10-CM

## 2023-08-01 NOTE — TELEPHONE ENCOUNTER
Routing refill request to provider for review/approval because:  Drug not on the [facility] refill protocol         Requested Prescriptions   Pending Prescriptions Disp Refills    tiZANidine (ZANAFLEX) 4 MG tablet [Pharmacy Med Name: TIZANIDINE 4MG TABLETS] 30 tablet 1     Sig: TAKE 1 TABLET(4 MG) BY MOUTH AT BEDTIME       There is no refill protocol information for this order              Tomasa Mohan RN 08/01/23 10:26 AM

## 2023-08-04 ENCOUNTER — MYC REFILL (OUTPATIENT)
Dept: FAMILY MEDICINE | Facility: CLINIC | Age: 58
End: 2023-08-04
Payer: COMMERCIAL

## 2023-08-04 DIAGNOSIS — F41.9 ANXIETY: ICD-10-CM

## 2023-08-04 RX ORDER — ALPRAZOLAM 0.5 MG
0.5 TABLET ORAL 2 TIMES DAILY PRN
Qty: 15 TABLET | Refills: 0 | Status: SHIPPED | OUTPATIENT
Start: 2023-08-04 | End: 2023-08-10

## 2023-08-04 NOTE — TELEPHONE ENCOUNTER
Routing refill request to provider for review/approval because:  Drug not on the [facility] refill protocol       Requested Prescriptions   Pending Prescriptions Disp Refills    ALPRAZolam (XANAX) 0.5 MG tablet 15 tablet 0     Sig: Take 1 tablet (0.5 mg) by mouth 2 times daily as needed for anxiety USE SPARINGLY       There is no refill protocol information for this order              Juice Bond RN 08/04/23 7:29 AM

## 2023-08-10 ENCOUNTER — MYC REFILL (OUTPATIENT)
Dept: FAMILY MEDICINE | Facility: CLINIC | Age: 58
End: 2023-08-10
Payer: COMMERCIAL

## 2023-08-10 DIAGNOSIS — F41.9 ANXIETY: ICD-10-CM

## 2023-08-11 RX ORDER — ALPRAZOLAM 0.5 MG
0.5 TABLET ORAL 2 TIMES DAILY PRN
Qty: 15 TABLET | Refills: 0 | Status: SHIPPED | OUTPATIENT
Start: 2023-08-11 | End: 2023-08-24

## 2023-08-11 NOTE — TELEPHONE ENCOUNTER
Routing refill request to provider for review/approval because:  Drug not on the [facility] refill protocol       Requested Prescriptions   Pending Prescriptions Disp Refills    ALPRAZolam (XANAX) 0.5 MG tablet 15 tablet 0     Sig: Take 1 tablet (0.5 mg) by mouth 2 times daily as needed for anxiety USE SPARINGLY       There is no refill protocol information for this order              Juice Bond RN 08/11/23 7:50 AM

## 2023-08-18 ENCOUNTER — MYC MEDICAL ADVICE (OUTPATIENT)
Dept: FAMILY MEDICINE | Facility: CLINIC | Age: 58
End: 2023-08-18
Payer: COMMERCIAL

## 2023-08-18 DIAGNOSIS — F41.9 ANXIETY: ICD-10-CM

## 2023-08-20 ENCOUNTER — HEALTH MAINTENANCE LETTER (OUTPATIENT)
Age: 58
End: 2023-08-20

## 2023-08-24 ENCOUNTER — ALLIED HEALTH/NURSE VISIT (OUTPATIENT)
Dept: FAMILY MEDICINE | Facility: CLINIC | Age: 58
End: 2023-08-24
Payer: COMMERCIAL

## 2023-08-24 VITALS — DIASTOLIC BLOOD PRESSURE: 90 MMHG | SYSTOLIC BLOOD PRESSURE: 167 MMHG

## 2023-08-24 DIAGNOSIS — I16.0 HYPERTENSIVE URGENCY: Primary | ICD-10-CM

## 2023-08-24 PROCEDURE — 99207 PR NO CHARGE NURSE ONLY: CPT

## 2023-08-24 NOTE — TELEPHONE ENCOUNTER
Routing refill request to provider for review/approval because:  Drug not on the [facility] refill protocol     Requested Prescriptions   Pending Prescriptions Disp Refills    ALPRAZolam (XANAX) 0.5 MG tablet 15 tablet 0     Sig: Take 1 tablet (0.5 mg) by mouth 2 times daily as needed for anxiety USE SPARINGLY       There is no refill protocol information for this order              Juice Bond RN 08/24/23 8:35 AM

## 2023-08-25 RX ORDER — ALPRAZOLAM 0.5 MG
0.5 TABLET ORAL 2 TIMES DAILY PRN
Qty: 15 TABLET | Refills: 0 | Status: SHIPPED | OUTPATIENT
Start: 2023-08-25 | End: 2023-09-01

## 2023-08-29 DIAGNOSIS — F41.9 ANXIETY: ICD-10-CM

## 2023-08-29 RX ORDER — DULOXETIN HYDROCHLORIDE 60 MG/1
CAPSULE, DELAYED RELEASE ORAL
Qty: 180 CAPSULE | Refills: 0 | Status: SHIPPED | OUTPATIENT
Start: 2023-08-29 | End: 2023-11-21

## 2023-09-01 ENCOUNTER — MYC REFILL (OUTPATIENT)
Dept: FAMILY MEDICINE | Facility: CLINIC | Age: 58
End: 2023-09-01
Payer: COMMERCIAL

## 2023-09-01 DIAGNOSIS — F41.9 ANXIETY: ICD-10-CM

## 2023-09-01 RX ORDER — ALPRAZOLAM 0.5 MG
0.5 TABLET ORAL 2 TIMES DAILY PRN
Qty: 15 TABLET | Refills: 0 | Status: SHIPPED | OUTPATIENT
Start: 2023-09-01 | End: 2023-09-07

## 2023-09-07 ENCOUNTER — MYC REFILL (OUTPATIENT)
Dept: FAMILY MEDICINE | Facility: CLINIC | Age: 58
End: 2023-09-07
Payer: COMMERCIAL

## 2023-09-07 DIAGNOSIS — F41.9 ANXIETY: ICD-10-CM

## 2023-09-08 RX ORDER — ALPRAZOLAM 0.5 MG
0.5 TABLET ORAL 2 TIMES DAILY PRN
Qty: 15 TABLET | Refills: 0 | Status: SHIPPED | OUTPATIENT
Start: 2023-09-08 | End: 2023-09-14

## 2023-09-11 ENCOUNTER — LAB (OUTPATIENT)
Dept: LAB | Facility: CLINIC | Age: 58
End: 2023-09-11
Payer: COMMERCIAL

## 2023-09-11 DIAGNOSIS — E11.65 TYPE 2 DIABETES MELLITUS WITH HYPERGLYCEMIA, WITH LONG-TERM CURRENT USE OF INSULIN (H): ICD-10-CM

## 2023-09-11 DIAGNOSIS — Z79.4 TYPE 2 DIABETES MELLITUS WITH HYPERGLYCEMIA, WITH LONG-TERM CURRENT USE OF INSULIN (H): ICD-10-CM

## 2023-09-11 LAB — HBA1C MFR BLD: 8.6 % (ref 0–5.6)

## 2023-09-11 PROCEDURE — 82570 ASSAY OF URINE CREATININE: CPT

## 2023-09-11 PROCEDURE — 80061 LIPID PANEL: CPT

## 2023-09-11 PROCEDURE — 82043 UR ALBUMIN QUANTITATIVE: CPT

## 2023-09-11 PROCEDURE — 82947 ASSAY GLUCOSE BLOOD QUANT: CPT

## 2023-09-11 PROCEDURE — 36415 COLL VENOUS BLD VENIPUNCTURE: CPT

## 2023-09-11 PROCEDURE — 83036 HEMOGLOBIN GLYCOSYLATED A1C: CPT

## 2023-09-11 PROCEDURE — 82565 ASSAY OF CREATININE: CPT

## 2023-09-12 LAB
CHOLEST SERPL-MCNC: 336 MG/DL
CREAT SERPL-MCNC: 0.81 MG/DL (ref 0.67–1.17)
CREAT UR-MCNC: 65.9 MG/DL
EGFRCR SERPLBLD CKD-EPI 2021: >90 ML/MIN/1.73M2
FASTING STATUS PATIENT QL REPORTED: ABNORMAL
GLUCOSE SERPL-MCNC: 256 MG/DL (ref 70–99)
HDLC SERPL-MCNC: 43 MG/DL
LDLC SERPL CALC-MCNC: ABNORMAL MG/DL
MICROALBUMIN UR-MCNC: <12 MG/L
MICROALBUMIN/CREAT UR: NORMAL MG/G{CREAT}
NONHDLC SERPL-MCNC: 293 MG/DL
TRIGL SERPL-MCNC: 484 MG/DL

## 2023-09-14 ENCOUNTER — MYC REFILL (OUTPATIENT)
Dept: FAMILY MEDICINE | Facility: CLINIC | Age: 58
End: 2023-09-14
Payer: COMMERCIAL

## 2023-09-14 ENCOUNTER — TRANSFERRED RECORDS (OUTPATIENT)
Dept: HEALTH INFORMATION MANAGEMENT | Facility: CLINIC | Age: 58
End: 2023-09-14
Payer: COMMERCIAL

## 2023-09-14 DIAGNOSIS — F41.9 ANXIETY: ICD-10-CM

## 2023-09-15 DIAGNOSIS — Z79.4 TYPE 2 DIABETES MELLITUS WITH HYPERGLYCEMIA, WITH LONG-TERM CURRENT USE OF INSULIN (H): Primary | ICD-10-CM

## 2023-09-15 DIAGNOSIS — E11.65 TYPE 2 DIABETES MELLITUS WITH HYPERGLYCEMIA, WITH LONG-TERM CURRENT USE OF INSULIN (H): Primary | ICD-10-CM

## 2023-09-15 RX ORDER — ALPRAZOLAM 0.5 MG
0.5 TABLET ORAL 2 TIMES DAILY PRN
Qty: 15 TABLET | Refills: 0 | Status: SHIPPED | OUTPATIENT
Start: 2023-09-15 | End: 2023-09-21

## 2023-09-18 NOTE — TELEPHONE ENCOUNTER
Medication Appeal Initiation    We have initiated an appeal for the requested medication:  Medication: OZEMPIC (0.25 OR 0.5 MG/DOSE) 2 MG/3ML SC SOPN  Appeal Start Date:     Insurance Company: [employer] Phone:    Insurance Fax: [phone]  Comments:

## 2023-09-21 ENCOUNTER — MYC REFILL (OUTPATIENT)
Dept: FAMILY MEDICINE | Facility: CLINIC | Age: 58
End: 2023-09-21
Payer: COMMERCIAL

## 2023-09-21 DIAGNOSIS — F41.9 ANXIETY: ICD-10-CM

## 2023-09-21 RX ORDER — ALPRAZOLAM 0.5 MG
0.5 TABLET ORAL 2 TIMES DAILY PRN
Qty: 15 TABLET | Refills: 0 | Status: SHIPPED | OUTPATIENT
Start: 2023-09-21 | End: 2023-09-28

## 2023-09-25 ENCOUNTER — MYC MEDICAL ADVICE (OUTPATIENT)
Dept: FAMILY MEDICINE | Facility: CLINIC | Age: 58
End: 2023-09-25
Payer: COMMERCIAL

## 2023-09-25 DIAGNOSIS — M79.10 MUSCLE PAIN: ICD-10-CM

## 2023-09-28 ENCOUNTER — MYC REFILL (OUTPATIENT)
Dept: FAMILY MEDICINE | Facility: CLINIC | Age: 58
End: 2023-09-28
Payer: COMMERCIAL

## 2023-09-28 DIAGNOSIS — E11.65 TYPE 2 DIABETES MELLITUS WITH HYPERGLYCEMIA, WITH LONG-TERM CURRENT USE OF INSULIN (H): ICD-10-CM

## 2023-09-28 DIAGNOSIS — F41.9 ANXIETY: ICD-10-CM

## 2023-09-28 DIAGNOSIS — Z79.4 TYPE 2 DIABETES MELLITUS WITH HYPERGLYCEMIA, WITH LONG-TERM CURRENT USE OF INSULIN (H): ICD-10-CM

## 2023-09-28 RX ORDER — ALPRAZOLAM 0.5 MG
0.5 TABLET ORAL 2 TIMES DAILY PRN
Qty: 15 TABLET | Refills: 0 | Status: SHIPPED | OUTPATIENT
Start: 2023-09-28 | End: 2023-10-05

## 2023-09-28 RX ORDER — EMPAGLIFLOZIN 25 MG/1
25 TABLET, FILM COATED ORAL DAILY
Qty: 90 TABLET | Refills: 0 | Status: SHIPPED | OUTPATIENT
Start: 2023-09-28 | End: 2023-12-13

## 2023-09-28 NOTE — TELEPHONE ENCOUNTER
"    Requested Prescriptions   Pending Prescriptions Disp Refills    JARDIANCE 25 MG TABS tablet [Pharmacy Med Name: JARDIANCE 25MG TABLETS] 90 tablet 4     Sig: TAKE 1 TABLET(25 MG) BY MOUTH DAILY       Sodium Glucose Co-Transport Inhibitor Agents Passed - 9/28/2023  9:39 AM        Passed - Patient has documented A1c within the specified period of time.     If HgbA1C is 8 or greater, it needs to be on file within the past 3 months.  If less than 8, must be on file within the past 6 months.     Recent Labs   Lab Test 09/11/23  1352   A1C 8.6*             Passed - No creatinine >1.4 or GFR <45 within the past 12 mos     Recent Labs   Lab Test 09/11/23  1352 10/27/21  1334 07/06/21  1457   GFRESTIMATED >90   < > >90   GFRESTBLACK  --   --  >90    < > = values in this interval not displayed.       Recent Labs   Lab Test 09/11/23  1352   CR 0.81             Passed - Medication is active on med list        Passed - Patient is age 18 or older        Passed - Patient has documented normal Potassium within the last 12 mos.     Recent Labs   Lab Test 02/03/23  1103   POTASSIUM 4.1             Passed - Recent (6 mo) or future (30 days) visit within the authorizing provider's specialty     Patient had office visit in the last 6 months or has a visit in the next 30 days with authorizing provider or within the authorizing provider's specialty.  See \"Patient Info\" tab in inbasket, or \"Choose Columns\" in Meds & Orders section of the refill encounter.                 "

## 2023-10-03 RX ORDER — HYDROXYZINE HYDROCHLORIDE 25 MG/1
25 TABLET, FILM COATED ORAL
COMMUNITY
Start: 2023-09-14 | End: 2023-11-12

## 2023-10-04 ENCOUNTER — OFFICE VISIT (OUTPATIENT)
Dept: FAMILY MEDICINE | Facility: CLINIC | Age: 58
End: 2023-10-04
Payer: COMMERCIAL

## 2023-10-04 VITALS
TEMPERATURE: 98.1 F | OXYGEN SATURATION: 98 % | HEART RATE: 98 BPM | HEIGHT: 66 IN | SYSTOLIC BLOOD PRESSURE: 150 MMHG | BODY MASS INDEX: 38.76 KG/M2 | RESPIRATION RATE: 16 BRPM | DIASTOLIC BLOOD PRESSURE: 100 MMHG | WEIGHT: 241.19 LBS

## 2023-10-04 DIAGNOSIS — Z79.4 TYPE 2 DIABETES MELLITUS WITH HYPERGLYCEMIA, WITH LONG-TERM CURRENT USE OF INSULIN (H): Primary | ICD-10-CM

## 2023-10-04 DIAGNOSIS — I10 HYPERTENSION GOAL BP (BLOOD PRESSURE) < 130/80: ICD-10-CM

## 2023-10-04 DIAGNOSIS — E78.5 HYPERLIPIDEMIA LDL GOAL <100: ICD-10-CM

## 2023-10-04 DIAGNOSIS — F51.01 PRIMARY INSOMNIA: ICD-10-CM

## 2023-10-04 DIAGNOSIS — E11.65 TYPE 2 DIABETES MELLITUS WITH HYPERGLYCEMIA, WITH LONG-TERM CURRENT USE OF INSULIN (H): Primary | ICD-10-CM

## 2023-10-04 DIAGNOSIS — Z23 NEED FOR PROPHYLACTIC VACCINATION AND INOCULATION AGAINST INFLUENZA: ICD-10-CM

## 2023-10-04 PROCEDURE — 90682 RIV4 VACC RECOMBINANT DNA IM: CPT | Performed by: FAMILY MEDICINE

## 2023-10-04 PROCEDURE — 90471 IMMUNIZATION ADMIN: CPT | Performed by: FAMILY MEDICINE

## 2023-10-04 PROCEDURE — 99214 OFFICE O/P EST MOD 30 MIN: CPT | Mod: 25 | Performed by: FAMILY MEDICINE

## 2023-10-04 RX ORDER — TRAZODONE HYDROCHLORIDE 50 MG/1
50-100 TABLET, FILM COATED ORAL
Qty: 90 TABLET | Refills: 3 | Status: SHIPPED | OUTPATIENT
Start: 2023-10-04 | End: 2024-03-29

## 2023-10-04 RX ORDER — AMLODIPINE BESYLATE 5 MG/1
5 TABLET ORAL DAILY
Qty: 90 TABLET | Refills: 1 | Status: SHIPPED | OUTPATIENT
Start: 2023-10-04 | End: 2024-04-08

## 2023-10-04 RX ORDER — LISINOPRIL 40 MG/1
40 TABLET ORAL DAILY
Qty: 90 TABLET | Refills: 1 | Status: SHIPPED | OUTPATIENT
Start: 2023-10-04 | End: 2024-04-08

## 2023-10-04 RX ORDER — BLOOD PRESSURE TEST KIT
KIT MISCELLANEOUS
Qty: 120 EACH | Refills: 11 | Status: SHIPPED | OUTPATIENT
Start: 2023-10-04

## 2023-10-04 ASSESSMENT — ANXIETY QUESTIONNAIRES
4. TROUBLE RELAXING: NEARLY EVERY DAY
GAD7 TOTAL SCORE: 14
5. BEING SO RESTLESS THAT IT IS HARD TO SIT STILL: NOT AT ALL
GAD7 TOTAL SCORE: 14
6. BECOMING EASILY ANNOYED OR IRRITABLE: SEVERAL DAYS
IF YOU CHECKED OFF ANY PROBLEMS ON THIS QUESTIONNAIRE, HOW DIFFICULT HAVE THESE PROBLEMS MADE IT FOR YOU TO DO YOUR WORK, TAKE CARE OF THINGS AT HOME, OR GET ALONG WITH OTHER PEOPLE: VERY DIFFICULT
3. WORRYING TOO MUCH ABOUT DIFFERENT THINGS: NEARLY EVERY DAY
1. FEELING NERVOUS, ANXIOUS, OR ON EDGE: NEARLY EVERY DAY
7. FEELING AFRAID AS IF SOMETHING AWFUL MIGHT HAPPEN: SEVERAL DAYS
2. NOT BEING ABLE TO STOP OR CONTROL WORRYING: NEARLY EVERY DAY

## 2023-10-04 ASSESSMENT — PATIENT HEALTH QUESTIONNAIRE - PHQ9
SUM OF ALL RESPONSES TO PHQ QUESTIONS 1-9: 17
SUM OF ALL RESPONSES TO PHQ QUESTIONS 1-9: 17
10. IF YOU CHECKED OFF ANY PROBLEMS, HOW DIFFICULT HAVE THESE PROBLEMS MADE IT FOR YOU TO DO YOUR WORK, TAKE CARE OF THINGS AT HOME, OR GET ALONG WITH OTHER PEOPLE: EXTREMELY DIFFICULT

## 2023-10-04 NOTE — PROGRESS NOTES
Assessment/Plan:    Obed Nickerson is a 58 year old male presenting for:    Type 2 diabetes mellitus with hyperglycemia, with long-term current use of insulin (H)  Lab Results   Component Value Date    A1C 8.6 09/11/2023    A1C 8.1 02/03/2023    A1C 8.0 07/25/2022    A1C 8.6 01/29/2022    A1C 7.9 10/27/2021    A1C 8.7 07/06/2021    A1C 9.0 03/31/2021    A1C 8.8 12/30/2020    A1C 10.0 06/29/2020    A1C 7.5 02/16/2018     Patient will be following with endocrinology.  Starting Ozempic today.  Recommended repeat A1c in 3 months.  He will let me know how things go with blood sugars  - Alcohol Swabs PADS  Dispense: 120 each; Refill: 11    Hypertension goal BP (blood pressure) < 130/80  Increase lisinopril to 40 mg daily.  Start amlodipine as well given that his blood pressures sound to be very elevated.  We will continue to monitor at home and let me know how things are going over the next few weeks.    Patient states that he has not tolerated beta-blockers or hydrochlorothiazide in the past.  - amLODIPine (NORVASC) 5 MG tablet  Dispense: 90 tablet; Refill: 1  - lisinopril (ZESTRIL) 40 MG tablet  Dispense: 90 tablet; Refill: 1    Hyperlipidemia LDL goal <100  Referral to cardiology.  Patient has a history of significant hyperlipidemia with an intolerance to statin medication.  Could consider a CT calcium score at some point as well (although given diagnosis of diabetes potentially stress testing would be better).  Treadmill stress in February 2021 was unremarkable.  - Adult Cardiology Eval  Referral    Primary insomnia  Trazodone sent to the pharmacy.  Discussed risks and benefits.  - traZODone (DESYREL) 50 MG tablet  Dispense: 90 tablet; Refill: 3    Need for prophylactic vaccination and inoculation against influenza      Medications Discontinued During This Encounter   Medication Reason    lisinopril (ZESTRIL) 40 MG tablet Reorder (No AVS)           Chief Complaint:  Diabetes, Blood Pressure Check,  Depression, Anxiety, Back Pain, and Imm/Inj        Subjective:   Obed Nickerson is a very pleasant 58-year-old gentleman who presents to the clinic today with a multitude of concerns:    1.  Diabetes: Patient has a past medical history significant for insulin-dependent diabetes.An A1c is 8.6.  Recently saw endocrinology and will be starting Ozempic later today (insurance has finally covered it).    2.  Hypertension: Patient has a past medical history significant for hypertension.  He has been on several medication in the past but now is only on lisinopril 20 mg daily.  He is unsure why he was cut back to just this medication.  He states that he took hydrochlorothiazide but felt as though he did not tolerate it although he cannot remember exactly why.  He also took metoprolol but he felt as though this affected his memory in an adverse way.  Blood pressures at home have often been in the 200s systolic.    3.  Hyperlipidemia: Most recent cholesterol level done at the endocrinologist was 336.  He has not tolerated statin medications in the past due to myalgias and memory loss.  He would like to talk with cardiology regarding his medications    4.  Insomnia: Patient notes that he has a difficult time sleeping.  He notes that he was recently cut back on his pain medications from the pain clinic.  He states that even before that he was having a difficult time but now it is much more challenging for him to fall asleep.  He is wondering if there is a sleep aid.  At one point many years ago he was on a benzodiazepine for sleep however he is not taking that anymore.  He does have a prescription for Xanax which he will use occasionally at night to help him sleep.      12 point review of systems completed and negative except for what has been described above    History   Smoking Status    Never   Smokeless Tobacco    Never         Current Outpatient Medications:     Alcohol Swabs PADS, Use twice daily with insulin, Disp: 120  each, Rfl: 11    ALPRAZolam (XANAX) 0.5 MG tablet, Take 1 tablet (0.5 mg) by mouth 2 times daily as needed for anxiety USE SPARINGLY, Disp: 15 tablet, Rfl: 0    amLODIPine (NORVASC) 5 MG tablet, Take 1 tablet (5 mg) by mouth daily, Disp: 90 tablet, Rfl: 1    B-D U/F 31G X 8 MM insulin pen needle, USE THREE TIMES DAILY, Disp: 100 each, Rfl: 11    BD ULTRA FINE PEN NEEDLES, Use three times daily, Disp: 200 each, Rfl: 1    blood glucose (NO BRAND SPECIFIED) test strip, Use to test blood sugar 2 times daily or as directed., Disp: 300 strip, Rfl: 4    blood glucose monitoring (NO BRAND SPECIFIED) meter device kit, Use to test blood sugar 2 times daily or as directed., Disp: 1 kit, Rfl: 3    Continuous Blood Gluc Sensor (FREESTYLE YOKO 14 DAY SENSOR) [facility], USE AS DIRECTED AND CHANGE SENSOR EVERY 14 DAYS, Disp: 2 each, Rfl: 1    Continuous Blood Gluc Sensor (FREESTYLE YOKO 3 SENSOR) [facility], 1 each every 14 days, Disp: 6 each, Rfl: 11    DULoxetine (CYMBALTA) 60 MG capsule, TAKE ONE CAPSULE BY MOUTH TWICE DAILY, Disp: 180 capsule, Rfl: 0    exenatide ER (BYDUREON BCISE) 2 MG/0.85ML auto-injector, Inject 2 mg Subcutaneous every 7 days, Disp: 10.2 mL, Rfl: 4    hydrOXYzine (ATARAX) 25 MG tablet, Take 25 mg by mouth, Disp: , Rfl:     insulin aspart (NOVOLOG PEN) 100 UNIT/ML pen, 5 - 10 units before meals up to 5 times daily; max dose 50 units/day, Disp: 15 mL, Rfl: 3    insulin glargine (LANTUS SOLOSTAR) 100 UNIT/ML pen, INJECT 50 UNITS UNDER THE SKIN EVERY NIGHT AT BEDTIME, Disp: 60 mL, Rfl: 0    JARDIANCE 25 MG TABS tablet, TAKE 1 TABLET(25 MG) BY MOUTH DAILY, Disp: 90 tablet, Rfl: 0    lisinopril (ZESTRIL) 40 MG tablet, Take 1 tablet (40 mg) by mouth daily, Disp: 90 tablet, Rfl: 1    morphine (MS CONTIN) 15 MG CR tablet, Take 15 mg by mouth every 12 hours, Disp: , Rfl:     naloxone (NARCAN) 4 MG/0.1ML nasal spray, Spray 1 spray (4 mg) into one nostril alternating nostrils as needed for opioid reversal every 2-3 minutes  "until assistance arrives, Disp: 0.2 mL, Rfl: 3    oxyCODONE IR (ROXICODONE) 10 MG tablet, Take 10 mg by mouth every 6 hours as needed for severe pain Up  to 4 tabs/day, Disp: , Rfl:     sildenafil (VIAGRA) 100 MG tablet, Take 0.5-1 tablets ( mg) by mouth daily as needed, Disp: 20 tablet, Rfl: 0    SUMAtriptan (IMITREX) 50 MG tablet, TAKE 1 TABLET(50 MG) BY MOUTH AT ONSET OF HEADACHE FOR MIGRAINE. MAY REPEAT IN 2 HOURS. MAX 4 TABLETS/ 24 HOURS, Disp: 9 tablet, Rfl: 1    tiZANidine (ZANAFLEX) 4 MG tablet, Take 1 tablet (4 mg) by mouth At Bedtime, Disp: 30 tablet, Rfl: 4    traZODone (DESYREL) 50 MG tablet, Take 1-2 tablets ( mg) by mouth nightly as needed for sleep, Disp: 90 tablet, Rfl: 3    semaglutide (OZEMPIC) 2 MG/3ML pen, Inject 0.5 mg Subcutaneous every 7 days 0.5 mg every 7 days, after 4 doses increase to 1.0 mg every 7 days (Patient not taking: Reported on 10/4/2023), Disp: 3 mL, Rfl: 0    semaglutide (OZEMPIC) 2 MG/3ML pen, Inject 0.25 mg Subcutaneous every 7 days 0.25 mg weekly for 4 weeks then increase to 0.5 mg weekly (Patient not taking: Reported on 10/4/2023), Disp: 3 mL, Rfl: 1    Semaglutide, 1 MG/DOSE, (OZEMPIC) 4 MG/3ML pen, Inject 1 mg Subcutaneous every 7 days 0.5 mg every 7 days, after 4 doses increase to 1.0 mg every 7 days (Patient not taking: Reported on 10/4/2023), Disp: 3 mL, Rfl: 1      Objective:  Vitals:    10/04/23 1015   BP: (!) 150/100   Pulse: 98   Resp: 16   Temp: 98.1  F (36.7  C)   TempSrc: Tympanic   SpO2: 98%   Weight: 109.4 kg (241 lb 3 oz)   Height: 1.68 m (5' 6.14\")       Body mass index is 38.76 kg/m .    Vital signs reviewed and stable  General: No acute distress  Psych: Appropriate affect  HEENT: moist mucous membranes, pupils equal, round, reactive to light and accomodation, tympanic membranes are pearly grey bilaterally  Lymph: no cervical or supraclavicular lymphadenopathy  Cardiovascular: regular rate and rhythm with no murmur  Pulmonary: clear to " auscultation bilaterally with no wheeze  Abdomen: soft, non tender, non distended with normo-active bowel sounds  Extremities: warm and well perfused with no edema  Skin: warm and dry with no rash         This note has been dictated and transcribed using voice recognition software.   Any errors in transcription are unintentional and inherent to the software.    Answers submitted by the patient for this visit:  Lipid Visit (Submitted on 10/4/2023)  Chief Complaint: Chronic problems general questions HPI Form  Are you regularly taking any medication or supplement to lower your cholesterol?: No  Are you having muscle aches or other side effects that you think could be caused by your cholesterol lowering medication?: No  Patient Health Questionnaire (Submitted on 10/4/2023)  If you checked off any problems, how difficult have these problems made it for you to do your work, take care of things at home, or get along with other people?: Extremely difficult  PHQ9 TOTAL SCORE: 17  DEACON-7 (Submitted on 10/4/2023)  DEACON 7 TOTAL SCORE: 14  Hypertension Visit (Submitted on 10/4/2023)  Chief Complaint: Chronic problems general questions HPI Form  Do you check your blood pressure regularly outside of the clinic?: Yes  Are your blood pressures ever more than 140 on the top number (systolic) OR more than 90 on the bottom number (diastolic)? (For example, greater than 140/90): Yes  Are you following a low salt diet?: No  Diabetes Visit (Submitted on 10/4/2023)  Chief Complaint: Chronic problems general questions HPI Form  Frequency of checking blood sugars:: continous glucose monitor  What time of day are you checking your blood sugars : before and after meals, at bedtime  Have you had any blood sugars above 200?: Yes  Have you had any blood sugars below 70?: No  Hypoglycemia symptoms:: none  Diabetic concerns:: blood sugar frequently over 200  Paraesthesia present:: numbness in feet, burning in feet, excessive thirst  Have you had a  diabetic eye exam within the last year?: Yes  Date of last eye exam: : unknown  Where was this eye exam done?: [facility]  Depression / Anxiety Questionnaire (Submitted on 10/4/2023)  Chief Complaint: Chronic problems general questions HPI Form  Depression/Anxiety: Depression & Anxiety  Depression & Anxiety (Submitted on 10/4/2023)  Chief Complaint: Chronic problems general questions HPI Form  Status since last visit:: worse  Anxiety since last: : worse  Other associated symptoms of depression:: Yes  Other associated symotome: : Yes  Significant life event: : financial concerns, health concerns, other  Anxious:: Yes  Current substance use:: No  Back Pain Visit Questionnaire (Submitted on 10/4/2023)  Your back pain is: chronic  Chronic or Recurring Back Pain Visit Questionnaire (Submitted on 10/4/2023)  Where is your back pain located? : right upper back, left upper back, right shoulder  How would you describe your back pain? : burning, cramping, dull ache, gnawing, sharp, stabbing  Where does your back pain spread? : right shoulder  Since you noticed your back pain, how has it changed? : gradually worsening  Does your back pain interfere with your job?: Not applicable  General Questionnaire (Submitted on 10/4/2023)  Chief Complaint: Chronic problems general questions HPI Form  How many servings of fruits and vegetables do you eat daily?: 0-1  On average, how many sweetened beverages do you drink each day (Examples: soda, juice, sweet tea, etc.  Do NOT count diet or artificially sweetened beverages)?: 0  How many minutes a day do you exercise enough to make your heart beat faster?: 9 or less  How many days a week do you exercise enough to make your heart beat faster?: 3 or less  How many days per week do you miss taking your medication?: 1

## 2023-10-04 NOTE — COMMUNITY RESOURCES LIST (ENGLISH)
10/04/2023   [facility]  N/A  For questions about this resource list or additional care needs, please contact your primary care clinic or care manager.  Phone: [phone]   Email: N/A   Address: [facility] [place] [address]   Hours: N/A        Food and Nutrition       Food pantry  1  [facility] Food Shelf Distance: 0.49 miles      Pickup   [address] [place] [address]  Language: English  Hours: Mon 4:00 PM - 6:00 PM , Thu 4:00 PM - 6:00 PM  Fees: Free   Phone: (126) 672-6557 Email: jolanta@[employer] Website: http://www.[employer].org/     2  [employer] [facility] Food Shelf Distance: 3.41 miles      Pickup   [address] [place] [address]  Language: English, Armenian  Hours: Mon - Fri Appt. Only  Fees: Free   Phone: (578) 590-2109 Email: aroldo@[employer] Website: http://www.[employer].org     SNAP application assistance  3  [facility] Economic Assistance Department Distance: 3.7 miles      Phone/Virtual   [address] [address]  Language: English  Hours: Mon - Fri 8:15 AM - 4:00 PM  Fees: Free   Phone: (955) 173-5067 Email: paperwork@co.[employer]avinia.mn. Website: http://www.[facility]193/Economic-Assistance     4  [facility]  [employer] [facility] ([employer]) Distance: 8.2 miles      In-Person, Phone/Virtual   [address] [place] [address]  Language: Vietnamese, Thai, English, Gujarati, Belle, Japanese, Armenian, Occitan, Maltese, Kinyarwanda  Hours: Mon - Wed 9:00 AM - 5:00 PM , Thu 12:00 PM - 6:00 PM , Fri 9:00 AM - 5:00 PM , Sun 10:30 AM - 2:00 PM Appt. Only  Fees: Free   Phone: (395) 905-3335 Email: info@sewa-aifw.org Website: https://www.[employer]w.org/     Soup kitchen or free meals  5  [facility] Supper Distance: 5.75 miles      In-Person   [address] [place] [address]  Language: English  Hours: Wed 5:15 PM - 6:00 PM   Fees: Free   Phone: (756) 125-6951 Email: info@[facility].org Website: http://www.[facility].org/     6  [facility] Table Meal Distance: 5.84 miles      [address] [address]  Language: English  Hours: Sat 11:30 AM - 12:30 PM  Fees: Free   Phone: (949) 647-2844 Email: katharina@[facility].[employer] Website: http://www.[facility].org/          Important Numbers & Websites       Emergency Services   [address]  [place] [address]  Poison Control   (319) 444-1326  Suicide Prevention Lifeline   (902) 830-2857 (TALK)  Child Abuse Hotline   (104) 437-7157 (4-A-Child)  Sexual Assault Hotline   (773) 567-6040 (HOPE)  National Runaway Safeline   (160) 789-7628 (RUNAWAY)  All-Options Talkline   (157) 457-4567  Substance Abuse Referral   (124) 808-9409 (HELP)

## 2023-10-05 ENCOUNTER — MYC REFILL (OUTPATIENT)
Dept: FAMILY MEDICINE | Facility: CLINIC | Age: 58
End: 2023-10-05
Payer: COMMERCIAL

## 2023-10-05 DIAGNOSIS — F41.9 ANXIETY: ICD-10-CM

## 2023-10-05 RX ORDER — ALPRAZOLAM 0.5 MG
0.5 TABLET ORAL 2 TIMES DAILY PRN
Qty: 15 TABLET | Refills: 0 | Status: SHIPPED | OUTPATIENT
Start: 2023-10-05 | End: 2023-10-12

## 2023-10-12 ENCOUNTER — MYC REFILL (OUTPATIENT)
Dept: FAMILY MEDICINE | Facility: CLINIC | Age: 58
End: 2023-10-12
Payer: COMMERCIAL

## 2023-10-12 DIAGNOSIS — F41.9 ANXIETY: ICD-10-CM

## 2023-10-12 RX ORDER — ALPRAZOLAM 0.5 MG
0.5 TABLET ORAL 2 TIMES DAILY PRN
Qty: 15 TABLET | Refills: 0 | Status: SHIPPED | OUTPATIENT
Start: 2023-10-12 | End: 2023-10-23

## 2023-10-17 ENCOUNTER — TRANSFERRED RECORDS (OUTPATIENT)
Dept: HEALTH INFORMATION MANAGEMENT | Facility: CLINIC | Age: 58
End: 2023-10-17
Payer: COMMERCIAL

## 2023-10-25 ENCOUNTER — MYC MEDICAL ADVICE (OUTPATIENT)
Dept: FAMILY MEDICINE | Facility: CLINIC | Age: 58
End: 2023-10-25
Payer: COMMERCIAL

## 2023-10-29 ENCOUNTER — MYC REFILL (OUTPATIENT)
Dept: FAMILY MEDICINE | Facility: CLINIC | Age: 58
End: 2023-10-29
Payer: COMMERCIAL

## 2023-10-29 DIAGNOSIS — F41.9 ANXIETY: ICD-10-CM

## 2023-10-30 RX ORDER — ALPRAZOLAM 0.5 MG
0.5 TABLET ORAL 2 TIMES DAILY PRN
Qty: 15 TABLET | Refills: 0 | Status: SHIPPED | OUTPATIENT
Start: 2023-10-30 | End: 2023-11-03

## 2023-10-31 ENCOUNTER — VIRTUAL VISIT (OUTPATIENT)
Dept: PHARMACY | Facility: CLINIC | Age: 58
End: 2023-10-31
Attending: INTERNAL MEDICINE
Payer: COMMERCIAL

## 2023-10-31 DIAGNOSIS — I10 HYPERTENSION GOAL BP (BLOOD PRESSURE) < 130/80: ICD-10-CM

## 2023-10-31 DIAGNOSIS — E11.65 TYPE 2 DIABETES MELLITUS WITH HYPERGLYCEMIA, WITH LONG-TERM CURRENT USE OF INSULIN (H): Primary | ICD-10-CM

## 2023-10-31 DIAGNOSIS — Z79.4 TYPE 2 DIABETES MELLITUS WITH HYPERGLYCEMIA, WITH LONG-TERM CURRENT USE OF INSULIN (H): Primary | ICD-10-CM

## 2023-10-31 PROCEDURE — 99605 MTMS BY PHARM NP 15 MIN: CPT | Mod: 93

## 2023-10-31 PROCEDURE — 99607 MTMS BY PHARM ADDL 15 MIN: CPT | Mod: 93

## 2023-10-31 NOTE — PROGRESS NOTES
Medication Therapy Management (MTM) Encounter    ASSESSMENT:                            Medication Adherence/Access: See below for considerations    Type 2 Diabetes: A1C above goal of < 7%, but time in range is nearing goal of greater than 70% since initiation of Ozempic.  Would benefit from maintaining Ozempic at 1 mg weekly for the next month given 2 mg is on backorder.  When we follow-up in December, we can increase to 2 mg.  Patient desires to come off insulin completely.  If we do not achieve this with the 2 mg dose of Ozempic, could consider upgrade to Mounjaro.  Patient currently not on a statin due to tolerability --could consider PCSK9 inhibitor in future encounters.    Hypertension: Last clinic reported BP above JNC 8 goal of less than 140/90.  Could consider dose titration of amlodipine in future encounters.  Defer to primary care.    Due to time constraints, I was only able to assess the above with the patient today. Will follow-up on other disease states in future encounters    PLAN:                            Continue Ozempic 1 mg weekly  OK to decrease Lantus by 2-4 units every 3 days if FPG consistently < 100  Mychart me if you have any lows < 70    Endocrine Team & Next Follow-Up:  4/16/2024 with Dr. Al   12/6/2023 with Ab    SUBJECTIVE/OBJECTIVE:                          Obed Nickerson is a 58 year old male called for an initial visit. He was referred to me from Dr. Al.    Reason for visit: Medication Therapy Management (MTM).    Allergies/ADRs: Reviewed in chart  Past Medical History: Reviewed in chart  Social History     Tobacco Use    Smoking status: Never    Smokeless tobacco: Never   Vaping Use    Vaping Use: Never used   Substance Use Topics    Alcohol use: No    Drug use: No        Medication Adherence/Access: Adherence is reflected well in the dispense report. [facility] patient    Type 2 Diabetes:   Diabetes Medication(s)       Insulin            insulin glargine (LANTUS  SOLOSTAR) 100 UNIT/ML pen    INJECT 60 UNITS UNDER THE SKIN EVERY NIGHT AT BEDTIME      Sodium-Glucose Co-Transporter 2 (SGLT2) Inhibitors       JARDIANCE 25 MG TABS tablet    TAKE 1 TABLET(25 MG) BY MOUTH DAILY      Incretin Mimetic Agents       Semaglutide, 1 MG/DOSE, (OZEMPIC) 4 MG/3ML pen    Inject 1 mg Subcutaneous every 7 days. 2 weeks so far. Of note, pharmacy erroneously filled the 1 mg dose first, so this is the one he has started. Tolerating OK -- does notice increased tiredness, but has gotten better over past week. Prefers to continue this dose vs. Stepping down as it has improved his sugars greatly.        Blood sugar monitoring:         Urine Albumin:   Lab Results   Component Value Date    [facility]  09/11/2023      Comment:      Unable to calculate, urine albumin and/or urine creatinine is outside detectable limits.  Microalbuminuria is defined as an albumin:creatinine ratio of 17 to 299 for males and 25 to 299 for females. A ratio of albumin:creatinine of 300 or higher is indicative of overt proteinuria.  Due to biologic variability, positive results should be confirmed by a second, first-morning random or 24-hour timed urine specimen. If there is discrepancy, a third specimen is recommended. When 2 out of 3 results are in the microalbuminuria range, this is evidence for incipient nephropathy and warrants increased efforts at glucose control, blood pressure control, and institution of therapy with an angiotensin-converting-enzyme (ACE) inhibitor (if the patient can tolerate it).      [facility]  02/03/2023      Comment:      Unable to calculate, urine albumin and/or urine creatinine is outside detectable limits.  Microalbuminuria is defined as an albumin:creatinine ratio of 17 to 299 for males and 25 to 299 for females. A ratio of albumin:creatinine of 300 or higher is indicative of overt proteinuria.  Due to biologic variability, positive results should be confirmed by a second, first-morning random or  "24-hour timed urine specimen. If there is discrepancy, a third specimen is recommended. When 2 out of 3 results are in the microalbuminuria range, this is evidence for incipient nephropathy and warrants increased efforts at glucose control, blood pressure control, and institution of therapy with an angiotensin-converting-enzyme (ACE) inhibitor (if the patient can tolerate it).      UMALCR 46.56 (H) 10/27/2021      Lab Results   Component Value Date    A1C 8.6 09/11/2023    A1C 8.1 02/03/2023    A1C 8.0 07/25/2022    A1C 8.6 01/29/2022    A1C 7.9 10/27/2021    A1C 8.7 07/06/2021    A1C 9.0 03/31/2021    A1C 8.8 12/30/2020    A1C 10.0 06/29/2020    A1C 7.5 02/16/2018     Lab Results   Component Value Date    GFRESTIMATED >90 09/11/2023    GFRESTIMATED >90 02/03/2023    GFRESTIMATED >90 07/25/2022     Most Recent Immunizations   Administered Date(s) Administered    COVID-19 Vaccine (Liss) 07/06/2021    Influenza Vaccine 18-64 (Flublok) 10/04/2023    Influenza Vaccine >6 months (Alfuria,Fluzone) 09/14/2017    Influenza Vaccine, 6+MO IM (QUADRIVALENT W/PRESERVATIVES) 09/24/2018    Pneumococcal 20 valent Conjugate (Prevnar 20) 02/03/2023    TD,PF 7+ (Tenivac) 10/23/2006    TDAP (Adacel,Boostrix) 01/01/2008    TDAP Vaccine (Boostrix) 09/24/2018      Estimated body mass index is 38.76 kg/m  as calculated from the following:    Height as of 10/4/23: 5' 6.14\" (1.68 m).    Weight as of 10/4/23: 241 lb 3 oz (109.4 kg).     Hypertension: Current medications:   Amlodipine 5 mg daily  Lisinopril 40 mg daily  No reports of side effects.     BP Readings from Last 6 Encounters:   10/04/23 (!) 150/100   08/24/23 (!) 167/90   05/25/23 (!) 199/107   02/09/23 138/84   07/25/22 (!) 164/110   07/08/22 (!) 150/92        BP Readings from Last 1 Encounters:   10/04/23 (!) 150/100     Pulse Readings from Last 1 Encounters:   10/04/23 98     Wt Readings from Last 1 Encounters:   10/04/23 241 lb 3 oz (109.4 kg)     Ht Readings from Last 1 " "Encounters:   10/04/23 5' 6.14\" (1.68 m)     Estimated body mass index is 38.76 kg/m  as calculated from the following:    Height as of 10/4/23: 5' 6.14\" (1.68 m).    Weight as of 10/4/23: 241 lb 3 oz (109.4 kg).    Temp Readings from Last 1 Encounters:   10/04/23 98.1  F (36.7  C) (Tympanic)       ----------------      I spent 20 minutes with this patient today. Dr. Al was provided the recommendations above via routed note and is the authorizing prescriber for this visit through the pharmacist collaborative practice agreement. A copy of the visit note was provided to the patient's provider(s).    The patient was given to the patient a summary of these recommendations.     Ab Richards, PharmD, [facility]  Endocrine & Diabetes [facility] Pharmacist  [address] [place] [id number]  Direct Voicemail: [phone]    Telemedicine Visit Details  Type of service:  Telephone visit  Start Time: 1PM  End Time: 1:20PM  Originating Location (pt. Location): Home  Provider has received verbal consent for a visit from the patient? Yes     Medication Therapy Recommendations  Type 2 diabetes mellitus with hyperglycemia, with long-term current use of insulin (H)    Current Medication: semaglutide (OZEMPIC) 2 MG/3ML pen (Discontinued)   Rationale: Does not understand instructions - Adherence - Adherence   Recommendation: Provide Education   Status: Patient Agreed - Adherence/Education                 "

## 2023-11-01 NOTE — TELEPHONE ENCOUNTER
Prior Authorization Approval    Medication: OZEMPIC (0.25 OR 0.5 MG/DOSE) 2 MG/3ML SC SOPN  Authorization Effective Date:    Authorization Expiration Date:    Approved Dose/Quantity:    Reference #: Key: [id number]   Insurance Company: [employer] - Phone [phone] Fax [phone]  Expected CoPay: $    CoPay Card Available:      Financial Assistance Needed:    Which Pharmacy is filling the prescription: [facility] #[id number] [address]  AT [facility]  Pharmacy Notified: Y  Patient Notified:

## 2023-11-02 DIAGNOSIS — Z79.4 TYPE 2 DIABETES MELLITUS WITH HYPERGLYCEMIA, WITH LONG-TERM CURRENT USE OF INSULIN (H): ICD-10-CM

## 2023-11-02 DIAGNOSIS — E11.65 TYPE 2 DIABETES MELLITUS WITH HYPERGLYCEMIA, WITH LONG-TERM CURRENT USE OF INSULIN (H): ICD-10-CM

## 2023-11-02 RX ORDER — INSULIN GLARGINE 100 [IU]/ML
INJECTION, SOLUTION SUBCUTANEOUS
Qty: 30 ML | Refills: 0 | Status: SHIPPED | OUTPATIENT
Start: 2023-11-02 | End: 2024-02-01

## 2023-11-02 NOTE — TELEPHONE ENCOUNTER
"Prescription approved per [facility] Refill Protocol.     Requested Prescriptions   Pending Prescriptions Disp Refills    insulin glargine (LANTUS SOLOSTAR) 100 UNIT/ML pen 30 mL 0     Sig: INJECT 50 UNITS UNDER THE SKIN EVERY NIGHT AT BEDTIME       Long Acting Insulin Protocol Passed - 11/2/2023  4:26 PM        Passed - Serum creatinine on file in past 12 months     Recent Labs   Lab Test 09/11/23  1352 12/11/18  1052 07/11/18  1231   CR 0.81   < >  --    CREAT  --   --  0.9    < > = values in this interval not displayed.       Ok to refill medication if creatinine is low          Passed - HgbA1C in past 3 or 6 months     If HgbA1C is 8 or greater, it needs to be on file within the past 3 months.  If less than 8, must be on file within the past 6 months.     Recent Labs   Lab Test 09/11/23  1352   A1C 8.6*             Passed - Medication is active on med list        Passed - Patient is age 18 or older        Passed - Recent (6 mo) or future (30 days) visit within the authorizing provider's specialty     Patient had office visit in the last 6 months or has a visit in the next 30 days with authorizing provider or within the authorizing provider's specialty.  See \"Patient Info\" tab in inbasket, or \"Choose Columns\" in Meds & Orders section of the refill encounter.                     Tomasa Mohan RN 11/02/23 5:50 PM   "

## 2023-11-03 ENCOUNTER — MYC REFILL (OUTPATIENT)
Dept: FAMILY MEDICINE | Facility: CLINIC | Age: 58
End: 2023-11-03
Payer: COMMERCIAL

## 2023-11-03 DIAGNOSIS — F41.9 ANXIETY: ICD-10-CM

## 2023-11-06 RX ORDER — ALPRAZOLAM 0.5 MG
0.5 TABLET ORAL 2 TIMES DAILY PRN
Qty: 15 TABLET | Refills: 0 | Status: SHIPPED | OUTPATIENT
Start: 2023-11-06 | End: 2023-11-12

## 2023-11-12 ENCOUNTER — MYC REFILL (OUTPATIENT)
Dept: FAMILY MEDICINE | Facility: CLINIC | Age: 58
End: 2023-11-12
Payer: COMMERCIAL

## 2023-11-12 DIAGNOSIS — F41.9 ANXIETY: ICD-10-CM

## 2023-11-13 RX ORDER — HYDROXYZINE HYDROCHLORIDE 25 MG/1
25 TABLET, FILM COATED ORAL EVERY 6 HOURS PRN
Qty: 60 TABLET | Refills: 0 | Status: SHIPPED | OUTPATIENT
Start: 2023-11-13 | End: 2024-02-20

## 2023-11-13 RX ORDER — ALPRAZOLAM 0.5 MG
0.5 TABLET ORAL 2 TIMES DAILY PRN
Qty: 15 TABLET | Refills: 0 | Status: SHIPPED | OUTPATIENT
Start: 2023-11-13 | End: 2023-11-20

## 2023-11-14 ENCOUNTER — TRANSFERRED RECORDS (OUTPATIENT)
Dept: HEALTH INFORMATION MANAGEMENT | Facility: CLINIC | Age: 58
End: 2023-11-14
Payer: COMMERCIAL

## 2023-11-20 ENCOUNTER — MYC REFILL (OUTPATIENT)
Dept: FAMILY MEDICINE | Facility: CLINIC | Age: 58
End: 2023-11-20
Payer: COMMERCIAL

## 2023-11-20 DIAGNOSIS — F41.9 ANXIETY: ICD-10-CM

## 2023-11-20 RX ORDER — ALPRAZOLAM 0.5 MG
0.5 TABLET ORAL 2 TIMES DAILY PRN
Qty: 15 TABLET | Refills: 0 | Status: SHIPPED | OUTPATIENT
Start: 2023-11-20 | End: 2023-11-26

## 2023-11-21 DIAGNOSIS — F41.9 ANXIETY: ICD-10-CM

## 2023-11-21 RX ORDER — DULOXETIN HYDROCHLORIDE 60 MG/1
CAPSULE, DELAYED RELEASE ORAL
Qty: 180 CAPSULE | Refills: 0 | Status: SHIPPED | OUTPATIENT
Start: 2023-11-21 | End: 2024-04-08

## 2023-11-26 ENCOUNTER — MYC REFILL (OUTPATIENT)
Dept: FAMILY MEDICINE | Facility: CLINIC | Age: 58
End: 2023-11-26
Payer: COMMERCIAL

## 2023-11-26 DIAGNOSIS — F41.9 ANXIETY: ICD-10-CM

## 2023-11-27 RX ORDER — ALPRAZOLAM 0.5 MG
0.5 TABLET ORAL 2 TIMES DAILY PRN
Qty: 15 TABLET | Refills: 0 | Status: SHIPPED | OUTPATIENT
Start: 2023-11-27 | End: 2023-12-03

## 2023-12-03 ENCOUNTER — MYC REFILL (OUTPATIENT)
Dept: FAMILY MEDICINE | Facility: CLINIC | Age: 58
End: 2023-12-03
Payer: COMMERCIAL

## 2023-12-03 DIAGNOSIS — F41.9 ANXIETY: ICD-10-CM

## 2023-12-04 RX ORDER — ALPRAZOLAM 0.5 MG
0.5 TABLET ORAL 2 TIMES DAILY PRN
Qty: 15 TABLET | Refills: 0 | Status: SHIPPED | OUTPATIENT
Start: 2023-12-04 | End: 2023-12-10

## 2023-12-06 ENCOUNTER — VIRTUAL VISIT (OUTPATIENT)
Dept: PHARMACY | Facility: CLINIC | Age: 58
End: 2023-12-06
Payer: COMMERCIAL

## 2023-12-06 DIAGNOSIS — Z79.4 TYPE 2 DIABETES MELLITUS WITH HYPERGLYCEMIA, WITH LONG-TERM CURRENT USE OF INSULIN (H): Primary | ICD-10-CM

## 2023-12-06 DIAGNOSIS — E11.65 TYPE 2 DIABETES MELLITUS WITH HYPERGLYCEMIA, WITH LONG-TERM CURRENT USE OF INSULIN (H): Primary | ICD-10-CM

## 2023-12-06 PROCEDURE — 99606 MTMS BY PHARM EST 15 MIN: CPT | Mod: 93

## 2023-12-06 NOTE — PROGRESS NOTES
Medication Therapy Management (MTM) Encounter    ASSESSMENT:                            Medication Adherence/Access: See below for considerations    Type 2 Diabetes: A1C above goal of < 7%, but time in range is nearing goal of greater than 70% since initiation of Ozempic.  Given patient is experiencing some side effects on his current dose, reasonable to continue current dose for now. Can consider Mounjaro next year when his insurance changes if still not tolerating Ozempic well.       PLAN:                            Continue Ozempic 1 mg weekly  Mychart me if you have any lows < 70    Endocrine Team & Next Follow-Up:  4/16/2024 with Dr. Al   1/9/2024 with Ab    SUBJECTIVE/OBJECTIVE:                          Obed Nickerson is a 58 year old male called for a follow-up  visit. He was referred to me from Dr. Al.    Reason for visit: Medication Therapy Management (MTM).    Allergies/ADRs: Reviewed in chart  Past Medical History: Reviewed in chart  Social History     Tobacco Use    Smoking status: Never    Smokeless tobacco: Never   Vaping Use    Vaping Use: Never used   Substance Use Topics    Alcohol use: No    Drug use: No        Medication Adherence/Access: Adherence is reflected well in the dispense report. [facility] patient    Type 2 Diabetes:   Diabetes Medication(s)       Insulin            insulin glargine (LANTUS SOLOSTAR) 100 UNIT/ML pen    INJECT 50 UNITS UNDER THE SKIN EVERY NIGHT AT BEDTIME      Sodium-Glucose Co-Transporter 2 (SGLT2) Inhibitors       JARDIANCE 25 MG TABS tablet    TAKE 1 TABLET(25 MG) BY MOUTH DAILY      Incretin Mimetic Agents       Semaglutide, 1 MG/DOSE, (OZEMPIC) 4 MG/3ML pen    Inject 1 mg Subcutaneous every 7 days. Some nausea 1-2 days post injection       Blood sugar monitoring:         Urine Albumin:   Lab Results   Component Value Date    ALC  09/11/2023      Comment:      Unable to calculate, urine albumin and/or urine creatinine is outside detectable  limits.  Microalbuminuria is defined as an albumin:creatinine ratio of 17 to 299 for males and 25 to 299 for females. A ratio of albumin:creatinine of 300 or higher is indicative of overt proteinuria.  Due to biologic variability, positive results should be confirmed by a second, first-morning random or 24-hour timed urine specimen. If there is discrepancy, a third specimen is recommended. When 2 out of 3 results are in the microalbuminuria range, this is evidence for incipient nephropathy and warrants increased efforts at glucose control, blood pressure control, and institution of therapy with an angiotensin-converting-enzyme (ACE) inhibitor (if the patient can tolerate it).      UMALCR  02/03/2023      Comment:      Unable to calculate, urine albumin and/or urine creatinine is outside detectable limits.  Microalbuminuria is defined as an albumin:creatinine ratio of 17 to 299 for males and 25 to 299 for females. A ratio of albumin:creatinine of 300 or higher is indicative of overt proteinuria.  Due to biologic variability, positive results should be confirmed by a second, first-morning random or 24-hour timed urine specimen. If there is discrepancy, a third specimen is recommended. When 2 out of 3 results are in the microalbuminuria range, this is evidence for incipient nephropathy and warrants increased efforts at glucose control, blood pressure control, and institution of therapy with an angiotensin-converting-enzyme (ACE) inhibitor (if the patient can tolerate it).      UMALCR 46.56 (H) 10/27/2021      Lab Results   Component Value Date    A1C 8.6 09/11/2023    A1C 8.1 02/03/2023    A1C 8.0 07/25/2022    A1C 8.6 01/29/2022    A1C 7.9 10/27/2021    A1C 8.7 07/06/2021    A1C 9.0 03/31/2021    A1C 8.8 12/30/2020    A1C 10.0 06/29/2020    A1C 7.5 02/16/2018     Lab Results   Component Value Date    GFRESTIMATED >90 09/11/2023    GFRESTIMATED >90 02/03/2023    GFRESTIMATED >90 07/25/2022     Most Recent Immunizations  "  Administered Date(s) Administered    COVID-19 Vaccine (Liss) 07/06/2021    Influenza Vaccine 18-64 (Flublok) 10/04/2023    Influenza Vaccine >6 months,quad, PF 09/14/2017    Influenza Vaccine, 6+MO IM (QUADRIVALENT W/PRESERVATIVES) 09/24/2018    Pneumococcal 20 valent Conjugate (Prevnar 20) 02/03/2023    TD,PF 7+ (Tenivac) 10/23/2006    TDAP (Adacel,Boostrix) 01/01/2008    TDAP Vaccine (Boostrix) 09/24/2018      Estimated body mass index is 38.76 kg/m  as calculated from the following:    Height as of 10/4/23: 5' 6.14\" (1.68 m).    Weight as of 10/4/23: 241 lb 3 oz (109.4 kg).     BP Readings from Last 1 Encounters:   10/04/23 (!) 150/100     Pulse Readings from Last 1 Encounters:   10/04/23 98     Wt Readings from Last 1 Encounters:   10/04/23 241 lb 3 oz (109.4 kg)     Ht Readings from Last 1 Encounters:   10/04/23 5' 6.14\" (1.68 m)     Estimated body mass index is 38.76 kg/m  as calculated from the following:    Height as of 10/4/23: 5' 6.14\" (1.68 m).    Weight as of 10/4/23: 241 lb 3 oz (109.4 kg).    Temp Readings from Last 1 Encounters:   10/04/23 98.1  F (36.7  C) (Tympanic)       ----------------      I spent 20 minutes with this patient today. Dr. Al was provided the recommendations above via routed note and is the authorizing prescriber for this visit through the pharmacist collaborative practice agreement. A copy of the visit note was provided to the patient's provider(s).    The patient was given to the patient a summary of these recommendations.     bA Richards, PharmD, [facility]  Endocrine & Diabetes [facility] Pharmacist  [address] [place] [id number]  Direct Voicemail: [phone]    Telemedicine Visit Details  Type of service:  Telephone visit  Start Time: 1PM  End Time: 1:20PM  Originating Location (pt. Location): Home  Provider has received verbal consent for a visit from the patient? Yes     Medication Therapy Recommendations  No medication therapy recommendations to display   "

## 2023-12-10 ENCOUNTER — MYC REFILL (OUTPATIENT)
Dept: FAMILY MEDICINE | Facility: CLINIC | Age: 58
End: 2023-12-10
Payer: COMMERCIAL

## 2023-12-10 DIAGNOSIS — F41.9 ANXIETY: ICD-10-CM

## 2023-12-11 RX ORDER — ALPRAZOLAM 0.5 MG
0.5 TABLET ORAL 2 TIMES DAILY PRN
Qty: 15 TABLET | Refills: 0 | Status: SHIPPED | OUTPATIENT
Start: 2023-12-11 | End: 2023-12-19

## 2023-12-13 DIAGNOSIS — E11.65 TYPE 2 DIABETES MELLITUS WITH HYPERGLYCEMIA, WITH LONG-TERM CURRENT USE OF INSULIN (H): ICD-10-CM

## 2023-12-13 DIAGNOSIS — Z79.4 TYPE 2 DIABETES MELLITUS WITH HYPERGLYCEMIA, WITH LONG-TERM CURRENT USE OF INSULIN (H): ICD-10-CM

## 2023-12-13 RX ORDER — EMPAGLIFLOZIN 25 MG/1
25 TABLET, FILM COATED ORAL DAILY
Qty: 90 TABLET | Refills: 0 | Status: SHIPPED | OUTPATIENT
Start: 2023-12-13 | End: 2024-07-30

## 2023-12-13 NOTE — TELEPHONE ENCOUNTER
"    Requested Prescriptions   Pending Prescriptions Disp Refills    JARDIANCE 25 MG TABS tablet [Pharmacy Med Name: JARDIANCE 25MG TABLETS] 90 tablet 0     Sig: TAKE 1 TABLET BY MOUTH DAILY       Sodium Glucose Co-Transport Inhibitor Agents Failed - 12/13/2023  9:40 AM        Failed - Patient has documented A1c within the specified period of time.     If HgbA1C is 8 or greater, it needs to be on file within the past 3 months.  If less than 8, must be on file within the past 6 months.     Recent Labs   Lab Test 09/11/23  1352   A1C 8.6*             Passed - No creatinine >1.4 or GFR <45 within the past 12 mos     Recent Labs   Lab Test 09/11/23  1352 10/27/21  1334 07/06/21  1457   GFRESTIMATED >90   < > >90   GFRESTBLACK  --   --  >90    < > = values in this interval not displayed.       Recent Labs   Lab Test 09/11/23  1352   CR 0.81             Passed - Medication is active on med list        Passed - Patient is age 18 or older        Passed - Patient has documented normal Potassium within the last 12 mos.     Recent Labs   Lab Test 02/03/23  1103   POTASSIUM 4.1             Passed - Recent (6 mo) or future (30 days) visit within the authorizing provider's specialty     Patient had office visit in the last 6 months or has a visit in the next 30 days with authorizing provider or within the authorizing provider's specialty.  See \"Patient Info\" tab in inbasket, or \"Choose Columns\" in Meds & Orders section of the refill encounter.                 "

## 2023-12-15 ENCOUNTER — MYC REFILL (OUTPATIENT)
Dept: FAMILY MEDICINE | Facility: CLINIC | Age: 58
End: 2023-12-15
Payer: COMMERCIAL

## 2023-12-15 DIAGNOSIS — F41.9 ANXIETY: ICD-10-CM

## 2023-12-15 RX ORDER — ALPRAZOLAM 0.5 MG
0.5 TABLET ORAL 2 TIMES DAILY PRN
Qty: 15 TABLET | Refills: 0 | OUTPATIENT
Start: 2023-12-15

## 2023-12-19 ENCOUNTER — MYC REFILL (OUTPATIENT)
Dept: FAMILY MEDICINE | Facility: CLINIC | Age: 58
End: 2023-12-19
Payer: COMMERCIAL

## 2023-12-19 ENCOUNTER — MYC MEDICAL ADVICE (OUTPATIENT)
Dept: FAMILY MEDICINE | Facility: CLINIC | Age: 58
End: 2023-12-19
Payer: COMMERCIAL

## 2023-12-19 DIAGNOSIS — F41.9 ANXIETY: ICD-10-CM

## 2023-12-19 RX ORDER — ALPRAZOLAM 0.5 MG
0.5 TABLET ORAL 2 TIMES DAILY PRN
Qty: 15 TABLET | Refills: 0 | Status: SHIPPED | OUTPATIENT
Start: 2023-12-19 | End: 2023-12-26

## 2023-12-19 RX ORDER — ALPRAZOLAM 0.5 MG
0.5 TABLET ORAL 2 TIMES DAILY PRN
Qty: 15 TABLET | Refills: 0 | OUTPATIENT
Start: 2023-12-19

## 2023-12-22 ENCOUNTER — TELEPHONE (OUTPATIENT)
Dept: ENDOCRINOLOGY | Facility: CLINIC | Age: 58
End: 2023-12-22
Payer: COMMERCIAL

## 2023-12-22 DIAGNOSIS — E11.65 TYPE 2 DIABETES MELLITUS WITH HYPERGLYCEMIA, WITH LONG-TERM CURRENT USE OF INSULIN (H): Primary | ICD-10-CM

## 2023-12-22 DIAGNOSIS — Z79.4 TYPE 2 DIABETES MELLITUS WITH HYPERGLYCEMIA, WITH LONG-TERM CURRENT USE OF INSULIN (H): Primary | ICD-10-CM

## 2023-12-22 RX ORDER — TIRZEPATIDE 2.5 MG/.5ML
2.5 INJECTION, SOLUTION SUBCUTANEOUS
Qty: 2 ML | Refills: 3 | Status: SHIPPED | OUTPATIENT
Start: 2023-12-22 | End: 2024-03-04 | Stop reason: ALTCHOICE

## 2023-12-22 NOTE — TELEPHONE ENCOUNTER
PA Initiation    Medication: MOUNJARO 2.5 MG/0.5ML SC SOPN  Insurance Company: [employer] Non-Specialty PA's - Phone [phone] Fax [phone]  Pharmacy Filling the Rx: [facility] #[id number] [address]  AT [facility]  Filling Pharmacy Phone: [phone]  Filling Pharmacy Fax: [phone]  Start Date: 12/22/2023

## 2023-12-22 NOTE — TELEPHONE ENCOUNTER
"To Whom it May Concern:    I am writing to formally request a prior authorization of coverage for my patient, Obed Nickerson, for treatment using Mounjaro.      The therapy involves utilization of the dual GLP-1/GIP agonist Mounjaro 2.5 mg weekly for 4 weeks and titrating up to a max dose of 15 mg/week as tolerated over a period of 6 months.    Lab Results   Component Value Date    A1C 8.6 09/11/2023    A1C 8.1 02/03/2023    A1C 8.0 07/25/2022    A1C 8.6 01/29/2022    A1C 7.9 10/27/2021    A1C 8.7 07/06/2021    A1C 9.0 03/31/2021    A1C 8.8 12/30/2020    A1C 10.0 06/29/2020    A1C 7.5 02/16/2018       Current and historical therapy diabetes therapy: Ozempic, Lantus, Victoza (not effective), Bydureon (not effective), metformin (did not tolerate)     Mounjaro is superior to other GLP-1 agonists in the literature and has \"very high\" efficacy for glucose lowering according to ADA 2023 clinical practice guidelines.  Patient has already failed Bydureon Bcise and Victoza. Given patient's A1c is not at ADA goal of < 7% with current therapy, it is an unacceptable clinical risk for the member for the patient to not be treated with Mounjaro and replacement of Bydureon. Formulary alternative Byetta is NOT listed as \"very high\" efficacy for glucose lowering like Mounjaro and patient would not see benefit from it.     Across the five phase 3 SURPASS studies, mean reductions in A1C with Mounjaro ranged from 1.8% to 2.1% for the 5-mg dose, 1.7% to 2.4% for the 10-mg dose, and 1.7% to 2.4% for the 15-mg dose vs 0.1% to 1.9% for comparators (Ozempic). p<0.05 for Mounjaro 5 mg vs study comparators (Ozempic), adjusted for multiplicity.    I have included medical records pertaining to the patient s medical history, current condition, and treatment plan.  In addition, the following billing codes will be used for therapy and follow-up: E11.65.    I would request this submission be evaluated on an individual basis by an endocrinologist or " weight  who is current in the practice and standards of care. I firmly believe that this therapy is clinically appropriate and that Obed Nickerson would benefit from improved clinical outcomes and quality of life if allowed the opportunity to receive this treatment.  I urge you to follow the aforementioned evidence presented to keep the best interest of our patient in mind.

## 2023-12-26 ENCOUNTER — MYC REFILL (OUTPATIENT)
Dept: FAMILY MEDICINE | Facility: CLINIC | Age: 58
End: 2023-12-26
Payer: COMMERCIAL

## 2023-12-26 DIAGNOSIS — F41.9 ANXIETY: ICD-10-CM

## 2023-12-26 RX ORDER — ALPRAZOLAM 0.5 MG
0.5 TABLET ORAL 2 TIMES DAILY PRN
Qty: 15 TABLET | Refills: 0 | Status: SHIPPED | OUTPATIENT
Start: 2023-12-26 | End: 2024-01-01

## 2023-12-26 NOTE — TELEPHONE ENCOUNTER
Prior Authorization Approval    Medication: MOUNJARO 2.5 MG/0.5ML SC SOPN  Authorization Effective Date: 11/22/2023  Authorization Expiration Date: 12/21/2024  Approved Dose/Quantity:    Reference #: Key: [id number]   Insurance Company: [employer] Non-Specialty PA's - Phone [phone] Fax [phone]  Expected CoPay: $    CoPay Card Available: No    Financial Assistance Needed:    Which [facility] is filling the prescription[id number] [address]  AT [facility]  Pharmacy Notified:  Y  Patient Notified: Called No Answer

## 2024-01-01 ENCOUNTER — MYC REFILL (OUTPATIENT)
Dept: ENDOCRINOLOGY | Facility: CLINIC | Age: 59
End: 2024-01-01
Payer: COMMERCIAL

## 2024-01-01 ENCOUNTER — MYC REFILL (OUTPATIENT)
Dept: PALLIATIVE MEDICINE | Facility: OTHER | Age: 59
End: 2024-01-01
Payer: COMMERCIAL

## 2024-01-01 ENCOUNTER — MYC REFILL (OUTPATIENT)
Dept: FAMILY MEDICINE | Facility: CLINIC | Age: 59
End: 2024-01-01
Payer: COMMERCIAL

## 2024-01-01 DIAGNOSIS — E11.65 TYPE 2 DIABETES MELLITUS WITH HYPERGLYCEMIA, WITH LONG-TERM CURRENT USE OF INSULIN (H): ICD-10-CM

## 2024-01-01 DIAGNOSIS — Z79.4 TYPE 2 DIABETES MELLITUS WITH HYPERGLYCEMIA, WITH LONG-TERM CURRENT USE OF INSULIN (H): ICD-10-CM

## 2024-01-01 DIAGNOSIS — R10.9 ACUTE ABDOMINAL PAIN: ICD-10-CM

## 2024-01-01 DIAGNOSIS — F41.9 ANXIETY: ICD-10-CM

## 2024-01-01 DIAGNOSIS — M79.10 MUSCLE PAIN: ICD-10-CM

## 2024-01-02 RX ORDER — BLOOD-GLUCOSE SENSOR
1 EACH MISCELLANEOUS
Qty: 6 EACH | Refills: 0 | Status: SHIPPED | OUTPATIENT
Start: 2024-01-02 | End: 2024-03-25

## 2024-01-02 RX ORDER — ALPRAZOLAM 0.5 MG
0.5 TABLET ORAL 2 TIMES DAILY PRN
Qty: 15 TABLET | Refills: 0 | Status: SHIPPED | OUTPATIENT
Start: 2024-01-02 | End: 2024-01-08

## 2024-01-02 NOTE — TELEPHONE ENCOUNTER
Pending Prescriptions:                       Disp   Refills    ALPRAZolam (XANAX) 0.5 MG tablet           15 tab*0        Sig: Take 1 tablet (0.5 mg) by mouth 2 times daily as           needed for anxiety USE SPARINGLY  Refused Prescriptions:                       Disp   Refills    tiZANidine (ZANAFLEX) 4 MG tablet          30 tab*4        Sig: Take 1 tablet (4 mg) by mouth at bedtime  Refused By: SATCY BATES  Reason for Refusal: Should already have refills on file    Routing refill request to provider for review/approval because:  Drug not on the G refill protocol     Stacy Bates RN  [facility]

## 2024-01-02 NOTE — TELEPHONE ENCOUNTER
Drug:  naloxone (NARCAN) 4 MG/0.1ML nasal spray   Last filled 7/19/23     tiZANidine (ZANAFLEX) 4 MG tablet [Rocío Brandt]  Last filled 11/28/23      ALPRAZolam (XANAX) 0.5 MG tablet [Rocío Brandt]  Last filled 12/19/23  Last seen: 6/9/23  Next appointment 07/28/23

## 2024-01-02 NOTE — TELEPHONE ENCOUNTER
Requested Prescriptions   Pending Prescriptions Disp Refills    Continuous Blood Gluc Sensor (FREESTYLE YOKO 3 SENSOR) MISC 6 each 11     Si each every 14 days       There is no refill protocol information for this order

## 2024-01-03 NOTE — TELEPHONE ENCOUNTER
Refill of Narcan declined and routed to PCP. Patient has not been seen in nearly 7 months, did not follow up on recommendations. Only seen once.     CATHIE Palafox, NP-C  [facility] [place]

## 2024-01-08 ENCOUNTER — MYC REFILL (OUTPATIENT)
Dept: FAMILY MEDICINE | Facility: CLINIC | Age: 59
End: 2024-01-08
Payer: COMMERCIAL

## 2024-01-08 DIAGNOSIS — F41.9 ANXIETY: ICD-10-CM

## 2024-01-09 ENCOUNTER — NURSE TRIAGE (OUTPATIENT)
Dept: NURSING | Facility: CLINIC | Age: 59
End: 2024-01-09
Payer: COMMERCIAL

## 2024-01-09 RX ORDER — ALPRAZOLAM 0.5 MG
0.5 TABLET ORAL 2 TIMES DAILY PRN
Qty: 15 TABLET | Refills: 1 | Status: SHIPPED | OUTPATIENT
Start: 2024-01-09 | End: 2024-01-24

## 2024-01-10 NOTE — TELEPHONE ENCOUNTER
Nurse Triage SBAR    Is this a 2nd Level Triage? NO    Situation:  hypotension     Background: patient's current blood pressure is 84/42 with a heart rate of 92.  Wife states that patient also seems confused.  Patient states that he believes he has an infection in a tooth.  Wife denies any cough or cold.  Patient has a history of DM2 current blood sugar 237    Assessment:  hypotension, confusion    Protocol Recommended Disposition:   Call  Now    Recommendation: caller verbalized understanding of care advice.       Desirae Harmon RN on 1/9/2024 at 11:25 PM      Does the patient meet one of the following criteria for ADS visit consideration? No    Reason for Disposition   [1] Systolic BP < 90 AND [2] dizzy, lightheaded, or weak    Additional Information   Negative: Started suddenly after an allergic medicine, an allergic food, or bee sting   Negative: Shock suspected (e.g., cold/pale/clammy skin, too weak to stand, low BP, rapid pulse)   Negative: Difficult to awaken or acting confused (e.g., disoriented, slurred speech)   Negative: Fainted    Protocols used: Blood Pressure - Low-A-

## 2024-01-16 ENCOUNTER — TRANSFERRED RECORDS (OUTPATIENT)
Dept: HEALTH INFORMATION MANAGEMENT | Facility: CLINIC | Age: 59
End: 2024-01-16
Payer: COMMERCIAL

## 2024-01-18 ENCOUNTER — TELEPHONE (OUTPATIENT)
Dept: ENDOCRINOLOGY | Facility: CLINIC | Age: 59
End: 2024-01-18
Payer: COMMERCIAL

## 2024-01-18 NOTE — TELEPHONE ENCOUNTER
"To Whom it May Concern:     I am writing to formally request a prior authorization of coverage for my patient, Obed Nickerson, for treatment using Mounjaro.       The therapy involves utilization of the dual GLP-1/GIP agonist Mounjaro 2.5 mg weekly for 4 weeks and titrating up to a max dose of 15 mg/week as tolerated over a period of 6 months.           Lab Results   Component Value Date     A1C 8.6 09/11/2023     A1C 8.1 02/03/2023     A1C 8.0 07/25/2022     A1C 8.6 01/29/2022     A1C 7.9 10/27/2021     A1C 8.7 07/06/2021     A1C 9.0 03/31/2021     A1C 8.8 12/30/2020     A1C 10.0 06/29/2020     A1C 7.5 02/16/2018         Current and historical therapy diabetes therapy: Ozempic, Lantus, Victoza (not effective), Bydureon (not effective), metformin (did not tolerate)      Mounjaro is superior to other GLP-1 agonists in the literature and has \"very high\" efficacy for glucose lowering according to ADA 2023 clinical practice guidelines.  Patient has already failed Bydureon Bcise and Victoza. Given patient's A1c is not at ADA goal of < 7% with current therapy, it is an unacceptable clinical risk for the member for the patient to not be treated with Mounjaro and replacement of Bydureon. Formulary alternative Byetta is NOT listed as \"very high\" efficacy for glucose lowering like Mounjaro and patient would not see benefit from it.      Across the five phase 3 SURPASS studies, mean reductions in A1C with Mounjaro ranged from 1.8% to 2.1% for the 5-mg dose, 1.7% to 2.4% for the 10-mg dose, and 1.7% to 2.4% for the 15-mg dose vs 0.1% to 1.9% for comparators (Ozempic). p<0.05 for Mounjaro 5 mg vs study comparators (Ozempic), adjusted for multiplicity.     I have included medical records pertaining to the patient s medical history, current condition, and treatment plan.  In addition, the following billing codes will be used for therapy and follow-up: E11.65.     I would request this submission be evaluated on an individual basis " by an endocrinologist or weight  who is current in the practice and standards of care. I firmly believe that this therapy is clinically appropriate and that Obed Nickerson would benefit from improved clinical outcomes and quality of life if allowed the opportunity to receive this treatment.  I urge you to follow the aforementioned evidence presented to keep the best interest of our patient in mind.

## 2024-01-18 NOTE — TELEPHONE ENCOUNTER
PA Initiation Key: [id number]    Medication: MOUNJARO 2.5 MG/0.5ML SC SOPN  Insurance Company: [employer] - Phone [phone] Fax [phone]  Pharmacy Filling the Rx: [facility] #[id number] [address]  AT [facility]  Filling Pharmacy Phone: [phone]  Filling Pharmacy Fax: [phone]  Start Date: 1/18/2024

## 2024-01-19 NOTE — TELEPHONE ENCOUNTER
Prior Authorization Approval Key: [id number]    Medication: MOUNJARO 2.5 MG/0.5ML SC SOPN  Authorization Effective Date: 1/1/2024  Authorization Expiration Date: 1/18/2025  Approved Dose/Quantity:    Reference #: Key: [id number]   Insurance Company: [employer] - Phone [phone] Fax [phone]  Expected CoPay: $    CoPay Card Available: No    Financial Assistance Needed:    [id number] [address]  AT [facility]  Pharmacy Notified: Yes  Patient Notified: Yes

## 2024-01-30 DIAGNOSIS — E11.65 TYPE 2 DIABETES MELLITUS WITH HYPERGLYCEMIA, WITH LONG-TERM CURRENT USE OF INSULIN (H): ICD-10-CM

## 2024-01-30 DIAGNOSIS — Z79.4 TYPE 2 DIABETES MELLITUS WITH HYPERGLYCEMIA, WITH LONG-TERM CURRENT USE OF INSULIN (H): ICD-10-CM

## 2024-01-31 NOTE — TELEPHONE ENCOUNTER
Pending Prescriptions:                       Disp   Refills    LANTUS SOLOSTAR 100 UNIT/ML soln [Pharmacy*30 mL  0        Sig: ADMINISTER 50 UNITS UNDER THE SKIN EVERY NIGHT AT           BEDTIME    Routing refill request to provider for review/approval because:    Long Acting Insulin Protocol [id number]/30/2024 11:52 AM   Protocol Details   HgbA1C in past 3 or 6 months    Serum creatinine on file in past 12 months    Medication is active on med list    Patient is age 18 or older    Recent (6 mo) or future (30 days) visit within the authorizing provider's specialty   Insulin Protocol Failed   Protocol Details   Chart Review Required    Medication is active on med list    Has GFR on file in past 12 months and most recent value is normal    Recent (6 mo) or future (90 days) visit within the authorizing provider's specialty    Medication indicated for associated diagnosis    Patient is 18 years of age or older        Kaitlin Colby RN  [facility]

## 2024-02-01 RX ORDER — INSULIN GLARGINE 100 [IU]/ML
INJECTION, SOLUTION SUBCUTANEOUS
Qty: 30 ML | Refills: 0 | Status: SHIPPED | OUTPATIENT
Start: 2024-02-01 | End: 2024-02-28

## 2024-02-09 ENCOUNTER — MYC REFILL (OUTPATIENT)
Dept: FAMILY MEDICINE | Facility: CLINIC | Age: 59
End: 2024-02-09
Payer: COMMERCIAL

## 2024-02-09 DIAGNOSIS — F41.9 ANXIETY: ICD-10-CM

## 2024-02-09 RX ORDER — ALPRAZOLAM 0.5 MG
0.5 TABLET ORAL 2 TIMES DAILY PRN
Qty: 15 TABLET | Refills: 1 | Status: SHIPPED | OUTPATIENT
Start: 2024-02-09 | End: 2024-02-25

## 2024-02-20 DIAGNOSIS — F41.9 ANXIETY: ICD-10-CM

## 2024-02-20 RX ORDER — HYDROXYZINE HYDROCHLORIDE 25 MG/1
TABLET, FILM COATED ORAL
Qty: 60 TABLET | Refills: 3 | Status: SHIPPED | OUTPATIENT
Start: 2024-02-20

## 2024-02-20 RX ORDER — HYDROXYZINE HYDROCHLORIDE 25 MG/1
TABLET, FILM COATED ORAL
Qty: 60 TABLET | Refills: 0 | OUTPATIENT
Start: 2024-02-20

## 2024-02-25 ENCOUNTER — MYC REFILL (OUTPATIENT)
Dept: FAMILY MEDICINE | Facility: CLINIC | Age: 59
End: 2024-02-25
Payer: COMMERCIAL

## 2024-02-25 DIAGNOSIS — F41.9 ANXIETY: ICD-10-CM

## 2024-02-26 RX ORDER — ALPRAZOLAM 0.5 MG
0.5 TABLET ORAL 2 TIMES DAILY PRN
Qty: 15 TABLET | Refills: 1 | Status: SHIPPED | OUTPATIENT
Start: 2024-02-26 | End: 2024-03-11

## 2024-02-28 DIAGNOSIS — Z79.4 TYPE 2 DIABETES MELLITUS WITH HYPERGLYCEMIA, WITH LONG-TERM CURRENT USE OF INSULIN (H): ICD-10-CM

## 2024-02-28 DIAGNOSIS — E11.65 TYPE 2 DIABETES MELLITUS WITH HYPERGLYCEMIA, WITH LONG-TERM CURRENT USE OF INSULIN (H): ICD-10-CM

## 2024-02-28 RX ORDER — INSULIN GLARGINE 100 [IU]/ML
INJECTION, SOLUTION SUBCUTANEOUS
Qty: 30 ML | Refills: 0 | Status: SHIPPED | OUTPATIENT
Start: 2024-02-28 | End: 2024-04-08

## 2024-03-11 ENCOUNTER — MYC REFILL (OUTPATIENT)
Dept: FAMILY MEDICINE | Facility: CLINIC | Age: 59
End: 2024-03-11

## 2024-03-11 DIAGNOSIS — F41.9 ANXIETY: ICD-10-CM

## 2024-03-11 RX ORDER — ALPRAZOLAM 0.5 MG
0.5 TABLET ORAL 2 TIMES DAILY PRN
Qty: 15 TABLET | Refills: 3 | Status: SHIPPED | OUTPATIENT
Start: 2024-03-11 | End: 2024-04-04

## 2024-03-17 ENCOUNTER — HEALTH MAINTENANCE LETTER (OUTPATIENT)
Age: 59
End: 2024-03-17

## 2024-03-21 DIAGNOSIS — M79.10 MUSCLE PAIN: ICD-10-CM

## 2024-03-25 DIAGNOSIS — Z79.4 TYPE 2 DIABETES MELLITUS WITH HYPERGLYCEMIA, WITH LONG-TERM CURRENT USE OF INSULIN (H): ICD-10-CM

## 2024-03-25 DIAGNOSIS — E11.65 TYPE 2 DIABETES MELLITUS WITH HYPERGLYCEMIA, WITH LONG-TERM CURRENT USE OF INSULIN (H): ICD-10-CM

## 2024-03-25 RX ORDER — BLOOD-GLUCOSE SENSOR
1 EACH MISCELLANEOUS
Qty: 2 EACH | Refills: 0 | Status: SHIPPED | OUTPATIENT
Start: 2024-03-25 | End: 2024-04-25

## 2024-03-25 NOTE — TELEPHONE ENCOUNTER
Requested Prescriptions   Pending Prescriptions Disp Refills    Continuous Blood Gluc Sensor (FREESTYLE YOKO 3 SENSOR) MISC 6 each 0     Si each every 14 days       There is no refill protocol information for this order

## 2024-03-29 ENCOUNTER — VIRTUAL VISIT (OUTPATIENT)
Dept: PHARMACY | Facility: CLINIC | Age: 59
End: 2024-03-29
Payer: COMMERCIAL

## 2024-03-29 DIAGNOSIS — E11.65 TYPE 2 DIABETES MELLITUS WITH HYPERGLYCEMIA, WITH LONG-TERM CURRENT USE OF INSULIN (H): Primary | ICD-10-CM

## 2024-03-29 DIAGNOSIS — Z79.4 TYPE 2 DIABETES MELLITUS WITH HYPERGLYCEMIA, WITH LONG-TERM CURRENT USE OF INSULIN (H): Primary | ICD-10-CM

## 2024-03-29 PROCEDURE — 99207 PR NO CHARGE LOS: CPT | Mod: 93

## 2024-03-29 RX ORDER — TIRZEPATIDE 7.5 MG/.5ML
7.5 INJECTION, SOLUTION SUBCUTANEOUS
Qty: 2 ML | Refills: 3 | Status: SHIPPED | OUTPATIENT
Start: 2024-03-29 | End: 2024-10-07

## 2024-03-29 RX ORDER — CYCLOBENZAPRINE HCL 10 MG
10 TABLET ORAL
COMMUNITY
Start: 2024-03-25 | End: 2024-04-04

## 2024-03-29 NOTE — PROGRESS NOTES
Medication Therapy Management (MTM) Encounter    ASSESSMENT:                            Medication Adherence/Access: See below for considerations    Type 2 Diabetes: A1C above goal of < 7%, but time in range is nearing goal of greater than 70% since initiation of Mounjaro.  Given tolerating well, would benefit from dose increase today.  In light of current control, will recommend plan to self taper Lantus to mitigate risk of hypoglycemia.  And goal is to remove Lantus completely.  Will also provide game plan to hold Mounjaro prior to upcoming surgery in which patient will undergo anesthesia.    PLAN:                            INCREASE Mounjaro to 7.5 mg weekly. When you make the Mounjaro change, DECREASE Lantus by 2 units. Continue to decrease by 2 units every 3 days if FPG consistently < 100.    Mychart me if you have any lows < 70    4/16/24 surgery Mounjaro plan:  Give Mounjaro 4/7 as scheduled  SKIP Mounjaro 4/14  RESTART Mounjaro 4/16 (or once stable after surgery). That day will become your new Mounjaro day.     Endocrine Team & Next Follow-Up:  4/16/2024 with Dr. Al   5/3/2024 with Ab    SUBJECTIVE/OBJECTIVE:                          Obed Nickerson is a 58 year old male called for a follow-up  visit. He was referred to me from Dr. Al.    Reason for visit: Medication Therapy Management (MTM).    Allergies/ADRs: Reviewed in chart  Past Medical History: Reviewed in chart  Social History     Tobacco Use    Smoking status: Never    Smokeless tobacco: Never   Vaping Use    Vaping Use: Never used   Substance Use Topics    Alcohol use: No    Drug use: No        Medication Adherence/Access: Adherence is reflected well in the dispense report. [facility] patient    Type 2 Diabetes:   Diabetes Medication(s)       Insulin       LANTUS SOLOSTAR 100 UNIT/ML soln ADMINISTER 25 UNITS UNDER THE SKIN EVERY NIGHT AT BEDTIME       Sodium-Glucose Co-Transporter 2 (SGLT2) Inhibitors       JARDIANCE 25 MG TABS  tablet TAKE 1 TABLET BY MOUTH DAILY       Incretin Mimetic Agents       tirzepatide (MOUNJARO) 5 MG/0.5ML pen Inject 5 mg Subcutaneous every 7 days. Tolerating well. Sundays                    Urine Albumin:   Lab Results   Component Value Date    UMALCR  09/11/2023      Comment:      Unable to calculate, urine albumin and/or urine creatinine is outside detectable limits.  Microalbuminuria is defined as an albumin:creatinine ratio of 17 to 299 for males and 25 to 299 for females. A ratio of albumin:creatinine of 300 or higher is indicative of overt proteinuria.  Due to biologic variability, positive results should be confirmed by a second, first-morning random or 24-hour timed urine specimen. If there is discrepancy, a third specimen is recommended. When 2 out of 3 results are in the microalbuminuria range, this is evidence for incipient nephropathy and warrants increased efforts at glucose control, blood pressure control, and institution of therapy with an angiotensin-converting-enzyme (ACE) inhibitor (if the patient can tolerate it).      [facility]  02/03/2023      Comment:      Unable to calculate, urine albumin and/or urine creatinine is outside detectable limits.  Microalbuminuria is defined as an albumin:creatinine ratio of 17 to 299 for males and 25 to 299 for females. A ratio of albumin:creatinine of 300 or higher is indicative of overt proteinuria.  Due to biologic variability, positive results should be confirmed by a second, first-morning random or 24-hour timed urine specimen. If there is discrepancy, a third specimen is recommended. When 2 out of 3 results are in the microalbuminuria range, this is evidence for incipient nephropathy and warrants increased efforts at glucose control, blood pressure control, and institution of therapy with an angiotensin-converting-enzyme (ACE) inhibitor (if the patient can tolerate it).      UMALCR 46.56 (H) 10/27/2021      Lab Results   Component Value Date    A1C 8.6  "09/11/2023    A1C 8.1 02/03/2023    A1C 8.0 07/25/2022    A1C 8.6 01/29/2022    A1C 7.9 10/27/2021    A1C 8.7 07/06/2021    A1C 9.0 03/31/2021    A1C 8.8 12/30/2020    A1C 10.0 06/29/2020    A1C 7.5 02/16/2018     Lab Results   Component Value Date    GFRESTIMATED >90 09/11/2023    GFRESTIMATED >90 02/03/2023    GFRESTIMATED >90 07/25/2022     Most Recent Immunizations   Administered Date(s) Administered    COVID-19 Vaccine (Liss) 07/06/2021    Influenza Vaccine 18-64 (Flublok) 10/04/2023    Influenza Vaccine >6 months,quad, PF 09/14/2017    Influenza Vaccine, 6+MO IM (QUADRIVALENT W/PRESERVATIVES) 09/24/2018    Pneumococcal 20 valent Conjugate (Prevnar 20) 02/03/2023    TD,PF 7+ (Tenivac) 10/23/2006    TDAP (Adacel,Boostrix) 01/01/2008    TDAP Vaccine (Boostrix) 09/24/2018      Estimated body mass index is 38.76 kg/m  as calculated from the following:    Height as of 10/4/23: 5' 6.14\" (1.68 m).    Weight as of 10/4/23: 241 lb 3 oz (109.4 kg).     BP Readings from Last 1 Encounters:   10/04/23 (!) 150/100     Pulse Readings from Last 1 Encounters:   10/04/23 98     Wt Readings from Last 1 Encounters:   10/04/23 241 lb 3 oz (109.4 kg)     Ht Readings from Last 1 Encounters:   10/04/23 5' 6.14\" (1.68 m)       Temp Readings from Last 1 Encounters:   10/04/23 98.1  F (36.7  C) (Tympanic)       ----------------      I spent 20 minutes with this patient today. Dr. Al was provided the recommendations above via routed note and is the authorizing prescriber for this visit through the pharmacist collaborative practice agreement. A copy of the visit note was provided to the patient's provider(s).    The patient was given to the patient a summary of these recommendations.     Ab Richards, PharmD, [facility]  Endocrine & Diabetes [facility] Pharmacist  [address] [place] [id number]  Direct Voicemail: [phone]    Telemedicine Visit Details  Type of service:  Telephone visit  Start Time: 1PM  End Time: " 1:20PM  Originating Location (pt. Location): Home  Provider has received verbal consent for a visit from the patient? Yes     Medication Therapy Recommendations  Type 2 diabetes mellitus with hyperglycemia, with long-term current use of insulin (H)    Current Medication: tirzepatide (MOUNJARO) 5 MG/0.5ML pen   Rationale: Does not understand instructions - Adherence - Adherence   Recommendation: Provide Education   Status: Patient Agreed - Adherence/Education

## 2024-04-04 ENCOUNTER — MYC REFILL (OUTPATIENT)
Dept: FAMILY MEDICINE | Facility: CLINIC | Age: 59
End: 2024-04-04
Payer: COMMERCIAL

## 2024-04-04 DIAGNOSIS — F41.9 ANXIETY: ICD-10-CM

## 2024-04-04 RX ORDER — ALPRAZOLAM 0.5 MG
0.5 TABLET ORAL 2 TIMES DAILY PRN
Qty: 15 TABLET | Refills: 3 | Status: SHIPPED | OUTPATIENT
Start: 2024-04-04 | End: 2024-05-02

## 2024-04-07 ASSESSMENT — PATIENT HEALTH QUESTIONNAIRE - PHQ9
SUM OF ALL RESPONSES TO PHQ QUESTIONS 1-9: 18
SUM OF ALL RESPONSES TO PHQ QUESTIONS 1-9: 18
10. IF YOU CHECKED OFF ANY PROBLEMS, HOW DIFFICULT HAVE THESE PROBLEMS MADE IT FOR YOU TO DO YOUR WORK, TAKE CARE OF THINGS AT HOME, OR GET ALONG WITH OTHER PEOPLE: EXTREMELY DIFFICULT

## 2024-04-08 ENCOUNTER — OFFICE VISIT (OUTPATIENT)
Dept: FAMILY MEDICINE | Facility: CLINIC | Age: 59
End: 2024-04-08
Payer: COMMERCIAL

## 2024-04-08 DIAGNOSIS — E11.65 TYPE 2 DIABETES MELLITUS WITH HYPERGLYCEMIA, WITH LONG-TERM CURRENT USE OF INSULIN (H): ICD-10-CM

## 2024-04-08 DIAGNOSIS — S06.0X0D CONCUSSION WITHOUT LOSS OF CONSCIOUSNESS, SUBSEQUENT ENCOUNTER: ICD-10-CM

## 2024-04-08 DIAGNOSIS — M54.41 CHRONIC BILATERAL LOW BACK PAIN WITH RIGHT-SIDED SCIATICA: ICD-10-CM

## 2024-04-08 DIAGNOSIS — Z01.818 PREOP GENERAL PHYSICAL EXAM: Primary | ICD-10-CM

## 2024-04-08 DIAGNOSIS — G89.29 CHRONIC BILATERAL LOW BACK PAIN WITH RIGHT-SIDED SCIATICA: ICD-10-CM

## 2024-04-08 DIAGNOSIS — I10 HYPERTENSION GOAL BP (BLOOD PRESSURE) < 130/80: ICD-10-CM

## 2024-04-08 DIAGNOSIS — Z79.4 TYPE 2 DIABETES MELLITUS WITH HYPERGLYCEMIA, WITH LONG-TERM CURRENT USE OF INSULIN (H): ICD-10-CM

## 2024-04-08 DIAGNOSIS — F41.9 ANXIETY: ICD-10-CM

## 2024-04-08 LAB
ANION GAP SERPL CALCULATED.3IONS-SCNC: 14 MMOL/L (ref 7–15)
BUN SERPL-MCNC: 23 MG/DL (ref 8–23)
CALCIUM SERPL-MCNC: 10.3 MG/DL (ref 8.6–10)
CHLORIDE SERPL-SCNC: 102 MMOL/L (ref 98–107)
CREAT SERPL-MCNC: 0.86 MG/DL (ref 0.67–1.17)
DEPRECATED HCO3 PLAS-SCNC: 23 MMOL/L (ref 22–29)
EGFRCR SERPLBLD CKD-EPI 2021: >90 ML/MIN/1.73M2
ERYTHROCYTE [DISTWIDTH] IN BLOOD BY AUTOMATED COUNT: 13.5 % (ref 10–15)
GLUCOSE SERPL-MCNC: 157 MG/DL (ref 70–99)
HBA1C MFR BLD: 8.6 % (ref 0–5.6)
HCT VFR BLD AUTO: 51.1 % (ref 40–53)
HGB BLD-MCNC: 17.2 G/DL (ref 13.3–17.7)
HOLD SPECIMEN: NORMAL
MCH RBC QN AUTO: 29.8 PG (ref 26.5–33)
MCHC RBC AUTO-ENTMCNC: 33.7 G/DL (ref 31.5–36.5)
MCV RBC AUTO: 89 FL (ref 78–100)
PLATELET # BLD AUTO: 307 10E3/UL (ref 150–450)
POTASSIUM SERPL-SCNC: 4.7 MMOL/L (ref 3.4–5.3)
RBC # BLD AUTO: 5.77 10E6/UL (ref 4.4–5.9)
SODIUM SERPL-SCNC: 139 MMOL/L (ref 135–145)
WBC # BLD AUTO: 6.7 10E3/UL (ref 4–11)

## 2024-04-08 PROCEDURE — 36415 COLL VENOUS BLD VENIPUNCTURE: CPT | Performed by: FAMILY MEDICINE

## 2024-04-08 PROCEDURE — 93000 ELECTROCARDIOGRAM COMPLETE: CPT | Performed by: FAMILY MEDICINE

## 2024-04-08 PROCEDURE — 85027 COMPLETE CBC AUTOMATED: CPT | Performed by: FAMILY MEDICINE

## 2024-04-08 PROCEDURE — 80048 BASIC METABOLIC PNL TOTAL CA: CPT | Performed by: FAMILY MEDICINE

## 2024-04-08 PROCEDURE — 83036 HEMOGLOBIN GLYCOSYLATED A1C: CPT | Performed by: FAMILY MEDICINE

## 2024-04-08 PROCEDURE — 99214 OFFICE O/P EST MOD 30 MIN: CPT | Mod: 25 | Performed by: FAMILY MEDICINE

## 2024-04-08 NOTE — PATIENT INSTRUCTIONS
Preparing for Your Surgery  Getting started  A nurse will call you to review your health history and instructions. They will give you an arrival time based on your scheduled surgery time. Please be ready to share:  Your doctor's clinic name and phone number  Your medical, surgical, and anesthesia history  A list of allergies and sensitivities  A list of medicines, including herbal treatments and over-the-counter drugs  Whether the patient has a legal guardian (ask how to send us the papers in advance)  Please tell us if you're pregnant--or if there's any chance you might be pregnant. Some surgeries may injure a fetus (unborn baby), so they require a pregnancy test. Surgeries that are safe for a fetus don't always need a test, and you can choose whether to have one.   If you have a child who's having surgery, please ask for a copy of Preparing for Your Child's Surgery.    Preparing for surgery  Within 10 to 30 days of surgery: Have a pre-op exam (sometimes called an H&P, or History and Physical). This can be done at a clinic or pre-operative center.  If you're having a , you may not need this exam. Talk to your care team.  At your pre-op exam, talk to your care team about all medicines you take. If you need to stop any medicines before surgery, ask when to start taking them again.  We do this for your safety. Many medicines can make you bleed too much during surgery. Some change how well surgery (anesthesia) drugs work.  Call your insurance company to let them know you're having surgery. (If you don't have insurance, call [phone].)  Call your clinic if there's any change in your health. This includes signs of a cold or flu (sore throat, runny nose, cough, rash, fever). It also includes a scrape or scratch near the surgery site.  If you have questions on the day of surgery, call your hospital or surgery center.  Eating and drinking guidelines  For your safety: Unless your surgeon tells you otherwise,  follow the guidelines below.  Eat and drink as usual until 8 hours before you arrive for surgery. After that, no food or milk.  Drink clear liquids until 2 hours before you arrive. These are liquids you can see through, like water, Gatorade, and Propel Water. They also include plain black coffee and tea (no cream or milk), candy, and breath mints. You can spit out gum when you arrive.  If you drink alcohol: Stop drinking it the night before surgery.  If your care team tells you to take medicine on the morning of surgery, it's okay to take it with a sip of water.  Preventing infection  Shower or bathe the night before and morning of your surgery. Follow the instructions your clinic gave you. (If no instructions, use regular soap.)  Don't shave or clip hair near your surgery site. We'll remove the hair if needed.  Don't smoke or vape the morning of surgery. You may chew nicotine gum up to 2 hours before surgery. A nicotine patch is okay.  Note: Some surgeries require you to completely quit smoking and nicotine. Check with your surgeon.  Your care team will make every effort to keep you safe from infection. We will:  Clean our hands often with soap and water (or an alcohol-based hand rub).  Clean the skin at your surgery site with a special soap that kills germs.  Give you a special gown to keep you warm. (Cold raises the risk of infection.)  Wear special hair covers, masks, gowns and gloves during surgery.  Give antibiotic medicine, if prescribed. Not all surgeries need antibiotics.  What to bring on the day of surgery  Photo ID and insurance card  Copy of your health care directive, if you have one  Glasses and hearing aids (bring cases)  You can't wear contacts during surgery  Inhaler and eye drops, if you use them (tell us about these when you arrive)  CPAP machine or breathing device, if you use them  A few personal items, if spending the night  If you have . . .  A pacemaker, ICD (cardiac defibrillator) or other  implant: Bring the ID card.  An implanted stimulator: Bring the remote control.  A legal guardian: Bring a copy of the certified (court-stamped) guardianship papers.  Please remove any jewelry, including body piercings. Leave jewelry and other valuables at home.  If you're going home the day of surgery  You must have a responsible adult drive you home. They should stay with you overnight as well.  If you don't have someone to stay with you, and you aren't safe to go home alone, we may keep you overnight. Insurance often won't pay for this.  After surgery  If it's hard to control your pain or you need more pain medicine, please call your surgeon's office.  Questions?   If you have any questions for your care team, list them here: _________________________________________________________________________________________________________________________________________________________________________ ____________________________________ ____________________________________ ____________________________________  For informational purposes only. Not to replace the advice of your health care provider. Copyright   2003, 2019 [facility]. All rights reserved. Clinically reviewed by Bekah Bernstein MD. [employer] 124075 - REV 12/22.    How to Take Your Medication Before Surgery  To
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
## 2024-04-10 ENCOUNTER — TELEPHONE (OUTPATIENT)
Dept: FAMILY MEDICINE | Facility: CLINIC | Age: 59
End: 2024-04-10
Payer: COMMERCIAL

## 2024-04-10 NOTE — TELEPHONE ENCOUNTER
Pt has upcoming surgery with [facility] on April15th  Can we get the encounter from 4/8/24 signed off?    Tiny Martínez

## 2024-04-11 VITALS
HEART RATE: 110 BPM | RESPIRATION RATE: 18 BRPM | TEMPERATURE: 98.9 F | BODY MASS INDEX: 37.82 KG/M2 | DIASTOLIC BLOOD PRESSURE: 88 MMHG | OXYGEN SATURATION: 95 % | SYSTOLIC BLOOD PRESSURE: 142 MMHG | WEIGHT: 235.3 LBS

## 2024-04-11 RX ORDER — INSULIN GLARGINE 100 [IU]/ML
INJECTION, SOLUTION SUBCUTANEOUS
Qty: 30 ML | Refills: 0 | Status: SHIPPED | OUTPATIENT
Start: 2024-04-11 | End: 2024-06-04

## 2024-04-11 RX ORDER — DULOXETIN HYDROCHLORIDE 60 MG/1
60 CAPSULE, DELAYED RELEASE ORAL 2 TIMES DAILY
Qty: 180 CAPSULE | Refills: 1 | Status: SHIPPED | OUTPATIENT
Start: 2024-04-11

## 2024-04-11 RX ORDER — AMLODIPINE BESYLATE 5 MG/1
5 TABLET ORAL DAILY
Qty: 90 TABLET | Refills: 1 | Status: SHIPPED | OUTPATIENT
Start: 2024-04-11 | End: 2024-08-07 | Stop reason: DRUGHIGH

## 2024-04-11 RX ORDER — LISINOPRIL 40 MG/1
40 TABLET ORAL DAILY
Qty: 90 TABLET | Refills: 1 | Status: SHIPPED | OUTPATIENT
Start: 2024-04-11

## 2024-04-25 ENCOUNTER — TELEPHONE (OUTPATIENT)
Dept: ENDOCRINOLOGY | Facility: CLINIC | Age: 59
End: 2024-04-25
Payer: COMMERCIAL

## 2024-04-25 DIAGNOSIS — E11.65 TYPE 2 DIABETES MELLITUS WITH HYPERGLYCEMIA, WITH LONG-TERM CURRENT USE OF INSULIN (H): ICD-10-CM

## 2024-04-25 DIAGNOSIS — Z79.4 TYPE 2 DIABETES MELLITUS WITH HYPERGLYCEMIA, WITH LONG-TERM CURRENT USE OF INSULIN (H): ICD-10-CM

## 2024-04-26 RX ORDER — BLOOD-GLUCOSE SENSOR
1 EACH MISCELLANEOUS
Qty: 6 EACH | Refills: 0 | Status: SHIPPED | OUTPATIENT
Start: 2024-04-26 | End: 2024-07-15

## 2024-04-26 NOTE — TELEPHONE ENCOUNTER
Appt scheduled for 01/2025.    Offered to schedule with PA/NP before appt with Dr. CAR, but pt said he's talking with MTM every couple mons already.

## 2024-05-02 ENCOUNTER — MYC REFILL (OUTPATIENT)
Dept: FAMILY MEDICINE | Facility: CLINIC | Age: 59
End: 2024-05-02
Payer: COMMERCIAL

## 2024-05-02 DIAGNOSIS — F41.9 ANXIETY: ICD-10-CM

## 2024-05-02 RX ORDER — ALPRAZOLAM 0.5 MG
0.5 TABLET ORAL 2 TIMES DAILY PRN
Qty: 15 TABLET | Refills: 3 | Status: SHIPPED | OUTPATIENT
Start: 2024-05-02 | End: 2024-06-05

## 2024-05-02 NOTE — TELEPHONE ENCOUNTER
Pending Prescriptions:                       Disp   Refills    ALPRAZolam (XANAX) 0.5 MG tablet          15 tab*3            Sig: Take 1 tablet (0.5 mg) by mouth 2 times daily as           needed for anxiety USE SPARINGLY    Routing refill request to provider for review/approval because:  Drug not on the G refill protocol       Jim Daniels RN

## 2024-05-30 ENCOUNTER — TRANSFERRED RECORDS (OUTPATIENT)
Dept: HEALTH INFORMATION MANAGEMENT | Facility: CLINIC | Age: 59
End: 2024-05-30
Payer: COMMERCIAL

## 2024-06-04 DIAGNOSIS — E11.65 TYPE 2 DIABETES MELLITUS WITH HYPERGLYCEMIA, WITH LONG-TERM CURRENT USE OF INSULIN (H): ICD-10-CM

## 2024-06-04 DIAGNOSIS — Z79.4 TYPE 2 DIABETES MELLITUS WITH HYPERGLYCEMIA, WITH LONG-TERM CURRENT USE OF INSULIN (H): ICD-10-CM

## 2024-06-04 RX ORDER — INSULIN GLARGINE 100 [IU]/ML
INJECTION, SOLUTION SUBCUTANEOUS
Qty: 30 ML | Refills: 0 | Status: SHIPPED | OUTPATIENT
Start: 2024-06-04 | End: 2024-08-05

## 2024-06-05 ENCOUNTER — MYC REFILL (OUTPATIENT)
Dept: FAMILY MEDICINE | Facility: CLINIC | Age: 59
End: 2024-06-05
Payer: COMMERCIAL

## 2024-06-05 DIAGNOSIS — F41.9 ANXIETY: ICD-10-CM

## 2024-06-05 RX ORDER — ALPRAZOLAM 0.5 MG
TABLET ORAL
Qty: 15 TABLET | Refills: 3 | Status: SHIPPED | OUTPATIENT
Start: 2024-06-05 | End: 2024-07-02

## 2024-06-05 RX ORDER — ALPRAZOLAM 0.5 MG
0.5 TABLET ORAL 2 TIMES DAILY PRN
Qty: 15 TABLET | Refills: 3 | OUTPATIENT
Start: 2024-06-05

## 2024-06-27 ENCOUNTER — TRANSFERRED RECORDS (OUTPATIENT)
Dept: HEALTH INFORMATION MANAGEMENT | Facility: CLINIC | Age: 59
End: 2024-06-27
Payer: COMMERCIAL

## 2024-07-01 ENCOUNTER — THERAPY VISIT (OUTPATIENT)
Dept: PHYSICAL THERAPY | Facility: CLINIC | Age: 59
End: 2024-07-01
Payer: COMMERCIAL

## 2024-07-01 DIAGNOSIS — M54.2 NECK PAIN: Primary | ICD-10-CM

## 2024-07-01 PROCEDURE — 97162 PT EVAL MOD COMPLEX 30 MIN: CPT | Mod: GP | Performed by: PHYSICAL THERAPIST

## 2024-07-01 PROCEDURE — 97110 THERAPEUTIC EXERCISES: CPT | Mod: GP | Performed by: PHYSICAL THERAPIST

## 2024-07-01 NOTE — PROGRESS NOTES
PHYSICAL THERAPY EVALUATION  Type of Visit: Evaluation       Fall Risk Screen:  Fall screen completed by: PT  Have you fallen 2 or more times in the past year?: Yes  Have you fallen and had an injury in the past year?: No  Is patient a fall risk?: No    Subjective       Presenting condition or subjective complaint: Post cervical fusion.  Neck feels sore and weak.  No longer on restrictions.  Also goes to pain clinic and has for years.    Date of onset: 04/16/24 (DOS)    Relevant medical history:     Dates & types of surgery: april 16 2024 neck surgery    Prior diagnostic imaging/testing results: MRI; CT scan; X-ray     Prior therapy history for the same diagnosis, illness or injury: No      Prior Level of Function  Transfers: Independent  Ambulation: Independent  ADL: Independent  IADL:     Living Environment  Social support: With a significant other or spouse   Type of home: Apartment/condo; 1 level   Stairs to enter the home: No       Ramp: No   Stairs inside the home: No       Help at home: Self Cares (home health aide/personal care attendant, family, etc); Home management tasks (cooking, cleaning); Home and Yard maintenance tasks  Equipment owned: Bedrail     Employment: No    Hobbies/Interests: computers    Patient goals for therapy: turn my neck and lift items    Pain assessment:      Objective   CERVICAL SPINE EVALUATION  PAIN: Pain Level at Rest: 1/10  Pain Level with Use: 8/10  Pain Location: L > R neck pain with radic sx on L last 3 digits  Pain Quality: Sharp  Pain Frequency: constant  Pain is Worst: daytime or nighttime  Pain is Exacerbated By: pulling or lifting bag of groceries;  riding in car  Pain is Relieved By: pain meds  Pain Progression: surgery helped with swallowing and eating, but not pain  INTEGUMENTARY (edema, incisions):   POSTURE: Standing Posture: Rounded shoulders, Forward head  Sitting Posture: Rounded shoulders, Forward head  GAIT:   Weightbearing Status:   Assistive Device(s):   Gait  Deviations: WNL  BALANCE/PROPRIOCEPTION:   WEIGHTBEARING ALIGNMENT:   ROM:   (Degrees) Left AROM Right AROM    Cervical Flexion Decreased 50%    Cervical Extension neutral    Cervical Side bend      Cervical Rotation Decreased 75% Decreased 50%    Cervical Protrusion     Cervical Retraction     Thoracic Flexion     Thoracic Extension     Thoracic Rotation       Left AROM Left PROM Right AROM Right PROM   Shoulder Flexion full  full    Shoulder Extension       Shoulder Abduction       Shoulder Adduction       Shoulder IR       Shoulder ER       Shoulder Horiz Abduction       Shoulder Horiz Adduction       Pain:   End Feel:     MYOTOMES:  shoulder and arm strength limited by pain   DTR S:   CORD SIGNS:   DERMATOMES: WNL  NEURAL TENSION:   FLEXIBILITY:    SPECIAL TESTS:   PALPATION:   + Tenderness At Location Left Right   Sternocleidomastoid + +   Scalenes + +   Rhomboids + +   Facet + +   Upper Trap + +   Levator + +   Erector Spinae + +   Suboccipitals + +     Hypertonicity on L side    SPINAL SEGMENTAL CONCLUSIONS:  not assessed      Assessment & Plan   CLINICAL IMPRESSIONS  Medical Diagnosis: chronic neck pain    Treatment Diagnosis: chronic neck pain   Impression/Assessment: Patient is a 59 year old male with neck complaints.  The following significant findings have been identified: Pain, Decreased ROM/flexibility, Decreased joint mobility, Decreased strength, Impaired muscle performance, and Decreased activity tolerance. These impairments interfere with their ability to perform self care tasks, recreational activities, household chores, driving , household mobility, and community mobility as compared to previous level of function.     Clinical Decision Making (Complexity):  Clinical Presentation: Evolving/Changing  Clinical Presentation Rationale: based on medical and personal factors listed in PT evaluation  Clinical Decision Making (Complexity): Moderate complexity    PLAN OF CARE  Treatment  Interventions:  Interventions: Manual Therapy, Neuromuscular Re-education, Therapeutic Activity, Therapeutic Exercise    Long Term Goals     PT Goal 1  Goal Identifier: neck pain  Goal Description: Pt able to lift bag of groceries with 2/10 pain  Rationale: to maximize safety and independence with performance of ADLs and functional tasks;to maximize safety and independence within the home;to maximize safety and independence within the community;to maximize safety and independence with transportation;to maximize safety and independence with self cares  Target Date: 09/23/24      Frequency of Treatment: 1x/ week  Duration of Treatment: 12 week    Recommended Referrals to Other Professionals:   Education Assessment:   Learner/Method: Patient;Demonstration;Pictures/Video    Risks and benefits of evaluation/treatment have been explained.   Patient/Family/caregiver agrees with Plan of Care.     Evaluation Time:     PT Eval, Moderate Complexity Minutes (77165): 20       Signing Clinician: Martínez Olvera PT        [facility]                                                                                   OUTPATIENT PHYSICAL THERAPY      PLAN OF TREATMENT FOR OUTPATIENT REHABILITATION   Patient's Last Name, First Name, Obed Montoya YOB: 1965   Provider's Name   [facility]   Medical Record No.  [id number]     Onset Date: 04/16/24 (DOS)  Start of Care Date: 07/01/24     Medical Diagnosis:  chronic neck pain      PT Treatment Diagnosis:  chronic neck pain Plan of Treatment  Frequency/Duration: 1x/ week/ 12 week    Certification date from 07/01/24 to 09/23/24         See note for plan of treatment details and functional goals     Martínez Olvera, PT                         I CERTIFY THE NEED FOR THESE SERVICES FURNISHED UNDER        THIS PLAN OF TREATMENT AND WHILE UNDER MY CARE .             Physician Signature                Date    X_____________________________________________________                  Referring Provider:  Trav Ward    Initial Assessment  See Epic Evaluation- Start of Care Date: 07/01/24

## 2024-07-02 ENCOUNTER — MYC REFILL (OUTPATIENT)
Dept: FAMILY MEDICINE | Facility: CLINIC | Age: 59
End: 2024-07-02
Payer: COMMERCIAL

## 2024-07-02 DIAGNOSIS — F41.9 ANXIETY: ICD-10-CM

## 2024-07-02 PROBLEM — S06.0XAA CONCUSSION: Status: ACTIVE | Noted: 2024-04-21

## 2024-07-02 PROBLEM — G95.9 CERVICAL MYELOPATHY (H): Status: ACTIVE | Noted: 2024-04-16

## 2024-07-02 RX ORDER — ALPRAZOLAM 0.5 MG
TABLET ORAL
Qty: 15 TABLET | Refills: 3 | Status: SHIPPED | OUTPATIENT
Start: 2024-07-02 | End: 2024-07-29

## 2024-07-10 ASSESSMENT — ANXIETY QUESTIONNAIRES
2. NOT BEING ABLE TO STOP OR CONTROL WORRYING: NEARLY EVERY DAY
8. IF YOU CHECKED OFF ANY PROBLEMS, HOW DIFFICULT HAVE THESE MADE IT FOR YOU TO DO YOUR WORK, TAKE CARE OF THINGS AT HOME, OR GET ALONG WITH OTHER PEOPLE?: EXTREMELY DIFFICULT
7. FEELING AFRAID AS IF SOMETHING AWFUL MIGHT HAPPEN: MORE THAN HALF THE DAYS
3. WORRYING TOO MUCH ABOUT DIFFERENT THINGS: MORE THAN HALF THE DAYS
1. FEELING NERVOUS, ANXIOUS, OR ON EDGE: NEARLY EVERY DAY
GAD7 TOTAL SCORE: 17
6. BECOMING EASILY ANNOYED OR IRRITABLE: NEARLY EVERY DAY
GAD7 TOTAL SCORE: 17
7. FEELING AFRAID AS IF SOMETHING AWFUL MIGHT HAPPEN: MORE THAN HALF THE DAYS
5. BEING SO RESTLESS THAT IT IS HARD TO SIT STILL: MORE THAN HALF THE DAYS
IF YOU CHECKED OFF ANY PROBLEMS ON THIS QUESTIONNAIRE, HOW DIFFICULT HAVE THESE PROBLEMS MADE IT FOR YOU TO DO YOUR WORK, TAKE CARE OF THINGS AT HOME, OR GET ALONG WITH OTHER PEOPLE: EXTREMELY DIFFICULT
4. TROUBLE RELAXING: MORE THAN HALF THE DAYS

## 2024-07-11 ASSESSMENT — PATIENT HEALTH QUESTIONNAIRE - PHQ9
SUM OF ALL RESPONSES TO PHQ QUESTIONS 1-9: 16
SUM OF ALL RESPONSES TO PHQ QUESTIONS 1-9: 16
10. IF YOU CHECKED OFF ANY PROBLEMS, HOW DIFFICULT HAVE THESE PROBLEMS MADE IT FOR YOU TO DO YOUR WORK, TAKE CARE OF THINGS AT HOME, OR GET ALONG WITH OTHER PEOPLE: EXTREMELY DIFFICULT

## 2024-07-12 ENCOUNTER — OFFICE VISIT (OUTPATIENT)
Dept: FAMILY MEDICINE | Facility: CLINIC | Age: 59
End: 2024-07-12
Payer: COMMERCIAL

## 2024-07-12 DIAGNOSIS — I10 HYPERTENSION GOAL BP (BLOOD PRESSURE) < 130/80: ICD-10-CM

## 2024-07-12 DIAGNOSIS — E11.65 TYPE 2 DIABETES MELLITUS WITH HYPERGLYCEMIA, WITH LONG-TERM CURRENT USE OF INSULIN (H): ICD-10-CM

## 2024-07-12 DIAGNOSIS — Z79.4 TYPE 2 DIABETES MELLITUS WITH HYPERGLYCEMIA, WITH LONG-TERM CURRENT USE OF INSULIN (H): ICD-10-CM

## 2024-07-12 DIAGNOSIS — N52.9 ERECTILE DYSFUNCTION, UNSPECIFIED ERECTILE DYSFUNCTION TYPE: ICD-10-CM

## 2024-07-12 DIAGNOSIS — R20.0 FACIAL NUMBNESS: ICD-10-CM

## 2024-07-12 DIAGNOSIS — L03.211 FACIAL CELLULITIS: Primary | ICD-10-CM

## 2024-07-12 PROCEDURE — 99214 OFFICE O/P EST MOD 30 MIN: CPT | Performed by: FAMILY MEDICINE

## 2024-07-12 PROCEDURE — G2211 COMPLEX E/M VISIT ADD ON: HCPCS | Performed by: FAMILY MEDICINE

## 2024-07-12 RX ORDER — AMLODIPINE BESYLATE 10 MG/1
10 TABLET ORAL DAILY
Qty: 90 TABLET | Refills: 3 | Status: SHIPPED | OUTPATIENT
Start: 2024-07-12

## 2024-07-12 RX ORDER — CEPHALEXIN 500 MG/1
500 CAPSULE ORAL 3 TIMES DAILY
Qty: 21 CAPSULE | Refills: 0 | Status: SHIPPED | OUTPATIENT
Start: 2024-07-12 | End: 2024-07-19

## 2024-07-12 NOTE — PROGRESS NOTES
Assessment/Plan:    Obed Nickerson is a 59 year old male presenting for:    Facial cellulitis  Patient does not appear to have specific cellulitis today but he does state that the antibiotics seem to help significantly and would be interested in trying another course in case he does have some sort of infection.  - cephALEXin (KEFLEX) 500 MG capsule  Dispense: 21 capsule; Refill: 0    Facial numbness  I highly suspect that this is due to recent surgery given the timing of his symptoms.  Does not seem consistent with other viral illness or Lyme's disease.  Potentially due to postsurgery infection nerves became temporary damage and I did discuss that it can take 6 months to a year for nerves to regrow.  Will try the antibiotics as above but if this is not helpful could give this more time or reach back out to surgeon.  Could also consider seeing neurology for further appropriate workup    Hypertension goal BP (blood pressure) < 130/80  Increase amlodipine to 10 mg daily.  Recommended referral to cardiology or to nephrology for blood pressure monitoring but he declined this time.  Unclear if medications are being taken as appropriately.  Patient has lost some weight with the Mounjaro and hopefully this will help as well.  He will contact me in 2 weeks to let me know what his blood pressures look like.  - amLODIPine (NORVASC) 10 MG tablet  Dispense: 90 tablet; Refill: 3    Erectile dysfunction, unspecified erectile dysfunction type  Referral to urology placed  - Adult Urology  Referral    Type 2 diabetes mellitus with hyperglycemia, with long-term current use of insulin (H)  Lab Results   Component Value Date    A1C 8.6 04/08/2024    A1C 8.6 09/11/2023    A1C 8.1 02/03/2023    A1C 8.0 07/25/2022    A1C 8.6 01/29/2022    A1C 8.7 07/06/2021    A1C 9.0 03/31/2021    A1C 8.8 12/30/2020    A1C 10.0 06/29/2020    A1C 7.5 02/16/2018     Continues to be above goal.  Increase Norco to 7.5 mg.  Continue to titrate  insulin with the pharmacist.  Appreciate the pharmacist support  - Hemoglobin A1c  - Basic metabolic panel  (Ca, Cl, CO2, Creat, Gluc, K, Na, BUN)  - CBC with platelets      Medications Discontinued During This Encounter   Medication Reason    morphine (MS CONTIN) 15 MG CR tablet Stopped by Patient (No AVS)     The longitudinal plan of care for the diagnosis(es)/condition(s) as documented were addressed during this visit. Due to the added complexity in care, I will continue to support Mati in the subsequent management and with ongoing continuity of care.      Chief Complaint:  Numbness        Subjective:   Obed Nickerson is a pleasant 59-year-old gentleman who presents to the clinic today for follow-up of facial cellulitis and ongoing facial numbness.    On April 16 of this year (approximately 2 and half months ago) patient had a C3/C4 anterior discectomy, fusion, plating bilateral.  Shortly after his procedure he developed what he describes as a cellulitis radiating up from the anterior incision to half of his cheek.  He was started on Keflex for 2 weeks which seemed to help significantly.  He notes that when he had this cellulitis he had significant swelling.  He now has continual numbness in the left side of his face as well as some pain in his left cheek occasionally.  He reached out to his surgeon who did not feel as though this was a surgical issue and recommended that he be seen in clinic today for further evaluation.    Patient has not been sick.  He has not had any nasal congestion.  He has not noted that he has any muscular strength loss or drooping of his face.  It is mostly the numbness and the intermittent shooting pain that is bothersome.    Otherwise, has a history of hypertension which is historically not well-controlled.  Initial blood pressure 180/111 with a repeat of 165/100.  He is currently taking lisinopril 40 mg daily as well as amlodipine 5 mg daily.  He has not done well with diuretics in  the past and states that beta-blockers have not worked well for him either.    History of poorly controlled insulin-dependent diabetes.  Increasing insulin slightly although he notes that when his blood sugars are 120 or less he feels unwell.  He is also on Jardiance which she tolerates well.  He is also taking Mounjaro which she initially states that he is using but later it was a bit more unsure.  It appears as though the pharmacist has recently increase this to 7.5 mg.  Blood sugar still elevated in the 200s.  Does not have low blood sugars.    12 point review of systems completed and negative except for what has been described above    History   Smoking Status    Never   Smokeless Tobacco    Never         Current Outpatient Medications:     ALPRAZolam (XANAX) 0.5 MG tablet, TAKE 1 TABLET BY MOUTH TWICE DAILY AS NEEDED FOR ANXIETY. USE SPARINGLY, Disp: 15 tablet, Rfl: 3    amLODIPine (NORVASC) 10 MG tablet, Take 1 tablet (10 mg) by mouth daily, Disp: 90 tablet, Rfl: 3    amLODIPine (NORVASC) 5 MG tablet, Take 1 tablet (5 mg) by mouth daily, Disp: 90 tablet, Rfl: 1    cephALEXin (KEFLEX) 500 MG capsule, Take 1 capsule (500 mg) by mouth 3 times daily for 7 days, Disp: 21 capsule, Rfl: 0    Continuous Glucose Sensor (FREESTYLE YOKO 3 SENSOR) MISC, 1 each every 14 days, Disp: 6 each, Rfl: 0    DULoxetine (CYMBALTA) 60 MG capsule, Take 1 capsule (60 mg) by mouth 2 times daily, Disp: 180 capsule, Rfl: 1    hydrOXYzine HCl (ATARAX) 25 MG tablet, TAKE 1 TABLET(25 MG) BY MOUTH EVERY 6 HOURS AS NEEDED FOR ANXIETY, Disp: 60 tablet, Rfl: 3    JARDIANCE 25 MG TABS tablet, TAKE 1 TABLET BY MOUTH DAILY, Disp: 90 tablet, Rfl: 0    LANTUS SOLOSTAR 100 UNIT/ML soln, ADMINISTER 50 UNITS UNDER THE SKIN EVERY NIGHT AT BEDTIME, Disp: 30 mL, Rfl: 0    lisinopril (ZESTRIL) 40 MG tablet, Take 1 tablet (40 mg) by mouth daily, Disp: 90 tablet, Rfl: 1    oxyCODONE IR (ROXICODONE) 10 MG tablet, Take 10 mg by mouth every 6 hours as needed  "for severe pain Up  to 4 tabs/day, Disp: , Rfl:     SUMAtriptan (IMITREX) 50 MG tablet, TAKE 1 TABLET(50 MG) BY MOUTH AT ONSET OF HEADACHE FOR MIGRAINE. MAY REPEAT IN 2 HOURS. MAX 4 TABLETS/ 24 HOURS, Disp: 9 tablet, Rfl: 1    tirzepatide (MOUNJARO) 5 MG/0.5ML pen, Inject 5 mg Subcutaneous every 7 days, Disp: 2 mL, Rfl: 3    tiZANidine (ZANAFLEX) 4 MG tablet, TAKE 1 TABLET(4 MG) BY MOUTH AT BEDTIME, Disp: 30 tablet, Rfl: 4    Alcohol Swabs PADS, Use twice daily with insulin, Disp: 120 each, Rfl: 11    B-D U/F 31G X 8 MM insulin pen needle, USE THREE TIMES DAILY, Disp: 100 each, Rfl: 11    BD ULTRA FINE PEN NEEDLES, Use three times daily, Disp: 200 each, Rfl: 1    blood glucose (NO BRAND SPECIFIED) test strip, Use to test blood sugar 2 times daily or as directed., Disp: 300 strip, Rfl: 4    blood glucose monitoring (NO BRAND SPECIFIED) meter device kit, Use to test blood sugar 2 times daily or as directed., Disp: 1 kit, Rfl: 3    naloxone (NARCAN) 4 MG/0.1ML nasal spray, Spray 1 spray (4 mg) into one nostril alternating nostrils as needed for opioid reversal every 2-3 minutes until assistance arrives, Disp: 0.2 mL, Rfl: 3    tirzepatide (MOUNJARO) 7.5 MG/0.5ML pen, Inject 7.5 mg Subcutaneous every 7 days, Disp: 2 mL, Rfl: 3      Objective:  Vitals:    07/12/24 1405 07/12/24 1420   BP: (!) 181/111 (!) 165/100   Pulse: 100    Resp: 14    Temp: 98.2  F (36.8  C)    TempSrc: Tympanic    SpO2: 97%    Weight: 103 kg (227 lb)    Height: 1.695 m (5' 6.73\")        Body mass index is 35.84 kg/m .    Vital signs reviewed and stable  General: No acute distress  Psych: Appropriate affect  HEENT: moist mucous membranes, pupils equal, round, reactive to light and accomodation, tympanic membranes are pearly grey bilaterally  Lymph: no cervical or supraclavicular lymphadenopathy  Cardiovascular: regular rate and rhythm with no murmur  Pulmonary: clear to auscultation bilaterally with no wheeze  Abdomen: soft, non tender, non " distended with normo-active bowel sounds  Extremities: warm and well perfused with no edema  Skin: warm and dry with no rash  Neurologic: Cranial nerves II through XII are intact, patient has decreased sensation to light touch and pinprick from mid left cheek down to upper left chest.  Normal on the right side.       This note has been dictated and transcribed using voice recognition software.   Any errors in transcription are unintentional and inherent to the software.  Answers submitted by the patient for this visit:  Lipid Visit (Submitted on 7/10/2024)  Chief Complaint: Chronic problems general questions HPI Form  Are you regularly taking any medication or supplement to lower your cholesterol?: No  Are you having muscle aches or other side effects that you think could be caused by your cholesterol lowering medication?: No  Patient Health Questionnaire (Submitted on 7/11/2024)  If you checked off any problems, how difficult have these problems made it for you to do your work, take care of things at home, or get along with other people?: Extremely difficult  PHQ9 TOTAL SCORE: 16  DEACON-7 (Submitted on 7/10/2024)  DEACON 7 TOTAL SCORE: 17  Hypertension Visit (Submitted on 7/10/2024)  Chief Complaint: Chronic problems general questions HPI Form  Do you check your blood pressure regularly outside of the clinic?: Yes  Are your blood pressures ever more than 140 on the top number (systolic) OR more than 90 on the bottom number (diastolic)? (For example, greater than 140/90): Yes  Are you following a low salt diet?: No  Diabetes Visit (Submitted on 7/10/2024)  Chief Complaint: Chronic problems general questions HPI Form  Frequency of checking blood sugars:: continous glucose monitor  What time of day are you checking your blood sugars : before and after meals  Have you had any blood sugars above 200?: Yes  Have you had any blood sugars below 70?: No  Hypoglycemia symptoms:: dizziness, lethargy, confusion  Diabetic concerns::  blood sugar frequently over 200  Paraesthesia present:: numbness in feet, excessive thirst, none of these symptoms  Have you had a diabetic eye exam within the last year?: No  Migraine Visit Questionnaire (Submitted on 7/10/2024)  Chief Complaint: Chronic problems general questions HPI Form  Headache Symptoms are: worsened  How often are you getting headaches or migraines? : Everyday sinse Apr. 2024 neck surgery  Are you able to do normal daily activities when you have a migraine?: No  Migraine Rescue/Relief Medications:: Tylenol, Excedrin  Effectiveness of rescue/relief medications:: I get some relief  Migraine Preventative Medications:: no medications to prevent migraines  ER or UC Visits:: 0 times  Depression / Anxiety Questionnaire (Submitted on 7/10/2024)  Chief Complaint: Chronic problems general questions HPI Form  Depression/Anxiety: Depression & Anxiety  Depression & Anxiety (Submitted on 7/10/2024)  Chief Complaint: Chronic problems general questions HPI Form  Status since last visit:: bad  Anxiety since last: : bad  Other associated symptoms of depression:: No  Other associated symotome: : No  Significant life event: : financial concerns, health concerns  Anxious:: Yes  Current substance use:: No  Back Pain Visit Questionnaire (Submitted on 7/10/2024)  Your back pain is: recurring  Chronic or Recurring Back Pain Visit Questionnaire (Submitted on 7/10/2024)  Where is your back pain located? : right lower back, right upper back, left upper back, left side of neck, right shoulder, left shoulder, left buttock, left hip  How would you describe your back pain? : burning, dull ache  Where does your back pain spread? : left buttocks, right thigh, left thigh, left side of neck  Since you noticed your back pain, how has it changed? : unchanged  Does your back pain interfere with your job?: Yes  General Questionnaire (Submitted on 7/10/2024)  Chief Complaint: Chronic problems general questions HPI Form  On average,  how many sweetened beverages do you drink each day (Examples: soda, juice, sweet tea, etc.  Do NOT count diet or artificially sweetened beverages)?: 0  How many minutes a day do you exercise enough to make your heart beat faster?: 9 or less  How many days a week do you exercise enough to make your heart beat faster?: 3 or less  How many days per week do you miss taking your medication?: 2  What makes it hard for you to take your medication every day?: remembering to take

## 2024-07-14 VITALS
HEART RATE: 100 BPM | RESPIRATION RATE: 14 BRPM | TEMPERATURE: 98.2 F | HEIGHT: 67 IN | SYSTOLIC BLOOD PRESSURE: 165 MMHG | WEIGHT: 227 LBS | OXYGEN SATURATION: 97 % | BODY MASS INDEX: 35.63 KG/M2 | DIASTOLIC BLOOD PRESSURE: 100 MMHG

## 2024-07-15 DIAGNOSIS — Z79.4 TYPE 2 DIABETES MELLITUS WITH HYPERGLYCEMIA, WITH LONG-TERM CURRENT USE OF INSULIN (H): ICD-10-CM

## 2024-07-15 DIAGNOSIS — E11.65 TYPE 2 DIABETES MELLITUS WITH HYPERGLYCEMIA, WITH LONG-TERM CURRENT USE OF INSULIN (H): ICD-10-CM

## 2024-07-15 RX ORDER — BLOOD-GLUCOSE SENSOR
1 EACH MISCELLANEOUS
Qty: 6 EACH | Refills: 0 | Status: SHIPPED | OUTPATIENT
Start: 2024-07-15 | End: 2024-10-07

## 2024-07-15 NOTE — TELEPHONE ENCOUNTER
Requested Prescriptions   Pending Prescriptions Disp Refills    Continuous Glucose Sensor (FREESTYLE YOKO 3 SENSOR) MISC 6 each 0     Si each every 14 days       There is no refill protocol information for this order

## 2024-07-16 NOTE — CONFIDENTIAL NOTE
Chief Complaint:   Erectile dysfunction          History of Present Illness:   Obed Nickerson is a 59 year old male with a history of T2 DM, major depressive disorder, HLD, and HTN with complaints of erectile dysfunction for the last several years.    He reports difficulty both achieving and maintaining an erection.     He has tried both sildenafil 100 mg and tadalafil 10 mg.           Past Medical History:     Past Medical History:   Diagnosis Date    Arthritis 2017    Chronic pain syndrome 01/04/2008    Previously followed by Dr. Moran at [facility], then left in 2008, then started seeing Dr. Lo at [facility] in [place].  Plans to continue with this provider.  Here to establish care/PCP, does not want/need me to manage his chronic narcotics or pain.    Had neck injury in 2000, and is s/p Cervical Fusion x 2 (2-6 presently).  Currently on Vicodin 5mg/325 QID.  Feels this helps the pain, though does not completely control pain.  He has been MS Contin, and OxyContin (briefly).  Had been on Neurontin (x 1 week- excessive SE), Amitriptyline, Tegretol (mental slow).  Has not tried Effexor or Cymbalta.   Is s/p SCS for lumbar radiculitis- which did not work and had it removed.  Has tried PT and numerous spine injection  Plans for some Carpal Tunnel surgery (pending EMG soon).  Has been told that he could have spine surgery, but is holding off surgery for as long as he can.       Depressive disorder 1985    Diabetes (H) 2015    Hypertension 2010    Treated with Lisinopril    Tinnitus of both ears 01/16/2014    Pt reports life-long ringing in both ears.  Was raised around farm equipment and airplanes.  Feels these are major contributors.  Has had audiology recently, per pt has mild hearing loss left worse than right and positive for tinnitus.  However they said there was no treatment available for tinnitus.               Past Surgical History:     Past Surgical History:   Procedure  Laterality Date    ?? previous implanted morphine pump??  during 1990's    eventually explanted due to infection    ABDOMEN SURGERY  Brodhead ectomy, appendix renoval    1980's    AMPUTATION  June 22 1974    Successfully reattached    APPENDECTOMY      APPENDECTOMY OPEN      BACK SURGERY      COLONOSCOPY  2020    Severe diverticulosis    FUSION CERVICAL ANTERIOR THREE+ LEVELS      C2-6    GENITOURINARY SURGERY  Orchidectomy    HEAD & NECK SURGERY  [id number]    C2-4, C4-61    NECK SURGERY      ORTHOPEDIC SURGERY  Osteomyelitis    2 x Failure to remove cord stimulater caused severe infection    OTHER SURGICAL HISTORY      Multiple surgeries to right lower leg    OTHER SURGICAL HISTORY      rotator cuff surgery, right    right lower leg limb reattachment      skin grafts      many    SOFT TISSUE SURGERY  1974    THORACIC SURGERY      Fusion            Medications     Current Outpatient Medications   Medication Sig Dispense Refill    Alcohol Swabs PADS Use twice daily with insulin 120 each 11    ALPRAZolam (XANAX) 0.5 MG tablet TAKE 1 TABLET BY MOUTH TWICE DAILY AS NEEDED FOR ANXIETY. USE SPARINGLY 15 tablet 3    amLODIPine (NORVASC) 10 MG tablet Take 1 tablet (10 mg) by mouth daily 90 tablet 3    amLODIPine (NORVASC) 5 MG tablet Take 1 tablet (5 mg) by mouth daily 90 tablet 1    B-D U/F 31G X 8 MM insulin pen needle USE THREE TIMES DAILY 100 each 11    BD ULTRA FINE PEN NEEDLES Use three times daily 200 each 1    blood glucose (NO BRAND SPECIFIED) test strip Use to test blood sugar 2 times daily or as directed. 300 strip 4    blood glucose monitoring (NO BRAND SPECIFIED) meter device kit Use to test blood sugar 2 times daily or as directed. 1 kit 3    cephALEXin (KEFLEX) 500 MG capsule Take 1 capsule (500 mg) by mouth 3 times daily for 7 days 21 capsule 0    Continuous Glucose Sensor (FREESTYLE YOKO 3 SENSOR) MISC 1 each every 14 days 6 each 0    DULoxetine (CYMBALTA) 60 MG capsule Take 1 capsule (60 mg) by mouth 2  times daily 180 capsule 1    hydrOXYzine HCl (ATARAX) 25 MG tablet TAKE 1 TABLET(25 MG) BY MOUTH EVERY 6 HOURS AS NEEDED FOR ANXIETY 60 tablet 3    JARDIANCE 25 MG TABS tablet TAKE 1 TABLET BY MOUTH DAILY 90 tablet 0    LANTUS SOLOSTAR 100 UNIT/ML soln ADMINISTER 50 UNITS UNDER THE SKIN EVERY NIGHT AT BEDTIME 30 mL 0    lisinopril (ZESTRIL) 40 MG tablet Take 1 tablet (40 mg) by mouth daily 90 tablet 1    naloxone (NARCAN) 4 MG/0.1ML nasal spray Spray 1 spray (4 mg) into one nostril alternating nostrils as needed for opioid reversal every 2-3 minutes until assistance arrives 0.2 mL 3    oxyCODONE IR (ROXICODONE) 10 MG tablet Take 10 mg by mouth every 6 hours as needed for severe pain Up  to 4 tabs/day      SUMAtriptan (IMITREX) 50 MG tablet TAKE 1 TABLET(50 MG) BY MOUTH AT ONSET OF HEADACHE FOR MIGRAINE. MAY REPEAT IN 2 HOURS. MAX 4 TABLETS/ 24 HOURS 9 tablet 1    tirzepatide (MOUNJARO) 5 MG/0.5ML pen Inject 5 mg Subcutaneous every 7 days 2 mL 3    tirzepatide (MOUNJARO) 7.5 MG/0.5ML pen Inject 7.5 mg Subcutaneous every 7 days 2 mL 3    tiZANidine (ZANAFLEX) 4 MG tablet TAKE 1 TABLET(4 MG) BY MOUTH AT BEDTIME 30 tablet 4     No current facility-administered medications for this visit.            Allergies:   Cyclobenzaprine, Gabapentin, Ketorolac, Phenothiazines, Compazine [prochlorperazine], Demerol, Naproxen, Statins [statins], Tolmetin, and Tramadol         Review of Systems:  From intake questionnaire   Negative 14 system review except as noted on HPI, nurse's note.         Physical Exam:   Patient is a 59 year old male evaluated via video visit.       Labs and Pathology:    I personally reviewed all applicable laboratory data and went over findings with patient  Significant for:    CBC RESULTS:  Recent Labs   Lab Test 04/08/24  1451 01/30/22  0638 01/29/22  0547 01/28/22  1758   WBC 6.7 7.8 5.8 7.7   HGB 17.2 14.3 13.5 14.8    281 271 296        BMP RESULTS:  Recent Labs   Lab Test 04/08/24  1459  09/11/23  1352 02/03/23  1103 07/25/22  1425 02/11/22  0915 10/27/21  1334 07/06/21  1457 02/02/21  1107 12/30/20  1516 06/29/20  1323     --  137 136 134   < > 139 137 136 134   POTASSIUM 4.7  --  4.1 4.1 3.9   < > 4.1 3.9 3.9 4.1   CHLORIDE 102  --  101 104 100   < > 103 106 104 102   CO2 23  --  23 25 26   < > 30 29 28 24   ANIONGAP 14  --  13 7 8   < > 6 2* 4 8   * 256* 188* 241* 144*   < > 167* 162* 155* 277*   BUN 23.0  --  26.4* 15 17   < > 14 12 15 26   CR 0.86 0.81 0.73 0.69 0.83   < > 0.80 0.77 0.82 0.93   GFRESTIMATED >90 >90 >90 >90 >90   < > >90 >90 >90 >90   GFRESTBLACK  --   --   --   --   --   --  >90 >90 >90 >90   MARYURI 10.3*  --  9.8 9.4 9.8   < > 8.9 8.8 9.4 9.0    < > = values in this interval not displayed.       UA RESULTS:   Recent Labs   Lab Test 02/03/23  1103 01/28/22  1758 12/30/18  1824   SG 1.020 1.026 1.026   URINEPH 5.0 5.0 5.0   NITRITE Negative Negative Negative   RBCU  --  1 0   WBCU  --  0 <1       PSA RESULTS  PSA   Date Value Ref Range Status   02/02/2021 0.74 0 - 4 ug/L Final     Comment:     Assay Method:  Chemiluminescence using Siemens Vista analyzer   01/16/2014 0.70 0 - 4 ug/L Final     Prostate Specific Antigen Screen   Date Value Ref Range Status   02/03/2023 0.59 0.00 - 3.50 ng/mL Final            Assessment and Plan:     Assessment: 59 year old male with a several history of erectile dysfunction. He has tried sildenafil 100 mg and tadalafil 10 mg.     We discussed treatment of erectile dysfunction with oral PDE-5 inhibitors, a vacuum erection device, intra cavernosal injections, and penile prosthesis surgery. The patient elected to pursue treatment with ICI. Side effects and proper usage were reviewed.     Plan:  Start alprostadil 50 mcg/mL, inject 0.2-0.4 mL as needed for sexual activity.     SHIRAZ GLYNN PA-C  Department of Urology

## 2024-07-17 ENCOUNTER — VIRTUAL VISIT (OUTPATIENT)
Dept: UROLOGY | Facility: CLINIC | Age: 59
End: 2024-07-17
Attending: FAMILY MEDICINE
Payer: COMMERCIAL

## 2024-07-17 DIAGNOSIS — N52.9 ERECTILE DYSFUNCTION, UNSPECIFIED ERECTILE DYSFUNCTION TYPE: ICD-10-CM

## 2024-07-17 PROCEDURE — 99204 OFFICE O/P NEW MOD 45 MIN: CPT | Mod: 95 | Performed by: STUDENT IN AN ORGANIZED HEALTH CARE EDUCATION/TRAINING PROGRAM

## 2024-07-17 NOTE — PROGRESS NOTES
Obed Nickerson is a 59 year old year old who is being evaluated via a billable video visit.      How would you like to obtain your AVS? MyChart  If the video visit is dropped, the invitation should be resent by: Text to cell phone: [phone]  Will anyone else be joining your video visit? No    Video-Visit Details    Type of service:  Video Visit     Originating Location (pt. Location): Home    Distant Location (provider location):  Off-site  Platform used for Video Visit: Ozzie

## 2024-07-19 DIAGNOSIS — E11.65 TYPE 2 DIABETES MELLITUS WITH HYPERGLYCEMIA, WITH LONG-TERM CURRENT USE OF INSULIN (H): ICD-10-CM

## 2024-07-19 DIAGNOSIS — Z79.4 TYPE 2 DIABETES MELLITUS WITH HYPERGLYCEMIA, WITH LONG-TERM CURRENT USE OF INSULIN (H): ICD-10-CM

## 2024-07-19 NOTE — TELEPHONE ENCOUNTER
Message sent to pt- last refill sent 12/2023 for 90 days     Requested Prescriptions   Pending Prescriptions Disp Refills    JARDIANCE 25 MG TABS tablet [Pharmacy Med Name: JARDIANCE 25MG TABLETS] 90 tablet 0     Sig: TAKE 1 TABLET BY MOUTH DAILY       Sodium Glucose Co-Transport Inhibitor Agents Failed - 7/19/2024  6:56 AM        Failed - Patient has documented A1c within the specified period of time.     If HgbA1C is 8 or greater, it needs to be on file within the past 3 months.  If less than 8, must be on file within the past 6 months.     Recent Labs   Lab Test 04/08/24  1451   A1C 8.6*             Failed - Recent (6 mo) or future (90 days) visit within the authorizing provider's specialty     The patient must have completed an in-person or virtual visit within the past 6 months or has a future visit scheduled within the next 90 days with the authorizing provider s specialty.  Urgent care and e-visits do not quality as an office visit for this protocol.          Passed - Medication is active on med list        Passed - Has GFR on file in past 12 months and most recent value is normal        Passed - Medication indicated for associated diagnosis     Medication is associated with one or more of the following diagnoses:     Diabetic nephropathy, With Albuminuria - Type 2 diabetes mellitus     Disorder of cardiovascular system; Prophylaxis - Type 2 diabetes mellitus     Type 2 diabetes mellitus    Disorder of cardiovascular system; Prophylaxis - Heart failure   Chronic kidney disease, (At risk of progression) to reduce the risk of sustained   estimated GFR decline, end-stage kidney disease, cardiovascular death,   and hospitalization for heart failure     Heart failure, (NYHA class II to IV, reduced ejection fraction) to reduce risk of  cardiovascular death and hospitalization           Passed - Patient is age 18 or older        Passed - Patient has documented normal Potassium within the last 12 mos.     Recent Labs    Lab Test 04/08/24  1451   POTASSIUM 4.7

## 2024-07-23 ENCOUNTER — MYC REFILL (OUTPATIENT)
Dept: FAMILY MEDICINE | Facility: CLINIC | Age: 59
End: 2024-07-23
Payer: COMMERCIAL

## 2024-07-23 DIAGNOSIS — F41.9 ANXIETY: ICD-10-CM

## 2024-07-23 DIAGNOSIS — M79.10 MUSCLE PAIN: ICD-10-CM

## 2024-07-23 DIAGNOSIS — Z79.4 TYPE 2 DIABETES MELLITUS WITH HYPERGLYCEMIA, WITH LONG-TERM CURRENT USE OF INSULIN (H): ICD-10-CM

## 2024-07-23 DIAGNOSIS — E11.65 TYPE 2 DIABETES MELLITUS WITH HYPERGLYCEMIA, WITH LONG-TERM CURRENT USE OF INSULIN (H): ICD-10-CM

## 2024-07-23 RX ORDER — EMPAGLIFLOZIN 25 MG/1
25 TABLET, FILM COATED ORAL DAILY
Qty: 90 TABLET | Refills: 0 | OUTPATIENT
Start: 2024-07-23

## 2024-07-23 RX ORDER — ALPRAZOLAM 0.5 MG
TABLET ORAL
Qty: 15 TABLET | Refills: 3 | Status: CANCELLED | OUTPATIENT
Start: 2024-07-23

## 2024-07-23 NOTE — TELEPHONE ENCOUNTER
Requested Prescriptions   Pending Prescriptions Disp Refills    JARDIANCE 25 MG TABS tablet [Pharmacy Med Name: JARDIANCE 25MG TABLETS] 90 tablet 0     Sig: TAKE 1 TABLET BY MOUTH DAILY       Sodium Glucose Co-Transport Inhibitor Agents Failed - 7/23/2024 10:26 AM        Failed - Patient has documented A1c within the specified period of time.     If HgbA1C is 8 or greater, it needs to be on file within the past 3 months.  If less than 8, must be on file within the past 6 months.     Recent Labs   Lab Test 04/08/24  1451   A1C 8.6*             Failed - Recent (6 mo) or future (90 days) visit within the authorizing provider's specialty     The patient must have completed an in-person or virtual visit within the past 6 months or has a future visit scheduled within the next 90 days with the authorizing provider s specialty.  Urgent care and e-visits do not quality as an office visit for this protocol.          Passed - Medication is active on med list        Passed - Has GFR on file in past 12 months and most recent value is normal        Passed - Medication indicated for associated diagnosis     Medication is associated with one or more of the following diagnoses:     Diabetic nephropathy, With Albuminuria - Type 2 diabetes mellitus     Disorder of cardiovascular system; Prophylaxis - Type 2 diabetes mellitus     Type 2 diabetes mellitus    Disorder of cardiovascular system; Prophylaxis - Heart failure   Chronic kidney disease, (At risk of progression) to reduce the risk of sustained   estimated GFR decline, end-stage kidney disease, cardiovascular death,   and hospitalization for heart failure     Heart failure, (NYHA class II to IV, reduced ejection fraction) to reduce risk of  cardiovascular death and hospitalization           Passed - Patient is age 18 or older        Passed - Patient has documented normal Potassium within the last 12 mos.     Recent Labs   Lab Test 04/08/24  1451   POTASSIUM 4.7

## 2024-07-29 ENCOUNTER — MYC REFILL (OUTPATIENT)
Dept: FAMILY MEDICINE | Facility: CLINIC | Age: 59
End: 2024-07-29
Payer: COMMERCIAL

## 2024-07-29 DIAGNOSIS — F41.9 ANXIETY: ICD-10-CM

## 2024-07-29 RX ORDER — ALPRAZOLAM 0.5 MG
TABLET ORAL
Qty: 15 TABLET | Refills: 3 | Status: SHIPPED | OUTPATIENT
Start: 2024-07-31 | End: 2024-08-23

## 2024-07-30 ENCOUNTER — MYC REFILL (OUTPATIENT)
Dept: ENDOCRINOLOGY | Facility: CLINIC | Age: 59
End: 2024-07-30
Payer: COMMERCIAL

## 2024-07-30 DIAGNOSIS — Z79.4 TYPE 2 DIABETES MELLITUS WITH HYPERGLYCEMIA, WITH LONG-TERM CURRENT USE OF INSULIN (H): ICD-10-CM

## 2024-07-30 DIAGNOSIS — E11.65 TYPE 2 DIABETES MELLITUS WITH HYPERGLYCEMIA, WITH LONG-TERM CURRENT USE OF INSULIN (H): ICD-10-CM

## 2024-07-30 NOTE — TELEPHONE ENCOUNTER
Requested Prescriptions   Pending Prescriptions Disp Refills    empagliflozin (JARDIANCE) 25 MG TABS tablet 90 tablet 0     Sig: Take 1 tablet (25 mg) by mouth daily       Sodium Glucose Co-Transport Inhibitor Agents Failed - 7/30/2024 12:03 PM        Failed - Patient has documented A1c within the specified period of time.     If HgbA1C is 8 or greater, it needs to be on file within the past 3 months.  If less than 8, must be on file within the past 6 months.     Recent Labs   Lab Test 04/08/24  1451   A1C 8.6*             Failed - Recent (6 mo) or future (90 days) visit within the authorizing provider's specialty     The patient must have completed an in-person or virtual visit within the past 6 months or has a future visit scheduled within the next 90 days with the authorizing provider s specialty.  Urgent care and e-visits do not quality as an office visit for this protocol.          Passed - Medication is active on med list        Passed - Has GFR on file in past 12 months and most recent value is normal        Passed - Medication indicated for associated diagnosis     Medication is associated with one or more of the following diagnoses:     Diabetic nephropathy, With Albuminuria - Type 2 diabetes mellitus     Disorder of cardiovascular system; Prophylaxis - Type 2 diabetes mellitus     Type 2 diabetes mellitus    Disorder of cardiovascular system; Prophylaxis - Heart failure   Chronic kidney disease, (At risk of progression) to reduce the risk of sustained   estimated GFR decline, end-stage kidney disease, cardiovascular death,   and hospitalization for heart failure     Heart failure, (NYHA class II to IV, reduced ejection fraction) to reduce risk of  cardiovascular death and hospitalization           Passed - Patient is age 18 or older        Passed - Patient has documented normal Potassium within the last 12 mos.     Recent Labs   Lab Test 04/08/24  1451   POTASSIUM 4.7

## 2024-08-03 DIAGNOSIS — Z79.4 TYPE 2 DIABETES MELLITUS WITH HYPERGLYCEMIA, WITH LONG-TERM CURRENT USE OF INSULIN (H): ICD-10-CM

## 2024-08-03 DIAGNOSIS — E11.65 TYPE 2 DIABETES MELLITUS WITH HYPERGLYCEMIA, WITH LONG-TERM CURRENT USE OF INSULIN (H): ICD-10-CM

## 2024-08-05 RX ORDER — INSULIN GLARGINE 100 [IU]/ML
INJECTION, SOLUTION SUBCUTANEOUS
Qty: 30 ML | Refills: 0 | Status: SHIPPED | OUTPATIENT
Start: 2024-08-05 | End: 2024-09-30

## 2024-08-05 NOTE — TELEPHONE ENCOUNTER
Requested Prescriptions   Pending Prescriptions Disp Refills    LANTUS SOLOSTAR 100 UNIT/ML soln [Pharmacy Med Name: LANTUS SOLOSTAR PEN INJ 3ML] 30 mL 0     Sig: ADMINISTER 50 UNITS UNDER THE SKIN EVERY NIGHT AT BEDTIME       Insulin Protocol Failed - 8/3/2024  8:26 AM        Failed - Chart Review Required     Review Chart.    Do not approve if insulin is used in a pump.  Instead, direct refill request to the patient's endocrinologist.  If the patient doesn't have an endocrinologist, then send the refill to the patient's PCP for review            Passed - Medication is active on med list        Passed - Has GFR on file in past 12 months and most recent value is normal        Passed - Recent (6 mo) or future (90 days) visit within the authorizing provider's specialty     The patient must have completed an in-person or virtual visit within the past 6 months or has a future visit scheduled within the next 90 days with the authorizing provider s specialty.  Urgent care and e-visits do not quality as an office visit for this protocol.          Passed - Medication indicated for associated diagnosis     Medication is associated with one or more of the following diagnoses:   - Type 1 diabetes mellitus  - Type 2 diabetes mellitus  - Diabetic nephropathy; Prophylaxis  - Neuropathy due to diabetes mellitus; Prophylaxis  - Retinopathy due to diabetes mellitus; Prophylaxis  - Diabetes mellitus associated with cystic fibrosis  - Disorder of cardiovascular system; Prophylaxis - Type 1 diabetes mellitus   - Disorder of cardiovascular system; Prophylaxis - Type 2 diabetes mellitus            Passed - Patient is 18 years of age or older

## 2024-08-07 ENCOUNTER — VIRTUAL VISIT (OUTPATIENT)
Dept: FAMILY MEDICINE | Facility: CLINIC | Age: 59
End: 2024-08-07
Payer: COMMERCIAL

## 2024-08-07 ENCOUNTER — E-VISIT (OUTPATIENT)
Dept: FAMILY MEDICINE | Facility: CLINIC | Age: 59
End: 2024-08-07
Payer: COMMERCIAL

## 2024-08-07 DIAGNOSIS — Z79.4 TYPE 2 DIABETES MELLITUS WITH HYPERGLYCEMIA, WITH LONG-TERM CURRENT USE OF INSULIN (H): ICD-10-CM

## 2024-08-07 DIAGNOSIS — E11.65 TYPE 2 DIABETES MELLITUS WITH HYPERGLYCEMIA, WITH LONG-TERM CURRENT USE OF INSULIN (H): ICD-10-CM

## 2024-08-07 DIAGNOSIS — R19.7 DIARRHEA OF PRESUMED INFECTIOUS ORIGIN: Primary | ICD-10-CM

## 2024-08-07 DIAGNOSIS — R19.7 DIARRHEA, UNSPECIFIED TYPE: Primary | ICD-10-CM

## 2024-08-07 PROCEDURE — 99207 PR NON-BILLABLE SERV PER CHARTING: CPT | Performed by: FAMILY MEDICINE

## 2024-08-07 PROCEDURE — 99213 OFFICE O/P EST LOW 20 MIN: CPT | Mod: 95 | Performed by: FAMILY MEDICINE

## 2024-08-07 PROCEDURE — G2211 COMPLEX E/M VISIT ADD ON: HCPCS | Mod: 95 | Performed by: FAMILY MEDICINE

## 2024-08-07 RX ORDER — ONDANSETRON 4 MG/1
4-8 TABLET, ORALLY DISINTEGRATING ORAL EVERY 8 HOURS PRN
Qty: 30 TABLET | Refills: 1 | Status: SHIPPED | OUTPATIENT
Start: 2024-08-07

## 2024-08-07 NOTE — PROGRESS NOTES
"Mati is a 59 year old who is being evaluated via a billable video visit.    How would you like to obtain your AVS? MyChart  If the video visit is dropped, the invitation should be resent by: Text to cell phone: [phone]  Will anyone else be joining your video visit? No      Assessment & Plan     Diarrhea of presumed infectious origin  Given that he has had fairly extreme diarrhea for 7 to 10 days I think is reasonable to do a bacterial/viral panel as well as a C. difficile test.  Patient was on about 2 weeks of antibiotics for a tooth/facial infection making C. difficile a bit more likely.    I also considered DKA given patient's vomiting although I think this is a bit less likely with blood sugars in the 100s to low 200s.  He does not have abdominal pain but I did discuss if he begins to get abdominal pain or vomiting worsens he should be seen.  Also discussed that if he begins to get dehydrated he needs to go to the emergency department.    - ondansetron (ZOFRAN ODT) 4 MG ODT tab; Take 1-2 tablets (4-8 mg) by mouth every 8 hours as needed for nausea  - Enteric Bacteria and Virus Panel by [facility] Stool; Future  - C. difficile Toxin B PCR with reflex to C. difficile Antigen and Toxins A/B EIA; Future          BMI  Estimated body mass index is 35.84 kg/m  as calculated from the following:    Height as of 7/12/24: 1.695 m (5' 6.73\").    Weight as of 7/12/24: 103 kg (227 lb).   Weight management plan: Discussed healthy diet and exercise guidelines    The longitudinal plan of care for the diagnosis(es)/condition(s) as documented were addressed during this visit. Due to the added complexity in care, I will continue to support Mati in the subsequent management and with ongoing continuity of care.        Subjective   Mati is a 59 year old, presenting for the following health issues:  Gastrointestinal Problem      Video Start Time: 355pm    History of Present Illness       Reason for visit:  Liquid stools  vomiting  Symptom " onset:  1-2 weeks ago  Symptoms include:  Uncontrollable liquid stool, vomiting  Symptom intensity:  Moderate  Symptom progression:  Staying the same  Had these symptoms before:  No  What makes it worse:  Standing,  movement  What makes it better:  Laying downHe consumes 0 sweetened beverage(s) daily.He exercises with enough effort to increase his heart rate 9 or less minutes per day.  He exercises with enough effort to increase his heart rate 6 days per week. He is missing 2 dose(s) of medications per week.  He is not taking prescribed medications regularly due to remembering to take.     Patient recently got off 2-week course of antibiotics for facial infection (Augmentin).  Follow-up 3 to 4 days after stopping antibiotics he began to have diarrhea.  He notes that he is having 10-15 episodes of loose stools daily.  They are somewhat foamy and started out very light in color but now are a bit darker.  He has had some vomiting as well.  Zofran seems to help with the vomiting and he is hopeful to get a refill of that.    He is able to keep fluids down and sometimes is able to eat although sometimes does not have much of an appetite.    History of diabetes which is uncontrolled.  His blood sugars have been in the high 100s to low 200s which is actually fairly normal for him or even on the lower side of what he usually is.  He does not have abdominal pain.                Review of Systems  Constitutional, HEENT, cardiovascular, pulmonary, GI, , musculoskeletal, neuro, skin, endocrine and psych systems are negative, except as otherwise noted.      Objective           Vitals:  No vitals were obtained today due to virtual visit.    Physical Exam   GENERAL: alert and no distress  EYES: Eyes grossly normal to inspection.  No discharge or erythema, or obvious scleral/conjunctival abnormalities.  RESP: No audible wheeze, cough, or visible cyanosis.    SKIN: Visible skin clear. No significant rash, abnormal pigmentation or  lesions.  NEURO: Cranial nerves grossly intact.  Mentation and speech appropriate for age.  PSYCH: Appropriate affect, tone, and pace of words          Video-Visit Details    Type of service:  Video Visit   Video End Time: 418  Originating Location (pt. Location): Home    Distant Location (provider location):  On-site  Platform used for Video Visit: Doximity  Signed Electronically by: Rocío Brandt MD

## 2024-08-12 ENCOUNTER — LAB (OUTPATIENT)
Dept: LAB | Facility: CLINIC | Age: 59
End: 2024-08-12
Payer: COMMERCIAL

## 2024-08-12 DIAGNOSIS — R19.7 DIARRHEA OF PRESUMED INFECTIOUS ORIGIN: ICD-10-CM

## 2024-08-12 DIAGNOSIS — Z79.4 TYPE 2 DIABETES MELLITUS WITH HYPERGLYCEMIA, WITH LONG-TERM CURRENT USE OF INSULIN (H): ICD-10-CM

## 2024-08-12 DIAGNOSIS — E11.65 TYPE 2 DIABETES MELLITUS WITH HYPERGLYCEMIA, WITH LONG-TERM CURRENT USE OF INSULIN (H): ICD-10-CM

## 2024-08-12 LAB
ERYTHROCYTE [DISTWIDTH] IN BLOOD BY AUTOMATED COUNT: 12.9 % (ref 10–15)
HBA1C MFR BLD: 9.5 % (ref 0–5.6)
HCT VFR BLD AUTO: 42 % (ref 40–53)
HGB BLD-MCNC: 14.8 G/DL (ref 13.3–17.7)
MCH RBC QN AUTO: 30.8 PG (ref 26.5–33)
MCHC RBC AUTO-ENTMCNC: 35.2 G/DL (ref 31.5–36.5)
MCV RBC AUTO: 88 FL (ref 78–100)
PLATELET # BLD AUTO: 294 10E3/UL (ref 150–450)
RBC # BLD AUTO: 4.8 10E6/UL (ref 4.4–5.9)
WBC # BLD AUTO: 8.4 10E3/UL (ref 4–11)

## 2024-08-12 PROCEDURE — 83036 HEMOGLOBIN GLYCOSYLATED A1C: CPT

## 2024-08-12 PROCEDURE — 85027 COMPLETE CBC AUTOMATED: CPT

## 2024-08-12 PROCEDURE — 36415 COLL VENOUS BLD VENIPUNCTURE: CPT

## 2024-08-12 PROCEDURE — 80048 BASIC METABOLIC PNL TOTAL CA: CPT

## 2024-08-13 LAB
ANION GAP SERPL CALCULATED.3IONS-SCNC: 12 MMOL/L (ref 7–15)
BUN SERPL-MCNC: 14.1 MG/DL (ref 8–23)
CALCIUM SERPL-MCNC: 9.7 MG/DL (ref 8.8–10.4)
CHLORIDE SERPL-SCNC: 102 MMOL/L (ref 98–107)
CREAT SERPL-MCNC: 0.83 MG/DL (ref 0.67–1.17)
EGFRCR SERPLBLD CKD-EPI 2021: >90 ML/MIN/1.73M2
GLUCOSE SERPL-MCNC: 239 MG/DL (ref 70–99)
HCO3 SERPL-SCNC: 25 MMOL/L (ref 22–29)
POTASSIUM SERPL-SCNC: 3.6 MMOL/L (ref 3.4–5.3)
SODIUM SERPL-SCNC: 139 MMOL/L (ref 135–145)

## 2024-08-14 LAB
ADV 40+41 DNA STL QL NAA+NON-PROBE: NEGATIVE
ASTRO TYP 1-8 RNA STL QL NAA+NON-PROBE: NEGATIVE
C CAYETANENSIS DNA STL QL NAA+NON-PROBE: NEGATIVE
C DIFF GDH STL QL IA: POSITIVE
C DIFF TOX A+B STL QL IA: NEGATIVE
C DIFF TOX B STL QL: POSITIVE
CAMPYLOBACTER DNA SPEC NAA+PROBE: NEGATIVE
CRYPTOSP DNA STL QL NAA+NON-PROBE: NEGATIVE
E COLI O157 DNA STL QL NAA+NON-PROBE: ABNORMAL
E HISTOLYT DNA STL QL NAA+NON-PROBE: NEGATIVE
EAEC ASTA GENE ISLT QL NAA+PROBE: NEGATIVE
EC STX1+STX2 GENES STL QL NAA+NON-PROBE: NEGATIVE
EPEC EAE GENE STL QL NAA+NON-PROBE: POSITIVE
ETEC LTA+ST1A+ST1B TOX ST NAA+NON-PROBE: NEGATIVE
G LAMBLIA DNA STL QL NAA+NON-PROBE: NEGATIVE
NOROVIRUS GI+II RNA STL QL NAA+NON-PROBE: NEGATIVE
P SHIGELLOIDES DNA STL QL NAA+NON-PROBE: NEGATIVE
RVA RNA STL QL NAA+NON-PROBE: NEGATIVE
SALMONELLA SP RPOD STL QL NAA+PROBE: NEGATIVE
SAPO I+II+IV+V RNA STL QL NAA+NON-PROBE: NEGATIVE
SHIGELLA SP+EIEC IPAH ST NAA+NON-PROBE: NEGATIVE
V CHOLERAE DNA SPEC QL NAA+PROBE: NEGATIVE
VIBRIO DNA SPEC NAA+PROBE: NEGATIVE
Y ENTEROCOL DNA STL QL NAA+PROBE: NEGATIVE

## 2024-08-14 PROCEDURE — 87493 C DIFF AMPLIFIED PROBE: CPT | Mod: 59

## 2024-08-14 PROCEDURE — 87507 IADNA-DNA/RNA PROBE TQ 12-25: CPT

## 2024-08-14 PROCEDURE — 87324 CLOSTRIDIUM AG IA: CPT | Mod: 59

## 2024-08-15 ENCOUNTER — TELEPHONE (OUTPATIENT)
Dept: FAMILY MEDICINE | Facility: CLINIC | Age: 59
End: 2024-08-15
Payer: COMMERCIAL

## 2024-08-15 DIAGNOSIS — A04.72 C. DIFFICILE COLITIS: Primary | ICD-10-CM

## 2024-08-15 NOTE — TELEPHONE ENCOUNTER
Call placed to patient   No answer; voicemail left requesting call back to Clinic RN at [phone]    Juice Bond, Clinic RN  [facility]

## 2024-08-15 NOTE — TELEPHONE ENCOUNTER
Retail Pharmacy Prior Authorization Team   Phone: [phone]    PRIOR AUTHORIZATION DENIED    Medication: DIFICID 200 MG PO TABS  Insurance Company: [employer] - Phone [phone] Fax [phone]  Denial Date: 8/15/2024  Denial Reason(s): MUST TRY/FAIL VANCOMYCIN OR PROVIDE CLINICAL RATIONALE FOR AVOIDING USE      Appeal Information: IF THE PROVIDER WOULD LIKE TO APPEAL THIS DECISION PLEASE PROVIDE THE PA TEAM WITH A LETTER OF MEDICAL NECESSITY      Patient Notified: NO

## 2024-08-15 NOTE — TELEPHONE ENCOUNTER
----- Message from Rocío Brandt sent at 8/15/2024  8:27 AM CDT -----  Please call the patient to let him know that his stool cultures came back positive for E. coli (which is generally self-limited and does not need treatment) and C. difficile.  C. difficile is a bacteria that can occur in overgrowth when someone is on long courses of antibiotics (I know you had several courses of antibiotics to treat a facial infection).    I have sent an antibiotic that you can take twice daily for 10 days.    Thanks    EB

## 2024-08-16 RX ORDER — VANCOMYCIN HYDROCHLORIDE 125 MG/1
125 CAPSULE ORAL 4 TIMES DAILY
Qty: 40 CAPSULE | Refills: 0 | Status: SHIPPED | OUTPATIENT
Start: 2024-08-16 | End: 2024-08-26

## 2024-08-16 NOTE — TELEPHONE ENCOUNTER
Please call Mati.   Insurance is denying dificid.   I will send in vancomycin, it doesn't appear that he has an allergy to this - please confirm.   SSUSY Alexander MD

## 2024-08-23 ENCOUNTER — MYC REFILL (OUTPATIENT)
Dept: FAMILY MEDICINE | Facility: CLINIC | Age: 59
End: 2024-08-23
Payer: COMMERCIAL

## 2024-08-23 DIAGNOSIS — F41.9 ANXIETY: ICD-10-CM

## 2024-08-23 RX ORDER — ALPRAZOLAM 0.5 MG
TABLET ORAL
Qty: 15 TABLET | Refills: 3 | Status: SHIPPED | OUTPATIENT
Start: 2024-08-23 | End: 2024-09-24

## 2024-09-11 DIAGNOSIS — E11.65 TYPE 2 DIABETES MELLITUS WITH HYPERGLYCEMIA, WITH LONG-TERM CURRENT USE OF INSULIN (H): Primary | ICD-10-CM

## 2024-09-11 DIAGNOSIS — Z79.4 TYPE 2 DIABETES MELLITUS WITH HYPERGLYCEMIA, WITH LONG-TERM CURRENT USE OF INSULIN (H): Primary | ICD-10-CM

## 2024-09-24 ENCOUNTER — MYC REFILL (OUTPATIENT)
Dept: FAMILY MEDICINE | Facility: CLINIC | Age: 59
End: 2024-09-24
Payer: COMMERCIAL

## 2024-09-24 DIAGNOSIS — F41.9 ANXIETY: ICD-10-CM

## 2024-09-24 RX ORDER — ALPRAZOLAM 0.5 MG
TABLET ORAL
Qty: 15 TABLET | Refills: 3 | Status: SHIPPED | OUTPATIENT
Start: 2024-09-24

## 2024-09-29 DIAGNOSIS — E11.65 TYPE 2 DIABETES MELLITUS WITH HYPERGLYCEMIA, WITH LONG-TERM CURRENT USE OF INSULIN (H): ICD-10-CM

## 2024-09-29 DIAGNOSIS — Z79.4 TYPE 2 DIABETES MELLITUS WITH HYPERGLYCEMIA, WITH LONG-TERM CURRENT USE OF INSULIN (H): ICD-10-CM

## 2024-09-30 RX ORDER — INSULIN GLARGINE 100 [IU]/ML
INJECTION, SOLUTION SUBCUTANEOUS
Qty: 30 ML | Refills: 0 | Status: SHIPPED | OUTPATIENT
Start: 2024-09-30

## 2024-10-01 NOTE — TELEPHONE ENCOUNTER
Call placed to patient regarding MyChart message.     No answer, voicemail left for patient to call Clinic RN back at [phone].    Juice Bond RN    
Patient called Clinic RN back.     Patient reports 4 days ago he started to experience lower left quadrant abdominal pain that radiates into his left groin and down his left leg.   Patient states pain started 4 days ago; has had similar episodes of this pain in the past -- approx a dozen times. Though, reports this episode has been the longest and the worse.     Patient reports he was up all night long with the abdominal pain and the pain has continued this morning.     Patient is most comfortable when laying in bed with his left leg raised up into his abdomen to put pressure to the pain source.   Rates the pain 10/10 on the pain scale with movement and 6/10 constantly.   Describes the pain as sharp and stabbing in nature.  States when pain is most severe while standing, the pain makes it so he has to go down onto his knees to recover from the pain.  Patient reports he is unable to stand longer than 5 minutes due to the severity of the pain when he moves.      Reports taking his prescribed Oxycodone and OTC Tylenol and Ibuprofen with some relief for a short period of time.    Discussed appropriate dosage of OTC Tylenol and Ibuprofen and educated on how each was excreted from the body and the importance of not taking more than recommended -- patient verbalized understanding.     Denies nausea / vomiting  Reports good appetite and has been drinking approx 60oz of water a day.   Reports when pain flares up his stool becomes pebble like and is brown in color -- reports passing gas slows down as well when pain flares.   Denies taking OTC stool softners.     Due to patient reporting this episode of pain is the worse and the longest he has experienced; author recommended ED evaluation.    Patient agrees with plan and states his wife will take him to [facility] that is nearest to him.     Juice Bond RN      
done today

## 2024-10-11 ENCOUNTER — TRANSFERRED RECORDS (OUTPATIENT)
Dept: HEALTH INFORMATION MANAGEMENT | Facility: CLINIC | Age: 59
End: 2024-10-11
Payer: COMMERCIAL

## 2024-10-11 DIAGNOSIS — F41.9 ANXIETY: ICD-10-CM

## 2024-10-11 RX ORDER — HYDROXYZINE HYDROCHLORIDE 25 MG/1
TABLET, FILM COATED ORAL
Qty: 60 TABLET | Refills: 3 | Status: SHIPPED | OUTPATIENT
Start: 2024-10-11

## 2024-10-11 NOTE — TELEPHONE ENCOUNTER
Prescription approved per [facility] Refill Protocol     Savannah Robles     RN MSN

## 2024-10-21 ENCOUNTER — MYC REFILL (OUTPATIENT)
Dept: FAMILY MEDICINE | Facility: CLINIC | Age: 59
End: 2024-10-21
Payer: COMMERCIAL

## 2024-10-21 DIAGNOSIS — F41.9 ANXIETY: ICD-10-CM

## 2024-10-21 RX ORDER — ALPRAZOLAM 0.5 MG
TABLET ORAL
Qty: 15 TABLET | Refills: 3 | Status: SHIPPED | OUTPATIENT
Start: 2024-10-21

## 2024-10-29 DIAGNOSIS — E11.65 TYPE 2 DIABETES MELLITUS WITH HYPERGLYCEMIA, WITH LONG-TERM CURRENT USE OF INSULIN (H): ICD-10-CM

## 2024-10-29 DIAGNOSIS — Z79.4 TYPE 2 DIABETES MELLITUS WITH HYPERGLYCEMIA, WITH LONG-TERM CURRENT USE OF INSULIN (H): ICD-10-CM

## 2024-10-29 NOTE — TELEPHONE ENCOUNTER
Requested Prescriptions   Pending Prescriptions Disp Refills    empagliflozin (JARDIANCE) 25 MG TABS tablet 90 tablet 0     Sig: Take 1 tablet (25 mg) by mouth daily.       Sodium Glucose Co-Transport Inhibitor Agents Passed - 10/29/2024 11:35 AM        Passed - Patient has documented A1c within the specified period of time.     If HgbA1C is 8 or greater, it needs to be on file within the past 3 months.  If less than 8, must be on file within the past 6 months.     Recent Labs   Lab Test 08/12/24  1217   A1C 9.5*             Passed - Medication is active on med list        Passed - Recent (6 mo) or future (90 days) visit within the authorizing provider's specialty     The patient must have completed an in-person or virtual visit within the past 6 months or has a future visit scheduled within the next 90 days with the authorizing provider s specialty.  Urgent care and e-visits do not quality as an office visit for this protocol.          Passed - Medication indicated for associated diagnosis     Medication is associated with one or more of the following diagnoses:     Diabetic nephropathy, With Albuminuria - Type 2 diabetes mellitus     Disorder of cardiovascular system; Prophylaxis - Type 2 diabetes mellitus     Type 2 diabetes mellitus    Disorder of cardiovascular system; Prophylaxis - Heart failure   Chronic kidney disease, (At risk of progression) to reduce the risk of sustained   estimated GFR decline, end-stage kidney disease, cardiovascular death,   and hospitalization for heart failure     Heart failure, (NYHA class II to IV, reduced ejection fraction) to reduce risk of  cardiovascular death and hospitalization           Passed - Has GFR on file in past 12 months and most recent value is >30        Passed - Patient is age 18 or older        Passed - Patient has documented normal Potassium within the last 12 mos.     Recent Labs   Lab Test 08/12/24  1217   POTASSIUM 3.6

## 2024-11-10 DIAGNOSIS — F41.9 ANXIETY: ICD-10-CM

## 2024-11-11 RX ORDER — DULOXETIN HYDROCHLORIDE 60 MG/1
60 CAPSULE, DELAYED RELEASE ORAL 2 TIMES DAILY
Qty: 180 CAPSULE | Refills: 1 | Status: SHIPPED | OUTPATIENT
Start: 2024-11-11

## 2024-11-11 NOTE — TELEPHONE ENCOUNTER
Requested Prescriptions   Pending Prescriptions Disp Refills    DULoxetine (CYMBALTA) 60 MG capsule [Pharmacy Med Name: DULOXETINE DR 60MG CAPSULES] 180 capsule 1     Sig: TAKE 1 CAPSULE BY MOUTH TWICE DAILY       Serotonin-Norepinephrine Reuptake Inhibitors  Failed - 11/11/2024 11:36 AM        Failed - Most recent blood pressure under 140/90 in past 12 months     BP Readings from Last 3 Encounters:   07/12/24 (!) 165/100   04/08/24 (!) 142/88   10/04/23 (!) 150/100       No data recorded            Passed - Medication is active on med list        Passed - Recent (12 mo) or future (90 days) visit within the authorizing provider's specialty     The patient must have completed an in-person or virtual visit within the past 12 months or has a future visit scheduled within the next 90 days with the authorizing provider s specialty.  Urgent care and e-visits do not quality as an office visit for this protocol.          Passed - Medication indicated for associated diagnosis     Medication is associated with one or more of the following diagnoses:  Anxiety  Bipolar  Chronic musculoskeletal pain  Depression  Fibromyalgia  Headache  Migraine  Neuropathy  Obsessive compulsive disorder  Panic disorder  Social phobia  Mood disorder  Menopause  Hot flashes/Menopausal flushing  Fibromyitis  Flushing          Passed - DEACON-7 score on file during last 12 months        Passed - Patient is age 18 or older

## 2024-11-18 ENCOUNTER — MYC REFILL (OUTPATIENT)
Dept: FAMILY MEDICINE | Facility: CLINIC | Age: 59
End: 2024-11-18
Payer: COMMERCIAL

## 2024-11-18 DIAGNOSIS — F41.9 ANXIETY: ICD-10-CM

## 2024-11-18 RX ORDER — ALPRAZOLAM 0.5 MG
TABLET ORAL
Qty: 15 TABLET | Refills: 3 | Status: SHIPPED | OUTPATIENT
Start: 2024-11-18

## 2024-11-23 DIAGNOSIS — E11.65 TYPE 2 DIABETES MELLITUS WITH HYPERGLYCEMIA, WITH LONG-TERM CURRENT USE OF INSULIN (H): ICD-10-CM

## 2024-11-23 DIAGNOSIS — Z79.4 TYPE 2 DIABETES MELLITUS WITH HYPERGLYCEMIA, WITH LONG-TERM CURRENT USE OF INSULIN (H): ICD-10-CM

## 2024-11-25 RX ORDER — INSULIN GLARGINE 100 [IU]/ML
INJECTION, SOLUTION SUBCUTANEOUS
Qty: 30 ML | Refills: 0 | Status: SHIPPED | OUTPATIENT
Start: 2024-11-25

## 2024-11-25 NOTE — TELEPHONE ENCOUNTER
Requested Prescriptions   Pending Prescriptions Disp Refills    LANTUS SOLOSTAR 100 UNIT/ML soln [Pharmacy Med Name: LANTUS SOLOSTAR PEN INJ 3ML] 30 mL 0     Sig: ADMINISTER 50 UNITS UNDER THE SKIN EVERY NIGHT AT BEDTIME       Insulin Protocol Failed - 11/25/2024  9:44 AM        Failed - HgbA1C in past 3 or 6 months     If HgbA1C is 8 or greater, it needs to be on file within the past 3 months.  If less than 8, must be on file within the past 6 months.     Recent Labs   Lab Test 08/12/24  1217   A1C 9.5*             Failed - Chart review required     Review Chart.    Do not approve if insulin is used in a pump.  Instead, direct refill request to the patient's endocrinologist.  If the patient doesn't have an endocrinologist, then send the refill to the patient's PCP for review            Passed - Medication is active on med list        Passed - Recent (6 mo) or future (90 days) visit within the authorizing provider's specialty     The patient must have completed an in-person or virtual visit within the past 6 months or has a future visit scheduled within the next 90 days with the authorizing provider s specialty.  Urgent care and e-visits do not quality as an office visit for this protocol.          Passed - Medication indicated for associated diagnosis     Medication is associated with one or more of the following diagnoses:   - Type 1 diabetes mellitus  - Type 2 diabetes mellitus  - Diabetic nephropathy; Prophylaxis  - Neuropathy due to diabetes mellitus; Prophylaxis  - Retinopathy due to diabetes mellitus; Prophylaxis  - Diabetes mellitus associated with cystic fibrosis  - Disorder of cardiovascular system; Prophylaxis - Type 1 diabetes mellitus   - Disorder of cardiovascular system; Prophylaxis - Type 2 diabetes mellitus            Passed - Patient is 18 years of age or older

## 2024-12-16 DIAGNOSIS — F41.9 ANXIETY: ICD-10-CM

## 2024-12-16 RX ORDER — ALPRAZOLAM 0.5 MG
TABLET ORAL
Qty: 15 TABLET | Refills: 0 | Status: SHIPPED | OUTPATIENT
Start: 2024-12-16

## 2024-12-22 ENCOUNTER — MYC REFILL (OUTPATIENT)
Dept: FAMILY MEDICINE | Facility: CLINIC | Age: 59
End: 2024-12-22
Payer: COMMERCIAL

## 2024-12-22 DIAGNOSIS — F41.9 ANXIETY: ICD-10-CM

## 2024-12-23 RX ORDER — ALPRAZOLAM 0.5 MG
TABLET ORAL
Qty: 15 TABLET | Refills: 0 | Status: SHIPPED | OUTPATIENT
Start: 2024-12-23

## 2024-12-30 DIAGNOSIS — F41.9 ANXIETY: ICD-10-CM

## 2024-12-30 RX ORDER — ALPRAZOLAM 0.5 MG
TABLET ORAL
Qty: 15 TABLET | Refills: 2 | Status: SHIPPED | OUTPATIENT
Start: 2024-12-30

## 2025-01-07 ASSESSMENT — PATIENT HEALTH QUESTIONNAIRE - PHQ9
10. IF YOU CHECKED OFF ANY PROBLEMS, HOW DIFFICULT HAVE THESE PROBLEMS MADE IT FOR YOU TO DO YOUR WORK, TAKE CARE OF THINGS AT HOME, OR GET ALONG WITH OTHER PEOPLE: SOMEWHAT DIFFICULT
SUM OF ALL RESPONSES TO PHQ QUESTIONS 1-9: 13
SUM OF ALL RESPONSES TO PHQ QUESTIONS 1-9: 13

## 2025-01-08 ENCOUNTER — VIRTUAL VISIT (OUTPATIENT)
Dept: FAMILY MEDICINE | Facility: CLINIC | Age: 60
End: 2025-01-08
Payer: COMMERCIAL

## 2025-01-08 DIAGNOSIS — F51.04 PSYCHOPHYSIOLOGICAL INSOMNIA: ICD-10-CM

## 2025-01-08 DIAGNOSIS — F41.9 ANXIETY: ICD-10-CM

## 2025-01-08 DIAGNOSIS — T24.201A PARTIAL THICKNESS BURN OF RIGHT LOWER EXTREMITY, INITIAL ENCOUNTER: Primary | ICD-10-CM

## 2025-01-08 PROCEDURE — 98006 SYNCH AUDIO-VIDEO EST MOD 30: CPT | Performed by: FAMILY MEDICINE

## 2025-01-08 RX ORDER — SILVER SULFADIAZINE 10 MG/G
CREAM TOPICAL DAILY
Qty: 50 G | Refills: 1 | Status: SHIPPED | OUTPATIENT
Start: 2025-01-08

## 2025-01-08 RX ORDER — ALPRAZOLAM 0.5 MG
0.5 TABLET ORAL 3 TIMES DAILY PRN
Qty: 45 TABLET | Refills: 0 | Status: SHIPPED | OUTPATIENT
Start: 2025-01-08

## 2025-01-08 RX ORDER — ESZOPICLONE 3 MG/1
3 TABLET, FILM COATED ORAL AT BEDTIME
Qty: 30 TABLET | Refills: 2 | Status: SHIPPED | OUTPATIENT
Start: 2025-01-08

## 2025-01-08 NOTE — PROGRESS NOTES
Mati is a 59 year old who is being evaluated via a billable video visit.    How would you like to obtain your AVS? MyChart  If the video visit is dropped, the invitation should be resent by: Send to e-mail at: [email or web address]  Will anyone else be joining your video visit? No      Assessment & Plan     Partial thickness burn of right lower extremity, initial encounter  Will have him wrapped with nonstick bandaging with the Silvadene keep the blisters intact as long as he can and then he will follow-up with Dr. Dyer in several weeks sooner if things are changing  - silver sulfADIAZINE (SILVADENE) 1 % external cream; Apply topically daily.    Psychophysiological insomnia  I asked him to at least try the Lunesta.  I also discussed as he has tried trazodone and Ambien without help if the Lunesta does not work he may be able to try one of the newer sleeping pills  - eszopiclone (LUNESTA) 3 MG tablet; Take 1 tablet (3 mg) by mouth at bedtime.    Anxiety  He is already taking duloxetine which she should continue and there will be enough Xanax for him to have 1-2 a day.  - ALPRAZolam (XANAX) 0.5 MG tablet; Take 1 tablet (0.5 mg) by mouth 3 times daily as needed for anxiety. This is a 30 day supply    Thea Simmons M.D.          Virtual visit appointment with Dr. Dyer in the next 2 to 3 weeks    Subjective   Mati is a 59 year old, presenting for the following health issues:  Medication Request    Video Start Time: 2:04 PM    History of Present Illness       Reason for visit:  Increased difficulty falling/staying asleep. I've used Xanax for years with good result. I spoke with Dr. Brandt, and she prescribed tramodol with no improvement.  As Xanax .5m 2x a day perhaps a change to 3x a day or possibly switching to 1mg 2x. Thanks   He is taking medications regularly.     The trazodone did not really work last year.   Ambien  not help  Spilled hot water on the leg 3 days ago it is blistered he has not punctured the blisters  he has had experiences with the it burns before.  He did say the Silvadene worked really great for him so I will send that in  If he also is having a hard time shutting his brain off and sleeping so he has been using the Xanax primarily for that I did discuss with him the idea of not taking the Xanax for sleep and trying Lunesta which she has never tried and he said he would try it I also did say I am still sending in some Xanax schedule any needed during the day.    Blood sugar has been okay he does not have a fever and he does not feel ill      Review of Systems  Constitutional, HEENT, cardiovascular, pulmonary, gi and gu systems are negative, except as otherwise noted.      Objective           Vitals:  No vitals were obtained today due to virtual visit.    Physical Exam   GENERAL: alert and no distress  EYES: Eyes grossly normal to inspection.  No discharge or erythema, or obvious scleral/conjunctival abnormalities.  RESP: No audible wheeze, cough, or visible cyanosis.    SKIN: no suspicious lesions or rashes and right knee with 2 blisters roughly 2 cm across both of them are still intact and there are areas around there that are just bright red indicating a first-degree burn.  NEURO: Cranial nerves grossly intact.  Mentation and speech appropriate for age.  PSYCH: Appropriate affect, tone, and pace of words          Video-Visit Details    Type of service:  Video Visit   Video End Time:2:24 PM  Originating Location (pt. Location): Home    Distant Location (provider location):  On-site  Platform used for Video Visit: Ozzie  Signed Electronically by: Thea Simmons MD

## 2025-01-20 DIAGNOSIS — Z79.4 TYPE 2 DIABETES MELLITUS WITH HYPERGLYCEMIA, WITH LONG-TERM CURRENT USE OF INSULIN (H): ICD-10-CM

## 2025-01-20 DIAGNOSIS — E11.65 TYPE 2 DIABETES MELLITUS WITH HYPERGLYCEMIA, WITH LONG-TERM CURRENT USE OF INSULIN (H): ICD-10-CM

## 2025-01-20 RX ORDER — INSULIN GLARGINE 100 [IU]/ML
INJECTION, SOLUTION SUBCUTANEOUS
Qty: 30 ML | Refills: 0 | Status: SHIPPED | OUTPATIENT
Start: 2025-01-20

## 2025-01-20 NOTE — PROGRESS NOTES
"Video visit    Start time 9:37, stop time 9:55; additional 10 minutes spent on the date of the encounter doing chart review, documentation and further activities as noted. This did not include time spent on CGM review.     Provider location: [facility], [address] [place] [address] [address] [address]    Patient location: patient home    Mode of transmission: video     The longitudinal plan of care for the diagnosis(es)/condition(s) as documented were addressed during this visit. Due to the added complexity in care, I will continue to support Mati in the subsequent management and with ongoing continuity of care.    Subjective:    Established patient    Obed Nickerson is a 59 year old male who presents for DM.     Current DM therapy:  -Lantus 0-0-0-40  -Mounjaro 7.5 mg weekly, he has significant nausea and some vomiting typically the day after injecting, these symptoms were also present with a lower dose   -Empagliflozin 25 mg daily; well tolerated     Prior DM therapy:  -Ozempic, Bydureon    -Victoza 1.2 mg daily stopped 6/2023 because of nausea   -Metformin 1000 mg BID stopped 12/2022 due to nausea and the nausea subsequently improved   -No prior use of prandial insulin     Activity is limited due to chronic pain.    # Hypercalcemia    He's had a few mildly elevated serum calcium values over the years (without concurrent albumin). No prior PTH.     Objective:    Virtual visit today. He estimates his weight is ~222#    7/2024: 103 kg (227 #), 170 cm with BMI 35.6 kg/m2, /100     6/30/2023: phone visit today, 226 #    12/2022: he notes a stable weight, virtual visit    9/2022: 250 #, 5'7\" with BMI ~39 kg/m2    Assessment/Plan:    # DM-2  -CT A/P 3/2021: per radiology pancreas normal   -8/2024: HbA1c 9.5% (was 8.6% 4/2024)   # No retinopathy, most recent eye exam 4/2022  # Hypertension, on lisinopril   -9/2023: urine microalbumin undetectable (previously mildly elevated)  -8/2024: GFR >90  # " "Peripheral neuropathy, no prior ulceration    # Mild coronary artery calcification on imaging, no prior ASCVD event, not on ASA  # Mixed hyperlipidemia, not on lipid lowering therapy   -Statins previously caused \"memory loss\"  -9/2023: ,  (not previously >500 mg/dL), HDL 43, LDL not reported but previously 205  -6/2023: referred to the cardiology lipid clinic; wasn't scheduled    # Medically complicated obesity   # Hepatic steatosis  # Other  -1/8/2021 at 05:43 AM serum cortisol 2.0 mcg/dL - unclear context of this lab draw     CGM reviewed in detail.  Over the past 2 weeks GMI 8.0%, average glucose 196 mg/deciliter, time in range 49%, 33% high, 17% very high.  He only had 1 reading of low during which he was asymptomatic and we suspect it was from sensor compression. We reviewed that given his significant gastrointestinal symptoms the day after receiving Mounjaro that we could either stop it or lower the dose.  I do not recommend increasing the dose at this time.  He has a strong preference to continue Mounjaro despite the GI symptoms.  He actually has Zofran at home and he will try using it proactively.    The only change we will make today is to increase his dose of Lantus from 40 to 50 units. Reviewed a walking program.     We reviewed that ideally we would maximize his dose of Mounjaro, symptoms permitting.  I suspect that will be sufficient to achieve adequate glycemic control.  However if that is not the case then we could introduce prandial insulin. Could also retry metformin but wouldn't at this time given his GI symptoms.     I would like him to reestablish care with one of our pharmacy colleagues (He saw Ab Richards [facility], most recently 3/29/2024) for titration of insulin and Mounjaro.    I also ordered a diabetic eye exam and put in for a cardiology referral for the lipid clinic.    Return to see me in about 6 months with labs ordered.    Upcoming appointment with Dr. Brandt 1/27/2025.     # " Hypercalcemia    PTH with minerals, Cr, Vitamin D ordered for next lab draw.

## 2025-01-21 ENCOUNTER — VIRTUAL VISIT (OUTPATIENT)
Dept: ENDOCRINOLOGY | Facility: CLINIC | Age: 60
End: 2025-01-21
Payer: COMMERCIAL

## 2025-01-21 DIAGNOSIS — K76.0 HEPATIC STEATOSIS: ICD-10-CM

## 2025-01-21 DIAGNOSIS — E11.42 TYPE 2 DIABETES MELLITUS WITH DIABETIC POLYNEUROPATHY, WITH LONG-TERM CURRENT USE OF INSULIN (H): ICD-10-CM

## 2025-01-21 DIAGNOSIS — E11.65 TYPE 2 DIABETES MELLITUS WITH HYPERGLYCEMIA, WITH LONG-TERM CURRENT USE OF INSULIN (H): Primary | ICD-10-CM

## 2025-01-21 DIAGNOSIS — E83.52 HYPERCALCEMIA: ICD-10-CM

## 2025-01-21 DIAGNOSIS — Z79.4 TYPE 2 DIABETES MELLITUS WITH DIABETIC POLYNEUROPATHY, WITH LONG-TERM CURRENT USE OF INSULIN (H): ICD-10-CM

## 2025-01-21 DIAGNOSIS — E88.819 INSULIN RESISTANCE: ICD-10-CM

## 2025-01-21 DIAGNOSIS — Z79.4 TYPE 2 DIABETES MELLITUS WITH HYPERGLYCEMIA, WITH LONG-TERM CURRENT USE OF INSULIN (H): Primary | ICD-10-CM

## 2025-01-21 DIAGNOSIS — E66.01 CLASS 2 SEVERE OBESITY DUE TO EXCESS CALORIES WITH SERIOUS COMORBIDITY AND BODY MASS INDEX (BMI) OF 35.0 TO 35.9 IN ADULT (H): ICD-10-CM

## 2025-01-21 DIAGNOSIS — I10 HYPERTENSION, UNSPECIFIED TYPE: ICD-10-CM

## 2025-01-21 DIAGNOSIS — E78.2 MIXED HYPERLIPIDEMIA: ICD-10-CM

## 2025-01-21 DIAGNOSIS — Z79.4 LONG TERM (CURRENT) USE OF INSULIN (H): ICD-10-CM

## 2025-01-21 DIAGNOSIS — E66.812 CLASS 2 SEVERE OBESITY DUE TO EXCESS CALORIES WITH SERIOUS COMORBIDITY AND BODY MASS INDEX (BMI) OF 35.0 TO 35.9 IN ADULT (H): ICD-10-CM

## 2025-01-21 RX ORDER — TIRZEPATIDE 5 MG/.5ML
5 INJECTION, SOLUTION SUBCUTANEOUS
COMMUNITY

## 2025-01-21 RX ORDER — BUPRENORPHINE HYDROCHLORIDE 150 UG/1
FILM, SOLUBLE BUCCAL EVERY 12 HOURS
COMMUNITY
Start: 2024-12-24

## 2025-01-21 NOTE — LETTER
"1/21/2025      Obed Nickerson  [address]  [facility] [address]      Dear Colleague,    Thank you for referring your patient, Obed Nickerson, to the [facility]. Please see a copy of my visit note below.    Video visit    Start time 9:37, stop time 9:55; additional 10 minutes spent on the date of the encounter doing chart review, documentation and further activities as noted. This did not include time spent on CGM review.     Provider location: [facility], [address] [place] [address] [address], [place] [address]    Patient location: patient home    Mode of transmission: video     The longitudinal plan of care for the diagnosis(es)/condition(s) as documented were addressed during this visit. Due to the added complexity in care, I will continue to support Mati in the subsequent management and with ongoing continuity of care.    Subjective:    Established patient    Obde Nickerson is a 59 year old male who presents for DM.     Current DM therapy:  -Lantus 0-0-0-40  -Mounjaro 7.5 mg weekly, he has significant nausea and some vomiting typically the day after injecting, these symptoms were also present with a lower dose   -Empagliflozin 25 mg daily; well tolerated     Prior DM therapy:  -Ozempic, Bydureon    -Victoza 1.2 mg daily stopped 6/2023 because of nausea   -Metformin 1000 mg BID stopped 12/2022 due to nausea and the nausea subsequently improved   -No prior use of prandial insulin     Activity is limited due to chronic pain.    # Hypercalcemia    He's had a few mildly elevated serum calcium values over the years (without concurrent albumin). No prior PTH.     Objective:    Virtual visit today. He estimates his weight is ~222#    7/2024: 103 kg (227 #), 170 cm with BMI 35.6 kg/m2, /100     6/30/2023: phone visit today, 226 #    12/2022: he notes a stable weight, virtual visit    9/2022: 250 #, 5'7\" with BMI ~39 kg/m2    Assessment/Plan:    # DM-2  -CT A/P " "3/2021: per radiology pancreas normal   -8/2024: HbA1c 9.5% (was 8.6% 4/2024)   # No retinopathy, most recent eye exam 4/2022  # Hypertension, on lisinopril   -9/2023: urine microalbumin undetectable (previously mildly elevated)  -8/2024: GFR >90  # Peripheral neuropathy, no prior ulceration    # Mild coronary artery calcification on imaging, no prior ASCVD event, not on ASA  # Mixed hyperlipidemia, not on lipid lowering therapy   -Statins previously caused \"memory loss\"  -9/2023: ,  (not previously >500 mg/dL), HDL 43, LDL not reported but previously 205  -6/2023: referred to the cardiology lipid clinic; wasn't scheduled    # Medically complicated obesity   # Hepatic steatosis  # Other  -1/8/2021 at 05:43 AM serum cortisol 2.0 mcg/dL - unclear context of this lab draw     CGM reviewed in detail.  Over the past 2 weeks GMI 8.0%, average glucose 196 mg/deciliter, time in range 49%, 33% high, 17% very high.  He only had 1 reading of low during which he was asymptomatic and we suspect it was from sensor compression. We reviewed that given his significant gastrointestinal symptoms the day after receiving Mounjaro that we could either stop it or lower the dose.  I do not recommend increasing the dose at this time.  He has a strong preference to continue Mounjaro despite the GI symptoms.  He actually has Zofran at home and he will try using it proactively.    The only change we will make today is to increase his dose of Lantus from 40 to 50 units. Reviewed a walking program.     We reviewed that ideally we would maximize his dose of Mounjaro, symptoms permitting.  I suspect that will be sufficient to achieve adequate glycemic control.  However if that is not the case then we could introduce prandial insulin. Could also retry metformin but wouldn't at this time given his GI symptoms.     I would like him to reestablish care with one of our pharmacy colleagues (He saw Ab Richards [facility], most recently 3/29/2024) " for titration of insulin and Mounjaro.    I also ordered a diabetic eye exam and put in for a cardiology referral for the lipid clinic.    Return to see me in about 6 months with labs ordered.    Upcoming appointment with Dr. Brandt 1/27/2025.     # Hypercalcemia    PTH with minerals, Cr, Vitamin D ordered for next lab draw.              Again, thank you for allowing me to participate in the care of your patient.        Sincerely,        Marcus Al MD    Electronically signed

## 2025-02-02 ASSESSMENT — ANXIETY QUESTIONNAIRES
GAD7 TOTAL SCORE: 16
1. FEELING NERVOUS, ANXIOUS, OR ON EDGE: NEARLY EVERY DAY
2. NOT BEING ABLE TO STOP OR CONTROL WORRYING: NEARLY EVERY DAY
5. BEING SO RESTLESS THAT IT IS HARD TO SIT STILL: SEVERAL DAYS
8. IF YOU CHECKED OFF ANY PROBLEMS, HOW DIFFICULT HAVE THESE MADE IT FOR YOU TO DO YOUR WORK, TAKE CARE OF THINGS AT HOME, OR GET ALONG WITH OTHER PEOPLE?: EXTREMELY DIFFICULT
GAD7 TOTAL SCORE: 16
3. WORRYING TOO MUCH ABOUT DIFFERENT THINGS: NEARLY EVERY DAY
6. BECOMING EASILY ANNOYED OR IRRITABLE: MORE THAN HALF THE DAYS
IF YOU CHECKED OFF ANY PROBLEMS ON THIS QUESTIONNAIRE, HOW DIFFICULT HAVE THESE PROBLEMS MADE IT FOR YOU TO DO YOUR WORK, TAKE CARE OF THINGS AT HOME, OR GET ALONG WITH OTHER PEOPLE: EXTREMELY DIFFICULT
4. TROUBLE RELAXING: NEARLY EVERY DAY
GAD7 TOTAL SCORE: 16
7. FEELING AFRAID AS IF SOMETHING AWFUL MIGHT HAPPEN: SEVERAL DAYS
7. FEELING AFRAID AS IF SOMETHING AWFUL MIGHT HAPPEN: SEVERAL DAYS

## 2025-02-03 ENCOUNTER — VIRTUAL VISIT (OUTPATIENT)
Dept: FAMILY MEDICINE | Facility: CLINIC | Age: 60
End: 2025-02-03
Payer: COMMERCIAL

## 2025-02-03 DIAGNOSIS — E78.5 HYPERLIPIDEMIA LDL GOAL <100: ICD-10-CM

## 2025-02-03 DIAGNOSIS — I10 BENIGN ESSENTIAL HYPERTENSION: ICD-10-CM

## 2025-02-03 DIAGNOSIS — F33.1 MODERATE RECURRENT MAJOR DEPRESSION (H): ICD-10-CM

## 2025-02-03 DIAGNOSIS — F41.9 ANXIETY: Primary | ICD-10-CM

## 2025-02-03 PROCEDURE — 98006 SYNCH AUDIO-VIDEO EST MOD 30: CPT | Performed by: FAMILY MEDICINE

## 2025-02-03 RX ORDER — ALPRAZOLAM 1 MG/1
1 TABLET ORAL
Qty: 3 TABLET | Refills: 0 | Status: SHIPPED | OUTPATIENT
Start: 2025-02-03 | End: 2025-02-05

## 2025-02-03 NOTE — PROGRESS NOTES
"Mati is a 59 year old who is being evaluated via a billable video visit.    How would you like to obtain your AVS? MyChart  If the video visit is dropped, the invitation should be resent by: Text to cell phone: [phone]  Will anyone else be joining your video visit? No      Assessment & Plan     Anxiety  Discussed with patient that I will increase his Xanax to 1 mg tablets for 3 tablets.  He can use these as he needs to for the next few days until he can  a 0.5 mg regular prescription.    We discussed that he can use the Lunesta at night to see if it is helpful but would not use at the same time as Xanax  - ALPRAZolam (XANAX) 1 MG tablet; Take 1 tablet (1 mg) by mouth nightly as needed for anxiety.  - Adult Mental Health  Referral; Future    Moderate recurrent major depression (H)  Patient currently on Cymbalta.  Would like to talk with a therapist to discuss his mood and anxiety.      Benign essential hypertension  Was in the emergency department due to low blood pressure but blood pressure has rebounded and now is high again.  Would like him to meet with cardiology to discuss other medications.  Believes he has not tolerated hydrochlorothiazide well in the past.    Hyperlipidemia LDL goal <100  Allergy to statins (memory loss) -would like to discuss with the cardiologist other types of cholesterol medications.    The longitudinal plan of care for the diagnosis(es)/condition(s) as documented were addressed during this visit. Due to the added complexity in care, I will continue to support Mati in the subsequent management and with ongoing continuity of care.    MED REC REQUIRED  Post Medication Reconciliation Status:  Patient was not discharged from an inpatient facility or TCU  BMI  Estimated body mass index is 35.84 kg/m  as calculated from the following:    Height as of 7/12/24: 1.695 m (5' 6.73\").    Weight as of 7/12/24: 103 kg (227 lb).   Weight management plan: Discussed healthy diet and " exercise guidelines    The longitudinal plan of care for the diagnosis(es)/condition(s) as documented were addressed during this visit. Due to the added complexity in care, I will continue to support Mati in the subsequent management and with ongoing continuity of care.        Subjective   Mati is a 59 year old, presenting for the following health issues:  ER F/U (1/25/2025/[facility]/Fainting and Low BP) and Recheck Medication (Med check)      Video Start Time: 1250    History of Present Illness       Mental Health Follow-up:  Patient presents to follow-up on Depression & Anxiety.Patient's depression since last visit has been:  Bad  The patient is having other symptoms associated with depression.  Patient's anxiety since last visit has been:  Bad  The patient is having other symptoms associated with anxiety.  Any significant life events: relationship concerns, financial concerns, housing concerns, grief or loss and other  Patient is feeling anxious or having panic attacks.  Patient has no concerns about alcohol or drug use.    Diabetes:   He presents for follow up of diabetes.  He is checking home blood glucose two times daily.   He checks blood glucose before meals.  Blood glucose is sometimes over 200 and never under 70. He is aware of hypoglycemia symptoms including lethargy and confusion.    He has no concerns regarding his diabetes at this time.  He is having numbness in feet, burning in feet and excessive thirst.  The patient has not had a diabetic eye exam in the last 12 months.          Hypertension: He presents for follow up of hypertension.  He does check blood pressure  regularly outside of the clinic. Outside blood pressures have been over 140/90. He does not follow a low salt diet.     He eats 0-1 servings of fruits and vegetables daily.He consumes 6 sweetened beverage(s) daily.He exercises with enough effort to increase his heart rate 9 or less minutes per day.  He exercises with  enough effort to increase his heart rate 3 or less days per week. He is missing 3 dose(s) of medications per week.  He is not taking prescribed medications regularly due to remembering to take.     Depression/anxiety: Patient has a history of depression anxiety.  He is currently on Cymbalta to help with his chronic pain as well as his mood.  He states that he still feels very anxious.  He takes up to 2 Xanax 0.5 mg daily.  He met with one of my partners who gave him a limited supply of up to 3 daily but 45 tablets should have lasted him 30 days.  He states that he misunderstood the directions and used 3 daily every day.  He is now out a few days early.  He does not think he wants to have 3 tablets daily from here on out and would like to be back to 2 tablets daily.  He also would like to speak with a therapist to see if he could get some benefit from talking with someone.    He was recently at an [facility] ER for a low blood pressure fainting episode.  Patient has a history of hypertension which has historically been difficult to control.  He had some low blood pressures and felt very lightheaded after being sick for about 10 days.  He was seen in the emergency department.  He is feeling better but notes that blood pressures are back elevated to the 160s over 100s again.    Additionally, he is wondering if there is a different cholesterol medication he could take apart from a statin.            Review of Systems  Constitutional, HEENT, cardiovascular, pulmonary, GI, , musculoskeletal, neuro, skin, endocrine and psych systems are negative, except as otherwise noted.      Objective           Vitals:  No vitals were obtained today due to virtual visit.    Physical Exam   GENERAL: alert and no distress  EYES: Eyes grossly normal to inspection.  No discharge or erythema, or obvious scleral/conjunctival abnormalities.  RESP: No audible wheeze, cough, or visible cyanosis.    SKIN: Visible skin clear. No significant  rash, abnormal pigmentation or lesions.  NEURO: Cranial nerves grossly intact.  Mentation and speech appropriate for age.  PSYCH: Appropriate affect, tone, and pace of words          Video-Visit Details    Type of service:  Video Visit   Video End Time:115pm  Originating Location (pt. Location): Home    Distant Location (provider location):  On-site  Platform used for Video Visit: Ozzie  Signed Electronically by: Rocío Brandt MD

## 2025-02-05 ENCOUNTER — TELEPHONE (OUTPATIENT)
Dept: FAMILY MEDICINE | Facility: CLINIC | Age: 60
End: 2025-02-05
Payer: COMMERCIAL

## 2025-02-05 NOTE — TELEPHONE ENCOUNTER
I spoke to [facility] pharmacist.     We cannot dispense the 0.5 mg dose until February 7 th, since the last time he picked up a 30 day supply was on 1-11-25 and we can fill up to 3 days early, .     On 1-11-25, the 0.5 mg dose was filled with directions of taking three times a day, so he should have more than enough medication on hand.    The 1 mg dose was last filled for a 3 day supply on the 4 th and he got 3 tablets of 1 mg.     Charlotte Tran RN

## 2025-02-05 NOTE — TELEPHONE ENCOUNTER
Thank you for contacting them.  If he got a 3-day supply on the fourth he should be okay to wait until the seventh to  the 0.5 mg    Thanks    EB

## 2025-02-05 NOTE — TELEPHONE ENCOUNTER
"My preference would be that patient would feel that 0.5 mg Xanax to be taken twice daily.  We were trying to do a \"workaround\" with the 1 mg dosing but his ultimate dosing is 0.5 mg up to twice daily as needed.    Are they able to fill the 0.5 mg tablets today?    EB  "

## 2025-02-05 NOTE — LETTER
February 5, 2025      Mati Nickerson  [address]   [facility] [address]        Dear ,    We are writing to inform you of your test results.    {results letter list:[id number]}    No results found from the In Basket message.    If you have any questions or concerns, please call the clinic at the number listed above.       Sincerely,          Electronically signed

## 2025-02-05 NOTE — TELEPHONE ENCOUNTER
[facility] pharmacy calling & states pt had called asking for early refill of the xanax 0.5mg. pharmacy noted that provider sent rx for 1 mg tabs (3 tabs) today.  Pt is now asking for the 0.5mg refill early.  Pharmacy wants to know what the plan is with this medication    Noted pt had VV 2/3    Mirna Curtis RN

## 2025-02-05 NOTE — TELEPHONE ENCOUNTER
Pharmacy calls with additional clarification needed; please also see the [employer] messages that were sent today.    Pharmacy says that the most recent alprazolam that was picked up was #3 of the 1 mg tablets on 2/3/25 for a 3 day supply, so pt will be out of meds tomorrow. Pharmacy says that they will dispense the 0.5 mg dose that was ordered by Dr. Brandt on 1/30/25 tomorrow instead of the 7th if PCP is ok with this. They do want ensure that pt understands the current directions with a decrease in frequency however, to ensure that they don't continue to run into the same situation again. Pharmacist says that the instructions from the last script that was picked up on 1/11/25 from Dr. Simmons said that he could take 0.5 mg 3 times a day as needed (this script also had a note indicating that this was a 30 day supply).    Routing to PCP for review; ok to fill alprazolam 0.5 mg that was sent on 1/30/25 by Dr. Brandt tomorrow 2/6/25? Pharmacist says that she'll leave notes in their system to update other pharmacists about this as well.     Kaitlin Colby RN  [facility]

## 2025-02-05 NOTE — TELEPHONE ENCOUNTER
I know that there was some concern that he would not be able to get to the pharmacy tomorrow which is why they wanted something filled today.  If they could fill the 0.5 mg tablets today that would be the most ideal.  He is aware of the decreased dosage and will only be using 2 tablets daily at most.  If they are unable to fill today they can fill tomorrow and patient will have to figure out how he can get to the pharmacy to pick this up.    Thanks    EB

## 2025-02-18 PROBLEM — M54.2 NECK PAIN: Status: RESOLVED | Noted: 2024-07-01 | Resolved: 2025-02-18

## 2025-02-22 ENCOUNTER — HEALTH MAINTENANCE LETTER (OUTPATIENT)
Age: 60
End: 2025-02-22

## 2025-02-24 ENCOUNTER — MYC REFILL (OUTPATIENT)
Dept: FAMILY MEDICINE | Facility: CLINIC | Age: 60
End: 2025-02-24
Payer: COMMERCIAL

## 2025-02-24 DIAGNOSIS — F41.9 ANXIETY: ICD-10-CM

## 2025-02-24 RX ORDER — ALPRAZOLAM 0.5 MG
0.5 TABLET ORAL 2 TIMES DAILY PRN
Qty: 15 TABLET | Refills: 0 | Status: SHIPPED | OUTPATIENT
Start: 2025-02-24

## 2025-03-03 ENCOUNTER — VIRTUAL VISIT (OUTPATIENT)
Dept: PHARMACY | Facility: CLINIC | Age: 60
End: 2025-03-03
Attending: INTERNAL MEDICINE
Payer: COMMERCIAL

## 2025-03-03 ENCOUNTER — MYC REFILL (OUTPATIENT)
Dept: FAMILY MEDICINE | Facility: CLINIC | Age: 60
End: 2025-03-03
Payer: COMMERCIAL

## 2025-03-03 ENCOUNTER — TELEPHONE (OUTPATIENT)
Dept: ENDOCRINOLOGY | Facility: CLINIC | Age: 60
End: 2025-03-03
Payer: COMMERCIAL

## 2025-03-03 DIAGNOSIS — E11.65 TYPE 2 DIABETES MELLITUS WITH HYPERGLYCEMIA, WITH LONG-TERM CURRENT USE OF INSULIN (H): Primary | ICD-10-CM

## 2025-03-03 DIAGNOSIS — Z79.4 TYPE 2 DIABETES MELLITUS WITH HYPERGLYCEMIA, WITH LONG-TERM CURRENT USE OF INSULIN (H): Primary | ICD-10-CM

## 2025-03-03 DIAGNOSIS — F41.9 ANXIETY: ICD-10-CM

## 2025-03-03 RX ORDER — PEN NEEDLE, DIABETIC 31 GX5/16"
NEEDLE, DISPOSABLE MISCELLANEOUS
Qty: 400 EACH | Refills: 5 | Status: SHIPPED | OUTPATIENT
Start: 2025-03-03

## 2025-03-03 RX ORDER — ALPRAZOLAM 0.5 MG
0.5 TABLET ORAL 2 TIMES DAILY PRN
Qty: 15 TABLET | Refills: 0 | Status: SHIPPED | OUTPATIENT
Start: 2025-03-03

## 2025-03-03 RX ORDER — INSULIN GLARGINE 100 [IU]/ML
INJECTION, SOLUTION SUBCUTANEOUS
Qty: 30 ML | Refills: 5 | Status: SHIPPED | OUTPATIENT
Start: 2025-03-03

## 2025-03-03 RX ORDER — TIRZEPATIDE 10 MG/.5ML
10 INJECTION, SOLUTION SUBCUTANEOUS
Qty: 2 ML | Refills: 5 | Status: SHIPPED | OUTPATIENT
Start: 2025-03-03

## 2025-03-03 RX ORDER — HYDROCHLOROTHIAZIDE 12.5 MG/1
1 CAPSULE ORAL SEE ADMIN INSTRUCTIONS
Qty: 6 EACH | Refills: 5 | Status: SHIPPED | OUTPATIENT
Start: 2025-03-03

## 2025-03-03 RX ORDER — INSULIN ASPART 100 [IU]/ML
INJECTION, SOLUTION INTRAVENOUS; SUBCUTANEOUS
Qty: 15 ML | Refills: 5 | Status: SHIPPED | OUTPATIENT
Start: 2025-03-03

## 2025-03-03 NOTE — TELEPHONE ENCOUNTER
PA submitted as urgent in CMM -- will check status tomorrow. Patient is due for shot Wednesday.        Ab Richards, PharmD, [facility]  Endocrine & Diabetes MTM Pharmacist  [facility]  Diabetes & Endocrinology Clinic  [address] [place] [address]    Contact information:   To schedule a MTM appointment: [phone]  For questions or concerns, please send a [employer] message (preferred) or call the clinic at [phone].    For more urgent concerns that do not require 029, please call [phone] after hours/weekends and ask to speak with the Endocrinologist on call.

## 2025-03-03 NOTE — TELEPHONE ENCOUNTER
"To Whom it May Concern:    I am writing to formally request a RENEWAL prior authorization of coverage for my patient, Obed Nickerson, for CONTINUED treatment using Mounjaro.      Obed started Mounjaro in February 2024 and has seen significant benefit to glycemic control. Prior to starting Mounjaro, his A1C was 8.6% (9/11/2023). As of 3/3/2025, his predicted A1C is 7.4% (see CGM data below), which is a clinically significant improvement. We are planning to titrate Mounjaro further over the coming months in the hopes that the patient achieves his glycemic goals and continues to reduce his daily insulin requirement.         Current diabetes therapy:  Mounjaro 7.5 mg weekly (started 2/2024)  Lantus 50 units daily  Jardiance 25 mg daily    Historical diabetes therapy and contraindications:  Ozempic 4/2019-4/2022 (not effective)  Victoza 4/2022-6/2023 (not effective, nausea)  Bydureon 7/2023-10/2023 (not effective)  Metformin 10/2017-12/2022 (intolerable nausea)    Moujaro is superior to other GLP-1 agonists as seen in the literature and is classified as having \"very high\" efficiacy for glucose lowering according to the 2024 ADA clinical guidelines.1 The patient has already tried Bydureon and Victoza, which is/are NOT listed as having very high efficacy for glucose lowering by the ADA guidelines. Additionally, the patient would not benefit from other formulary alternative Byetta as it is also not classified as having \"very high\" efficacy by the ADA.  Patient has already failed formulary alternative Ozempic as well.  Given this patients A1c is not at the ADA goal of <7% with their current therapy, it would be a serious clinical risk to not CONTINUE our plan to titrate Mounjaro.     Symlin and DPP-4 inhibitors (Janumet, Jentadueto, Kombiglyze, Onglyza, Tradjenta) are also not appropriate for this patient given they are less effective than GLP-1 agonists per ADA 2025.     Additionally, please see the ADA 2023 article " (Marcia et al)2 that demonstrates that Mounjaro is superior to other formulary alternatives (Trulicity, Rybelsus, Ozempic):      Across the five phase 3 SURPASS studies, mean reductions in A1C with Mounjaro ranged from 1.8% to 2.1% for the 5-mg dose, 1.7% to 2.4% for the 10-mg dose, and 1.7% to 2.4% for the 15-mg dose vs 0.1% to 1.9% for comparators (Ozempic). p<0.05 for Mounjaro 5 mg vs study comparators (Ozempic), adjusted for multiplicity.    I have included medical records pertaining to the patient s medical history, current condition, and treatment plan.  In addition, the following billing codes will be used for therapy and follow-up: E11.9.    I would request this submission be evaluated on an individual basis by an endocrinologist or weight  who is current in the practice and standards of care. I firmly believe that this therapy is clinically appropriate and that Obed Nickerson would benefit from improved clinical outcomes and quality of life if allowed the opportunity to receive this treatment.  I urge you to follow the aforementioned evidence presented to keep the best interest of our patient in mind.      Please contact my direct line at [phone] if you require additional information to ensure the prompt approval for coverage.    Please send your written decision to me at this address:  [facility] ENDOCRINOLOGY CLINIC [address] [address]  Secure Fax: [phone]  E-mail: [email or web address]  Direct Line: [phone]    Sincerely,    Marcus Al MD    References:    American Diabetes Association Professional Practice Committee. 6. Glycemic Goals and Hypoglycemia: Standards of Care in Diabetes-2024. Diabetes Care. 2024;47(Suppl 1):Q877-Y177. doi:10.2337/zt32-V267   Precious Spear, Andrew Wong; Special Report: Potential Strategies for Addressing GLP-1 and Dual GLP-1/GIP  Receptor Agonist Shortages. Clin Diabetes 1 July 2023; 41 (3): 467-473  SURPASS studies:  Demian BONDS, Paul MANZO, Barbi CORCORAN, et al. Efficacy and safety of a novel dual GIP and GLP-1 receptor agonist tirzepatide in patients with type 2 diabetes (SURPASS-1): a double-blind, randomised, phase 3 trial [published correction appears in Lancet. 2021 Jul 17;398(56377):212. doi: 10.1016/-3518659-6945(19)50620-3]. Lancet. 2021;398(31227):143-155. doi:10.1016/-9276289-6297(50)38424-6    Barbi CORCORAN, Ge GUERRA, Demian BONDS, et al. Tirzepatide versus Semaglutide Once Weekly in Patients with Type 2 Diabetes. N Engl J Med. 2021;385(6):503-515. doi:10.1056/[id number]    Tenisha B, Suzanne F, Rosa E, et al. Once-weekly tirzepatide versus once-daily insulin degludec as add-on to metformin with or without SGLT2 inhibitors in patients with type 2 diabetes (SURPASS-3): a randomised, open-label, parallel-group, phase 3 trial. Lancet. 2021;398(04110):583-598. doi:10.1016/-1854521-5326(03)49543-4    Del Prato S, Karen SE, Herrin I, et al. Tirzepatide versus insulin glargine in type 2 diabetes and increased cardiovascular risk (SURPASS-4): a randomised, open-label, parallel-group, multicentre, phase 3 trial. Lancet. 2021;398(15510):0001-8710. doi:10.1016/-5570350-0770(04)37875-7    Donald MARTIN, Benita Y, Shelby P, et al. Effect of Subcutaneous Tirzepatide vs Placebo Added to Titrated Insulin Glargine on Glycemic Control in Patients With Type 2 Diabetes: The SURPASS-5 Randomized Clinical Trial. RAYSA. 2022;327(6):534-545. doi:10.1001/raysa.2022.0078    .

## 2025-03-03 NOTE — PATIENT INSTRUCTIONS
Mounjaro: INCREASE to 10 mg weekly starting Wednesday (so long as we are able to get the prior authorization approved in time). I will contact patient if we have to come up with an alternative game plan.     Lantus: DECREASE Lantus to 46 units daily when you start the new Mounjaro dose. Continue to decrease by 2 units every 3 days if morning fasting sugars are consistently < 100.    Novolog: Sent prescription in anticipation of upcoming steroid shot on March 12th. Patient will use only as needed following prior correction scale used for steroid shots.     Mychart me if you have any lows < 70    Endocrine Team & Next Follow-Up:  7/15/2025 with Dr. Al   3/26/2025 with Ab

## 2025-03-03 NOTE — PROGRESS NOTES
Medication Therapy Management (MTM) Encounter    ASSESSMENT:                            Medication Adherence/Access: See below for considerations    Type 2 Diabetes: A1C above goal of < 7%, but time in range is nearing goal of greater than 70% since initiation of Mounjaro.  Given tolerating well, would benefit from dose increase today.  Of note, new prior authorization is required which I will help with today so patient can have continued access to Mounjaro. In light of current control, will recommend plan to self taper Lantus to mitigate risk of hypoglycemia.  End goal is to remove Lantus completely. Will continue to follow to titrate.    Due to time constraints, I was only able to assess the above with the patient today. Will follow-up on other disease states in future encounters    PLAN:                            Mounjaro: INCREASE to 10 mg weekly starting Wednesday (so long as we are able to get the prior authorization approved in time). I will contact patient if we have to come up with an alternative game plan.     Lantus: DECREASE Lantus to 46 units daily when you start the new Mounjaro dose. Continue to decrease by 2 units every 3 days if morning fasting sugars are consistently < 100.    Novolog: Sent prescription in anticipation of upcoming steroid shot on March 12th. Patient will use only as needed following prior correction scale used for steroid shots.     Mychart me if you have any lows < 70    Endocrine Team & Next Follow-Up:  7/15/2025 with Dr. Al   3/26/2025 with Ab    SUBJECTIVE/OBJECTIVE:                          Obed Nickerson is a 59 year old male called for a follow-up  visit. He was referred to me from Dr. Al.    Reason for visit: Medication Therapy Management (MTM).    Allergies/ADRs: Reviewed in chart  Past Medical History: Reviewed in chart  Social History     Tobacco Use    Smoking status: Never    Smokeless tobacco: Never   Vaping Use    Vaping status: Never Used  "  Substance Use Topics    Alcohol use: Never    Drug use: Never        Medication Adherence/Access: Adherence is reflected well in the dispense report.  PMAP PT    Type 2 Diabetes:   Diabetes Medication(s)    Mounjaro 7.5 mg on Wednesdays. Due for shot this Wednesday and PA is required for further fills.   Jardiance 25 mg daily  Lantus 50 units daily     No side effects/concerns today other than Mounjaro access issue.    Upcoming steroid shot on March 12th -- historically uses Novolog as needed if profound hyperglycemia occurs. Requesting new prescription today.           Per patient, lows over the past week are false lows.  He has experienced symptoms of hypoglycemia in the past and no symptoms in these instances.  Most of the time, they are compression lows which he is aware of.  Patient understands to double check readings with a fingerstick if he feels symptoms of low blood sugar and his CGM is telling him otherwise.    Lab Results   Component Value Date    A1C 9.5 (H) 08/12/2024    A1C 8.6 (H) 04/08/2024    A1C 8.6 (H) 09/11/2023     Estimated body mass index is 35.84 kg/m  as calculated from the following:    Height as of 7/12/24: 5' 6.73\" (1.695 m).    Weight as of 7/12/24: 227 lb (103 kg).     ----------------      I spent 20 minutes with this patient today. Dr. Al was provided the recommendations above via routed note and is the authorizing prescriber for this visit through the pharmacist collaborative practice agreement. A copy of the visit note was provided to the patient's provider(s).    The patient was given to the patient a summary of these recommendations.     Ab Richards, PharmD, [facility]  Endocrine & Diabetes [facility] Pharmacist  [address] [place] [address]  Direct Voicemail: [phone]    Telemedicine Visit Details  Type of service:  Telephone visit  Start Time: 1PM  End Time: 1:20PM  Originating Location (pt. Location): Home  Provider has received verbal consent for a visit " from the patient? Yes     Medication Therapy Recommendations  Type 2 diabetes mellitus with hyperglycemia, with long-term current use of insulin (H)   1 Current Medication: MOUNJARO 10 MG/0.5ML SOAJ   Current Medication Sig: Inject 0.5 mLs (10 mg) subcutaneously every 7 days.   Rationale: Dose too low - Dosage too low - Effectiveness   Recommendation: Increase Dose   Status: Accepted per CPA   Identified Date: 3/3/2025 Completed Date: 3/3/2025

## 2025-03-03 NOTE — LETTER
"3/3/2025    INSURER: Payor: [employer] / Plan: [employer] CARE MA / Product Type: HMO /   ATTN: Appeals Department  Re: Prior Authorization Request  Patient: Obed Nickerson  Policy ID#:  [id number]  : 1965      To Whom it May Concern:     I am writing to formally request a RENEWAL prior authorization of coverage for my patient, Obed Nickerson, for CONTINUED treatment using Mounjaro.       Obed started Mounjaro in 2024 and has seen significant benefit to glycemic control. Prior to starting Mounjaro, his A1C was 8.6% (2023). As of 3/3/2025, his predicted A1C is 7.4% (see CGM data below), which is a clinically significant improvement. We are planning to titrate Mounjaro further over the coming months in the hopes that the patient achieves his glycemic goals and continues to reduce his daily insulin requirement.           Current diabetes therapy:  Mounjaro 7.5 mg weekly (started 2024)  Lantus 50 units daily  Jardiance 25 mg daily     Historical diabetes therapy and contraindications:  Ozempic 2019-2022 (not effective)  Victoza 2022-2023 (not effective, nausea)  Bydureon 2023-10/2023 (not effective)  Metformin 10/2017-2022 (intolerable nausea)     Moujaro is superior to other GLP-1 agonists as seen in the literature and is classified as having \"very high\" efficiacy for glucose lowering according to the 2024 ADA clinical guidelines.1 The patient has already tried Bydureon and Victoza, which is/are NOT listed as having very high efficacy for glucose lowering by the ADA guidelines. Additionally, the patient would not benefit from other formulary alternative Byetta as it is also not classified as having \"very high\" efficacy by the ADA.  Patient has already failed formulary alternative Ozempic as well.  Given this patients A1c is not at the ADA goal of <7% with their current therapy, it would be a serious clinical risk to not CONTINUE our plan to titrate Mounjaro.    "   Symlin and DPP-4 inhibitors (Janumet, Jentadueto, Kombiglyze, Onglyza, Tradjenta) are also not appropriate for this patient given they are less effective than GLP-1 agonists per ADA 2025.      Additionally, please see the ADA 2023 article (Marcia et al)2 that demonstrates that Mounjaro is superior to other formulary alternatives (Trulicity, Rybelsus, Ozempic):       Across the five phase 3 SURPASS studies, mean reductions in A1C with Mounjaro ranged from 1.8% to 2.1% for the 5-mg dose, 1.7% to 2.4% for the 10-mg dose, and 1.7% to 2.4% for the 15-mg dose vs 0.1% to 1.9% for comparators (Ozempic). p<0.05 for Mounjaro 5 mg vs study comparators (Ozempic), adjusted for multiplicity.     I have included medical records pertaining to the patient s medical history, current condition, and treatment plan.  In addition, the following billing codes will be used for therapy and follow-up: E11.9.     I would request this submission be evaluated on an individual basis by an endocrinologist or weight  who is current in the practice and standards of care. I firmly believe that this therapy is clinically appropriate and that Obed Nickerson would benefit from improved clinical outcomes and quality of life if allowed the opportunity to receive this treatment.  I urge you to follow the aforementioned evidence presented to keep the best interest of our patient in mind.       Please contact my direct line at [phone] if you require additional information to ensure the prompt approval for coverage.     Please send your written decision to me at this address:  [facility] ENDOCRINOLOGY CLINIC [address] [address]  Secure Fax: [phone]  E-mail: [email or web address]  Direct Line: [phone]     Sincerely,     Marcus Al MD     References:     American Diabetes Association Professional Practice Committee. 6. Glycemic Goals and Hypoglycemia:  Standards of Care in Diabetes-2024. Diabetes Care. 2024;47(Suppl 1):A610-Z691. doi:10.2337/vh04-E579   Berenice Kennedy, Andrew Velázquez; Special Report: Potential Strategies for Addressing GLP-1 and Dual GLP-1/GIP Receptor Agonist Shortages. Clin Diabetes 1 July 2023; 41 (3): 467-473  SURPASS studies:  Demian BONDS, Paul MANZO, Barbi CORCORAN, et al. Efficacy and safety of a novel dual GIP and GLP-1 receptor agonist tirzepatide in patients with type 2 diabetes (SURPASS-1): a double-blind, randomised, phase 3 trial [published correction appears in Lancet. 2021 Jul 17;398(49043):212. doi: 10.1016/-9327839-0070(13)76356-7]. Lancet. 2021;398(04444):143-155. doi:10.1016/-0193670-5166(41)55924-6     Barbi CORCORAN, Ge GUERRA, Demian BONDS, et al. Tirzepatide versus Semaglutide Once Weekly in Patients with Type 2 Diabetes. N Engl J Med. 2021;385(6):503-515. doi:10.1056/SCUHpc1747485     Suzanne Polanco F, Rosa FORBES, et al. Once-weekly tirzepatide versus once-daily insulin degludec as add-on to metformin with or without SGLT2 inhibitors in patients with type 2 diabetes (SURPASS-3): a randomised, open-label, parallel-group, phase 3 trial. Lancet. 2021;398(88410):583-598. doi:10.1016/-3565356-4059(26)46038-4     Del Prato S, Karen SE, Live I, et al. Tirzepatide versus insulin glargine in type 2 diabetes and increased cardiovascular risk (SURPASS-4): a randomised, open-label, parallel-group, multicentre, phase 3 trial. Lancet. 2021;398(40197):0206-3669. doi:10.1016/-3490645-3959(71)25594-7     Donald MARTIN, Benita Y, Shelby P, et al. Effect of Subcutaneous Tirzepatide vs Placebo Added to Titrated Insulin Glargine on Glycemic Control in Patients With Type 2 Diabetes: The SURPASS-5 Randomized Clinical Trial. RAYSA. 2022;327(6):534-545. doi:10.1001/raysa.2022.0078

## 2025-03-10 ENCOUNTER — MYC REFILL (OUTPATIENT)
Dept: FAMILY MEDICINE | Facility: CLINIC | Age: 60
End: 2025-03-10
Payer: COMMERCIAL

## 2025-03-10 DIAGNOSIS — F41.9 ANXIETY: ICD-10-CM

## 2025-03-10 RX ORDER — ALPRAZOLAM 0.5 MG
0.5 TABLET ORAL 2 TIMES DAILY PRN
Qty: 15 TABLET | Refills: 0 | Status: SHIPPED | OUTPATIENT
Start: 2025-03-10

## 2025-03-11 DIAGNOSIS — I10 HYPERTENSION GOAL BP (BLOOD PRESSURE) < 130/80: ICD-10-CM

## 2025-03-12 RX ORDER — LISINOPRIL 40 MG/1
40 TABLET ORAL DAILY
Qty: 90 TABLET | Refills: 2 | Status: SHIPPED | OUTPATIENT
Start: 2025-03-12

## 2025-03-12 NOTE — TELEPHONE ENCOUNTER
Pending Prescriptions:                       Disp   Refills    lisinopril (ZESTRIL) 40 MG tablet [Pharmac*90 tab*2        Sig: TAKE 1 TABLET(40 MG) BY MOUTH DAILY    Routing refill request to provider for review/approval because:  BP not in goal range    BP Readings from Last 3 Encounters:   07/12/24 (!) 165/100   04/08/24 (!) 142/88   10/04/23 (!) 150/100     Kaitlin Colby RN  [facility]

## 2025-03-16 ENCOUNTER — MYC REFILL (OUTPATIENT)
Dept: FAMILY MEDICINE | Facility: CLINIC | Age: 60
End: 2025-03-16
Payer: COMMERCIAL

## 2025-03-16 DIAGNOSIS — F41.9 ANXIETY: ICD-10-CM

## 2025-03-17 RX ORDER — ALPRAZOLAM 0.5 MG
0.5 TABLET ORAL 2 TIMES DAILY PRN
Qty: 15 TABLET | Refills: 1 | Status: SHIPPED | OUTPATIENT
Start: 2025-03-17

## 2025-03-22 ENCOUNTER — HEALTH MAINTENANCE LETTER (OUTPATIENT)
Age: 60
End: 2025-03-22

## 2025-03-25 DIAGNOSIS — F51.04 PSYCHOPHYSIOLOGICAL INSOMNIA: ICD-10-CM

## 2025-03-25 RX ORDER — ESZOPICLONE 3 MG/1
3 TABLET, FILM COATED ORAL AT BEDTIME
Qty: 30 TABLET | Refills: 0 | Status: SHIPPED | OUTPATIENT
Start: 2025-03-25

## 2025-03-26 ENCOUNTER — VIRTUAL VISIT (OUTPATIENT)
Dept: PHARMACY | Facility: CLINIC | Age: 60
End: 2025-03-26
Attending: INTERNAL MEDICINE
Payer: COMMERCIAL

## 2025-03-26 DIAGNOSIS — E11.65 TYPE 2 DIABETES MELLITUS WITH HYPERGLYCEMIA, WITH LONG-TERM CURRENT USE OF INSULIN (H): Primary | ICD-10-CM

## 2025-03-26 DIAGNOSIS — Z79.4 TYPE 2 DIABETES MELLITUS WITH HYPERGLYCEMIA, WITH LONG-TERM CURRENT USE OF INSULIN (H): Primary | ICD-10-CM

## 2025-03-26 NOTE — PROGRESS NOTES
Medication Therapy Management (MTM) Encounter    ASSESSMENT:                            Medication Adherence/Access: See below for considerations    Type 2 Diabetes: A1C above goal of < 7%, recent worsening of glycemic control secondary to steroid shot.  Reasonable to increase NovoLog doses before meals until patient receives the next Mounjaro dose increase in about 2 weeks.  Would benefit from plan to self taper thereafter as steroid clears from system.  Will continue to follow to titrate     PLAN:                            Mounjaro: INCREASE to 12.5 mg weekly after one more dose of the 10 mg    Lantus: Continue to decrease by 2 units every 3 days if morning fasting sugars are consistently < 100.    Novolog: Increase to 10 units before meals until you start the new Mounjaro dose, then decrease to 5-8 units before meals. Discussed plan to self-taper if post-insulin sugars are nearing < 70    Mychart me if you have any lows < 70    Endocrine Team & Next Follow-Up:  7/15/2025 with Dr. Al   4/23/2025 with Ab    SUBJECTIVE/OBJECTIVE:                          Obed Nickerson is a 59 year old male called for a follow-up  visit. He was referred to me from Dr. Al.    Reason for visit: Medication Therapy Management (MTM).    Allergies/ADRs: Reviewed in chart  Past Medical History: Reviewed in chart  Social History     Tobacco Use    Smoking status: Never    Smokeless tobacco: Never   Vaping Use    Vaping status: Never Used   Substance Use Topics    Alcohol use: Never    Drug use: Never        Medication Adherence/Access: Adherence is reflected well in the dispense report. HP PMAP PT    Type 2 Diabetes:   Diabetes Medication(s)    Mounjaro 10 mg on Wednesdays -- 3 doses so far  Jardiance 25 mg daily  Lantus 50 units daily   Novolog 5-8 units before meals (started post-steroid injection).     No side effects or lows < 70    Steroid shot on March 12th -- historically uses Novolog as needed if profound  "hyperglycemia occurs. Requesting new prescription today.         Lab Results   Component Value Date    A1C 9.5 (H) 08/12/2024    A1C 8.6 (H) 04/08/2024    A1C 8.6 (H) 09/11/2023     Estimated body mass index is 35.84 kg/m  as calculated from the following:    Height as of 7/12/24: 5' 6.73\" (1.695 m).    Weight as of 7/12/24: 227 lb (103 kg).     ----------------      I spent 20 minutes with this patient today. Dr. Al was provided the recommendations above via routed note and is the authorizing prescriber for this visit through the pharmacist collaborative practice agreement. A copy of the visit note was provided to the patient's provider(s).    The patient was given to the patient a summary of these recommendations.     Ab Richards, PharmD, [facility]  Endocrine & Diabetes [facility] Pharmacist  [address] [place] [id number]  Direct Voicemail: [phone]    Telemedicine Visit Details  Type of service:  Telephone visit  Start Time: 1PM  End Time: 1:20PM  Originating Location (pt. Location): Home  Provider has received verbal consent for a visit from the patient? Yes     Medication Therapy Recommendations  Type 2 diabetes mellitus with hyperglycemia, with long-term current use of insulin (H)   1 Current Medication: MOUNJARO 10 MG/0.5ML SOAJ (Discontinued)   Current Medication Sig: Inject 0.5 mLs (10 mg) subcutaneously every 7 days.   Rationale: Dose too low - Dosage too low - Effectiveness   Recommendation: Increase Dose   Status: Accepted per CPA   Identified Date: 3/26/2025 Completed Date: 3/26/2025                       "

## 2025-03-26 NOTE — PATIENT INSTRUCTIONS
Mounjaro: INCREASE to 12.5 mg weekly after one more dose of the 10 mg    Lantus: Continue to decrease by 2 units every 3 days if morning fasting sugars are consistently < 100.    Novolog: Increase to 10 units before meals until you start the new Mounjaro dose, then decrease to 5-8 units before meals. Discussed plan to self-taper if post-insulin sugars are nearing < 70    Mychart me if you have any lows < 70    Endocrine Team & Next Follow-Up:  7/15/2025 with Dr. Al   4/23/2025 with Ab

## 2025-03-29 ENCOUNTER — MYC REFILL (OUTPATIENT)
Dept: FAMILY MEDICINE | Facility: CLINIC | Age: 60
End: 2025-03-29
Payer: COMMERCIAL

## 2025-03-29 DIAGNOSIS — F41.9 ANXIETY: ICD-10-CM

## 2025-03-31 RX ORDER — ALPRAZOLAM 0.5 MG
0.5 TABLET ORAL 2 TIMES DAILY PRN
Qty: 15 TABLET | Refills: 1 | Status: SHIPPED | OUTPATIENT
Start: 2025-03-31

## 2025-04-23 ENCOUNTER — VIRTUAL VISIT (OUTPATIENT)
Dept: PHARMACY | Facility: CLINIC | Age: 60
End: 2025-04-23
Attending: INTERNAL MEDICINE
Payer: COMMERCIAL

## 2025-04-23 DIAGNOSIS — E11.65 TYPE 2 DIABETES MELLITUS WITH HYPERGLYCEMIA, WITH LONG-TERM CURRENT USE OF INSULIN (H): Primary | ICD-10-CM

## 2025-04-23 DIAGNOSIS — Z79.4 TYPE 2 DIABETES MELLITUS WITH HYPERGLYCEMIA, WITH LONG-TERM CURRENT USE OF INSULIN (H): Primary | ICD-10-CM

## 2025-04-23 NOTE — PROGRESS NOTES
Medication Therapy Management (MTM) Encounter    ASSESSMENT:                            Medication Adherence/Access: See below for considerations    Type 2 Diabetes: A1C above goal of < 7%, but time in range nearing goal of greater than 70% secondary to Mounjaro therapy.  Given tolerating well, would benefit from dose increase as scheduled.  In light of current control, recommend continuing self taper plan of Lantus to mitigate risk of hypoglycemia. Will continue to follow to titrate     PLAN:                            Mounjaro: INCREASE to 15 mg weekly after one more dose of the 12.5 mg    Lantus: Continue to decrease by 2 units every 3 days if morning fasting sugars are consistently < 100.    Mychart me if you have any lows < 70 or issues with side effects    Endocrine Team & Next Follow-Up:  7/15/2025 with Dr. Al   5/21/2025 with Ab    SUBJECTIVE/OBJECTIVE:                          Obed Nickerson is a 59 year old male called for a follow-up  visit. He was referred to me from Dr. Al.    Reason for visit: Medication Therapy Management (MTM).    Allergies/ADRs: Reviewed in chart  Past Medical History: Reviewed in chart  Social History     Tobacco Use    Smoking status: Never    Smokeless tobacco: Never   Vaping Use    Vaping status: Never Used   Substance Use Topics    Alcohol use: Never    Drug use: Never        Medication Adherence/Access: Adherence is reflected well in the dispense report. HP PMAP PT    Type 2 Diabetes:   Diabetes Medication(s)    Mounjaro 12.5 mg on Wednesdays -- 3 doses so far  Jardiance 25 mg daily  Lantus 50 units daily   Novolog stopped since last encounter due to improved glycemic control as steroid shot cleared from system    No side effects or lows < 70          Lab Results   Component Value Date    A1C 9.5 (H) 08/12/2024    A1C 8.6 (H) 04/08/2024    A1C 8.6 (H) 09/11/2023     Recent weight: 222 lb     Estimated body mass index is 35.84 kg/m  as calculated from the  "following:    Height as of 7/12/24: 5' 6.73\" (1.695 m).    Weight as of 7/12/24: 227 lb (103 kg).     ----------------      I spent 20 minutes with this patient today. Dr. Al was provided the recommendations above via routed note and is the authorizing prescriber for this visit through the pharmacist collaborative practice agreement. A copy of the visit note was provided to the patient's provider(s).    The patient was given to the patient a summary of these recommendations.     Ab Richards, PharmD, [facility]  Endocrine & Diabetes [facility] Pharmacist  [address] [place] [id number]  Direct Voicemail: [phone]    Telemedicine Visit Details  Type of service:  Telephone visit  Start Time: 1PM  End Time: 1:20PM  Originating Location (pt. Location): Home  Provider has received verbal consent for a visit from the patient? Yes     Medication Therapy Recommendations  Type 2 diabetes mellitus with hyperglycemia, with long-term current use of insulin (H)   1 Current Medication: tirzepatide (MOUNJARO) 12.5 MG/0.5ML SOAJ auto-injector pen (Discontinued)   Current Medication Sig: Inject 0.5 mLs (12.5 mg) subcutaneously once a week.   Rationale: Dose too low - Dosage too low - Effectiveness   Recommendation: Increase Dose   Status: Accepted per CPA   Identified Date: 4/23/2025 Completed Date: 4/23/2025                         "

## 2025-04-24 ENCOUNTER — MYC REFILL (OUTPATIENT)
Dept: FAMILY MEDICINE | Facility: CLINIC | Age: 60
End: 2025-04-24
Payer: COMMERCIAL

## 2025-04-24 DIAGNOSIS — F41.9 ANXIETY: ICD-10-CM

## 2025-04-24 RX ORDER — ALPRAZOLAM 0.5 MG
0.5 TABLET ORAL 2 TIMES DAILY PRN
Qty: 15 TABLET | Refills: 1 | Status: SHIPPED | OUTPATIENT
Start: 2025-04-24

## 2025-05-01 ENCOUNTER — MYC MEDICAL ADVICE (OUTPATIENT)
Dept: FAMILY MEDICINE | Facility: CLINIC | Age: 60
End: 2025-05-01
Payer: COMMERCIAL

## 2025-05-01 DIAGNOSIS — F41.9 ANXIETY: Primary | ICD-10-CM

## 2025-05-05 RX ORDER — BUSPIRONE HYDROCHLORIDE 15 MG/1
15 TABLET ORAL 2 TIMES DAILY
Qty: 180 TABLET | Refills: 1 | Status: SHIPPED | OUTPATIENT
Start: 2025-05-05

## 2025-05-06 ENCOUNTER — MYC REFILL (OUTPATIENT)
Dept: FAMILY MEDICINE | Facility: CLINIC | Age: 60
End: 2025-05-06
Payer: COMMERCIAL

## 2025-05-06 DIAGNOSIS — F41.9 ANXIETY: ICD-10-CM

## 2025-05-07 RX ORDER — ALPRAZOLAM 0.5 MG
0.5 TABLET ORAL 2 TIMES DAILY PRN
Qty: 15 TABLET | Refills: 1 | OUTPATIENT
Start: 2025-05-07

## 2025-05-19 ENCOUNTER — MYC REFILL (OUTPATIENT)
Dept: FAMILY MEDICINE | Facility: CLINIC | Age: 60
End: 2025-05-19
Payer: COMMERCIAL

## 2025-05-19 DIAGNOSIS — F41.9 ANXIETY: ICD-10-CM

## 2025-05-19 RX ORDER — ALPRAZOLAM 0.5 MG
0.5 TABLET ORAL 2 TIMES DAILY PRN
Qty: 15 TABLET | Refills: 1 | Status: SHIPPED | OUTPATIENT
Start: 2025-05-19

## 2025-05-21 ENCOUNTER — VIRTUAL VISIT (OUTPATIENT)
Dept: PHARMACY | Facility: CLINIC | Age: 60
End: 2025-05-21
Attending: INTERNAL MEDICINE
Payer: COMMERCIAL

## 2025-05-21 DIAGNOSIS — E11.65 TYPE 2 DIABETES MELLITUS WITH HYPERGLYCEMIA, WITH LONG-TERM CURRENT USE OF INSULIN (H): ICD-10-CM

## 2025-05-21 DIAGNOSIS — Z79.4 TYPE 2 DIABETES MELLITUS WITH HYPERGLYCEMIA, WITH LONG-TERM CURRENT USE OF INSULIN (H): ICD-10-CM

## 2025-05-21 RX ORDER — HYDROCHLOROTHIAZIDE 12.5 MG/1
1 CAPSULE ORAL SEE ADMIN INSTRUCTIONS
Qty: 6 EACH | Refills: 5 | Status: SHIPPED | OUTPATIENT
Start: 2025-05-21

## 2025-05-21 NOTE — PROGRESS NOTES
Medication Therapy Management (MTM) Encounter    ASSESSMENT:                            Medication Adherence/Access: See below for considerations    Type 2 Diabetes: A1C above goal of < 7%, but time in range now above goal of greater than 70% secondary to Mounjaro therapy.  Tolerating 15 mg dose well, so will continue current therapy at this time. Reasonable to continue Lantus self-taper plan, if needed.     PLAN:                            Continue Mounjaro 15 mg weekly, Jardiance 25 mg daily    Lantus: Continue to decrease by 2 units every 3 days if morning fasting sugars are consistently < 100.    Mychart me if you have any lows < 70 or issues with side effects    Patient to obtain labs ordered by Dr. Al on 6/30/2025    Endocrine Team & Next Follow-Up:  7/15/2025 with Dr. Al   9/22/2025 with Ab    SUBJECTIVE/OBJECTIVE:                          Obed Nickerson is a 59 year old male called for a follow-up  visit. He was referred to me from Dr. Al.    Reason for visit: Medication Therapy Management (MTM).    Allergies/ADRs: Reviewed in chart  Past Medical History: Reviewed in chart  Social History     Tobacco Use    Smoking status: Never    Smokeless tobacco: Never   Vaping Use    Vaping status: Never Used   Substance Use Topics    Alcohol use: Never    Drug use: Never        Medication Adherence/Access: Adherence is reflected well in the dispense report. HP PMAP PT    Type 2 Diabetes:   Diabetes Medication(s)    Mounjaro 15 mg on Wednesdays   Jardiance 25 mg daily  Lantus 50 units daily   Novolog -- patient only uses for short period of time after steroid injections.  Follows sliding scale/adjusts on own per preference.    No side effects or lows < 70. Does note increased appetite day prior to injection day.     Prior DM therapy:  Ozempic, Bydureon    Victoza 1.2 mg daily stopped 6/2023 because of nausea   Metformin 1000 mg BID stopped 12/2022 due to nausea and the nausea subsequently  "improved           Lab Results   Component Value Date    A1C 9.5 (H) 08/12/2024    A1C 8.6 (H) 04/08/2024    A1C 8.6 (H) 09/11/2023     Recent weight: 215 - 222 lb     Wt Readings from Last 12 Encounters:   07/12/24 227 lb (103 kg)   04/08/24 235 lb 4.8 oz (106.7 kg)   10/04/23 241 lb 3 oz (109.4 kg)   05/25/23 228 lb (103.4 kg)   02/03/23 237 lb 2 oz (107.6 kg)   07/25/22 250 lb 2 oz (113.5 kg)   07/08/22 252 lb 3.2 oz (114.4 kg)   02/11/22 240 lb (108.9 kg)   01/29/22 241 lb 9.6 oz (109.6 kg)   10/27/21 249 lb (112.9 kg)   07/06/21 245 lb (111.1 kg)   02/02/21 248 lb (112.5 kg)     Estimated body mass index is 35.84 kg/m  as calculated from the following:    Height as of 7/12/24: 5' 6.73\" (1.695 m).    Weight as of 7/12/24: 227 lb (103 kg).     ----------------      I spent 20 minutes with this patient today. Dr. Al was provided the recommendations above via routed note and is the authorizing prescriber for this visit through the pharmacist collaborative practice agreement. A copy of the visit note was provided to the patient's provider(s).    The patient was given to the patient a summary of these recommendations.     Ab Richards, PharmD, [facility]  Endocrine & Diabetes [facility] Pharmacist  [address] [place] [id number]  Direct Voicemail: [phone]    Telemedicine Visit Details  Type of service:  Telephone visit  Start Time: 1PM  End Time: 1:20PM  Originating Location (pt. Location): Home  Provider has received verbal consent for a visit from the patient? Yes     Medication Therapy Recommendations  No medication therapy recommendations to display                  "

## 2025-05-21 NOTE — PATIENT INSTRUCTIONS
Continue Mounjaro 15 mg weekly, Jardiance 25 mg daily    Lantus: Continue to decrease by 2 units every 3 days if morning fasting sugars are consistently < 100.    Mychart me if you have any lows < 70 or issues with side effects    Patient to obtain labs ordered by Dr. Al on 6/30/2025    Endocrine Team & Next Follow-Up:  7/15/2025 with Dr. Al   9/22/2025 with Ab

## 2025-05-30 ENCOUNTER — TRANSFERRED RECORDS (OUTPATIENT)
Dept: MULTI SPECIALTY CLINIC | Facility: CLINIC | Age: 60
End: 2025-05-30
Payer: COMMERCIAL

## 2025-05-30 LAB — RETINOPATHY: NORMAL

## 2025-06-01 ENCOUNTER — MYC REFILL (OUTPATIENT)
Dept: FAMILY MEDICINE | Facility: CLINIC | Age: 60
End: 2025-06-01
Payer: COMMERCIAL

## 2025-06-01 DIAGNOSIS — F41.9 ANXIETY: ICD-10-CM

## 2025-06-02 RX ORDER — ALPRAZOLAM 0.5 MG
0.5 TABLET ORAL 2 TIMES DAILY PRN
Qty: 15 TABLET | Refills: 1 | Status: SHIPPED | OUTPATIENT
Start: 2025-06-02

## 2025-06-11 ENCOUNTER — PATIENT OUTREACH (OUTPATIENT)
Dept: FAMILY MEDICINE | Facility: CLINIC | Age: 60
End: 2025-06-11
Payer: COMMERCIAL

## 2025-06-11 NOTE — TELEPHONE ENCOUNTER
Patient Quality Outreach    Patient is due for the following:   Diabetes -  A1C, LDL (Fasting), Microalbumin, and Foot Exam  Physical Preventive Adult Physical    Action(s) Taken:   Schedule a Adult Preventative    Type of outreach:    Sent ComHear message.    Questions for provider review:    None         Evelin Markham MA  Chart routed to None.

## 2025-07-01 DIAGNOSIS — F41.9 ANXIETY: ICD-10-CM

## 2025-07-01 RX ORDER — DULOXETIN HYDROCHLORIDE 60 MG/1
60 CAPSULE, DELAYED RELEASE ORAL 2 TIMES DAILY
Qty: 180 CAPSULE | Refills: 1 | Status: SHIPPED | OUTPATIENT
Start: 2025-07-01

## 2025-07-09 DIAGNOSIS — F41.9 ANXIETY: ICD-10-CM

## 2025-07-09 RX ORDER — ALPRAZOLAM 0.5 MG
0.5 TABLET ORAL 2 TIMES DAILY PRN
Qty: 15 TABLET | Refills: 1 | Status: SHIPPED | OUTPATIENT
Start: 2025-07-09

## 2025-07-14 RX ORDER — HYDROXYZINE PAMOATE 25 MG/1
CAPSULE ORAL
COMMUNITY
Start: 2025-06-27

## 2025-07-14 RX ORDER — BUPRENORPHINE HYDROCHLORIDE 300 UG/1
FILM, SOLUBLE BUCCAL
COMMUNITY
Start: 2025-06-18

## 2025-07-15 ENCOUNTER — VIRTUAL VISIT (OUTPATIENT)
Dept: ENDOCRINOLOGY | Facility: CLINIC | Age: 60
End: 2025-07-15
Payer: COMMERCIAL

## 2025-07-15 ENCOUNTER — MYC MEDICAL ADVICE (OUTPATIENT)
Dept: ENDOCRINOLOGY | Facility: CLINIC | Age: 60
End: 2025-07-15

## 2025-07-15 DIAGNOSIS — E88.819 INSULIN RESISTANCE: ICD-10-CM

## 2025-07-15 DIAGNOSIS — Z79.4 TYPE 2 DIABETES MELLITUS WITH DIABETIC POLYNEUROPATHY, WITH LONG-TERM CURRENT USE OF INSULIN (H): Primary | ICD-10-CM

## 2025-07-15 DIAGNOSIS — E66.811 CLASS 1 OBESITY DUE TO EXCESS CALORIES WITH SERIOUS COMORBIDITY AND BODY MASS INDEX (BMI) OF 33.0 TO 33.9 IN ADULT: ICD-10-CM

## 2025-07-15 DIAGNOSIS — E11.42 TYPE 2 DIABETES MELLITUS WITH DIABETIC POLYNEUROPATHY, WITH LONG-TERM CURRENT USE OF INSULIN (H): Primary | ICD-10-CM

## 2025-07-15 DIAGNOSIS — E78.2 MIXED HYPERLIPIDEMIA: ICD-10-CM

## 2025-07-15 DIAGNOSIS — E66.09 CLASS 1 OBESITY DUE TO EXCESS CALORIES WITH SERIOUS COMORBIDITY AND BODY MASS INDEX (BMI) OF 33.0 TO 33.9 IN ADULT: ICD-10-CM

## 2025-07-15 DIAGNOSIS — E83.52 HYPERCALCEMIA: ICD-10-CM

## 2025-07-15 DIAGNOSIS — Z79.4 LONG TERM (CURRENT) USE OF INSULIN (H): ICD-10-CM

## 2025-07-15 DIAGNOSIS — I10 HYPERTENSION, UNSPECIFIED TYPE: ICD-10-CM

## 2025-07-15 DIAGNOSIS — K76.0 HEPATIC STEATOSIS: ICD-10-CM

## 2025-07-15 PROCEDURE — 98006 SYNCH AUDIO-VIDEO EST MOD 30: CPT | Mod: 25 | Performed by: INTERNAL MEDICINE

## 2025-07-15 PROCEDURE — 95251 CONT GLUC MNTR ANALYSIS I&R: CPT | Performed by: INTERNAL MEDICINE

## 2025-07-15 RX ORDER — HYDROCHLOROTHIAZIDE 12.5 MG/1
1 CAPSULE ORAL SEE ADMIN INSTRUCTIONS
Qty: 6 EACH | Refills: 5 | Status: SHIPPED | OUTPATIENT
Start: 2025-07-15

## 2025-07-15 NOTE — LETTER
"7/15/2025      Obed Nickerson  [address]  [facility] [address]      Dear Colleague,    Thank you for referring your patient, Obed Nickerson, to the [facility]. Please see a copy of my visit note below.    Video visit    Start time 3:35, stop time 3:58; additional 11 minutes spent on the date of the encounter doing chart review, documentation and further activities as noted. This did not include time spent on CGM review.     Provider location: [facility], [address] [place] [address] [address], [place] [address]    Patient location: patient home    Mode of transmission: video     The longitudinal plan of care for the diagnosis(es)/condition(s) as documented were addressed during this visit. Due to the added complexity in care, I will continue to support Mati in the subsequent management and with ongoing continuity of care.    Subjective:    Established patient    Obed Nickerson is a 60 year old male who presents for DM.     Current DM therapy:  -Lantus 40 units daily   -Mounjaro 15 mg weekly; reasonably tolerated  -Empagliflozin 25 mg daily; well tolerated     Prior DM therapy:  -Ozempic, Bydureon    -Victoza 1.2 mg daily stopped 6/2023 because of nausea   -Metformin 1000 mg BID stopped 12/2022 due to nausea and the nausea subsequently improved   -NovoLog      Activity is limited due to chronic pain.    # Hypercalcemia    He's had a few mildly elevated serum calcium values over the years (without concurrent albumin). No prior PTH.     He does not take: calcium supplement, vitamin D, MVI. He drinks a \"gallon of milk every few days\".     Objective:    Virtual visit today. He estimates his weight is ~214#    1/2025: Virtual visit today. He estimates his weight is ~222#    7/2024: 103 kg (227 #), 170 cm with BMI 35.6 kg/m2, /100     6/30/2023: phone visit today, 226 #    12/2022: he notes a stable weight, virtual visit    9/2022: 250 #, 5'7\" with BMI ~39 " "kg/m2    Assessment/Plan:    # DM-2  -CT A/P 3/2021: per radiology pancreas normal   -8/2024: HbA1c 9.5% (was 8.6% 4/2024)   # No retinopathy, most recent eye exam 5/2025  # Hypertension, on lisinopril   -9/2023: urine microalbumin undetectable (previously mildly elevated)  -1/2025: GFR 89  # Peripheral neuropathy, no prior ulceration    # Mild coronary artery calcification on imaging, no prior ASCVD event, not on ASA  # Mixed hyperlipidemia, not on lipid lowering therapy   -Statins previously caused \"memory loss\"  -9/2023: ,  (not previously >500 mg/dL), HDL 43, LDL not reported but previously 205  -6/2023: referred to the cardiology lipid clinic; wasn't scheduled    # Medically complicated obesity   # Hepatic steatosis  # Other  -1/8/2021 at 05:43 AM serum cortisol 2.0 mcg/dL - unclear context of this lab draw     CGM reviewed in detail. Over the past 2 weeks: GMI 6.9%, TIR 76%, 0% low, 22% high, 2% very high.     No change in DM medications. To break through his weight loss plateau I recommend increased activity (consider a pedometer or Fitbit). Consider calorie tracking - can use My Fitness Pal. He declines a dietician referral.     Labs to be done later this month - orders placed.     He's had issues with his CGM falling off. Referred to a CDCES to review. BGM and testing supplies also rx.    1/2025: I placed a cardiology referral for the lipid clinic - wasn't scheduled, encouraged Mati to schedule today.     He saw Ab Richards [facility], most recently 5/2025.    See me in 6-9 months.     # Hypercalcemia    PTH with minerals, Cr, Vitamin D ordered for next lab draw.            Again, thank you for allowing me to participate in the care of your patient.        Sincerely,        Marcus Al MD    Electronically signed"

## 2025-07-15 NOTE — PROGRESS NOTES
"Video visit    Start time 3:35, stop time 3:58; additional 11 minutes spent on the date of the encounter doing chart review, documentation and further activities as noted. This did not include time spent on CGM review.     Provider location: [facility], [address] [place] [address] [address], [place] [address]    Patient location: patient home    Mode of transmission: video     The longitudinal plan of care for the diagnosis(es)/condition(s) as documented were addressed during this visit. Due to the added complexity in care, I will continue to support Mati in the subsequent management and with ongoing continuity of care.    Subjective:    Established patient    Obed Nickerson is a 60 year old male who presents for DM.     Current DM therapy:  -Lantus 40 units daily   -Mounjaro 15 mg weekly; reasonably tolerated  -Empagliflozin 25 mg daily; well tolerated     Prior DM therapy:  -Ozempic, Bydureon    -Victoza 1.2 mg daily stopped 6/2023 because of nausea   -Metformin 1000 mg BID stopped 12/2022 due to nausea and the nausea subsequently improved   -NovoLog      Activity is limited due to chronic pain.    # Hypercalcemia    He's had a few mildly elevated serum calcium values over the years (without concurrent albumin). No prior PTH.     He does not take: calcium supplement, vitamin D, MVI. He drinks a \"gallon of milk every few days\".     Objective:    Virtual visit today. He estimates his weight is ~214#    1/2025: Virtual visit today. He estimates his weight is ~222#    7/2024: 103 kg (227 #), 170 cm with BMI 35.6 kg/m2, /100     6/30/2023: phone visit today, 226 #    12/2022: he notes a stable weight, virtual visit    9/2022: 250 #, 5'7\" with BMI ~39 kg/m2    Assessment/Plan:    # DM-2  -CT A/P 3/2021: per radiology pancreas normal   -8/2024: HbA1c 9.5% (was 8.6% 4/2024)   # No retinopathy, most recent eye exam 5/2025  # Hypertension, on lisinopril   -9/2023: urine microalbumin undetectable " "(previously mildly elevated)  -1/2025: GFR 89  # Peripheral neuropathy, no prior ulceration    # Mild coronary artery calcification on imaging, no prior ASCVD event, not on ASA  # Mixed hyperlipidemia, not on lipid lowering therapy   -Statins previously caused \"memory loss\"  -9/2023: ,  (not previously >500 mg/dL), HDL 43, LDL not reported but previously 205  -6/2023: referred to the cardiology lipid clinic; wasn't scheduled    # Medically complicated obesity   # Hepatic steatosis  # Other  -1/8/2021 at 05:43 AM serum cortisol 2.0 mcg/dL - unclear context of this lab draw     CGM reviewed in detail. Over the past 2 weeks: GMI 6.9%, TIR 76%, 0% low, 22% high, 2% very high.     No change in DM medications. To break through his weight loss plateau I recommend increased activity (consider a pedometer or Fitbit). Consider calorie tracking - can use My Fitness Pal. He declines a dietician referral.     Labs to be done later this month - orders placed.     He's had issues with his CGM falling off. Referred to a CDCES to review. BGM and testing supplies also rx.    1/2025: I placed a cardiology referral for the lipid clinic - wasn't scheduled, encouraged Mati to schedule today.     He saw Ab Richards RPH, most recently 5/2025.    See me in 6-9 months.     # Hypercalcemia    PTH with minerals, Cr, Vitamin D ordered for next lab draw.          "

## 2025-07-16 ENCOUNTER — PATIENT OUTREACH (OUTPATIENT)
Dept: CARE COORDINATION | Facility: CLINIC | Age: 60
End: 2025-07-16
Payer: COMMERCIAL

## 2025-07-18 DIAGNOSIS — F41.9 ANXIETY: ICD-10-CM

## 2025-07-19 RX ORDER — ALPRAZOLAM 0.5 MG
0.5 TABLET ORAL 2 TIMES DAILY PRN
Qty: 15 TABLET | Refills: 0 | Status: SHIPPED | OUTPATIENT
Start: 2025-07-19 | End: 2025-07-23

## 2025-07-22 SDOH — HEALTH STABILITY: PHYSICAL HEALTH: ON AVERAGE, HOW MANY DAYS PER WEEK DO YOU ENGAGE IN MODERATE TO STRENUOUS EXERCISE (LIKE A BRISK WALK)?: 0 DAYS

## 2025-07-22 SDOH — HEALTH STABILITY: PHYSICAL HEALTH: ON AVERAGE, HOW MANY MINUTES DO YOU ENGAGE IN EXERCISE AT THIS LEVEL?: 0 MIN

## 2025-07-22 ASSESSMENT — PATIENT HEALTH QUESTIONNAIRE - PHQ9
SUM OF ALL RESPONSES TO PHQ QUESTIONS 1-9: 12
10. IF YOU CHECKED OFF ANY PROBLEMS, HOW DIFFICULT HAVE THESE PROBLEMS MADE IT FOR YOU TO DO YOUR WORK, TAKE CARE OF THINGS AT HOME, OR GET ALONG WITH OTHER PEOPLE: VERY DIFFICULT
SUM OF ALL RESPONSES TO PHQ QUESTIONS 1-9: 12

## 2025-07-22 ASSESSMENT — SOCIAL DETERMINANTS OF HEALTH (SDOH): HOW OFTEN DO YOU GET TOGETHER WITH FRIENDS OR RELATIVES?: NEVER

## 2025-07-23 ENCOUNTER — LAB (OUTPATIENT)
Dept: LAB | Facility: CLINIC | Age: 60
End: 2025-07-23
Payer: COMMERCIAL

## 2025-07-23 ENCOUNTER — OFFICE VISIT (OUTPATIENT)
Dept: FAMILY MEDICINE | Facility: CLINIC | Age: 60
End: 2025-07-23
Payer: COMMERCIAL

## 2025-07-23 VITALS
TEMPERATURE: 97.8 F | DIASTOLIC BLOOD PRESSURE: 95 MMHG | BODY MASS INDEX: 35.16 KG/M2 | SYSTOLIC BLOOD PRESSURE: 165 MMHG | OXYGEN SATURATION: 99 % | RESPIRATION RATE: 17 BRPM | HEART RATE: 108 BPM | WEIGHT: 224 LBS | HEIGHT: 67 IN

## 2025-07-23 DIAGNOSIS — G95.9 CERVICAL MYELOPATHY (H): ICD-10-CM

## 2025-07-23 DIAGNOSIS — I10 HYPERTENSION GOAL BP (BLOOD PRESSURE) < 130/80: ICD-10-CM

## 2025-07-23 DIAGNOSIS — Z00.00 ROUTINE GENERAL MEDICAL EXAMINATION AT A HEALTH CARE FACILITY: Primary | ICD-10-CM

## 2025-07-23 DIAGNOSIS — E11.65 TYPE 2 DIABETES MELLITUS WITH HYPERGLYCEMIA, WITH LONG-TERM CURRENT USE OF INSULIN (H): ICD-10-CM

## 2025-07-23 DIAGNOSIS — E66.01 CLASS 2 SEVERE OBESITY DUE TO EXCESS CALORIES WITH SERIOUS COMORBIDITY AND BODY MASS INDEX (BMI) OF 35.0 TO 35.9 IN ADULT (H): ICD-10-CM

## 2025-07-23 DIAGNOSIS — Z79.4 TYPE 2 DIABETES MELLITUS WITH HYPERGLYCEMIA, WITH LONG-TERM CURRENT USE OF INSULIN (H): ICD-10-CM

## 2025-07-23 DIAGNOSIS — Z12.5 SCREENING FOR PROSTATE CANCER: ICD-10-CM

## 2025-07-23 DIAGNOSIS — E78.2 MIXED HYPERLIPIDEMIA: ICD-10-CM

## 2025-07-23 DIAGNOSIS — F41.9 ANXIETY: ICD-10-CM

## 2025-07-23 DIAGNOSIS — L84 CALLUS OF FOOT: ICD-10-CM

## 2025-07-23 DIAGNOSIS — E83.52 HYPERCALCEMIA: ICD-10-CM

## 2025-07-23 DIAGNOSIS — G99.2 MYELOPATHY IN DISEASES CLASSIFIED ELSEWHERE (H): ICD-10-CM

## 2025-07-23 DIAGNOSIS — E66.812 CLASS 2 SEVERE OBESITY DUE TO EXCESS CALORIES WITH SERIOUS COMORBIDITY AND BODY MASS INDEX (BMI) OF 35.0 TO 35.9 IN ADULT (H): ICD-10-CM

## 2025-07-23 LAB
ALBUMIN SERPL BCG-MCNC: 4.7 G/DL (ref 3.5–5.2)
ANION GAP SERPL CALCULATED.3IONS-SCNC: 16 MMOL/L (ref 7–15)
BUN SERPL-MCNC: 17.6 MG/DL (ref 8–23)
CALCIUM SERPL-MCNC: 10.6 MG/DL (ref 8.8–10.4)
CALCIUM SERPL-MCNC: 10.6 MG/DL (ref 8.8–10.4)
CHLORIDE SERPL-SCNC: 99 MMOL/L (ref 98–107)
CHOLEST SERPL-MCNC: 308 MG/DL
CREAT SERPL-MCNC: 1 MG/DL (ref 0.67–1.17)
CREAT SERPL-MCNC: 1 MG/DL (ref 0.67–1.17)
CREAT UR-MCNC: 66.8 MG/DL
EGFRCR SERPLBLD CKD-EPI 2021: 86 ML/MIN/1.73M2
EGFRCR SERPLBLD CKD-EPI 2021: 86 ML/MIN/1.73M2
EST. AVERAGE GLUCOSE BLD GHB EST-MCNC: 154 MG/DL
FASTING STATUS PATIENT QL REPORTED: NO
FASTING STATUS PATIENT QL REPORTED: NO
GLUCOSE SERPL-MCNC: 213 MG/DL (ref 70–99)
HBA1C MFR BLD: 7 % (ref 0–5.6)
HCO3 SERPL-SCNC: 24 MMOL/L (ref 22–29)
HDLC SERPL-MCNC: 44 MG/DL
LDLC SERPL CALC-MCNC: 208 MG/DL
MICROALBUMIN UR-MCNC: 21.4 MG/L
MICROALBUMIN/CREAT UR: 32.04 MG/G CR (ref 0–17)
NONHDLC SERPL-MCNC: 264 MG/DL
PHOSPHATE SERPL-MCNC: 3.9 MG/DL (ref 2.5–4.5)
POTASSIUM SERPL-SCNC: 4.2 MMOL/L (ref 3.4–5.3)
PSA SERPL DL<=0.01 NG/ML-MCNC: 0.62 NG/ML (ref 0–4.5)
PTH-INTACT SERPL-MCNC: 48 PG/ML (ref 15–65)
SODIUM SERPL-SCNC: 139 MMOL/L (ref 135–145)
TRIGL SERPL-MCNC: 282 MG/DL

## 2025-07-23 PROCEDURE — 82570 ASSAY OF URINE CREATININE: CPT

## 2025-07-23 PROCEDURE — 82043 UR ALBUMIN QUANTITATIVE: CPT

## 2025-07-23 PROCEDURE — 83036 HEMOGLOBIN GLYCOSYLATED A1C: CPT

## 2025-07-23 PROCEDURE — 99396 PREV VISIT EST AGE 40-64: CPT | Mod: 25 | Performed by: FAMILY MEDICINE

## 2025-07-23 PROCEDURE — G0103 PSA SCREENING: HCPCS

## 2025-07-23 PROCEDURE — 80061 LIPID PANEL: CPT

## 2025-07-23 PROCEDURE — 83970 ASSAY OF PARATHORMONE: CPT

## 2025-07-23 PROCEDURE — 36415 COLL VENOUS BLD VENIPUNCTURE: CPT

## 2025-07-23 PROCEDURE — 3077F SYST BP >= 140 MM HG: CPT | Performed by: FAMILY MEDICINE

## 2025-07-23 PROCEDURE — 3080F DIAST BP >= 90 MM HG: CPT | Performed by: FAMILY MEDICINE

## 2025-07-23 PROCEDURE — 80069 RENAL FUNCTION PANEL: CPT

## 2025-07-23 PROCEDURE — 11056 PARNG/CUTG B9 HYPRKR LES 2-4: CPT | Mod: Q8 | Performed by: FAMILY MEDICINE

## 2025-07-23 PROCEDURE — 99214 OFFICE O/P EST MOD 30 MIN: CPT | Mod: 25 | Performed by: FAMILY MEDICINE

## 2025-07-23 RX ORDER — LANCETS
EACH MISCELLANEOUS DAILY
COMMUNITY
Start: 2025-07-15

## 2025-07-23 RX ORDER — ALPRAZOLAM 1 MG/1
0.5 TABLET ORAL 2 TIMES DAILY PRN
Qty: 30 TABLET | Refills: 1 | Status: SHIPPED | OUTPATIENT
Start: 2025-07-23

## 2025-07-23 NOTE — PATIENT INSTRUCTIONS
Patient Education   Preventive Care Advice   This is general advice given by our system to help you stay healthy. However, your care team may have specific advice just for you. Please talk to your care team about your preventive care needs.  Nutrition  Eat 5 or more servings of fruits and vegetables each day.  Try wheat bread, brown rice and whole grain pasta (instead of white bread, rice, and pasta).  Get enough calcium and vitamin D. Check the label on foods and aim for 100% of the RDA (recommended daily allowance).  Lifestyle  Exercise at least 150 minutes each week  (30 minutes a day, 5 days a week).  Do muscle strengthening activities 2 days a week. These help control your weight and prevent disease.  No smoking.  Wear sunscreen to prevent skin cancer.  Have a dental exam and cleaning every 6 months.  Yearly exams  See your health care team every year to talk about:  Any changes in your health.  Any medicines your care team has prescribed.  Preventive care, family planning, and ways to prevent chronic diseases.  Shots (vaccines)   HPV shots (up to age 26), if you've never had them before.  Hepatitis B shots (up to age 59), if you've never had them before.  COVID-19 shot: Get this shot when it's due.  Flu shot: Get a flu shot every year.  Tetanus shot: Get a tetanus shot every 10 years.  Pneumococcal, hepatitis A, and RSV shots: Ask your care team if you need these based on your risk.  Shingles shot (for age 50 and up)  General health tests  Diabetes screening:  Starting at age 35, Get screened for diabetes at least every 3 years.  If you are younger than age 35, ask your care team if you should be screened for diabetes.  Cholesterol test: At age 39, start having a cholesterol test every 5 years, or more often if advised.  Bone density scan (DEXA): At age 50, ask your care team if you should have this scan for osteoporosis (brittle bones).  Hepatitis C: Get tested at least once in your life.  STIs (sexually  transmitted infections)  Before age 24: Ask your care team if you should be screened for STIs.  After age 24: Get screened for STIs if you're at risk. You are at risk for STIs (including HIV) if:  You are sexually active with more than one person.  You don't use condoms every time.  You or a partner was diagnosed with a sexually transmitted infection.  If you are at risk for HIV, ask about PrEP medicine to prevent HIV.  Get tested for HIV at least once in your life, whether you are at risk for HIV or not.  Cancer screening tests  Cervical cancer screening: If you have a cervix, begin getting regular cervical cancer screening tests starting at age 21.  Breast cancer scan (mammogram): If you've ever had breasts, begin having regular mammograms starting at age 40. This is a scan to check for breast cancer.  Colon cancer screening: It is important to start screening for colon cancer at age 45.  Have a colonoscopy test every 10 years (or more often if you're at risk) Or, ask your provider about stool tests like a FIT test every year or Cologuard test every 3 years.  To learn more about your testing options, visit:   .  For help making a decision, visit:   https://bit.ly/nb01265.  Prostate cancer screening test: If you have a prostate, ask your care team if a prostate cancer screening test (PSA) at age 55 is right for you.  Lung cancer screening: If you are a current or former smoker ages 50 to 80, ask your care team if ongoing lung cancer screenings are right for you.  For informational purposes only. Not to replace the advice of your health care provider. Copyright   2023 [facility]. All rights reserved. Clinically reviewed by the [facility]. [employer] 817948 - REV 01/24.  Preventing Falls: Care Instructions  Injuries and health problems such as trouble walking or poor eyesight can increase your risk of falling. So can some medicines. But there are things you can do to help  "prevent falls. You can exercise to get stronger. You can also arrange your home to make it safer.    Talk to your doctor about the medicines you take. Ask if any of them increase the risk of falls and whether they can be changed or stopped.   Try to exercise regularly. It can help improve your strength and balance. This can help lower your risk of falling.         Practice fall safety and prevention.   Wear low-heeled shoes that fit well and give your feet good support. Talk to your doctor if you have foot problems that make this hard.  Carry a cellphone or wear a medical alert device that you can use to call for help.  Use stepladders instead of chairs to reach high objects. Don't climb if you're at risk for falls. Ask for help, if needed.  Wear the correct eyeglasses, if you need them.        Make your home safer.   Remove rugs, cords, clutter, and furniture from walkways.  Keep your house well lit. Use night-lights in hallways and bathrooms.  Install and use sturdy handrails on stairways.  Wear nonskid footwear, even inside. Don't walk barefoot or in socks without shoes.        Be safe outside.   Use handrails, curb cuts, and ramps whenever possible.  Keep your hands free by using a shoulder bag or backpack.  Try to walk in well-lit areas. Watch out for uneven ground, changes in pavement, and debris.  Be careful in the winter. Walk on the grass or gravel when sidewalks are slippery. Use de-icer on steps and walkways. Add non-slip devices to shoes.    Put grab bars and nonskid mats in your shower or tub and near the toilet. Try to use a shower chair or bath bench when bathing.   Get into a tub or shower by putting in your weaker leg first. Get out with your strong side first. Have a phone or medical alert device in the bathroom with you.   Where can you learn more?  Go to https://www.Betabrandwise.net/patiented  Enter G117 in the search box to learn more about \"Preventing Falls: Care Instructions.\"  Current as of: " July 31, 2024  Content Version: 14.5    6909-8104 [employer].   Care instructions adapted under license by your healthcare professional. If you have questions about a medical condition or this instruction, always ask your healthcare professional. [employer] disclaims any warranty or liability for your use of this information.    Relationships for Good Health  Relationships are important for our health and happiness. Social isolation, loneliness and lack of support are bad for your health. Studies show that loneliness can harm health and limit your life span as much as high blood pressure and smoking.   Take some time to reflect on your relationships. Then answer these questions:  Are there people in your life that cause you stress or drain your energy? What can you do to set limits?  ________________________________________________________________________________________________________________________________________________________________________________________________________________________________________________________________________________________________________________________________________________  Who do you enjoy spending time with? Who can you go to for support?  ________________________________________________________________________________________________________________________________________________________________________________________________________________________________________________________________________________________________________________________________________________  What can you do to improve your relationships with  others?  __________________________________________________________________________________________________________________________________________________________________________________________________________________  ______________________________________________________________________________________________________________________________  What do you like most about your relationships with others?  ________________________________________________________________________________________________________________________________________________________________________________________________________________________________________________________________________________________________________________________________________________  My goal: ______________________________________________________________________  I will: ______________________________________________________________________________________________________________________________________________________________________________________________    For informational purposes only. Not to replace the advice of your health care provider. Copyright   2018 [facility]. All rights reserved. Clinically reviewed by [facility]  Team. [employer] 443512 - Rev 06/24.  Learning About Stress  What is stress?     Stress is your body's response to a hard situation. Your body can have a physical, emotional, or mental response. Stress is a fact of life for most people, and it affects everyone differently. What causes stress for you may not be stressful for someone else.  A lot of things can cause stress. You may feel stress when you go on a job interview, take a test, or run a race. This kind of short-term stress is normal and even useful. It can help you if you need to work hard or react quickly. For example, stress can help you finish an important job on time.  Long-term stress is caused by ongoing stressful situations or events. Examples  of long-term stress include long-term health problems, ongoing problems at work, or conflicts in your family. Long-term stress can harm your health.  How does stress affect your health?  When you are stressed, your body responds as though you are in danger. It makes hormones that speed up your heart, make you breathe faster, and give you a burst of energy. This is called the fight-or-flight stress response. If the stress is over quickly, your body goes back to normal and no harm is done.  But if stress happens too often or lasts too long, it can have bad effects. Long-term stress can make you more likely to get sick, and it can make symptoms of some diseases worse. If you tense up when you are stressed, you may develop neck, shoulder, or low back pain. Stress is linked to high blood pressure and heart disease.  Stress also harms your emotional health. It can make you coles, tense, or depressed. Your relationships may suffer, and you may not do well at work or school.  What can you do to manage stress?  You can try these things to help manage stress:   Do something active. Exercise or activity can help reduce stress. Walking is a great way to get started. Even everyday activities such as housecleaning or yard work can help.  Try yoga or kailyn chi. These techniques combine exercise and meditation. You may need some training at first to learn them.  Do something you enjoy. For example, listen to music or go to a movie. Practice your hobby or do volunteer work.  Meditate. This can help you relax, because you are not worrying about what happened before or what may happen in the future.  Do guided imagery. Imagine yourself in any setting that helps you feel calm. You can use online videos, books, or a teacher to guide you.  Do breathing exercises. For example:  From a standing position, bend forward from the waist with your knees slightly bent. Let your arms dangle close to the floor.  Breathe in slowly and deeply as you  "return to a standing position. Roll up slowly and lift your head last.  Hold your breath for just a few seconds in the standing position.  Breathe out slowly and bend forward from the waist.  Let your feelings out. Talk, laugh, cry, and express anger when you need to. Talking with supportive friends or family, a counselor, or a norma leader about your feelings is a healthy way to relieve stress. Avoid discussing your feelings with people who make you feel worse.  Write. It may help to write about things that are bothering you. This helps you find out how much stress you feel and what is causing it. When you know this, you can find better ways to cope.  What can you do to prevent stress?  You might try some of these things to help prevent stress:  Manage your time. This helps you find time to do the things you want and need to do.  Get enough sleep. Your body recovers from the stresses of the day while you are sleeping.  Get support. Your family, friends, and community can make a difference in how you experience stress.  Limit your news feed. Avoid or limit time on social media or news that may make you feel stressed.  Do something active. Exercise or activity can help reduce stress. Walking is a great way to get started.  Where can you learn more?  Go to https://www.TORIA.net/patiented  Enter N032 in the search box to learn more about \"Learning About Stress.\"  Current as of: October 24, 2024  Content Version: 14.5 2024-2025 [employer].   Care instructions adapted under license by your healthcare professional. If you have questions about a medical condition or this instruction, always ask your healthcare professional. [employer] disclaims any warranty or liability for your use of this information.    Recovering From Depression: Care Instructions  Overview    Sticking to your treatment plan is important as you recover from depression. It may take time for your symptoms to get better " after you start treatment. Try not to give up if you don't feel better right away. Make sure you keep going to counseling and taking any prescribed medicine if they are part of your treatment plan.  Focus on things that can help you feel better, such as being with friends and family. Try to eat healthy foods, be active, and get enough sleep. Take things slowly as you begin to recover.  Follow-up care is a key part of your treatment and safety. Be sure to make and go to all appointments, and call your doctor if you are having problems. It's also a good idea to know your test results and keep a list of the medicines you take.  How can you care for yourself at home?  Be realistic  If you have a large task to do, break it up into smaller steps you can handle, and just do what you can.  You may want to put off important decisions until your depression has lifted. If you have plans that will have a major impact on your life, such as marriage, divorce, or a job change, try to wait a bit. Talk it over with friends and loved ones who can help you look at the overall picture first.  Reaching out to people for help is important. Do not isolate yourself. Let your family and friends help you. Find someone you can trust and confide in, and talk to that person.  Be patient, and be kind to yourself. Remember that depression is not your fault and is not something you can overcome with willpower alone. Treatment is important for depression, just like for any other illness. Feeling better takes time, and your mood will improve little by little.  Stay active  Stay busy and get outside. Take a walk, or try some other light exercise.  Talk with your doctor about an exercise program. Exercise can help with mild depression.  Go to a movie or concert. Take part in a Christianity activity or other social gathering. Go to a ball game.  Ask a friend to have dinner with you.  Take care of yourself  Eat healthy foods such as fresh fruits and  vegetables, whole grains, and lean protein. If you have lost your appetite, eat small snacks rather than large meals.  Avoid using marijuana and other drugs and drinking alcohol. Do not take medicines that have not been prescribed for you. They may interfere with medicines you may be taking for depression, or they may make your depression worse.  Take your medicines exactly as they are prescribed. You may start to feel better within 1 to 3 weeks of taking antidepressant medicine. But it can take as many as 6 to 8 weeks to see more improvement. If you have questions or concerns about your medicines, or if you do not notice any improvement by 3 weeks, talk to your doctor.  Continue to take your medicine after your symptoms improve. Taking your medicine for at least 6 months after you feel better can help keep you from getting depressed again. If this isn't the first time you have been depressed, your doctor may recommend you to take medicine even longer.  If you have any side effects from your medicine, tell your doctor. Many side effects are mild and will go away on their own after you have been taking the medicine for a few weeks. Some may last longer. Talk to your doctor if side effects are bothering you too much. You might be able to try a different medicine.  Continue counseling. It may help prevent depression from returning, especially if you've had multiple episodes of depression. Talk with your counselor if you are having a hard time attending your sessions or you think the sessions aren't working. Don't just stop going.  Get enough sleep. Talk to your doctor if you are having problems sleeping.  Avoid sleeping pills unless they are prescribed by the doctor treating your depression. Sleeping pills may make you groggy during the day, and they may interact with other medicine you are taking.  If you have any other illnesses, such as diabetes, heart disease, or high blood pressure, make sure to continue with your  "treatment. Tell your doctor about all of the medicines you take, including those with or without a prescription.  Where to get help 24 hours a day, 7 days a week  If you or someone you know talks about suicide, self-harm, a mental health crisis, a substance use crisis, or any other kind of emotional distress, get help right away. You can:  Call the Suicide and Crisis Lifeline at 988.  Text HOME to 501769 to access the Crisis Text Line.  Consider saving these numbers in your phone.  Go to [employer] for more information or to chat online.  Call 911 anytime you think you may need emergency care. For example, call if:  You feel like hurting yourself or someone else.  Someone you know has depression and is about to attempt or is attempting suicide.  Where to get help 24 hours a day, 7 days a week  If you or someone you know talks about suicide, self-harm, a mental health crisis, a substance use crisis, or any other kind of emotional distress, get help right away. You can:  Call the Suicide and Crisis Lifeline at Entitle8.  Text HOME to 805733 to access the Crisis Text Line.  Consider saving these numbers in your phone.  Go to [employer] for more information or to chat online.  Call your doctor now or seek immediate medical care if:  You hear voices.  Someone you know has depression and:  Starts to give away possessions.  Uses illegal drugs or drinks alcohol heavily.  Talks or writes about death, including writing suicide notes or talking about guns, knives, or pills.  Starts to spend a lot of time alone.  Acts very aggressively or suddenly appears calm.  Watch closely for changes in your health, and be sure to contact your doctor if:  You do not get better as expected.  Where can you learn more?  Go to https://www.healthwise.net/patiented  Enter N529 in the search box to learn more about \"Recovering From Depression: Care Instructions.\"  Current as of: July 31, 2024  Content Version: 14.5 2024-2025 [employer].   Care instructions adapted under license by your healthcare professional. If you have questions about a medical condition or this instruction, always ask your healthcare professional. [facility] [employer] disclaims any warranty or liability for your use of this information.

## 2025-07-23 NOTE — PROGRESS NOTES
Preventive Care Visit  [facility] CALOS Brandt MD, Family Medicine  Jul 23, 2025      Assessment & Plan     Routine general medical examination at a health care facility    Type 2 diabetes mellitus with hyperglycemia, with long-term current use of insulin (H)  Lab Results   Component Value Date    A1C 7.0 07/23/2025    A1C 9.5 08/12/2024    A1C 8.6 04/08/2024    A1C 8.6 09/11/2023    A1C 8.1 02/03/2023    A1C 8.7 07/06/2021    A1C 9.0 03/31/2021    A1C 8.8 12/30/2020    A1C 10.0 06/29/2020    A1C 7.5 02/16/2018     A1c is within goal range today.  Congratulations was given.  This is the first time he has been within goal range since 2018.  He will continue to see the endocrinologist and continue his current medications.  Discussed that we should recheck the A1c in 6 months at the maximum.    Hypertension goal BP (blood pressure) < 130/80  Blood pressure is above goal today.  Discussed this with the patient.  He states that he has not been the best about taking his medication daily.  Encouraged him to take medication daily and let me know how his blood pressures look over the next few weeks.  If still elevated could consider spironolactone.  Patient states that he has not tolerated beta-blockers (hallucinations) or thiazide diuretics well.  - BASIC METABOLIC PANEL; Future    Screening for prostate cancer  Recheck PSA  - PSA, screen; Future    Anxiety  Refill anxiety medication.  Taking 0.5 mg twice daily.  Will send 30 days of the 1 mg tablets and he can take half tablet twice daily.  - ALPRAZolam (XANAX) 1 MG tablet; Take 0.5 tablets (0.5 mg) by mouth 2 times daily as needed for anxiety.    Callus of foot  2 calluses on the bottom of left foot.  1 on great toe and 1 between 3rd and 4th MTP    Procedure note for callus paring  After verbal consent was obtained the 2 calluses on patient's left foot were cleansed with an alcohol swab and pared with a sterile 10 blade.  Patient tolerated  the procedure well with good relief of symptoms.  No foreign body noted.    Cervical myelopathy (H)  Continue to follow with pain clinic    Myelopathy in diseases classified elsewhere (H)  Continue to follow with pain clinic.  Stable.    Class 2 severe obesity due to excess calories with serious comorbidity and body mass index (BMI) of 35.0 to 35.9 in adult (H)  Patient has been losing weight on the GLP-1.  Will continue.    Patient has been advised of split billing requirements and indicates understanding: Yes    Counseling  Appropriate preventive services were addressed with this patient via screening, questionnaire, or discussion as appropriate for fall prevention, nutrition, physical activity, Tobacco-use cessation, social engagement, weight loss and cognition.  Checklist reviewing preventive services available has been given to the patient.  Reviewed patient's diet, addressing concerns and/or questions.   Patient is at risk for social isolation and has been provided with information about the benefit of social connection.   The patient was instructed to see the dentist every 6 months.   He is at risk for psychosocial distress and has been provided with information to reduce risk.   Reviewed preventive health counseling, as reflected in patient instructions        Subjective   Mati is a 60 year old, presenting for the following:  Physical        7/23/2025     9:19 AM   Additional Questions   Roomed by Kiara   Accompanied by Self          HPI  Renan is a very pleasant 60-year-old gentleman with and chronic pain who presents to the clinic today for a complete physical exam.    He is overall doing fairly well.  He has a few calluses on his left foot that he would like looked at today.  He thinks that maybe he stepped on something and is wondering if there is a foreign body embedded as well.    Otherwise, no specific questions or concerns.  Had blood work done prior to her appointment and his A1c is in a good  range at 7.0.  He is working with an endocrinologist.        Advance Care Planning    Discussed advance care planning with patient; informed AVS has link to [facility] [employer].        7/22/2025   General Health   How would you rate your overall physical health? (!) POOR   Feel stress (tense, anxious, or unable to sleep) Very much   (!) STRESS CONCERN      7/22/2025   Nutrition   Three or more servings of calcium each day? Yes   Diet: Regular (no restrictions)   How many servings of fruit and vegetables per day? (!) 0-1   How many sweetened beverages each day? 0-1         7/22/2025   Exercise   Days per week of moderate/strenous exercise 0 days   Average minutes spent exercising at this level 0 min   (!) EXERCISE CONCERN      7/22/2025   Social Factors   Frequency of gathering with friends or relatives Never   Worry food won't last until get money to buy more Yes   Food not last or not have enough money for food? Yes   Do you have housing? (Housing is defined as stable permanent housing and does not include staying outside in a car, in a tent, in an abandoned building, in an overnight shelter, or couch-surfing.) Yes   Are you worried about losing your housing? No   Lack of transportation? No   Unable to get utilities (heat,electricity)? Yes   Want help with housing or utility concern? No   (!) FOOD SECURITY CONCERN PRESENT(!) FINANCIAL RESOURCE STRAIN CONCERN(!) SOCIAL CONNECTIONS CONCERN      7/23/2025   Fall Risk   Gait Speed Test (Document in seconds) 5   Gait Speed Test Interpretation Less than or equal to 5.00 seconds - PASS          7/22/2025   Dental   Dentist two times every year? (!) NO       Today's PHQ-9 Score:       7/22/2025    12:44 PM   PHQ-9 SCORE   PHQ-9 Total Score MyChart 12 (Moderate depression)   PHQ-9 Total Score 12        Patient-reported         7/22/2025   Substance Use   Alcohol more than 3/day or more than 7/wk Not Applicable   Do you use any other substances recreationally? No     Social  History     Tobacco Use    Smoking status: Never    Smokeless tobacco: Never   Vaping Use    Vaping status: Never Used   Substance Use Topics    Alcohol use: Never    Drug use: Never           7/22/2025   STI Screening   New sexual partner(s) since last STI/HIV test? No   Last PSA:   PSA   Date Value Ref Range Status   02/02/2021 0.74 0 - 4 ug/L Final     Comment:     Assay Method:  Chemiluminescence using Siemens Vista analyzer     Prostate Specific Antigen Screen   Date Value Ref Range Status   02/03/2023 0.59 0.00 - 3.50 ng/mL Final     ASCVD Risk   The ASCVD Risk score (Sofia ELISE, et al., 2019) failed to calculate for the following reasons:    The valid total cholesterol range is 130 to 320 mg/dL           Reviewed and updated as needed this visit by Provider                    Past Medical History:   Diagnosis Date    Arthritis 2017    Chronic pain syndrome 01/04/2008    Previously followed by Dr. Moran at [facility], then left in 2008, then started seeing Dr. Lo at [facility] in [place].  Plans to continue with this provider.  Here to establish care/PCP, does not want/need me to manage his chronic narcotics or pain.    Had neck injury in 2000, and is s/p Cervical Fusion x 2 (2-6 presently).  Currently on Vicodin 5mg/325 QID.  Feels this helps the pain, though does not completely control pain.  He has been MS Contin, and OxyContin (briefly).  Had been on Neurontin (x 1 week- excessive SE), Amitriptyline, Tegretol (mental slow).  Has not tried Effexor or Cymbalta.   Is s/p SCS for lumbar radiculitis- which did not work and had it removed.  Has tried PT and numerous spine injection  Plans for some Carpal Tunnel surgery (pending EMG soon).  Has been told that he could have spine surgery, but is holding off surgery for as long as he can.       Depressive disorder 1985    Diabetes (H) 2015    History of spinal cord injury 2000    3 fusion    Hypertension 2010    Treated with  "Lisinopril    Injury of male genital organ 1982    Testicular Torsion. Left testicle removed    Tinnitus of both ears 01/16/2014    Pt reports life-long ringing in both ears.  Was raised around farm equipment and airplanes.  Feels these are major contributors.  Has had audiology recently, per pt has mild hearing loss left worse than right and positive for tinnitus.  However they said there was no treatment available for tinnitus.        Past Surgical History:   Procedure Laterality Date    ?? previous implanted morphine pump??  during 1990's    eventually explanted due to infection    ABDOMEN SURGERY  Kori ectomy, appendix renoval    1980's    AMPUTATION  June 22 1974    Successfully reattached    APPENDECTOMY      APPENDECTOMY OPEN      BACK SURGERY      COLONOSCOPY  2020    Severe diverticulosis    FUSION CERVICAL ANTERIOR THREE+ LEVELS      C2-6    GENITOURINARY SURGERY  Orchidectomy    HEAD & NECK SURGERY  [id number]    C2-4, C4-61    NECK SURGERY      ORTHOPEDIC SURGERY  Osteomyelitis    2 x Failure to remove cord stimulater caused severe infection    OTHER SURGICAL HISTORY      Multiple surgeries to right lower leg    OTHER SURGICAL HISTORY      rotator cuff surgery, right    right lower leg limb reattachment      skin grafts      many    SOFT TISSUE SURGERY  1974    TESTICLE SURGERY      THORACIC SURGERY      Fusion    VASECTOMY           Review of Systems  Constitutional, HEENT, cardiovascular, pulmonary, GI, , musculoskeletal, neuro, skin, endocrine and psych systems are negative, except as otherwise noted.     Objective    Exam  BP (!) 165/95   Pulse 108   Temp 97.8  F (36.6  C) (Tympanic)   Resp 17   Ht 1.695 m (5' 6.73\")   Wt 101.6 kg (224 lb)   SpO2 99%   BMI 35.37 kg/m     Estimated body mass index is 35.37 kg/m  as calculated from the following:    Height as of this encounter: 1.695 m (5' 6.73\").    Weight as of this encounter: 101.6 kg (224 lb).    Physical Exam  Objective:  Vital signs " reviewed and stable  General: No acute distress  Psych: Appropriate affect  HEENT: moist mucous membranes, pupils equal, round, reactive to light and accomodation, tympanic membranes are pearly grey bilaterally  Lymph: no cervical or supraclavicular lymphadenopathy  Cardiovascular: regular rate and rhythm with no murmur  Pulmonary: clear to auscultation bilaterally with no wheeze  Abdomen: soft, non tender, non distended with normo-active bowel sounds  Extremities: warm and well perfused with no edema  Skin: warm and dry with no rash, 2 calluses on the bottom of left foot.  1 on great toe and 1 between 3rd and 4th MTP          Signed Electronically by: Rocío Brandt MD    Answers submitted by the patient for this visit:  Patient Health Questionnaire (Submitted on 7/22/2025)  If you checked off any problems, how difficult have these problems made it for you to do your work, take care of things at home, or get along with other people?: Very difficult  PHQ9 TOTAL SCORE: 12

## 2025-07-27 ENCOUNTER — MYC MEDICAL ADVICE (OUTPATIENT)
Dept: FAMILY MEDICINE | Facility: CLINIC | Age: 60
End: 2025-07-27
Payer: COMMERCIAL

## 2025-07-28 LAB
DEPRECATED CALCIDIOL+CALCIFEROL SERPL-MC: <19 UG/L (ref 20–75)
VITAMIN D2 SERPL-MCNC: <5 UG/L
VITAMIN D3 SERPL-MCNC: 14 UG/L

## (undated) RX ORDER — LIDOCAINE HYDROCHLORIDE 10 MG/ML
INJECTION, SOLUTION EPIDURAL; INFILTRATION; INTRACAUDAL; PERINEURAL
Status: DISPENSED
Start: 2018-02-16